# Patient Record
Sex: MALE | Race: WHITE | NOT HISPANIC OR LATINO | Employment: OTHER | URBAN - METROPOLITAN AREA
[De-identification: names, ages, dates, MRNs, and addresses within clinical notes are randomized per-mention and may not be internally consistent; named-entity substitution may affect disease eponyms.]

---

## 2018-06-07 LAB
ALBUMIN SERPL BCP-MCNC: 4.2 G/DL (ref 3.5–5.7)
ALP SERPL-CCNC: 82 IU/L (ref 55–165)
ALT SERPL W P-5'-P-CCNC: 16 IU/L (ref 10–35)
ANION GAP SERPL CALCULATED.3IONS-SCNC: 13.2 MM/L
AST SERPL W P-5'-P-CCNC: 17 U/L (ref 8–27)
BASOPHILS # BLD AUTO: 0 X3/UL (ref 0–0.3)
BASOPHILS # BLD AUTO: 0.5 % (ref 0–2)
BILIRUB SERPL-MCNC: 0.6 MG/DL (ref 0.3–1)
BUN SERPL-MCNC: 17 MG/DL (ref 7–25)
CALCIUM SERPL-MCNC: 9.5 MG/DL (ref 8.6–10.5)
CHLORIDE SERPL-SCNC: 102 MM/L (ref 98–107)
CHOLEST SERPL-MCNC: 138 MG/DL (ref 0–200)
CO2 SERPL-SCNC: 26 MM/L (ref 21–31)
CREAT SERPL-MCNC: 1.12 MG/DL (ref 0.7–1.3)
DEPRECATED RDW RBC AUTO: 14.2 % (ref 11.5–14.5)
EGFR (HISTORICAL): > 60 GFR
EGFR AFRICAN AMERICAN (HISTORICAL): > 60 GFR
EOSINOPHIL # BLD AUTO: 0 X3/UL (ref 0–0.5)
EOSINOPHIL NFR BLD AUTO: 0.9 % (ref 0–5)
GLUCOSE (HISTORICAL): 125 MG/DL (ref 65–99)
HCT VFR BLD AUTO: 45.3 % (ref 42–52)
HDLC SERPL-MCNC: 44 MG/DL (ref 40–60)
HGB BLD-MCNC: 15.6 G/DL (ref 14–18)
LDLC SERPL CALC-MCNC: 66.3 MG/DL (ref 75–193)
LYMPHOCYTES # BLD AUTO: 1.8 X3/UL (ref 1.2–4.2)
LYMPHOCYTES NFR BLD AUTO: 33.3 % (ref 20.5–51.1)
MCH RBC QN AUTO: 32.8 PG (ref 26–34)
MCHC RBC AUTO-ENTMCNC: 34.5 G/DL (ref 31–36)
MCV RBC AUTO: 95.1 FL (ref 81–99)
MONOCYTES # BLD AUTO: 0.4 X3/UL (ref 0–1)
MONOCYTES NFR BLD AUTO: 8.2 % (ref 1.7–12)
NEUTROPHILS # BLD AUTO: 3.1 X3/UL (ref 1.4–6.5)
NEUTS SEG NFR BLD AUTO: 57.1 % (ref 42.2–75.2)
OSMOLALITY, SERUM (HISTORICAL): 277 MOSM (ref 262–291)
PLATELET # BLD AUTO: 258 X3/UL (ref 130–400)
PMV BLD AUTO: 9.6 FL (ref 8.6–11.7)
POTASSIUM SERPL-SCNC: 4.2 MM/L (ref 3.5–5.5)
RBC # BLD AUTO: 4.76 X6/UL (ref 4.3–5.9)
SODIUM SERPL-SCNC: 137 MM/L (ref 134–143)
TOTAL PROTEIN (HISTORICAL): 6.8 G/DL (ref 6.4–8.9)
TRIGL SERPL-MCNC: 140 MG/DL (ref 44–166)
VLDL CHOLESTEROL (HISTORICAL): 28 MG/DL (ref 5–51)
WBC # BLD AUTO: 5.4 X3/UL (ref 4.8–10.8)

## 2018-08-17 DIAGNOSIS — I10 HYPERTENSION, UNSPECIFIED TYPE: Primary | ICD-10-CM

## 2018-08-17 RX ORDER — METOPROLOL SUCCINATE 50 MG/1
50 TABLET, EXTENDED RELEASE ORAL DAILY
Qty: 90 TABLET | Refills: 3 | Status: SHIPPED | OUTPATIENT
Start: 2018-08-17 | End: 2019-05-29 | Stop reason: SDUPTHER

## 2019-05-29 ENCOUNTER — OFFICE VISIT (OUTPATIENT)
Dept: CARDIOLOGY CLINIC | Facility: CLINIC | Age: 69
End: 2019-05-29
Payer: MEDICARE

## 2019-05-29 VITALS
DIASTOLIC BLOOD PRESSURE: 94 MMHG | HEART RATE: 58 BPM | HEIGHT: 70 IN | SYSTOLIC BLOOD PRESSURE: 136 MMHG | BODY MASS INDEX: 28.2 KG/M2 | WEIGHT: 197 LBS

## 2019-05-29 DIAGNOSIS — I25.10 CORONARY ARTERY DISEASE INVOLVING NATIVE CORONARY ARTERY OF NATIVE HEART WITHOUT ANGINA PECTORIS: Primary | ICD-10-CM

## 2019-05-29 DIAGNOSIS — I10 HYPERTENSION, UNSPECIFIED TYPE: ICD-10-CM

## 2019-05-29 DIAGNOSIS — E78.2 MIXED HYPERLIPIDEMIA: ICD-10-CM

## 2019-05-29 PROCEDURE — 93000 ELECTROCARDIOGRAM COMPLETE: CPT | Performed by: INTERNAL MEDICINE

## 2019-05-29 PROCEDURE — 99214 OFFICE O/P EST MOD 30 MIN: CPT | Performed by: PHYSICIAN ASSISTANT

## 2019-05-29 RX ORDER — ATORVASTATIN CALCIUM 80 MG/1
80 TABLET, FILM COATED ORAL DAILY
Qty: 90 TABLET | Refills: 3 | Status: SHIPPED | OUTPATIENT
Start: 2019-05-29

## 2019-05-29 RX ORDER — ESCITALOPRAM OXALATE 20 MG/1
20 TABLET ORAL DAILY
Refills: 1 | Status: ON HOLD | COMMUNITY
Start: 2019-05-03 | End: 2021-02-24

## 2019-05-29 RX ORDER — ATORVASTATIN CALCIUM 80 MG/1
80 TABLET, FILM COATED ORAL DAILY
Refills: 3 | COMMUNITY
Start: 2019-04-16 | End: 2019-05-29 | Stop reason: SDUPTHER

## 2019-05-29 RX ORDER — METOPROLOL SUCCINATE 50 MG/1
50 TABLET, EXTENDED RELEASE ORAL DAILY
Qty: 90 TABLET | Refills: 3 | Status: ON HOLD | OUTPATIENT
Start: 2019-05-29 | End: 2021-02-24

## 2019-05-29 RX ORDER — ASPIRIN 81 MG/1
81 TABLET ORAL DAILY
COMMUNITY

## 2020-02-25 ENCOUNTER — APPOINTMENT (OUTPATIENT)
Dept: LAB | Facility: HOSPITAL | Age: 70
End: 2020-02-25
Attending: OTOLARYNGOLOGY
Payer: MEDICARE

## 2020-02-25 ENCOUNTER — HOSPITAL ENCOUNTER (OUTPATIENT)
Dept: CT IMAGING | Facility: HOSPITAL | Age: 70
Discharge: HOME | End: 2020-02-25
Attending: OTOLARYNGOLOGY
Payer: MEDICARE

## 2020-02-25 DIAGNOSIS — H90.A32 MIXED CONDUCTIVE AND SENSORINEURAL HEARING LOSS OF LEFT EAR WITH RESTRICTED HEARING OF RIGHT EAR: ICD-10-CM

## 2020-02-25 LAB
BUN SERPL-MCNC: 18 MG/DL (ref 7–25)
CREAT SERPL-MCNC: 1.37 MG/DL (ref 0.7–1.3)
GFR SERPL CREATININE-BSD FRML MDRD: 53 ML/MIN/1.73SQ M

## 2020-02-25 PROCEDURE — 84520 ASSAY OF UREA NITROGEN: CPT

## 2020-02-25 PROCEDURE — 82565 ASSAY OF CREATININE: CPT

## 2020-02-25 PROCEDURE — 36415 COLL VENOUS BLD VENIPUNCTURE: CPT

## 2020-02-25 PROCEDURE — 70480 CT ORBIT/EAR/FOSSA W/O DYE: CPT

## 2020-05-08 ENCOUNTER — OFFICE VISIT (OUTPATIENT)
Dept: CARDIOLOGY CLINIC | Facility: CLINIC | Age: 70
End: 2020-05-08
Payer: MEDICARE

## 2020-05-08 VITALS
WEIGHT: 200 LBS | BODY MASS INDEX: 28.63 KG/M2 | SYSTOLIC BLOOD PRESSURE: 124 MMHG | HEART RATE: 55 BPM | HEIGHT: 70 IN | DIASTOLIC BLOOD PRESSURE: 82 MMHG

## 2020-05-08 DIAGNOSIS — I25.10 CORONARY ARTERY DISEASE INVOLVING NATIVE CORONARY ARTERY OF NATIVE HEART WITHOUT ANGINA PECTORIS: ICD-10-CM

## 2020-05-08 DIAGNOSIS — I10 HYPERTENSION, UNSPECIFIED TYPE: Primary | ICD-10-CM

## 2020-05-08 DIAGNOSIS — E78.2 MIXED HYPERLIPIDEMIA: ICD-10-CM

## 2020-05-08 PROCEDURE — 99214 OFFICE O/P EST MOD 30 MIN: CPT | Performed by: INTERNAL MEDICINE

## 2020-05-08 PROCEDURE — 93000 ELECTROCARDIOGRAM COMPLETE: CPT | Performed by: INTERNAL MEDICINE

## 2020-07-15 ENCOUNTER — TRANSCRIBE ORDERS (OUTPATIENT)
Dept: LAB | Facility: MEDICAL CENTER | Age: 70
End: 2020-07-15

## 2020-07-15 ENCOUNTER — APPOINTMENT (OUTPATIENT)
Dept: LAB | Facility: MEDICAL CENTER | Age: 70
End: 2020-07-15
Payer: MEDICARE

## 2020-07-15 DIAGNOSIS — I25.10 ASCVD (ARTERIOSCLEROTIC CARDIOVASCULAR DISEASE): ICD-10-CM

## 2020-07-15 DIAGNOSIS — I10 HYPERTENSION, UNSPECIFIED TYPE: ICD-10-CM

## 2020-07-15 DIAGNOSIS — I25.10 ASCVD (ARTERIOSCLEROTIC CARDIOVASCULAR DISEASE): Primary | ICD-10-CM

## 2020-07-15 DIAGNOSIS — E78.5 HYPERLIPIDEMIA, UNSPECIFIED HYPERLIPIDEMIA TYPE: ICD-10-CM

## 2020-07-15 LAB
ALBUMIN SERPL BCP-MCNC: 4.1 G/DL (ref 3.5–5)
ALP SERPL-CCNC: 99 U/L (ref 46–116)
ALT SERPL W P-5'-P-CCNC: 23 U/L (ref 12–78)
ANION GAP SERPL CALCULATED.3IONS-SCNC: 5 MMOL/L (ref 4–13)
AST SERPL W P-5'-P-CCNC: 17 U/L (ref 5–45)
BASOPHILS # BLD AUTO: 0.03 THOUSANDS/ΜL (ref 0–0.1)
BASOPHILS NFR BLD AUTO: 1 % (ref 0–1)
BILIRUB SERPL-MCNC: 1.1 MG/DL (ref 0.2–1)
BUN SERPL-MCNC: 18 MG/DL (ref 5–25)
CALCIUM SERPL-MCNC: 9.4 MG/DL (ref 8.3–10.1)
CHLORIDE SERPL-SCNC: 104 MMOL/L (ref 100–108)
CHOLEST SERPL-MCNC: 135 MG/DL (ref 50–200)
CO2 SERPL-SCNC: 26 MMOL/L (ref 21–32)
CREAT SERPL-MCNC: 1.12 MG/DL (ref 0.6–1.3)
EOSINOPHIL # BLD AUTO: 0.02 THOUSAND/ΜL (ref 0–0.61)
EOSINOPHIL NFR BLD AUTO: 0 % (ref 0–6)
ERYTHROCYTE [DISTWIDTH] IN BLOOD BY AUTOMATED COUNT: 13.6 % (ref 11.6–15.1)
GFR SERPL CREATININE-BSD FRML MDRD: 67 ML/MIN/1.73SQ M
GLUCOSE P FAST SERPL-MCNC: 89 MG/DL (ref 65–99)
HCT VFR BLD AUTO: 49.4 % (ref 36.5–49.3)
HDLC SERPL-MCNC: 45 MG/DL
HGB BLD-MCNC: 16.5 G/DL (ref 12–17)
IMM GRANULOCYTES # BLD AUTO: 0.02 THOUSAND/UL (ref 0–0.2)
IMM GRANULOCYTES NFR BLD AUTO: 0 % (ref 0–2)
LDLC SERPL CALC-MCNC: 75 MG/DL (ref 0–100)
LYMPHOCYTES # BLD AUTO: 1.76 THOUSANDS/ΜL (ref 0.6–4.47)
LYMPHOCYTES NFR BLD AUTO: 32 % (ref 14–44)
MCH RBC QN AUTO: 31.1 PG (ref 26.8–34.3)
MCHC RBC AUTO-ENTMCNC: 33.4 G/DL (ref 31.4–37.4)
MCV RBC AUTO: 93 FL (ref 82–98)
MONOCYTES # BLD AUTO: 0.49 THOUSAND/ΜL (ref 0.17–1.22)
MONOCYTES NFR BLD AUTO: 9 % (ref 4–12)
NEUTROPHILS # BLD AUTO: 3.2 THOUSANDS/ΜL (ref 1.85–7.62)
NEUTS SEG NFR BLD AUTO: 58 % (ref 43–75)
NONHDLC SERPL-MCNC: 90 MG/DL
NRBC BLD AUTO-RTO: 0 /100 WBCS
PLATELET # BLD AUTO: 312 THOUSANDS/UL (ref 149–390)
PMV BLD AUTO: 11.6 FL (ref 8.9–12.7)
POTASSIUM SERPL-SCNC: 3.8 MMOL/L (ref 3.5–5.3)
PROT SERPL-MCNC: 7.8 G/DL (ref 6.4–8.2)
PSA SERPL-MCNC: <0.1 NG/ML (ref 0–4)
RBC # BLD AUTO: 5.3 MILLION/UL (ref 3.88–5.62)
SODIUM SERPL-SCNC: 135 MMOL/L (ref 136–145)
TRIGL SERPL-MCNC: 74 MG/DL
WBC # BLD AUTO: 5.52 THOUSAND/UL (ref 4.31–10.16)

## 2020-07-15 PROCEDURE — G0103 PSA SCREENING: HCPCS

## 2020-07-15 PROCEDURE — 80061 LIPID PANEL: CPT

## 2020-07-15 PROCEDURE — 80053 COMPREHEN METABOLIC PANEL: CPT

## 2020-07-15 PROCEDURE — 85025 COMPLETE CBC W/AUTO DIFF WBC: CPT

## 2020-07-15 PROCEDURE — 36415 COLL VENOUS BLD VENIPUNCTURE: CPT

## 2020-11-13 ENCOUNTER — HOSPITAL ENCOUNTER (EMERGENCY)
Facility: HOSPITAL | Age: 70
Discharge: HOME | End: 2020-11-13
Attending: INTERNAL MEDICINE | Admitting: INTERNAL MEDICINE
Payer: MEDICARE

## 2020-11-13 ENCOUNTER — APPOINTMENT (EMERGENCY)
Dept: RADIOLOGY | Facility: HOSPITAL | Age: 70
End: 2020-11-13
Payer: MEDICARE

## 2020-11-13 VITALS
BODY MASS INDEX: 26.48 KG/M2 | WEIGHT: 185 LBS | TEMPERATURE: 97.4 F | DIASTOLIC BLOOD PRESSURE: 97 MMHG | SYSTOLIC BLOOD PRESSURE: 170 MMHG | OXYGEN SATURATION: 97 % | HEIGHT: 70 IN | HEART RATE: 72 BPM | RESPIRATION RATE: 16 BRPM

## 2020-11-13 DIAGNOSIS — S43.014A ANTERIOR DISLOCATION OF RIGHT SHOULDER, INITIAL ENCOUNTER: Primary | ICD-10-CM

## 2020-11-13 PROCEDURE — 73030 X-RAY EXAM OF SHOULDER: CPT

## 2020-11-13 PROCEDURE — 99283 EMERGENCY DEPT VISIT LOW MDM: CPT

## 2020-11-13 PROCEDURE — 23650 CLTX SHO DSLC W/MNPJ WO ANES: CPT | Performed by: INTERNAL MEDICINE

## 2020-11-13 PROCEDURE — 99284 EMERGENCY DEPT VISIT MOD MDM: CPT | Performed by: INTERNAL MEDICINE

## 2020-11-13 PROCEDURE — 90471 IMMUNIZATION ADMIN: CPT

## 2020-11-13 PROCEDURE — 96375 TX/PRO/DX INJ NEW DRUG ADDON: CPT

## 2020-11-13 PROCEDURE — 90715 TDAP VACCINE 7 YRS/> IM: CPT | Performed by: INTERNAL MEDICINE

## 2020-11-13 PROCEDURE — 96374 THER/PROPH/DIAG INJ IV PUSH: CPT

## 2020-11-13 RX ORDER — MIDAZOLAM HYDROCHLORIDE 2 MG/2ML
5 INJECTION, SOLUTION INTRAMUSCULAR; INTRAVENOUS ONCE
Status: DISCONTINUED | OUTPATIENT
Start: 2020-11-13 | End: 2020-11-13 | Stop reason: HOSPADM

## 2020-11-13 RX ORDER — HYDROMORPHONE HCL/PF 1 MG/ML
0.5 SYRINGE (ML) INJECTION ONCE
Status: COMPLETED | OUTPATIENT
Start: 2020-11-13 | End: 2020-11-13

## 2020-11-13 RX ORDER — KETOROLAC TROMETHAMINE 30 MG/ML
15 INJECTION, SOLUTION INTRAMUSCULAR; INTRAVENOUS ONCE
Status: COMPLETED | OUTPATIENT
Start: 2020-11-13 | End: 2020-11-13

## 2020-11-13 RX ADMIN — KETOROLAC TROMETHAMINE 15 MG: 30 INJECTION, SOLUTION INTRAMUSCULAR; INTRAVENOUS at 17:50

## 2020-11-13 RX ADMIN — HYDROMORPHONE HYDROCHLORIDE 0.5 MG: 1 INJECTION, SOLUTION INTRAMUSCULAR; INTRAVENOUS; SUBCUTANEOUS at 18:44

## 2020-11-13 RX ADMIN — TETANUS TOXOID, REDUCED DIPHTHERIA TOXOID AND ACELLULAR PERTUSSIS VACCINE, ADSORBED 0.5 ML: 5; 2.5; 8; 8; 2.5 SUSPENSION INTRAMUSCULAR at 17:02

## 2020-11-17 ENCOUNTER — OFFICE VISIT (OUTPATIENT)
Dept: OBGYN CLINIC | Facility: CLINIC | Age: 70
End: 2020-11-17
Payer: MEDICARE

## 2020-11-17 VITALS
WEIGHT: 192 LBS | TEMPERATURE: 97.5 F | BODY MASS INDEX: 27.49 KG/M2 | DIASTOLIC BLOOD PRESSURE: 88 MMHG | SYSTOLIC BLOOD PRESSURE: 149 MMHG | HEIGHT: 70 IN | HEART RATE: 91 BPM

## 2020-11-17 DIAGNOSIS — S43.004A DISLOCATION OF RIGHT SHOULDER JOINT, INITIAL ENCOUNTER: Primary | ICD-10-CM

## 2020-11-17 PROCEDURE — 99203 OFFICE O/P NEW LOW 30 MIN: CPT | Performed by: ORTHOPAEDIC SURGERY

## 2020-12-15 ENCOUNTER — APPOINTMENT (OUTPATIENT)
Dept: RADIOLOGY | Facility: CLINIC | Age: 70
End: 2020-12-15
Payer: MEDICARE

## 2020-12-15 ENCOUNTER — OFFICE VISIT (OUTPATIENT)
Dept: OBGYN CLINIC | Facility: CLINIC | Age: 70
End: 2020-12-15
Payer: MEDICARE

## 2020-12-15 VITALS
WEIGHT: 192 LBS | SYSTOLIC BLOOD PRESSURE: 138 MMHG | HEART RATE: 78 BPM | HEIGHT: 70 IN | DIASTOLIC BLOOD PRESSURE: 95 MMHG | BODY MASS INDEX: 27.49 KG/M2

## 2020-12-15 DIAGNOSIS — S43.004D DISLOCATION OF RIGHT SHOULDER JOINT, SUBSEQUENT ENCOUNTER: Primary | ICD-10-CM

## 2020-12-15 DIAGNOSIS — S43.004D DISLOCATION OF RIGHT SHOULDER JOINT, SUBSEQUENT ENCOUNTER: ICD-10-CM

## 2020-12-15 PROCEDURE — 99213 OFFICE O/P EST LOW 20 MIN: CPT | Performed by: ORTHOPAEDIC SURGERY

## 2020-12-15 PROCEDURE — 73030 X-RAY EXAM OF SHOULDER: CPT

## 2021-01-26 ENCOUNTER — OFFICE VISIT (OUTPATIENT)
Dept: OBGYN CLINIC | Facility: CLINIC | Age: 71
End: 2021-01-26
Payer: MEDICARE

## 2021-01-26 VITALS
BODY MASS INDEX: 28.2 KG/M2 | SYSTOLIC BLOOD PRESSURE: 165 MMHG | WEIGHT: 197 LBS | HEART RATE: 91 BPM | HEIGHT: 70 IN | DIASTOLIC BLOOD PRESSURE: 103 MMHG

## 2021-01-26 DIAGNOSIS — S43.004D DISLOCATION OF RIGHT SHOULDER JOINT, SUBSEQUENT ENCOUNTER: Primary | ICD-10-CM

## 2021-01-26 DIAGNOSIS — M75.101 TEAR OF RIGHT ROTATOR CUFF, UNSPECIFIED TEAR EXTENT, UNSPECIFIED WHETHER TRAUMATIC: ICD-10-CM

## 2021-01-26 PROCEDURE — 99213 OFFICE O/P EST LOW 20 MIN: CPT | Performed by: ORTHOPAEDIC SURGERY

## 2021-01-26 NOTE — PROGRESS NOTES
Assessment/Plan:    No problem-specific Assessment & Plan notes found for this encounter  Diagnoses and all orders for this visit:    Dislocation of right shoulder joint, subsequent encounter    Tear of right rotator cuff, unspecified tear extent, unspecified whether traumatic           the patient has persistent pain and weakness  An MRI is ordered for his right shoulder to look for rotator cuff tear  He returned back once the MRI is   Complete    Subjective:      Patient ID: Yan Arango  is a 72 y o  male  HPI      the patient is 2 months status post right shoulder dislocation  He states he still has some pain and weakness present  He states his range of motion is improved however  He denies any numbness or tingling  He denies any fever chills  The following portions of the patient's history were reviewed and updated as appropriate: allergies, current medications, past family history, past medical history, past social history, past surgical history and problem list     Review of Systems   Constitutional: Negative for chills, fever and unexpected weight change  HENT: Negative for hearing loss, nosebleeds and sore throat  Eyes: Negative for pain, redness and visual disturbance  Respiratory: Negative for cough, shortness of breath and wheezing  Cardiovascular: Negative for chest pain, palpitations and leg swelling  Gastrointestinal: Negative for abdominal pain, nausea and vomiting  Endocrine: Negative for polydipsia and polyuria  Genitourinary: Negative for dysuria and hematuria  Musculoskeletal: Positive for arthralgias, joint swelling and myalgias  Negative for back pain, gait problem, neck pain and neck stiffness  As noted in HPI   Skin: Negative for rash and wound  Neurological: Negative for dizziness, numbness and headaches  Psychiatric/Behavioral: Negative for decreased concentration and suicidal ideas  The patient is not nervous/anxious  Objective:      BP (!) 165/103 (BP Location: Left arm, Patient Position: Sitting, Cuff Size: Large)   Pulse 91   Ht 5' 10" (1 778 m)   Wt 89 4 kg (197 lb)   BMI 28 27 kg/m²          Physical Exam        Right upper extremity is neurovascular intact  Fingers are pink and mobile  Compartments are soft  Range of motion of her shoulder is improved with 110° of forward flexion and abduction  Internal rotation to L3-L4  Abductor strength is 3-1/2 to 4/5  There is a positive drop-arm test   There is mild signs of impingement  No gross instability  No crepitation is present

## 2021-02-04 ENCOUNTER — HOSPITAL ENCOUNTER (OUTPATIENT)
Dept: MRI IMAGING | Facility: HOSPITAL | Age: 71
Discharge: HOME | End: 2021-02-04
Attending: ORTHOPAEDIC SURGERY
Payer: MEDICARE

## 2021-02-04 DIAGNOSIS — M75.101 TEAR OF RIGHT ROTATOR CUFF, UNSPECIFIED TEAR EXTENT, UNSPECIFIED WHETHER TRAUMATIC: ICD-10-CM

## 2021-02-04 DIAGNOSIS — S43.004D DISLOCATION OF RIGHT SHOULDER JOINT, SUBSEQUENT ENCOUNTER: ICD-10-CM

## 2021-02-04 PROCEDURE — 73221 MRI JOINT UPR EXTREM W/O DYE: CPT

## 2021-02-04 PROCEDURE — G1004 CDSM NDSC: HCPCS

## 2021-02-09 ENCOUNTER — OFFICE VISIT (OUTPATIENT)
Dept: OBGYN CLINIC | Facility: CLINIC | Age: 71
End: 2021-02-09
Payer: MEDICARE

## 2021-02-09 ENCOUNTER — APPOINTMENT (OUTPATIENT)
Dept: LAB | Facility: CLINIC | Age: 71
End: 2021-02-09
Payer: MEDICARE

## 2021-02-09 ENCOUNTER — PREP FOR PROCEDURE (OUTPATIENT)
Dept: OBGYN CLINIC | Facility: CLINIC | Age: 71
End: 2021-02-09

## 2021-02-09 VITALS
HEART RATE: 86 BPM | WEIGHT: 197 LBS | SYSTOLIC BLOOD PRESSURE: 142 MMHG | BODY MASS INDEX: 28.2 KG/M2 | DIASTOLIC BLOOD PRESSURE: 88 MMHG | HEIGHT: 70 IN

## 2021-02-09 DIAGNOSIS — M75.121 COMPLETE TEAR OF RIGHT ROTATOR CUFF, UNSPECIFIED WHETHER TRAUMATIC: Primary | ICD-10-CM

## 2021-02-09 DIAGNOSIS — Z01.812 PRE-OPERATIVE LABORATORY EXAMINATION: ICD-10-CM

## 2021-02-09 PROBLEM — S46.011A TRAUMATIC COMPLETE TEAR OF RIGHT ROTATOR CUFF: Status: ACTIVE | Noted: 2021-02-09

## 2021-02-09 LAB
ANION GAP SERPL CALCULATED.3IONS-SCNC: 4 MMOL/L (ref 4–13)
BASOPHILS # BLD AUTO: 0.05 THOUSANDS/ΜL (ref 0–0.1)
BASOPHILS NFR BLD AUTO: 1 % (ref 0–1)
BUN SERPL-MCNC: 16 MG/DL (ref 5–25)
CALCIUM SERPL-MCNC: 10.1 MG/DL (ref 8.3–10.1)
CHLORIDE SERPL-SCNC: 109 MMOL/L (ref 100–108)
CO2 SERPL-SCNC: 28 MMOL/L (ref 21–32)
CREAT SERPL-MCNC: 1.2 MG/DL (ref 0.6–1.3)
EOSINOPHIL # BLD AUTO: 0.03 THOUSAND/ΜL (ref 0–0.61)
EOSINOPHIL NFR BLD AUTO: 1 % (ref 0–6)
ERYTHROCYTE [DISTWIDTH] IN BLOOD BY AUTOMATED COUNT: 14.4 % (ref 11.6–15.1)
GFR SERPL CREATININE-BSD FRML MDRD: 61 ML/MIN/1.73SQ M
GLUCOSE P FAST SERPL-MCNC: 79 MG/DL (ref 65–99)
HCT VFR BLD AUTO: 52.8 % (ref 36.5–49.3)
HGB BLD-MCNC: 17.1 G/DL (ref 12–17)
IMM GRANULOCYTES # BLD AUTO: 0.01 THOUSAND/UL (ref 0–0.2)
IMM GRANULOCYTES NFR BLD AUTO: 0 % (ref 0–2)
LYMPHOCYTES # BLD AUTO: 1.71 THOUSANDS/ΜL (ref 0.6–4.47)
LYMPHOCYTES NFR BLD AUTO: 30 % (ref 14–44)
MCH RBC QN AUTO: 30.1 PG (ref 26.8–34.3)
MCHC RBC AUTO-ENTMCNC: 32.4 G/DL (ref 31.4–37.4)
MCV RBC AUTO: 93 FL (ref 82–98)
MONOCYTES # BLD AUTO: 0.55 THOUSAND/ΜL (ref 0.17–1.22)
MONOCYTES NFR BLD AUTO: 10 % (ref 4–12)
NEUTROPHILS # BLD AUTO: 3.31 THOUSANDS/ΜL (ref 1.85–7.62)
NEUTS SEG NFR BLD AUTO: 58 % (ref 43–75)
NRBC BLD AUTO-RTO: 0 /100 WBCS
PLATELET # BLD AUTO: 389 THOUSANDS/UL (ref 149–390)
PMV BLD AUTO: 11 FL (ref 8.9–12.7)
POTASSIUM SERPL-SCNC: 4.2 MMOL/L (ref 3.5–5.3)
RBC # BLD AUTO: 5.69 MILLION/UL (ref 3.88–5.62)
SODIUM SERPL-SCNC: 141 MMOL/L (ref 136–145)
WBC # BLD AUTO: 5.66 THOUSAND/UL (ref 4.31–10.16)

## 2021-02-09 PROCEDURE — 99213 OFFICE O/P EST LOW 20 MIN: CPT | Performed by: ORTHOPAEDIC SURGERY

## 2021-02-09 PROCEDURE — 36415 COLL VENOUS BLD VENIPUNCTURE: CPT

## 2021-02-09 PROCEDURE — 80048 BASIC METABOLIC PNL TOTAL CA: CPT

## 2021-02-09 PROCEDURE — 85025 COMPLETE CBC W/AUTO DIFF WBC: CPT

## 2021-02-09 RX ORDER — CHLORHEXIDINE GLUCONATE 4 G/100ML
SOLUTION TOPICAL DAILY PRN
Status: CANCELLED | OUTPATIENT
Start: 2021-02-24

## 2021-02-09 RX ORDER — CEFAZOLIN SODIUM 2 G/50ML
2000 SOLUTION INTRAVENOUS ONCE
Status: CANCELLED | OUTPATIENT
Start: 2021-02-24 | End: 2021-02-09

## 2021-02-09 RX ORDER — CHLORHEXIDINE GLUCONATE 0.12 MG/ML
15 RINSE ORAL ONCE
Status: CANCELLED | OUTPATIENT
Start: 2021-02-24 | End: 2021-02-09

## 2021-02-09 NOTE — PROGRESS NOTES
Assessment/Plan:    No problem-specific Assessment & Plan notes found for this encounter  Diagnoses and all orders for this visit:    Complete tear of right rotator cuff, unspecified whether traumatic           the patient has opted for elective arthroscopy of his right shoulder with a probable rotator cuff repair  All risks, complications, and benefits were discussed with the patient in great detail including bleeding, infection, blood clots, pain, stiffness, neurovascular damage, fractures, dislocations, the possibility of loss of life or limb for surgery, heart attack, stroke, etcetera, rerupture of the rotator cuff, inability to repair the rotator cuff based on tissue and bone quality      his surgery is scheduled for Wednesday February 24, 2021    Subjective:      Patient ID: Darron Gee  is a 72 y o  male  HPI      the patient presents for his MRI report of his right shoulder  It was consistent with a full-thickness tear of the rotator cuff  He still has trouble lifting his shoulder  He denies any numbness or tingling  The pain does affect his lifestyle  He wants to proceed with elective arthroscopy and rotator cuff repair  The following portions of the patient's history were reviewed and updated as appropriate: allergies, current medications, past family history, past medical history, past social history, past surgical history and problem list     Review of Systems   Constitutional: Negative for chills, fever and unexpected weight change  HENT: Negative for hearing loss, nosebleeds and sore throat  Eyes: Negative for pain, redness and visual disturbance  Respiratory: Negative for cough, shortness of breath and wheezing  Cardiovascular: Negative for chest pain, palpitations and leg swelling  Gastrointestinal: Negative for abdominal pain, nausea and vomiting  Endocrine: Negative for polydipsia and polyuria  Genitourinary: Negative for dysuria and hematuria  Musculoskeletal: Positive for arthralgias and myalgias  Negative for back pain, gait problem, joint swelling, neck pain and neck stiffness  As noted in HPI   Skin: Negative for rash and wound  Neurological: Negative for dizziness, numbness and headaches  Psychiatric/Behavioral: Negative for decreased concentration and suicidal ideas  The patient is not nervous/anxious  Objective:      /88 (BP Location: Left arm, Patient Position: Sitting, Cuff Size: Large)   Pulse 86   Ht 5' 10" (1 778 m)   Wt 89 4 kg (197 lb)   BMI 28 27 kg/m²          Physical Exam        Neck was soft and supple  There is a negative axial compression test is neck  Right upper extremity was neurovascular intact  Fingers are pink and mobile  Compartments are soft  There is limited range of motion along his right shoulder and which could flex and abduct to 90°  There is mild signs of impingement  No gross instability  Rotator cuff strength testing was 3 5/5    There is a positive drop-arm test       MRI was consistent with a full-thickness tear of the rotator cuff

## 2021-02-09 NOTE — H&P (VIEW-ONLY)
Assessment/Plan:    No problem-specific Assessment & Plan notes found for this encounter  Diagnoses and all orders for this visit:    Complete tear of right rotator cuff, unspecified whether traumatic           the patient has opted for elective arthroscopy of his right shoulder with a probable rotator cuff repair  All risks, complications, and benefits were discussed with the patient in great detail including bleeding, infection, blood clots, pain, stiffness, neurovascular damage, fractures, dislocations, the possibility of loss of life or limb for surgery, heart attack, stroke, etcetera, rerupture of the rotator cuff, inability to repair the rotator cuff based on tissue and bone quality      his surgery is scheduled for Wednesday February 24, 2021    Subjective:      Patient ID: Gio Fernandes  is a 72 y o  male  HPI      the patient presents for his MRI report of his right shoulder  It was consistent with a full-thickness tear of the rotator cuff  He still has trouble lifting his shoulder  He denies any numbness or tingling  The pain does affect his lifestyle  He wants to proceed with elective arthroscopy and rotator cuff repair  The following portions of the patient's history were reviewed and updated as appropriate: allergies, current medications, past family history, past medical history, past social history, past surgical history and problem list     Review of Systems   Constitutional: Negative for chills, fever and unexpected weight change  HENT: Negative for hearing loss, nosebleeds and sore throat  Eyes: Negative for pain, redness and visual disturbance  Respiratory: Negative for cough, shortness of breath and wheezing  Cardiovascular: Negative for chest pain, palpitations and leg swelling  Gastrointestinal: Negative for abdominal pain, nausea and vomiting  Endocrine: Negative for polydipsia and polyuria  Genitourinary: Negative for dysuria and hematuria  Musculoskeletal: Positive for arthralgias and myalgias  Negative for back pain, gait problem, joint swelling, neck pain and neck stiffness  As noted in HPI   Skin: Negative for rash and wound  Neurological: Negative for dizziness, numbness and headaches  Psychiatric/Behavioral: Negative for decreased concentration and suicidal ideas  The patient is not nervous/anxious  Objective:      /88 (BP Location: Left arm, Patient Position: Sitting, Cuff Size: Large)   Pulse 86   Ht 5' 10" (1 778 m)   Wt 89 4 kg (197 lb)   BMI 28 27 kg/m²          Physical Exam        Neck was soft and supple  There is a negative axial compression test is neck  Right upper extremity was neurovascular intact  Fingers are pink and mobile  Compartments are soft  There is limited range of motion along his right shoulder and which could flex and abduct to 90°  There is mild signs of impingement  No gross instability  Rotator cuff strength testing was 3 5/5    There is a positive drop-arm test       MRI was consistent with a full-thickness tear of the rotator cuff

## 2021-02-17 ENCOUNTER — TELEPHONE (OUTPATIENT)
Dept: CARDIOLOGY CLINIC | Facility: CLINIC | Age: 71
End: 2021-02-17

## 2021-02-17 ENCOUNTER — TELEPHONE (OUTPATIENT)
Dept: OBGYN CLINIC | Facility: HOSPITAL | Age: 71
End: 2021-02-17

## 2021-02-17 NOTE — TELEPHONE ENCOUNTER
Spoke to Jackelin Cantu above message from Dr Gabbie Wilkes  She stated she will relay the message to the patient   Thank you

## 2021-02-17 NOTE — TELEPHONE ENCOUNTER
The aspirin should be stopped 8 days before the surgery    With that said, he should stop the aspirin now

## 2021-02-17 NOTE — TELEPHONE ENCOUNTER
Patient has surgery schedule on 2/24  Tanika from Pre-admission called for instructions about the Aspirin for patient    CB # [id number]

## 2021-02-17 NOTE — PRE-PROCEDURE INSTRUCTIONS
Pre-Surgery Instructions:   Medication Instructions    aspirin (ECOTRIN LOW STRENGTH) 81 mg EC tablet Hold for 8 days prior to surgery per  Dr Luke Parents atorvastatin (LIPITOR) 80 mg tablet Take as directed    carbidopa-levodopa (SINEMET)  mg per tablet Take as directed    escitalopram (LEXAPRO) 20 mg tablet Take as directed      My Surgical Experience    The following information was developed to assist you to prepare for your operation  What do I need to do before coming to the hospital?   Arrange for a responsible person to drive you to and from the hospital    Arrange care for your children at home  Children are not allowed in the recovery areas of the hospital   Plan to wear clothing that is easy to put on and take off  If you are having shoulder surgery, wear a shirt that buttons or zippers in the front  Bathing  o Shower the evening before and the morning of your surgery with an antibacterial soap  Please refer to the Pre Op Showering Instructions for Surgery Patients Sheet   o Remove nail polish and all body piercing jewelry  o Do not shave any body part for at least 24 hours before surgery-this includes face, arms, legs and upper body  Food  o Nothing to eat or drink after midnight the night before your surgery  This includes candy and chewing gum  o Exception: If your surgery is after 12:00pm (noon), you may have clear liquids such as 7-Up®, ginger ale, apple or cranberry juice, Jell-O®, water, or clear broth until 8:00 am  o Do not drink milk or juice with pulp on the morning before surgery  o Do not drink alcohol 24 hours before surgery  Medicine  o Follow instructions you received from your surgeon about which medicines you may take on the day of surgery  o If instructed to take medicine on the morning of surgery, take pills with just a small sip of water   Call your prescribing doctor for specific infroamtion on what to do if you take insulin    What should I bring to the hospital?    Bring:  Alexx Wallace or a walker, if you have them, for foot or knee surgery   A list of the daily medicines, vitamins, minerals, herbals and nutritional supplements you take  Include the dosages of medicines and the time you take them each day   Glasses, dentures or hearing aids   Minimal clothing; you will be wearing hospital sleepwear   Photo ID; required to verify your identity   If you have a Living Will or Power of , bring a copy of the documents   If you have an ostomy, bring an extra pouch and any supplies you use    Do not bring   Medicines or inhalers   Money, valuables or jewelry    What other information should I know about the day of surgery?  Notify your surgeons if you develop a cold, sore throat, cough, fever, rash or any other illness   Report to the Ambulatory Surgical/Same Day Surgery Unit   You will be instructed to stop at Registration only if you have not been pre-registered   Inform your  fi they do not stay that they will be asked by the staff to leave a phone number where they can be reached   Be available to be reached before surgery  In the event the operating room schedule changes, you may be asked to come in earlier or later than expected    *It is important to tell your doctor and others involved in your health care if you are taking or have been taking any non-prescription drugs, vitamins, minerals, herbals or other nutritional supplements   Any of these may interact with some food or medicines and cause a reaction

## 2021-02-17 NOTE — TELEPHONE ENCOUNTER
Pt has shoulder surgery/ possible rotator cuff repair  on 2/24/21  Dr Geoff Cruz wanted an 8 day hold of ASA prior to surgery         Please advise

## 2021-02-23 ENCOUNTER — OFFICE VISIT (OUTPATIENT)
Dept: LAB | Facility: HOSPITAL | Age: 71
End: 2021-02-23
Attending: PHYSICIAN ASSISTANT
Payer: MEDICARE

## 2021-02-23 ENCOUNTER — ANESTHESIA EVENT (OUTPATIENT)
Dept: PERIOP | Facility: HOSPITAL | Age: 71
End: 2021-02-23
Payer: MEDICARE

## 2021-02-23 DIAGNOSIS — Z01.812 PRE-OPERATIVE LABORATORY EXAMINATION: ICD-10-CM

## 2021-02-23 LAB
ATRIAL RATE: 87 BPM
P AXIS: 51 DEGREES
PR INTERVAL: 134 MS
QRS AXIS: -20 DEGREES
QRSD INTERVAL: 136 MS
QT INTERVAL: 378 MS
QTC INTERVAL: 454 MS
T WAVE AXIS: 3 DEGREES
VENTRICULAR RATE: 87 BPM

## 2021-02-23 PROCEDURE — 93005 ELECTROCARDIOGRAM TRACING: CPT

## 2021-02-24 ENCOUNTER — ANESTHESIA (OUTPATIENT)
Dept: PERIOP | Facility: HOSPITAL | Age: 71
End: 2021-02-24
Payer: MEDICARE

## 2021-02-24 ENCOUNTER — HOSPITAL ENCOUNTER (OUTPATIENT)
Facility: HOSPITAL | Age: 71
Setting detail: OUTPATIENT SURGERY
Discharge: HOME | End: 2021-02-24
Attending: ORTHOPAEDIC SURGERY | Admitting: ORTHOPAEDIC SURGERY
Payer: MEDICARE

## 2021-02-24 VITALS — HEART RATE: 84 BPM

## 2021-02-24 VITALS
HEART RATE: 73 BPM | RESPIRATION RATE: 18 BRPM | SYSTOLIC BLOOD PRESSURE: 128 MMHG | OXYGEN SATURATION: 93 % | TEMPERATURE: 97.2 F | DIASTOLIC BLOOD PRESSURE: 77 MMHG

## 2021-02-24 DIAGNOSIS — S46.011D TRAUMATIC COMPLETE TEAR OF RIGHT ROTATOR CUFF, SUBSEQUENT ENCOUNTER: Primary | ICD-10-CM

## 2021-02-24 DIAGNOSIS — M75.121 COMPLETE TEAR OF RIGHT ROTATOR CUFF, UNSPECIFIED WHETHER TRAUMATIC: ICD-10-CM

## 2021-02-24 PROCEDURE — 29826 SHO ARTHRS SRG DECOMPRESSION: CPT | Performed by: PHYSICIAN ASSISTANT

## 2021-02-24 PROCEDURE — 29826 SHO ARTHRS SRG DECOMPRESSION: CPT | Performed by: ORTHOPAEDIC SURGERY

## 2021-02-24 PROCEDURE — 29827 SHO ARTHRS SRG RT8TR CUF RPR: CPT | Performed by: ORTHOPAEDIC SURGERY

## 2021-02-24 PROCEDURE — C9290 INJ, BUPIVACAINE LIPOSOME: HCPCS | Performed by: ANESTHESIOLOGY

## 2021-02-24 PROCEDURE — 88304 TISSUE EXAM BY PATHOLOGIST: CPT | Performed by: CLINICAL MEDICAL LABORATORY

## 2021-02-24 PROCEDURE — C1713 ANCHOR/SCREW BN/BN,TIS/BN: HCPCS | Performed by: ORTHOPAEDIC SURGERY

## 2021-02-24 PROCEDURE — 29827 SHO ARTHRS SRG RT8TR CUF RPR: CPT | Performed by: PHYSICIAN ASSISTANT

## 2021-02-24 DEVICE — BIO-COMP SWIVELOCK C, CLD 5.5X19.1MM
Type: IMPLANTABLE DEVICE | Status: FUNCTIONAL
Brand: ARTHREX®

## 2021-02-24 RX ORDER — MIDAZOLAM HYDROCHLORIDE 2 MG/2ML
INJECTION, SOLUTION INTRAMUSCULAR; INTRAVENOUS
Status: COMPLETED | OUTPATIENT
Start: 2021-02-24 | End: 2021-02-24

## 2021-02-24 RX ORDER — CEFAZOLIN SODIUM 2 G/50ML
2000 SOLUTION INTRAVENOUS ONCE
Status: DISCONTINUED | OUTPATIENT
Start: 2021-02-24 | End: 2021-02-24 | Stop reason: HOSPADM

## 2021-02-24 RX ORDER — PROPOFOL 10 MG/ML
INJECTION, EMULSION INTRAVENOUS AS NEEDED
Status: DISCONTINUED | OUTPATIENT
Start: 2021-02-24 | End: 2021-02-24

## 2021-02-24 RX ORDER — POVIDONE-IODINE 10 MG/G
OINTMENT TOPICAL AS NEEDED
Status: DISCONTINUED | OUTPATIENT
Start: 2021-02-24 | End: 2021-02-24 | Stop reason: HOSPADM

## 2021-02-24 RX ORDER — PAROXETINE HYDROCHLORIDE 20 MG/1
20 TABLET, FILM COATED ORAL DAILY
COMMUNITY

## 2021-02-24 RX ORDER — CEFAZOLIN SODIUM 2 G/50ML
SOLUTION INTRAVENOUS AS NEEDED
Status: DISCONTINUED | OUTPATIENT
Start: 2021-02-24 | End: 2021-02-24

## 2021-02-24 RX ORDER — CEPHALEXIN 500 MG/1
500 CAPSULE ORAL EVERY 8 HOURS SCHEDULED
Qty: 9 CAPSULE | Refills: 0 | Status: SHIPPED | OUTPATIENT
Start: 2021-02-24 | End: 2021-02-27

## 2021-02-24 RX ORDER — ONDANSETRON 2 MG/ML
4 INJECTION INTRAMUSCULAR; INTRAVENOUS EVERY 6 HOURS PRN
Status: DISCONTINUED | OUTPATIENT
Start: 2021-02-24 | End: 2021-02-24 | Stop reason: HOSPADM

## 2021-02-24 RX ORDER — CHLORHEXIDINE GLUCONATE 0.12 MG/ML
15 RINSE ORAL ONCE
Status: COMPLETED | OUTPATIENT
Start: 2021-02-24 | End: 2021-02-24

## 2021-02-24 RX ORDER — SODIUM CHLORIDE, SODIUM LACTATE, POTASSIUM CHLORIDE, CALCIUM CHLORIDE 600; 310; 30; 20 MG/100ML; MG/100ML; MG/100ML; MG/100ML
125 INJECTION, SOLUTION INTRAVENOUS CONTINUOUS
Status: DISCONTINUED | OUTPATIENT
Start: 2021-02-24 | End: 2021-02-24 | Stop reason: HOSPADM

## 2021-02-24 RX ORDER — BUPIVACAINE HYDROCHLORIDE AND EPINEPHRINE 5; 5 MG/ML; UG/ML
INJECTION, SOLUTION PERINEURAL AS NEEDED
Status: DISCONTINUED | OUTPATIENT
Start: 2021-02-24 | End: 2021-02-24 | Stop reason: HOSPADM

## 2021-02-24 RX ORDER — LIDOCAINE HYDROCHLORIDE 10 MG/ML
INJECTION, SOLUTION EPIDURAL; INFILTRATION; INTRACAUDAL; PERINEURAL AS NEEDED
Status: DISCONTINUED | OUTPATIENT
Start: 2021-02-24 | End: 2021-02-24

## 2021-02-24 RX ORDER — FENTANYL CITRATE 50 UG/ML
INJECTION, SOLUTION INTRAMUSCULAR; INTRAVENOUS AS NEEDED
Status: DISCONTINUED | OUTPATIENT
Start: 2021-02-24 | End: 2021-02-24

## 2021-02-24 RX ORDER — MELOXICAM 15 MG/1
15 TABLET ORAL DAILY
Qty: 30 TABLET | Refills: 0 | Status: SHIPPED | OUTPATIENT
Start: 2021-02-24

## 2021-02-24 RX ORDER — BUPIVACAINE HYDROCHLORIDE 2.5 MG/ML
INJECTION, SOLUTION EPIDURAL; INFILTRATION; INTRACAUDAL
Status: COMPLETED | OUTPATIENT
Start: 2021-02-24 | End: 2021-02-24

## 2021-02-24 RX ORDER — OXYCODONE HYDROCHLORIDE AND ACETAMINOPHEN 5; 325 MG/1; MG/1
1 TABLET ORAL EVERY 4 HOURS PRN
Status: DISCONTINUED | OUTPATIENT
Start: 2021-02-24 | End: 2021-02-24 | Stop reason: HOSPADM

## 2021-02-24 RX ORDER — DEXAMETHASONE SODIUM PHOSPHATE 10 MG/ML
INJECTION, SOLUTION INTRAMUSCULAR; INTRAVENOUS AS NEEDED
Status: DISCONTINUED | OUTPATIENT
Start: 2021-02-24 | End: 2021-02-24

## 2021-02-24 RX ORDER — FENTANYL CITRATE/PF 50 MCG/ML
25 SYRINGE (ML) INJECTION
Status: DISCONTINUED | OUTPATIENT
Start: 2021-02-24 | End: 2021-02-24 | Stop reason: HOSPADM

## 2021-02-24 RX ORDER — LABETALOL 20 MG/4 ML (5 MG/ML) INTRAVENOUS SYRINGE
AS NEEDED
Status: DISCONTINUED | OUTPATIENT
Start: 2021-02-24 | End: 2021-02-24

## 2021-02-24 RX ORDER — CHLORHEXIDINE GLUCONATE 4 G/100ML
SOLUTION TOPICAL DAILY PRN
Status: DISCONTINUED | OUTPATIENT
Start: 2021-02-24 | End: 2021-02-24 | Stop reason: HOSPADM

## 2021-02-24 RX ORDER — ROCURONIUM BROMIDE 10 MG/ML
INJECTION, SOLUTION INTRAVENOUS AS NEEDED
Status: DISCONTINUED | OUTPATIENT
Start: 2021-02-24 | End: 2021-02-24

## 2021-02-24 RX ORDER — EPHEDRINE SULFATE 50 MG/ML
INJECTION INTRAVENOUS AS NEEDED
Status: DISCONTINUED | OUTPATIENT
Start: 2021-02-24 | End: 2021-02-24

## 2021-02-24 RX ORDER — ONDANSETRON 2 MG/ML
INJECTION INTRAMUSCULAR; INTRAVENOUS AS NEEDED
Status: DISCONTINUED | OUTPATIENT
Start: 2021-02-24 | End: 2021-02-24

## 2021-02-24 RX ORDER — ONDANSETRON 2 MG/ML
4 INJECTION INTRAMUSCULAR; INTRAVENOUS ONCE AS NEEDED
Status: DISCONTINUED | OUTPATIENT
Start: 2021-02-24 | End: 2021-02-24 | Stop reason: HOSPADM

## 2021-02-24 RX ORDER — NEOSTIGMINE METHYLSULFATE 1 MG/ML
INJECTION INTRAVENOUS AS NEEDED
Status: DISCONTINUED | OUTPATIENT
Start: 2021-02-24 | End: 2021-02-24

## 2021-02-24 RX ORDER — OXYCODONE HYDROCHLORIDE AND ACETAMINOPHEN 5; 325 MG/1; MG/1
1 TABLET ORAL EVERY 4 HOURS PRN
Qty: 35 TABLET | Refills: 0 | Status: SHIPPED | OUTPATIENT
Start: 2021-02-24 | End: 2021-03-06

## 2021-02-24 RX ORDER — GLYCOPYRROLATE 0.2 MG/ML
INJECTION INTRAMUSCULAR; INTRAVENOUS AS NEEDED
Status: DISCONTINUED | OUTPATIENT
Start: 2021-02-24 | End: 2021-02-24

## 2021-02-24 RX ADMIN — LABETALOL 20 MG/4 ML (5 MG/ML) INTRAVENOUS SYRINGE 10 MG: at 11:57

## 2021-02-24 RX ADMIN — GLYCOPYRROLATE 0.4 MG: 0.2 INJECTION, SOLUTION INTRAMUSCULAR; INTRAVENOUS at 11:45

## 2021-02-24 RX ADMIN — FENTANYL CITRATE 100 MCG: 50 INJECTION INTRAMUSCULAR; INTRAVENOUS at 10:44

## 2021-02-24 RX ADMIN — NEOSTIGMINE METHYLSULFATE 4 MG: 1 INJECTION, SOLUTION INTRAVENOUS at 11:44

## 2021-02-24 RX ADMIN — MIDAZOLAM HYDROCHLORIDE 2 MG: 1 INJECTION, SOLUTION INTRAMUSCULAR; INTRAVENOUS at 10:24

## 2021-02-24 RX ADMIN — LIDOCAINE HYDROCHLORIDE 50 MG: 10 INJECTION, SOLUTION EPIDURAL; INFILTRATION; INTRACAUDAL; PERINEURAL at 10:45

## 2021-02-24 RX ADMIN — ROCURONIUM BROMIDE 50 MG: 10 INJECTION INTRAVENOUS at 10:46

## 2021-02-24 RX ADMIN — ONDANSETRON 4 MG: 2 INJECTION INTRAMUSCULAR; INTRAVENOUS at 10:57

## 2021-02-24 RX ADMIN — DEXAMETHASONE SODIUM PHOSPHATE 4 MG: 10 INJECTION, SOLUTION INTRAMUSCULAR; INTRAVENOUS at 10:56

## 2021-02-24 RX ADMIN — LABETALOL 20 MG/4 ML (5 MG/ML) INTRAVENOUS SYRINGE 10 MG: at 12:06

## 2021-02-24 RX ADMIN — EPHEDRINE SULFATE 10 MG: 50 INJECTION, SOLUTION INTRAVENOUS at 11:08

## 2021-02-24 RX ADMIN — CEFAZOLIN SODIUM 2000 MG: 2 SOLUTION INTRAVENOUS at 10:40

## 2021-02-24 RX ADMIN — PROPOFOL 200 MG: 10 INJECTION, EMULSION INTRAVENOUS at 10:45

## 2021-02-24 RX ADMIN — SODIUM CHLORIDE, SODIUM LACTATE, POTASSIUM CHLORIDE, AND CALCIUM CHLORIDE 125 ML/HR: .6; .31; .03; .02 INJECTION, SOLUTION INTRAVENOUS at 09:22

## 2021-02-24 RX ADMIN — SODIUM CHLORIDE, SODIUM LACTATE, POTASSIUM CHLORIDE, AND CALCIUM CHLORIDE: .6; .31; .03; .02 INJECTION, SOLUTION INTRAVENOUS at 10:03

## 2021-02-24 RX ADMIN — BUPIVACAINE HYDROCHLORIDE 10 ML: 2.5 INJECTION, SOLUTION EPIDURAL; INFILTRATION; INTRACAUDAL; PERINEURAL at 10:24

## 2021-02-24 RX ADMIN — CHLORHEXIDINE GLUCONATE 0.12% ORAL RINSE 15 ML: 1.2 LIQUID ORAL at 09:15

## 2021-02-24 NOTE — ANESTHESIA PROCEDURE NOTES
Peripheral Block    Patient location during procedure: pre-op  Start time: 2/24/2021 10:24 AM  Reason for block: procedure for pain, at surgeon's request and post-op pain management  Staffing  Anesthesiologist: Breanne Smith MD  Performed: anesthesiologist   Preanesthetic Checklist  Completed: patient identified, site marked, surgical consent, pre-op evaluation, timeout performed, IV checked, risks and benefits discussed and monitors and equipment checked  Peripheral Block  Patient position: supine  Prep: ChloraPrep  Patient monitoring: heart rate, cardiac monitor, continuous pulse ox and frequent blood pressure checks  Block type: interscalene  Laterality: right  Injection technique: single-shot  Procedures: ultrasound guided, Ultrasound guidance required for the procedure to increase accuracy and safety of medication placement and decrease risk of complications   and nerve stimulator  Ultrasound permanent image savedbupivacaine (MARCAINE) 0 25 % perineural infiltration, 10 mL  midazolam (VERSED) 2 mg/2 mL IV, 2 mg (Exparei 20ml)  Needle  Needle type: Stimuplex   Needle gauge: 22 G  Needle length: 5 cm  Needle localization: anatomical landmarks and ultrasound guidance  Needle insertion depth: 3 cm  Test dose: negative  Assessment  Injection assessment: incremental injection, local visualized surrounding nerve on ultrasound, negative aspiration for heme and no paresthesia on injection  Paresthesia pain: none  Heart rate change: no  Slow fractionated injection: yes  Post-procedure:  site cleaned  patient tolerated the procedure well with no immediate complications  Additional Notes  Ultrasound guided in plane

## 2021-02-24 NOTE — OP NOTE
OPERATIVE REPORT  PATIENT NAME: Stephan Mejia  :  1950  MRN: [id number]  Pt Location: [facility] OR ROOM 02    SURGERY DATE: 2021    Surgeon(s) and Role:     * Catherine Davis DO - Primary    Assistant:  Andrés Alexis PA-C    Preop Diagnosis:  Complete tear of right rotator cuff, unspecified whether traumatic [M75 121]    Post-Op Diagnosis Codes:     * Complete tear of right rotator cuff, unspecified whether traumatic [M75 121]  Tear of labrum  Synovitis  Impingement    Procedure(s) (LRB):  SHOULDER ARTHROSCOPY WITH POSSIBLE ROTATOR CUFF REPAIR (Right)   Debridement  Synovectomy  Acromioplasty  Arthroscopic rotator cuff repair  Post arthroscopic injection of intra-articular joint space and peripheral portals    Specimen(s):  Shavings    Estimated Blood Loss:   Minimal    Drains:  None    Anesthesia Type:   General w/ Interscalene Block    Operative Indications:  Complete tear of right rotator cuff, unspecified whether traumatic [M75 121]      Operative Findings:  TT: 0    Complications:   None    Procedure and Technique:  The patient was properly identified and brought into the operating room suite  After successful induction of the general anesthetic, the patient was placed in the [facility] chair position  All areas of possible skin pressurization were well padded to avoid the above  The right upper extremity neck and torso region were then prepped and draped in the usual sterile fashion  It was medically necessary that the physician assistant be in the room to aid in positioning and placing the appropriate amount of retraction on the patient  A qualified resident was not available     The surgical landmarks were outlined with a skin marker  All the portals were infiltrated with 0 5% Marcaine with epinephrine  Using a #11  Scalpel blade a posterior portal was made approximately 1-1/2 cm medial and inferior to the posterior lateral corner of the acromion   The  arthroscope was then introduced into the glenohumeral joint space  There was a tremendous amount of synovial tissue present  Using a outside in technique an accessory anterior inferior portal was made and a synovectomy was performed  There was a degenerative tear along the posterior labrum which was debrided with a shaver  the anterior labrum was non-existent indicative of a previous tear or dislocation  There is some grade 2 arthritic changes along the humeral head as well as a Hill-Sachs lesion posteriorly  The biceps tendon was probed and found to be intact  The subscapularis tendon was found to be intact  There is a full-thickness tear of the supra spinatus tendon  This be further evaluated from the superior viewing portal  At this point, the arthroscope was then withdrawn from the glenohumeral joint space and introduced into the subacromial joint space  An accessory lateral portal was made  A bursectomy was performed  Once this occurred, there was a full-thickness rotator cuff tear present  It was confirmed with a grasper that there was adequate tissue quality  Using the Arthrex suture anchor system,  1 suture anchors were placed in a horizontal mattress fashion anatomic reducing the rotator cuff  At this point there was good tension on the cuff which was visualized using multiple arthroscopic portals  The soft tissue on the undersurface of the acromion was then removed  The coracoacromial ligament was then divided  At this point there was a large subacromial spur present  Using a motorized bur, an acromioplasty 1st occurred with the bur in the lateral portal and then switched to posterior portal to move any spur that was remaining  At this point, there is adequate decompression of the subacromial joint space  The shoulder was then copiously irrigated with sterile arthroscopic solution  All the excess fluid was removed  The portals were then closed with 3-0 nylon    The subacromial joint space was injected with 0 5% Marcaine with epinephrine and dressed with ointment, Xeroform, 4x4s, ABDs, and tape  A shoulder immobilizer with an abductor pillow was placed  There was no complications during procedure   He was successfully woken, transferred to [facility], and went to recovery room in stable condition       I was present for the entire procedure, A qualified resident physician was not available and A physician assistant was required during the procedure for retraction tissue handling,dissection and suturing    Patient Disposition:  PACU     SIGNATURE: Jermain Felix DO  DATE: February 24, 2021  TIME: 10:44 AM

## 2021-02-24 NOTE — INTERVAL H&P NOTE
H&P reviewed  After examining the patient I find no changes in the patients condition since the H&P had been written    Lungs CTA  Heart RRR  Vitals:    02/24/21 0900   BP: 152/100   Pulse: 86   Resp: 18   Temp: 98 4 °F (36 9 °C)   SpO2: 93%

## 2021-02-24 NOTE — ANESTHESIA PREPROCEDURE EVALUATION
Procedure:  SHOULDER ARTHROSCOPY WITH POSSIBLE ROTATOR CUFF REPAIR (Right Shoulder)    Relevant Problems   CARDIO   (+) Coronary artery disease involving native coronary artery of native heart without angina pectoris   (+) Hypertension   (+) Mixed hyperlipidemia        Physical Exam    Airway    Mallampati score: II  TM Distance: >3 FB  Neck ROM: full     Dental   No notable dental hx upper dentures,     Cardiovascular  Rhythm: regular, Rate: normal, Cardiovascular exam normal    Pulmonary  Pulmonary exam normal Breath sounds clear to auscultation,     Other Findings        Anesthesia Plan  ASA Score- 3     Anesthesia Type- regional and general with ASA Monitors  Additional Monitors:   Airway Plan: ETT  Plan Factors-Exercise tolerance (METS): >4 METS  Chart reviewed  EKG reviewed  Existing labs reviewed  Patient summary reviewed  Patient is not a current smoker  Induction- intravenous  Postoperative Plan- Plan for postoperative opioid use  Informed Consent- Anesthetic plan and risks discussed with patient  I personally reviewed this patient with the CRNA  Discussed and agreed on the Anesthesia Plan with the CRNA  Madeleine Madera

## 2021-02-24 NOTE — DISCHARGE INSTRUCTIONS
POST-OPERATIVE INFORMATION  ELBOW/SHOULDER SURGERY    1  Keep your arm in the sling as much as possible for the 1st 48 hours to minimize swelling  2  Keep your arm in the sling were brace at all times until seen in the office for follow-up  3  You may apply ice to your elbow or shoulder as instructed to control pain and swelling  4  You can and should exercise your fingers and wrist multiple times throughout the day  You can do this while remaining in the sling or brace  If you are in a shoulder immobilizer, you could remove the wrist strap to exercise her elbow  Do not lift your arm unless instructed by her physician  5  Your dressings may be changed after 2 days or if they become wet or dirty  You may remove the bandage after 3 days and cover the incisions with Band-Aids  6  You may shower after 2 days, but keep your incisions dry  If you are wearing a shoulder mobilizer remember to keep the operated arm against your body  You may gently slide your hand into wash and dry thoroughly  If you develop a rash in this underarm area, gently apply baby powder with your opposite hand  7  You should call the office if you have increasing pain not controlled by your medication, develop a fever, or experience increased drainage or redness around the incision site  8  If a post op appointment is not arranged for you prior to leaving the hospital, you should call the office the day after your surgery to make this appointment  9  You should start therapy in about 2 days after surgery or as previously scheduled

## 2021-02-26 ENCOUNTER — EVALUATION (OUTPATIENT)
Dept: PHYSICAL THERAPY | Facility: CLINIC | Age: 71
End: 2021-02-26
Payer: MEDICARE

## 2021-02-26 VITALS — HEART RATE: 93 BPM | SYSTOLIC BLOOD PRESSURE: 144 MMHG | DIASTOLIC BLOOD PRESSURE: 87 MMHG

## 2021-02-26 DIAGNOSIS — S46.011D TRAUMATIC COMPLETE TEAR OF RIGHT ROTATOR CUFF, SUBSEQUENT ENCOUNTER: ICD-10-CM

## 2021-02-26 DIAGNOSIS — Z98.890 S/P RIGHT ROTATOR CUFF REPAIR: Primary | ICD-10-CM

## 2021-02-26 PROCEDURE — 97161 PT EVAL LOW COMPLEX 20 MIN: CPT | Performed by: PHYSICAL THERAPIST

## 2021-02-26 PROCEDURE — 97110 THERAPEUTIC EXERCISES: CPT | Performed by: PHYSICAL THERAPIST

## 2021-02-26 PROCEDURE — 97140 MANUAL THERAPY 1/> REGIONS: CPT | Performed by: PHYSICAL THERAPIST

## 2021-02-26 NOTE — PROGRESS NOTES
PT Evaluation     Today's date: 2021  Patient name: Wili Montoya  : 1950  MRN: [id number]  Referring provider: Clau Curry DO  Dx:   Encounter Diagnosis     ICD-10-CM    1  S/P right rotator cuff repair  Z98 890    2  Traumatic complete tear of right rotator cuff, subsequent encounter  S46 011D Ambulatory referral to Physical Therapy                  Assessment  Assessment details: Wili Montoya  is a 72 y o  male who presents to PT POD 2 following R RC repair  No further referral appears necessary at this time based upon examination results  Presented in sling with abductor pillow  Patient's sling was adjusted to ensure proper fit  Patient required assistance with dressing  Instructed patient on removal of sling for PT exercise and bathing only  Patient was educated on hygiene and to keep incisions clean and dry  Instructed patient on initial HEP  Patient demonstrated understanding of initial HEP  Patient has severe functional deficits secondary to not being able to use his dominant arm x6 weeks  Prognosis is good given HEP compliance and PT 3x/wk  Please contact me if you have any questions or recommendations  Thank you for the opportunity to share in  [facility] care  Impairments: abnormal or restricted ROM, activity intolerance, impaired physical strength, lacks appropriate home exercise program and pain with function  Functional limitations: unable to use R UE; much difficulty with ADLs/IADLs and drivingUnderstanding of Dx/Px/POC: good   Prognosis: good    Goals  STGs  1) In 2 visits patient will demonstrate independence with initial basic HEP  2) In 2 weeks patient will demonstrate independence with donning/doffing sling  3) In 4 weeks patient will demonstrate 90 deg PROM R shoulder flexion and abduction      LTGs  1) In 6 weeks patient will DC sling  2) In 8 weeks patient will demonstrate AROM R UE 90 deg flexion and abduction  3) In 8-12 weeks patient will report little to no difficulty with all ADLs and IADLs  4) In 12 weeks patient will tolerate 45 mins of strengthening exercise  5) In 16 weeks patient will tolerate lifting and carrying 25 lbs or more with no shoulder pain    Plan  Patient would benefit from: skilled physical therapy  Referral necessary: No  Planned modality interventions: cryotherapy, TENS, unattended electrical stimulation and thermotherapy: hydrocollator packs  Planned therapy interventions: joint mobilization, manual therapy, massage, Guerra taping, patient education, postural training, neuromuscular re-education, self care, strengthening, stretching, therapeutic activities, therapeutic exercise, home exercise program, flexibility and functional ROM exercises  Frequency: 3x week  Duration in weeks: 12  Plan of Care beginning date: 2021  Plan of Care expiration date: 2021  Treatment plan discussed with: patient        Subjective Evaluation    History of Present Illness  Date of surgery: 2021  Mechanism of injury: surgery  Mechanism of injury: Patient presents to PT s/p R RC repair 21  Original injury occurred approx 4 months ago  Patient reports he fell while walking  He reports that he has Parkinson's and was out for a walk, picked up pace and could not slow down  Went into a jog and fell  Pain  At best pain ratin  At worst pain ratin    Social Support  Lives in: [facility]  Lives with: alone    Employment status: not working (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)  Hand dominance: right  Exercise history: likes to be active with walking    Treatments  No previous or current treatments  Patient Goals  Patient goals for therapy: independence with ADLs/IADLs, increased motion and increased strength          Objective     Observations     Right Shoulder  Positive for incision  Additional Observation Details  Removed dressings   Cleaned skin surrounding incisions with alcohol swabs  Surgical incisions inspected  Sutures present  Incisions clean, dry and intact with no signs/symptoms of infection  Patient was educated on signs/symptoms of infection and was advised to contact MD immediately if suspect infection  Active Range of Motion     Additional Active Range of Motion Details  Not appropriate for AROM testing at this time    Passive Range of Motion     Right Shoulder   Flexion: 30 degrees with pain  Abduction: 30 degrees with pain  External rotation 0°: -40 degrees   Internal rotation 0°: Right shoulder passive internal rotation at 0 degrees: to belly  Strength/Myotome Testing     Additional Strength Details  Not appropriate for strength testing at this time      Flowsheet Rows      Most Recent Value   PT/OT G-Codes   Current Score  4   Projected Score  54             Precautions: s/p R RC repair DOS 2/24/21 (no AROM x6 weeks), Parkinson's  POC exp 5/21/21  Manuals 2/26            R shoulder stretching to ilia Flex/abd only  KG                                                   Neuro Re-Ed             Scap retractions                                                                                           Ther Ex             Cervical ROM B/l rot x10 ea            Elbow flex/ext AROM x10 ea            Wrist flex/ext AROM x10 ea            UT stretch 15" x4            Levator scap stretch 15" x4                                                   Ther Activity             Digiflex gripping                                                                 Modalities             CP/stim post tx prn                            Patient was provided a home exercise program and demonstrated an understanding of exercises  Patient was advised to stop performing home exercise program if symptoms increase or new complaints developed  Verbal understanding demonstrated regarding home exercise program instructions

## 2021-03-02 ENCOUNTER — OFFICE VISIT (OUTPATIENT)
Dept: PHYSICAL THERAPY | Facility: CLINIC | Age: 71
End: 2021-03-02
Payer: MEDICARE

## 2021-03-02 DIAGNOSIS — S46.011D TRAUMATIC COMPLETE TEAR OF RIGHT ROTATOR CUFF, SUBSEQUENT ENCOUNTER: Primary | ICD-10-CM

## 2021-03-02 DIAGNOSIS — Z98.890 S/P RIGHT ROTATOR CUFF REPAIR: ICD-10-CM

## 2021-03-02 PROCEDURE — 97140 MANUAL THERAPY 1/> REGIONS: CPT

## 2021-03-02 PROCEDURE — 97110 THERAPEUTIC EXERCISES: CPT

## 2021-03-02 NOTE — PROGRESS NOTES
Daily Note     Today's date: 3/2/2021  Patient name: Cher Wang  : 1950  MRN: [id number]  Referring provider: Rober Hogan DO  Dx:   Encounter Diagnosis     ICD-10-CM    1  Traumatic complete tear of right rotator cuff, subsequent encounter  S46 011D    2  S/P right rotator cuff repair  Z98 890                   Subjective: Pt denies R shoulder pain  States he return to Dr Kelle Elizondo 3/8/21  Objective: See treatment diary below      Assessment: Tolerated treatment well  Reviewed RTC repair precautions with patient  Patient exhibited good technique with therapeutic exercises and would benefit from continued PT as per RTC repair protocol  Plan: Continue per plan of care        Precautions: s/p R RC repair DOS 21 (no AROM x6 weeks), Parkinson's  POC exp 21  Manuals 2/26 3/2           R shoulder stretching to ilia Flex/abd only  KG Flex/abd only  MJC                                                  Neuro Re-Ed             Scap retractions  5" x10                                                                                         Ther Ex             Cervical ROM B/l rot x10 ea B/L  x10 ea           Elbow flex/ext AROM x10 ea 2x10 ea           Wrist flex/ext AROM x10 ea 2x10 ea           UT stretch 15" x4 15" x4           Levator scap stretch 15" x4 15" x4                                                  Ther Activity             Digiflex gripping  yellow 5" 2x10                                                               Modalities             CP/stim post tx prn  Pt defers

## 2021-03-04 ENCOUNTER — OFFICE VISIT (OUTPATIENT)
Dept: PHYSICAL THERAPY | Facility: CLINIC | Age: 71
End: 2021-03-04
Payer: MEDICARE

## 2021-03-04 DIAGNOSIS — Z98.890 S/P RIGHT ROTATOR CUFF REPAIR: ICD-10-CM

## 2021-03-04 DIAGNOSIS — S46.011D TRAUMATIC COMPLETE TEAR OF RIGHT ROTATOR CUFF, SUBSEQUENT ENCOUNTER: Primary | ICD-10-CM

## 2021-03-04 PROCEDURE — 97110 THERAPEUTIC EXERCISES: CPT

## 2021-03-04 PROCEDURE — 97140 MANUAL THERAPY 1/> REGIONS: CPT

## 2021-03-04 NOTE — PROGRESS NOTES
Daily Note     Today's date: 3/4/2021  Patient name: Leisa Meigs  : 1950  MRN: [id number]  Referring provider: Sanjuana Delacruz DO  Dx:   Encounter Diagnosis     ICD-10-CM    1  Traumatic complete tear of right rotator cuff, subsequent encounter  S46 011D    2  S/P right rotator cuff repair  Z98 890                   Subjective: Pt denies R shoulder pain at rest while in his sling  He reports that he was able to use his R hand and wrist to stir his coffee this morning  Objective: See treatment diary below      Assessment: Tolerated treatment well  Pt limited in elbow ext during manuals  Advised pt to work on R elbow ext while at home  Pt Patient demonstrated fatigue post treatment, exhibited good technique with therapeutic exercises and would benefit from continued PT      Plan: Continue per plan of care  Progress treament per protocol        Precautions: s/p R RC repair DOS 21 (no AROM x6 weeks), Parkinson's  POC exp 21  Manuals 2/26 3/2 3     R shoulder stretching to ilia Flex/abd only  KG Flex/abd only  MJC Flex/abd only  TM     R elbow stretching    TM                     Neuro Re-Ed        Scap retractions  5" x10 5" 2x10                                                     Ther Ex        Cervical ROM B/l rot x10 ea B/L  x10 ea B/L  x10 ea     Elbow flex/ext AROM x10 ea 2x10 ea 2x10 ea     Wrist flex/ext AROM x10 ea 2x10 ea 2x10 ea     UT stretch 15" x4 15" x4 15" x4     Levator scap stretch 15" x4 15" x4 15" x4                             Ther Activity        Digiflex gripping  yellow 5" 2x10 yellow 5" 2x10                                     Modalities        CP/stim post tx prn  Pt defers Defers

## 2021-03-05 ENCOUNTER — OFFICE VISIT (OUTPATIENT)
Dept: PHYSICAL THERAPY | Facility: CLINIC | Age: 71
End: 2021-03-05
Payer: MEDICARE

## 2021-03-05 DIAGNOSIS — S46.011D TRAUMATIC COMPLETE TEAR OF RIGHT ROTATOR CUFF, SUBSEQUENT ENCOUNTER: Primary | ICD-10-CM

## 2021-03-05 DIAGNOSIS — Z98.890 S/P RIGHT ROTATOR CUFF REPAIR: ICD-10-CM

## 2021-03-05 PROCEDURE — 97140 MANUAL THERAPY 1/> REGIONS: CPT

## 2021-03-05 PROCEDURE — 97110 THERAPEUTIC EXERCISES: CPT

## 2021-03-05 NOTE — PROGRESS NOTES
Daily Note     Today's date: 3/5/2021  Patient name: Joanie Magana  : 1950  MRN: [id number]  Referring provider: Marisol Euceda DO  Dx:   Encounter Diagnosis     ICD-10-CM    1  Traumatic complete tear of right rotator cuff, subsequent encounter  S46 011D    2  S/P right rotator cuff repair  Z98 890                   Subjective: Pt reported that he was sore for the first time since surgery last night  He did perform some elbow ext lat night while out of his sling  Objective: See treatment diary below      Assessment: Tolerated treatment well  Manuals performed to ilia  Patient demonstrated fatigue post treatment, exhibited good technique with therapeutic exercises and would benefit from continued PT      Plan: Continue per plan of care  Progress treament per protocol        Precautions: s/p R RC repair DOS 21 (no AROM x6 weeks), Parkinson's  POC exp 21  Manuals 2/26 3/2 3 3    R shoulder stretching to ilia Flex/abd only  KG Flex/abd only  MJC Flex/abd only  TM Flex/abd only  TM    R elbow stretching    TM TM                    Neuro Re-Ed        Scap retractions  5" x10 5" 2x10 5" x20                                                    Ther Ex        Cervical ROM B/l rot x10 ea B/L  x10 ea B/L  x10 ea B/L x20 ea    Elbow flex/ext AROM x10 ea 2x10 ea 2x10 ea 2x10 ea    Wrist flex/ext AROM x10 ea 2x10 ea 2x10 ea 2x10 ea    UT stretch 15" x4 15" x4 15" x4 15" x4    Levator scap stretch 15" x4 15" x4 15" x4 15" x4                            Ther Activity        Digiflex gripping  yellow 5" 2x10 yellow 5" 2x10 Red 5" 2x10                                    Modalities        CP/stim post tx prn  Pt defers Defers Defers to HEP

## 2021-03-08 ENCOUNTER — OFFICE VISIT (OUTPATIENT)
Dept: PHYSICAL THERAPY | Facility: CLINIC | Age: 71
End: 2021-03-08
Payer: MEDICARE

## 2021-03-08 DIAGNOSIS — S46.011D TRAUMATIC COMPLETE TEAR OF RIGHT ROTATOR CUFF, SUBSEQUENT ENCOUNTER: Primary | ICD-10-CM

## 2021-03-08 DIAGNOSIS — Z98.890 S/P RIGHT ROTATOR CUFF REPAIR: ICD-10-CM

## 2021-03-08 PROCEDURE — 97110 THERAPEUTIC EXERCISES: CPT | Performed by: PHYSICAL THERAPIST

## 2021-03-08 PROCEDURE — 97140 MANUAL THERAPY 1/> REGIONS: CPT | Performed by: PHYSICAL THERAPIST

## 2021-03-08 NOTE — PROGRESS NOTES
Daily Note     Today's date: 3/8/2021  Patient name: Janet Dawn  : 1950  MRN: [id number]  Referring provider: Paco Hastings DO  Dx:   Encounter Diagnosis     ICD-10-CM    1  Traumatic complete tear of right rotator cuff, subsequent encounter  S46 011D    2  S/P right rotator cuff repair  Z98 890                   Subjective: Patient offers no new complaints  He remains compliant with HEP  Objective: See treatment diary below      Assessment: Tolerated treatment well  Making steady progress regarding shoulder ROM within limits of MD protocol  Patient demonstrated fatigue post treatment, exhibited good technique with therapeutic exercises and would benefit from continued PT      Plan: Continue per plan of care  Progress treatment as tolerated         Precautions: s/p R RC repair DOS 21 (no AROM x6 weeks), Parkinson's  POC exp 21  Manuals 2/26 3/2 3/4 3/5 3/8   R shoulder stretching to ilia Flex/abd only  KG Flex/abd only  MJC Flex/abd only  TM Flex/abd only  TM Flex/abd only  KG   R elbow stretching    TM TM KG                   Neuro Re-Ed        Scap retractions  5" x10 5" 2x10 5" x20 5" x20                                                   Ther Ex        Cervical ROM B/l rot x10 ea B/L  x10 ea B/L  x10 ea B/L x20 ea Rot/SB  B/l x20 ea   Elbow flex/ext AROM x10 ea 2x10 ea 2x10 ea 2x10 ea x20 ea   Wrist flex/ext AROM x10 ea 2x10 ea 2x10 ea 2x10 ea x20 ea   UT stretch 15" x4 15" x4 15" x4 15" x4 15" x4   Levator scap stretch 15" x4 15" x4 15" x4 15" x4 15" x4                           Ther Activity        Digiflex gripping  yellow 5" 2x10 yellow 5" 2x10 Red 5" 2x10 Red 5" x20                                   Modalities        CP/stim post tx prn  Pt defers Defers Defers to HEP

## 2021-03-09 ENCOUNTER — OFFICE VISIT (OUTPATIENT)
Dept: OBGYN CLINIC | Facility: CLINIC | Age: 71
End: 2021-03-09

## 2021-03-09 ENCOUNTER — APPOINTMENT (OUTPATIENT)
Dept: RADIOLOGY | Facility: CLINIC | Age: 71
End: 2021-03-09
Payer: MEDICARE

## 2021-03-09 VITALS
HEIGHT: 70 IN | BODY MASS INDEX: 28.2 KG/M2 | DIASTOLIC BLOOD PRESSURE: 87 MMHG | HEART RATE: 91 BPM | WEIGHT: 197 LBS | SYSTOLIC BLOOD PRESSURE: 138 MMHG

## 2021-03-09 DIAGNOSIS — Z98.890 S/P ARTHROSCOPY OF RIGHT SHOULDER: Primary | ICD-10-CM

## 2021-03-09 DIAGNOSIS — M75.121 COMPLETE TEAR OF RIGHT ROTATOR CUFF, UNSPECIFIED WHETHER TRAUMATIC: ICD-10-CM

## 2021-03-09 PROCEDURE — 99024 POSTOP FOLLOW-UP VISIT: CPT | Performed by: ORTHOPAEDIC SURGERY

## 2021-03-09 PROCEDURE — 1124F ACP DISCUSS-NO DSCNMKR DOCD: CPT | Performed by: ORTHOPAEDIC SURGERY

## 2021-03-09 PROCEDURE — 73030 X-RAY EXAM OF SHOULDER: CPT

## 2021-03-09 NOTE — PROGRESS NOTES
ASSESSMENT/PLAN:    Diagnoses and all orders for this visit:    S/P arthroscopy of right shoulder  -     XR shoulder 2+ vw right; Future         x-rays the patient's right shoulder shows adequate decompression of the subacromial joint space  There are no fractures or dislocations  Overall, the patient is doing well since surgery  He should continue physical therapy at this time  He will also continue the shoulder immobilizer  No active motion x4 weeks  He will follow up with our office in 1 month  The patient is acceptable to this plan  Return in about 1 month (around 4/9/2021)  _____________________________________________________  CHIEF COMPLAINT:  Chief Complaint   Patient presents with    Right Shoulder - Post-op         SUBJECTIVE:  Gio Fernandes  is a 72 y o  male  status post right shoulder arthroscopy with rotator cuff repair from 02/24/2021  The patient states he has been participating in physical therapy  He states he has not engaged in any active motion  He has been wearing the shoulder immobilizer  He denies any significant pain  He denies any numbness or tingling  He denies any fever or chills  The following portions of the patient's history were reviewed and updated as appropriate: allergies, current medications, past family history, past medical history, past social history, past surgical history and problem list     PAST MEDICAL HISTORY:  Past Medical History:   Diagnosis Date    Anxiety     Benign hypertension     [facility] [facility]     prostate    Coronary artery disease     ABRAM to LAD in 2014    History of cardiovascular stress test 01/21/2016    Normal EF, normal study      History of echocardiogram 05/05/2015    EF normal       Ischemic cardiomyopathy     resolved    Mixed hyperlipidemia     Myocardial infarction [facility] [address] [facility]     Parkinson disease ([facility] [address] [facility]     Prostate cancer ([facility] [address] [facility]     surgically removed    [facility] 2014 PAST SURGICAL HISTORY:  Past Surgical History:   Procedure Laterality Date    CORONARY ANGIOPLASTY WITH STENT PLACEMENT  12/26/2014    EF 40%, ABRAM to LAD   INCISIONAL HERNIA REPAIR      GA SHLDR ARTHROSCOP,SURG,W/ROTAT CUFF REPR Right 2/24/2021    Procedure: SHOULDER ARTHROSCOPY WITH ROTATOR CUFF REPAIR and acromioplasty;  Surgeon: Reshma Kramer DO;  Location: [facility] MAIN OR;  Service: Orthopedics    PROSTATECTOMY  2014    for cancer    TONSILLECTOMY         FAMILY HISTORY:  Family History   Problem Relation Age of Onset    Heart attack Father     Stroke Father        SOCIAL HISTORY:  Social History     Tobacco Use    Smoking status: Never Smoker    Smokeless tobacco: Never Used   Substance Use Topics    Alcohol use: Yes     Alcohol/week: 2 0 standard drinks     Types: 2 Cans of beer per week     Frequency: 4 or more times a week     Comment: daily    Drug use: Never       MEDICATIONS:    Current Outpatient Medications:     aspirin (ECOTRIN LOW STRENGTH) 81 mg EC tablet, Take 81 mg by mouth daily, Disp: , Rfl:     atorvastatin (LIPITOR) 80 mg tablet, Take 1 tablet (80 mg total) by mouth daily, Disp: 90 tablet, Rfl: 3    carbidopa-levodopa (SINEMET)  mg per tablet, Take 1 tablet by mouth 3 (three) times a day, Disp: , Rfl:     meloxicam (MOBIC) 15 mg tablet, Take 1 tablet (15 mg total) by mouth daily, Disp: 30 tablet, Rfl: 0    PARoxetine (PAXIL) 20 mg tablet, Take 20 mg by mouth daily, Disp: , Rfl:     ALLERGIES:  No Known Allergies    ROS:  Review of Systems     Constitutional: Negative for fatigue, fever or loss of appetite  HENT: Negative  Respiratory: Negative for shortness of breath, dyspnea  Cardiovascular: Negative for chest pain/tightness  Gastrointestinal: Negative for abdominal pain, N/V  Endocrine: Negative for cold/heat intolerance, unexplained weight loss/gain  Genitourinary: Negative for flank pain, dysuria, hematuria     Musculoskeletal:   Negative for arthralgia Skin: Negative for rash  Neurological: Negative for numbness or tingling  Psychiatric/Behavioral: Negative for agitation  _____________________________________________________  PHYSICAL EXAMINATION:    Blood pressure 138/87, pulse 91, height 5' 10" (1 778 m), weight 89 4 kg (197 lb)  Constitutional: Oriented to person, place, and time  Appears well-developed and well-nourished  No distress  HENT:   Head: Normocephalic  Eyes: Conjunctivae are normal  Right eye exhibits no discharge  Left eye exhibits no discharge  No scleral icterus  Cardiovascular: Normal rate  Pulmonary/Chest: Effort normal    Neurological: Alert and oriented to person, place, and time  Skin: Skin is warm and dry  No rash noted  Not diaphoretic  No erythema  No pallor  Psychiatric: Normal mood and affect  Behavior is normal  Judgment and thought content normal       MUSCULOSKELETAL EXAMINATION:   Physical Exam  Ortho Exam     right upper extremity is neurovascularly intact  Fingers are pink and mobile   Compartments are soft   Incisions are clean, dry and intact   Brisk cap refill   Sensation intact   No pain with gentle passive range of motion of the shoulder  Objective:  BP Readings from Last 1 Encounters:   03/09/21 138/87      Wt Readings from Last 1 Encounters:   03/09/21 89 4 kg (197 lb)        BMI:   Estimated body mass index is 28 27 kg/m² as calculated from the following:    Height as of this encounter: 5' 10" (1 778 m)  Weight as of this encounter: 89 4 kg (197 lb)  Radiographs:  _____________________________________________________  STUDIES REVIEWED:  I have personally reviewed pertinent films and reports in PACS      x-rays of the patient's right shoulder shows adequate decompression of the subacromial joint space        Silvestre Thornton PA-C

## 2021-03-11 ENCOUNTER — OFFICE VISIT (OUTPATIENT)
Dept: PHYSICAL THERAPY | Facility: CLINIC | Age: 71
End: 2021-03-11
Payer: MEDICARE

## 2021-03-11 DIAGNOSIS — Z98.890 S/P RIGHT ROTATOR CUFF REPAIR: ICD-10-CM

## 2021-03-11 DIAGNOSIS — S46.011D TRAUMATIC COMPLETE TEAR OF RIGHT ROTATOR CUFF, SUBSEQUENT ENCOUNTER: Primary | ICD-10-CM

## 2021-03-11 PROCEDURE — 97140 MANUAL THERAPY 1/> REGIONS: CPT

## 2021-03-11 PROCEDURE — 97110 THERAPEUTIC EXERCISES: CPT

## 2021-03-11 NOTE — PROGRESS NOTES
Daily Note     Today's date: 3/11/2021  Patient name: Jen Burnett  : 1950  MRN: [id number]  Referring provider: Ivone Espino DO  Dx:   Encounter Diagnosis     ICD-10-CM    1  Traumatic complete tear of right rotator cuff, subsequent encounter  S46 011D    2  S/P right rotator cuff repair  Z98 890                   Subjective: Pt reports that he does not have any pain at rest when in his sling  He is sleeping well  He is anxious to DC his sling  Objective: See treatment diary below      Assessment: Pt is now 2 weeks post op  Initiated pendulums to decrease joint tightness  Tolerated treatment well  Patient exhibited good technique with therapeutic exercises and would benefit from continued PT  Plan: Continue per plan of care  Progress treament per protocol        Precautions: s/p R RC repair DOS 21 (no AROM x6 weeks), Parkinson's  POC exp 21  Manuals 3/11 3 3 3 3   R shoulder stretching to ilia Flex/abd only  TM Flex/abd only  MJC Flex/abd only  TM Flex/abd only  TM Flex/abd only  KG   R elbow stretching  TM  TM TM KG                   Neuro Re-Ed        Scap retractions 5" x20 5" x10 5" 2x10 5" x20 5" x20                                                   Ther Ex        Cervical ROM B/l rot/SB x20 ea B/L  x10 ea B/L  x10 ea B/L x20 ea Rot/SB  B/l x20 ea   Elbow flex/ext AROM x20 ea 2x10 ea 2x10 ea 2x10 ea x20 ea   Wrist flex/ext AROM x20 ea 2x10 ea 2x10 ea 2x10 ea x20 ea   UT stretch 15" x4 15" x4 15" x4 15" x4 15" x4   Levator scap stretch 15" x4 15" x4 15" x4 15" x4 15" x4   Pendulums  4 ways x1 min ea                        Ther Activity        Digiflex gripping Red 5" x20 yellow 5" 2x10 yellow 5" 2x10 Red 5" 2x10 Red 5" x20                                   Modalities        CP/stim post tx prn  Pt defers Defers Defers to HEP

## 2021-03-12 ENCOUNTER — OFFICE VISIT (OUTPATIENT)
Dept: PHYSICAL THERAPY | Facility: CLINIC | Age: 71
End: 2021-03-12
Payer: MEDICARE

## 2021-03-12 DIAGNOSIS — Z98.890 S/P RIGHT ROTATOR CUFF REPAIR: ICD-10-CM

## 2021-03-12 DIAGNOSIS — S46.011D TRAUMATIC COMPLETE TEAR OF RIGHT ROTATOR CUFF, SUBSEQUENT ENCOUNTER: Primary | ICD-10-CM

## 2021-03-12 PROCEDURE — 97110 THERAPEUTIC EXERCISES: CPT | Performed by: PHYSICAL THERAPIST

## 2021-03-12 PROCEDURE — 97140 MANUAL THERAPY 1/> REGIONS: CPT | Performed by: PHYSICAL THERAPIST

## 2021-03-12 NOTE — PROGRESS NOTES
Daily Note     Today's date: 3/12/2021  Patient name: Rosa Rizvi  : 1950  MRN: [id number]  Referring provider: Lito Mahmood DO  Dx:   Encounter Diagnosis     ICD-10-CM    1  Traumatic complete tear of right rotator cuff, subsequent encounter  S46 011D    2  S/P right rotator cuff repair  Z98 890                   Subjective: Patient reports he is sore from yesterday  Objective: See treatment diary below      Assessment: Tolerated treatment well  Cueing required for pendulums to avoid active shoulder movement  Patient exhibited good technique with therapeutic exercises and would benefit from continued PT      Plan: Continue per plan of care  Progress treatment as tolerated         Precautions: s/p R RC repair DOS 21 (no AROM x6 weeks), Parkinson's  POC exp 21  Manuals 3/11 3/12 3/4 3/5 3/8   R shoulder stretching to ilia Flex/abd only  TM Flex/abd only  KG Flex/abd only  TM Flex/abd only  TM Flex/abd only  KG   R elbow stretching  TM KG TM TM KG                   Neuro Re-Ed        Scap retractions 5" x20 5" x20 5" 2x10 5" x20 5" x20                                                   Ther Ex        Cervical ROM B/l rot/SB x20 ea B/L rot and SB  x20 ea B/L  x10 ea B/L x20 ea Rot/SB  B/l x20 ea   Elbow flex/ext AROM x20 ea x20 ea 2x10 ea 2x10 ea x20 ea   Wrist flex/ext AROM x20 ea x20 ea 2x10 ea 2x10 ea x20 ea   UT stretch 15" x4 15" x4 15" x4 15" x4 15" x4   Levator scap stretch 15" x4 15" x4 15" x4 15" x4 15" x4   Pendulums  4 ways x1 min ea  4 ways x1 min ea                      Ther Activity        Digiflex gripping Red 5" x20 green 5" x20 yellow 5" 2x10 Red 5" 2x10 Red 5" x20                                   Modalities        CP/stim post tx prn  Pt defers Defers Defers to HEP

## 2021-03-15 ENCOUNTER — TRANSCRIBE ORDERS (OUTPATIENT)
Dept: LAB | Facility: MEDICAL CENTER | Age: 71
End: 2021-03-15

## 2021-03-15 ENCOUNTER — LAB (OUTPATIENT)
Dept: LAB | Facility: MEDICAL CENTER | Age: 71
End: 2021-03-15
Payer: MEDICARE

## 2021-03-15 ENCOUNTER — OFFICE VISIT (OUTPATIENT)
Dept: PHYSICAL THERAPY | Facility: CLINIC | Age: 71
End: 2021-03-15
Payer: MEDICARE

## 2021-03-15 DIAGNOSIS — E78.5 HYPERLIPIDEMIA, UNSPECIFIED HYPERLIPIDEMIA TYPE: ICD-10-CM

## 2021-03-15 DIAGNOSIS — I25.10 ASCVD (ARTERIOSCLEROTIC CARDIOVASCULAR DISEASE): ICD-10-CM

## 2021-03-15 DIAGNOSIS — I10 HYPERTENSION, UNSPECIFIED TYPE: ICD-10-CM

## 2021-03-15 DIAGNOSIS — Z98.890 S/P RIGHT ROTATOR CUFF REPAIR: ICD-10-CM

## 2021-03-15 DIAGNOSIS — I25.10 ASCVD (ARTERIOSCLEROTIC CARDIOVASCULAR DISEASE): Primary | ICD-10-CM

## 2021-03-15 DIAGNOSIS — S46.011D TRAUMATIC COMPLETE TEAR OF RIGHT ROTATOR CUFF, SUBSEQUENT ENCOUNTER: Primary | ICD-10-CM

## 2021-03-15 LAB
ALBUMIN SERPL BCP-MCNC: 4.3 G/DL (ref 3.5–5)
ALP SERPL-CCNC: 135 U/L (ref 46–116)
ALT SERPL W P-5'-P-CCNC: 71 U/L (ref 12–78)
ANION GAP SERPL CALCULATED.3IONS-SCNC: 7 MMOL/L (ref 4–13)
AST SERPL W P-5'-P-CCNC: 51 U/L (ref 5–45)
BASOPHILS # BLD AUTO: 0.05 THOUSANDS/ΜL (ref 0–0.1)
BASOPHILS NFR BLD AUTO: 1 % (ref 0–1)
BILIRUB SERPL-MCNC: 0.94 MG/DL (ref 0.2–1)
BUN SERPL-MCNC: 16 MG/DL (ref 5–25)
CALCIUM SERPL-MCNC: 9.9 MG/DL (ref 8.3–10.1)
CHLORIDE SERPL-SCNC: 105 MMOL/L (ref 100–108)
CHOLEST SERPL-MCNC: 168 MG/DL (ref 50–200)
CO2 SERPL-SCNC: 27 MMOL/L (ref 21–32)
CREAT SERPL-MCNC: 1.11 MG/DL (ref 0.6–1.3)
EOSINOPHIL # BLD AUTO: 0.02 THOUSAND/ΜL (ref 0–0.61)
EOSINOPHIL NFR BLD AUTO: 0 % (ref 0–6)
ERYTHROCYTE [DISTWIDTH] IN BLOOD BY AUTOMATED COUNT: 14.4 % (ref 11.6–15.1)
GFR SERPL CREATININE-BSD FRML MDRD: 67 ML/MIN/1.73SQ M
GLUCOSE SERPL-MCNC: 82 MG/DL (ref 65–140)
HCT VFR BLD AUTO: 52.1 % (ref 36.5–49.3)
HDLC SERPL-MCNC: 44 MG/DL
HGB BLD-MCNC: 17.4 G/DL (ref 12–17)
IMM GRANULOCYTES # BLD AUTO: 0.02 THOUSAND/UL (ref 0–0.2)
IMM GRANULOCYTES NFR BLD AUTO: 0 % (ref 0–2)
LDLC SERPL CALC-MCNC: 93 MG/DL (ref 0–100)
LYMPHOCYTES # BLD AUTO: 2.09 THOUSANDS/ΜL (ref 0.6–4.47)
LYMPHOCYTES NFR BLD AUTO: 37 % (ref 14–44)
MCH RBC QN AUTO: 29.8 PG (ref 26.8–34.3)
MCHC RBC AUTO-ENTMCNC: 33.4 G/DL (ref 31.4–37.4)
MCV RBC AUTO: 89 FL (ref 82–98)
MONOCYTES # BLD AUTO: 0.41 THOUSAND/ΜL (ref 0.17–1.22)
MONOCYTES NFR BLD AUTO: 7 % (ref 4–12)
NEUTROPHILS # BLD AUTO: 3.07 THOUSANDS/ΜL (ref 1.85–7.62)
NEUTS SEG NFR BLD AUTO: 55 % (ref 43–75)
NONHDLC SERPL-MCNC: 124 MG/DL
NRBC BLD AUTO-RTO: 0 /100 WBCS
PLATELET # BLD AUTO: 443 THOUSANDS/UL (ref 149–390)
PMV BLD AUTO: 11.4 FL (ref 8.9–12.7)
POTASSIUM SERPL-SCNC: 3.7 MMOL/L (ref 3.5–5.3)
PROT SERPL-MCNC: 8.2 G/DL (ref 6.4–8.2)
RBC # BLD AUTO: 5.83 MILLION/UL (ref 3.88–5.62)
SODIUM SERPL-SCNC: 139 MMOL/L (ref 136–145)
TRIGL SERPL-MCNC: 154 MG/DL
WBC # BLD AUTO: 5.66 THOUSAND/UL (ref 4.31–10.16)

## 2021-03-15 PROCEDURE — 85025 COMPLETE CBC W/AUTO DIFF WBC: CPT

## 2021-03-15 PROCEDURE — 80053 COMPREHEN METABOLIC PANEL: CPT

## 2021-03-15 PROCEDURE — 97140 MANUAL THERAPY 1/> REGIONS: CPT

## 2021-03-15 PROCEDURE — 80061 LIPID PANEL: CPT

## 2021-03-15 PROCEDURE — 36415 COLL VENOUS BLD VENIPUNCTURE: CPT

## 2021-03-15 PROCEDURE — 97110 THERAPEUTIC EXERCISES: CPT

## 2021-03-15 NOTE — PROGRESS NOTES
Daily Note     Today's date: 3/15/2021  Patient name: Ubaldo Mcclure  : 1950  MRN: [id number]  Referring provider: Riley Evans DO  Dx:   Encounter Diagnosis     ICD-10-CM    1  Traumatic complete tear of right rotator cuff, subsequent encounter  S46 011D    2  S/P right rotator cuff repair  Z98 890                   Subjective: Pt reports that he feel he has a knot in the R UT  He is anxious to DC is sling in a few weeks  He does deny R shoulder pain at rest        Objective: See treatment diary below      Assessment: Tolerated treatment well  Manuals performed to MD protocol  Pt continues to increase slightly in flex and abd of the R shoulder during manuals  Patient exhibited good technique with therapeutic exercises and would benefit from continued PT      Plan: Continue per plan of care  Progress treament per protocol        Precautions: s/p R RC repair DOS 21 (no AROM x6 weeks), Parkinson's  POC exp 21  Manuals 3/11 3/12 3/15 3/5 3   R shoulder stretching to ilia Flex/abd only  TM Flex/abd only  KG Flex/abd only  TM Flex/abd only  TM Flex/abd only  KG   R elbow stretching  TM KG TM TM KG                   Neuro Re-Ed        Scap retractions 5" x20 5" x20 5" 2x10 5" x20 5" x20                                                   Ther Ex        Cervical ROM B/l rot/SB x20 ea B/L rot and SB  x20 ea B/L rot and SB  x20 ea B/L x20 ea Rot/SB  B/l x20 ea   Elbow flex/ext AROM x20 ea x20 ea 2x10 ea 2x10 ea x20 ea   Wrist flex/ext AROM x20 ea x20 ea 2x10 ea 2x10 ea x20 ea   UT stretch 15" x4 15" x4 15" x4 15" x4 15" x4   Levator scap stretch 15" x4 15" x4 15" x4 15" x4 15" x4   Pendulums  4 ways x1 min ea  4 ways x1 min ea 4 ways x1 min ea                     Ther Activity        Digiflex gripping Red 5" x20 green 5" x20 Green 5" 2x10 Red 5" 2x10 Red 5" x20                                   Modalities        CP/stim post tx prn  Pt defers Defers Defers to HEP

## 2021-03-17 ENCOUNTER — OFFICE VISIT (OUTPATIENT)
Dept: PHYSICAL THERAPY | Facility: CLINIC | Age: 71
End: 2021-03-17
Payer: MEDICARE

## 2021-03-17 DIAGNOSIS — S46.011D TRAUMATIC COMPLETE TEAR OF RIGHT ROTATOR CUFF, SUBSEQUENT ENCOUNTER: Primary | ICD-10-CM

## 2021-03-17 DIAGNOSIS — Z98.890 S/P RIGHT ROTATOR CUFF REPAIR: ICD-10-CM

## 2021-03-17 PROCEDURE — 97110 THERAPEUTIC EXERCISES: CPT | Performed by: PHYSICAL THERAPIST

## 2021-03-17 PROCEDURE — 97140 MANUAL THERAPY 1/> REGIONS: CPT | Performed by: PHYSICAL THERAPIST

## 2021-03-17 NOTE — PROGRESS NOTES
Daily Note     Today's date: 3/17/2021  Patient name: Jen Burnett  : 1950  MRN: [id number]  Referring provider: Ivone Espino DO  Dx:   Encounter Diagnosis     ICD-10-CM    1  Traumatic complete tear of right rotator cuff, subsequent encounter  S46 011D    2  S/P right rotator cuff repair  Z98 890        Start Time: 945  Stop Time: 2663  Total time in clinic (min): 45 minutes    Subjective: Patient offers no new complaints at start of session  Objective: See treatment diary below      Assessment: Patient tolerated treatment well with no aggravation of sx reported following today's session  He also reported good relief of sx following scapular mobs with STM to scapulothoracic region  Progress per protocol  Plan: Continue per plan of care        Precautions: s/p R RC repair DOS 21 (no AROM x6 weeks), Parkinson's  POC exp 21  Manuals 3/11 3/12 3/15 3/17 3   R shoulder stretching to ilia Flex/abd only  TM Flex/abd only  KG Flex/abd only  TM JAB Flex/abd only  KG   R elbow stretching  TM KG TM JAB KG   Scap mobs/STM    JAB            Neuro Re-Ed        Scap retractions 5" x20 5" x20 5" 2x10 5" 2x10 5" x20                                                   Ther Ex        Cervical ROM B/l rot/SB x20 ea B/L rot and SB  x20 ea B/L rot and SB  x20 ea B/L rot and SB x20 ea Rot/SB  B/l x20 ea   Elbow flex/ext AROM x20 ea x20 ea 2x10 ea 2x10 ea x20 ea   Wrist flex/ext AROM x20 ea x20 ea 2x10 ea 2x10 ea x20 ea   UT stretch 15" x4 15" x4 15" x4 15"x4 15" x4   Levator scap stretch 15" x4 15" x4 15" x4 15"x4 15" x4   Pendulums  4 ways x1 min ea  4 ways x1 min ea 4 ways x1 min ea 4 ways x1 min ea                    Ther Activity        Digiflex gripping Red 5" x20 green 5" x20 Green 5" 2x10 Green 5" 2x10 Red 5" x20                                   Modalities        CP/stim post tx prn  Pt defers Defers

## 2021-03-19 ENCOUNTER — OFFICE VISIT (OUTPATIENT)
Dept: PHYSICAL THERAPY | Facility: CLINIC | Age: 71
End: 2021-03-19
Payer: MEDICARE

## 2021-03-19 DIAGNOSIS — S46.011D TRAUMATIC COMPLETE TEAR OF RIGHT ROTATOR CUFF, SUBSEQUENT ENCOUNTER: Primary | ICD-10-CM

## 2021-03-19 DIAGNOSIS — Z98.890 S/P RIGHT ROTATOR CUFF REPAIR: ICD-10-CM

## 2021-03-19 PROCEDURE — 97140 MANUAL THERAPY 1/> REGIONS: CPT | Performed by: PHYSICAL THERAPIST

## 2021-03-19 PROCEDURE — 97110 THERAPEUTIC EXERCISES: CPT | Performed by: PHYSICAL THERAPIST

## 2021-03-19 NOTE — PROGRESS NOTES
Daily Note     Today's date: 3/19/2021  Patient name: Keri Love  : 1950  MRN: [id number]  Referring provider: Stefan Bundy DO  Dx:   Encounter Diagnosis     ICD-10-CM    1  Traumatic complete tear of right rotator cuff, subsequent encounter  S46 011D    2  S/P right rotator cuff repair  Z98 890                   Subjective: Patient reports he is doing great  He offers no complaints  Remains compliant with his sling  Objective: See treatment diary below      Assessment: Added light resistance to biceps and wrist exercises today  Also added 1# DB for pendulums today to improve traction force  Tolerated treatment well  Patient exhibited good technique with therapeutic exercises and would benefit from continued PT      Plan: Continue per plan of care  Progress treatment as tolerated         Precautions: s/p R RC repair DOS 21 (no AROM x6 weeks - ), Parkinson's  POC exp 21  Manuals 3/11 3/12 3/15 3/17 3/19   R shoulder stretching to ilia Flex/abd only  TM Flex/abd only  KG Flex/abd only  TM JAB KG flex and abd   R elbow stretching  TM KG TM JAB KG   Scap mobs/STM    JAB            Neuro Re-Ed        Scap retractions 5" x20 5" x20 5" 2x10 5" 2x10 5" x30                                                   Ther Ex        Cervical ROM B/l rot/SB x20 ea B/L rot and SB  x20 ea B/L rot and SB  x20 ea B/L rot and SB x20 ea Rot/SB  B/l x20 ea   Elbow flex/ext AROM x20 ea x20 ea 2x10 ea 2x10 ea 1# x20 ea   Wrist flex/ext AROM x20 ea x20 ea 2x10 ea 2x10 ea 1# x20 ea   UT stretch 15" x4 15" x4 15" x4 15"x4 15" x4   Levator scap stretch 15" x4 15" x4 15" x4 15"x4 15" x4   Pendulums  4 ways x1 min ea  4 ways x1 min ea 4 ways x1 min ea 4 ways x1 min ea With 1# DB  4 ways x1 min ea                   Ther Activity        Digiflex gripping Red 5" x20 green 5" x20 Green 5" 2x10 Green 5" 2x10 Green 5" 2 mins                                   Modalities        CP/stim post tx prn  Pt defers Defers

## 2021-03-22 ENCOUNTER — OFFICE VISIT (OUTPATIENT)
Dept: PHYSICAL THERAPY | Facility: CLINIC | Age: 71
End: 2021-03-22
Payer: MEDICARE

## 2021-03-22 DIAGNOSIS — S46.011D TRAUMATIC COMPLETE TEAR OF RIGHT ROTATOR CUFF, SUBSEQUENT ENCOUNTER: Primary | ICD-10-CM

## 2021-03-22 DIAGNOSIS — Z98.890 S/P RIGHT ROTATOR CUFF REPAIR: ICD-10-CM

## 2021-03-22 PROCEDURE — 97110 THERAPEUTIC EXERCISES: CPT

## 2021-03-22 NOTE — PROGRESS NOTES
Daily Note     Today's date: 3/22/2021  Patient name: Crystal Tan  : 1950  MRN: [id number]  Referring provider: Barrett Duong DO  Dx:   Encounter Diagnosis     ICD-10-CM    1  Traumatic complete tear of right rotator cuff, subsequent encounter  S46 011D    2  S/P right rotator cuff repair  Z98 890                   Subjective: Patient noted no pain in shoulder currently  Objective: See treatment diary below      Assessment: Tolerated treatment fair  VC throughout treatment for patient to not put weight through his R shoulder as per following protocol  Patient used R arm to assist from pushing up from chair VC from therapist to not use R arm to push up from chair, Also VC for patient to not put full weight through R shoulder when going from supine to seated position  VC for patient to relax and not muscle guard when therapist was performing PROM  Patient was able to perform exercises with correct technique  Patient would benefit from continued PT      Plan: Continue per plan of care        Precautions: s/p R RC repair DOS 21 (no AROM x6 weeks - ), Parkinson's  POC exp 21  Manuals 3/22 3/12 3/15 3/17 3/19   R shoulder stretching to ilia AF flex and abd only Flex/abd only  KG Flex/abd only  TM JAB KG flex and abd   R elbow stretching  AF KG TM JAB KG   Scap mobs/STM    JAB            Neuro Re-Ed        Scap retractions 5" x30 5" x20 5" 2x10 5" 2x10 5" x30                                                   Ther Ex        Cervical ROM Rot/SB  B/l x20 ea B/L rot and SB  x20 ea B/L rot and SB  x20 ea B/L rot and SB x20 ea Rot/SB  B/l x20 ea   Elbow flex/ext AROM 1# x20 ea x20 ea 2x10 ea 2x10 ea 1# x20 ea   Wrist flex/ext AROM 1# x20 ea x20 ea 2x10 ea 2x10 ea 1# x20 ea   UT stretch 15" x4 15" x4 15" x4 15"x4 15" x4   Levator scap stretch 15" x4 15" x4 15" x4 15"x4 15" x4   Pendulums  With 1# DB  4 ways x1 min ea 4 ways x1 min ea 4 ways x1 min ea 4 ways x1 min ea With 1# DB  4 ways x1 min ea                   Ther Activity        Digiflex gripping Green 5" 2 mins green 5" x20 Green 5" 2x10 Green 5" 2x10 Green 5" 2 mins                                   Modalities        CP/stim post tx prn  Pt defers Defers

## 2021-03-24 ENCOUNTER — OFFICE VISIT (OUTPATIENT)
Dept: PHYSICAL THERAPY | Facility: CLINIC | Age: 71
End: 2021-03-24
Payer: MEDICARE

## 2021-03-24 DIAGNOSIS — S46.011D TRAUMATIC COMPLETE TEAR OF RIGHT ROTATOR CUFF, SUBSEQUENT ENCOUNTER: Primary | ICD-10-CM

## 2021-03-24 DIAGNOSIS — Z98.890 S/P RIGHT ROTATOR CUFF REPAIR: ICD-10-CM

## 2021-03-24 PROCEDURE — 97110 THERAPEUTIC EXERCISES: CPT | Performed by: PHYSICAL THERAPIST

## 2021-03-24 PROCEDURE — 97140 MANUAL THERAPY 1/> REGIONS: CPT | Performed by: PHYSICAL THERAPIST

## 2021-03-24 NOTE — PROGRESS NOTES
PT Progress Note     Today's date: 3/24/2021  Patient name: Keri Love  : 1950  MRN: [id number]  Referring provider: Stefan Bundy DO  Dx:   Encounter Diagnosis     ICD-10-CM    1  Traumatic complete tear of right rotator cuff, subsequent encounter  S46 011D    2  S/P right rotator cuff repair  Z98 890               Assessment  Assessment details: Keri Love  is a 72 y o  male who presents to PT POD 2 following R RC repair  No further referral appears necessary at this time based upon examination results  Presented in sling with abductor pillow  Patient's sling was adjusted to ensure proper fit  Patient required assistance with dressing  Instructed patient on removal of sling for PT exercise and bathing only  Patient was educated on hygiene and to keep incisions clean and dry  Instructed patient on initial HEP  Patient demonstrated understanding of initial HEP  Patient has severe functional deficits secondary to not being able to use his dominant arm x6 weeks  Prognosis is good given HEP compliance and PT 3x/wk  Please contact me if you have any questions or recommendations  Thank you for the opportunity to share in  [facility]'s care  RA 3/24       Keri Love  has demonstrated decreased pain, increased strength, increased range of motion, and increased activity tolerance since starting physical therapy services  He reports  an overall improvement of 95% thus far  He continues to present with pain, decreased strength, decreased range of motion, and decreased activity tolerance and would benefit from additional skilled physical therapy interventions to address impairments and maximize function          Impairments: abnormal or restricted ROM, activity intolerance, impaired physical strength, lacks appropriate home exercise program and pain with function  Functional limitations: unable to use R UE; much difficulty with ADLs/IADLs and drivingUnderstanding of Dx/Px/POC: good Prognosis: good    Goals  STGs  1) In 2 visits patient will demonstrate independence with initial basic HEP  Met   2) In 2 weeks patient will demonstrate independence with donning/doffing sling  Met   3) In 4 weeks patient will demonstrate 90 deg PROM R shoulder flexion and abduction  Me t    LTGs  1) In 6 weeks patient will DC sling  Progressing   2) In 8 weeks patient will demonstrate AROM R UE 90 deg flexion and abduction  Progressing   3) In 8-12 weeks patient will report little to no difficulty with all ADLs and IADLs  Progressing   4) In 12 weeks patient will tolerate 45 mins of strengthening exercise  Progressing   5) In 16 weeks patient will tolerate lifting and carrying 25 lbs or more with no shoulder pain  Progressing     Plan  Patient would benefit from: skilled physical therapy  Referral necessary: No  Planned modality interventions: cryotherapy, TENS, unattended electrical stimulation and thermotherapy: hydrocollator packs  Planned therapy interventions: joint mobilization, manual therapy, massage, Guerra taping, patient education, postural training, neuromuscular re-education, self care, strengthening, stretching, therapeutic activities, therapeutic exercise, home exercise program, flexibility and functional ROM exercises  Frequency: 3x week  Duration in weeks: 12  Plan of Care beginning date: 2/26/2021  Plan of Care expiration date: 5/21/2021  Treatment plan discussed with: patient        Subjective Evaluation    History of Present Illness  Date of surgery: 2/24/2021  Mechanism of injury: surgery  Mechanism of injury: Patient presents to PT s/p R RC repair 2/24/21  Original injury occurred approx 4 months ago  Patient reports he fell while walking  He reports that he has Parkinson's and was out for a walk, picked up pace and could not slow down  Went into a jog and fell  RA 3/24     The patient reports an improvement of 95 percent thus far  Still in phase 1 of RTC protocol   Allowed AROM at 6 weeks  Progressing well  Denies pain at this time ans reprots that he will be returning to work on   Pain    RA 3/24   At best pain ratin   0  At worst pain ratin   0    Social Support  Lives in: [place]  Lives with: alone    Employment status: not working (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)  Hand dominance: right  Exercise history: likes to be active with walking    Treatments  No previous or current treatments  Patient Goals  Patient goals for therapy: independence with ADLs/IADLs, increased motion and increased strength          Objective     Observations     Right Shoulder  Positive for incision  Additional Observation Details  Removed dressings  Cleaned skin surrounding incisions with alcohol swabs  Surgical incisions inspected  Sutures present  Incisions clean, dry and intact with no signs/symptoms of infection  Patient was educated on signs/symptoms of infection and was advised to contact MD immediately if suspect infection  Active Range of Motion     Additional Active Range of Motion Details  Not appropriate for AROM testing at this time    Passive Range of Motion     Right Shoulder   Flexion: 30 degrees with pain  RA 3/24  110 deg   Abduction: 30 degrees with pain  RA 3/24     90 deg   External rotation 0°: -40 degrees   RA 3/24 NT  Internal rotation 0°: Right shoulder passive internal rotation at 0 degrees: to belly  RA 3/24  NT    Strength/Myotome Testing     Additional Strength Details  Not appropriate for strength testing at this time    RA 3/24     Strength NT this date                    Precautions: s/p R RC repair DOS 21 (no AROM x6 weeks - ), Parkinson's  POC exp 21  Manuals 3/22 3/24 3/15 3/17 3/19   R shoulder stretching to ilia AF flex and abd only Flex/abd only  RRBE  Flex/abd only  TM JAB KG flex and abd   R elbow stretching  AF RRBE TM JAB KG   Scap mobs/STM    JAB            Neuro Re-Ed Scap retractions 5" x30 5" x20 5" 2x10 5" 2x10 5" x30                                                   Ther Ex        Cervical ROM Rot/SB  B/l x20 ea B/L rot and SB  x20 ea B/L rot and SB  x20 ea B/L rot and SB x20 ea Rot/SB  B/l x20 ea   Elbow flex/ext AROM 1# x20 ea x20 ea 2x10 ea 2x10 ea 1# x20 ea   Wrist flex/ext AROM 1# x20 ea x20 ea 2x10 ea 2x10 ea 1# x20 ea   UT stretch 15" x4 15" x4 15" x4 15"x4 15" x4   Levator scap stretch 15" x4 15" x4 15" x4 15"x4 15" x4   Pendulums  With 1# DB  4 ways x1 min ea 4 ways x1 min ea  1# DB 4 ways x1 min ea 4 ways x1 min ea With 1# DB  4 ways x1 min ea                   Ther Activity        Digiflex gripping Green 5" 2 mins green 5" x20 Green 5" 2x10 Green 5" 2x10 Green 5" 2 mins                                   Modalities        CP/stim post tx prn  Pt defers Defers

## 2021-03-26 ENCOUNTER — OFFICE VISIT (OUTPATIENT)
Dept: PHYSICAL THERAPY | Facility: CLINIC | Age: 71
End: 2021-03-26
Payer: MEDICARE

## 2021-03-26 DIAGNOSIS — Z98.890 S/P RIGHT ROTATOR CUFF REPAIR: ICD-10-CM

## 2021-03-26 DIAGNOSIS — S46.011D TRAUMATIC COMPLETE TEAR OF RIGHT ROTATOR CUFF, SUBSEQUENT ENCOUNTER: Primary | ICD-10-CM

## 2021-03-26 PROCEDURE — 97140 MANUAL THERAPY 1/> REGIONS: CPT

## 2021-03-26 PROCEDURE — 97110 THERAPEUTIC EXERCISES: CPT

## 2021-03-26 NOTE — PROGRESS NOTES
Daily Note     Today's date: 3/26/2021  Patient name: Dannette Leventhal  : 1950  MRN: [id number]  Referring provider: Tre Blake DO  Dx: No diagnosis found  Subjective: Pt denies R shoulder pain  Anxious to get out of sling  Pt returns to Dr Marcel Lopez 21  Objective: See treatment diary below      Assessment: Tolerated treatment well  Verbal cues to maintain RTC repair precaution  Pt verbalizes understanding  Patient exhibited good technique with therapeutic exercises and would benefit from continued PT      Plan: Continue per plan of care        Precautions: s/p R RC repair DOS 21 (no AROM x6 weeks - ), Parkinson's  POC exp 21  Manuals 3/22 3/26 3/15 3/17 3/19   R shoulder stretching to ilia AF flex and abd only Flex/abd only  MJC Flex/abd only  TM JAB KG flex and abd   R elbow stretching  AF MJC TM JAB KG   Scap mobs/STM    JAB            Neuro Re-Ed        Scap retractions 5" x30 5" x30 5" 2x10 5" 2x10 5" x30                                                   Ther Ex        Cervical ROM Rot/SB  B/l x20 ea B/L rot and SB  x20 ea B/L rot and SB  x20 ea B/L rot and SB x20 ea Rot/SB  B/l x20 ea   Elbow flex/ext AROM 1# x20 ea 1# x20 ea 2x10 ea 2x10 ea 1# x20 ea   Wrist flex/ext AROM 1# x20 ea 1# x20 ea 2x10 ea 2x10 ea 1# x20 ea   UT stretch 15" x4 15" x4 15" x4 15"x4 15" x4   Levator scap stretch 15" x4 15" x4 15" x4 15"x4 15" x4   Pendulums  With 1# DB  4 ways x1 min ea 1# DB 4 ways x1 min ea 4 ways x1 min ea 4 ways x1 min ea With 1# DB  4 ways x1 min ea                   Ther Activity        Digiflex gripping Green 5" 2 mins Blue  5" x20 Green 5" 2x10 Green 5" 2x10 Green 5" 2 mins                                   Modalities        CP/stim post tx prn  Pt defers Defers

## 2021-03-29 ENCOUNTER — OFFICE VISIT (OUTPATIENT)
Dept: PHYSICAL THERAPY | Facility: CLINIC | Age: 71
End: 2021-03-29
Payer: MEDICARE

## 2021-03-29 DIAGNOSIS — Z98.890 S/P RIGHT ROTATOR CUFF REPAIR: ICD-10-CM

## 2021-03-29 DIAGNOSIS — S46.011D TRAUMATIC COMPLETE TEAR OF RIGHT ROTATOR CUFF, SUBSEQUENT ENCOUNTER: Primary | ICD-10-CM

## 2021-03-29 PROCEDURE — 97530 THERAPEUTIC ACTIVITIES: CPT

## 2021-03-29 PROCEDURE — 97140 MANUAL THERAPY 1/> REGIONS: CPT

## 2021-03-29 PROCEDURE — 97110 THERAPEUTIC EXERCISES: CPT

## 2021-03-29 NOTE — PROGRESS NOTES
Daily Note     Today's date: 3/29/2021  Patient name: Vasile Kohli  : 1950  MRN: [id number]  Referring provider: Robby Patel DO  Dx:   Encounter Diagnosis     ICD-10-CM    1  Traumatic complete tear of right rotator cuff, subsequent encounter  S46 011D    2  S/P right rotator cuff repair  Z98 890                   Subjective: Pt denies pain at rest when in his sling  He is anxious to DC his sling  Objective: See treatment diary below      Assessment: Pt is now 4 weeks post op  Progressed program by iniating multiple AAROM exercises as per MD protocol  Tolerated treatment well  Patient demonstrated fatigue post treatment and would benefit from continued PT  Plan: Continue per plan of care  Progress treament per protocol        Precautions: s/p R RC repair DOS 21 (no AROM x6 weeks - ), Parkinson's  POC exp 21  Manuals 3/22 3/26 3/29 3/17 3/19   R shoulder stretching to ilia AF flex and abd only Flex/abd only  MJC Flex/abd to ilia    ER @45* to ilia    TM JAB KG flex and abd   R elbow stretching  AF MJC TM JAB KG   Scap mobs/STM    JAB            Neuro Re-Ed        Scap retractions 5" x30 5" x30 5" x30 5" 2x10 5" x30                                                   Ther Ex        Cervical ROM Rot/SB  B/l x20 ea B/L rot and SB  x20 ea DC B/L rot and SB x20 ea Rot/SB  B/l x20 ea   Elbow flex/ext AROM 1# x20 ea 1# x20 ea 2# 2x10 ea 2x10 ea 1# x20 ea   Wrist flex/ext AROM 1# x20 ea 1# x20 ea 2# 2x10 ea 2x10 ea 1# x20 ea   UT stretch 15" x4 15" x4 15" x4 15"x4 15" x4   Levator scap stretch 15" x4 15" x4 15" x4 15"x4 15" x4   Pendulums  With 1# DB  4 ways x1 min ea 1# DB 4 ways x1 min ea 2# DB 4 ways x1 min ea 4 ways x1 min ea With 1# DB  4 ways x1 min ea                   Ther Activity        Digiflex gripping Green 5" 2 mins Blue  5" x20 DC Green 5" 2x10 Green 5" 2 mins   AAROM Pulleys for functional ROM   Flex and Scap to ilia   10" x10     AAROM Finger ladder   Flex 5" x10 AAROM in ER w/ cane   5' x10             Modalities        CP/stim post tx prn  Pt defers

## 2021-03-31 ENCOUNTER — OFFICE VISIT (OUTPATIENT)
Dept: PHYSICAL THERAPY | Facility: CLINIC | Age: 71
End: 2021-03-31
Payer: MEDICARE

## 2021-03-31 DIAGNOSIS — Z98.890 S/P RIGHT ROTATOR CUFF REPAIR: ICD-10-CM

## 2021-03-31 DIAGNOSIS — S46.011D TRAUMATIC COMPLETE TEAR OF RIGHT ROTATOR CUFF, SUBSEQUENT ENCOUNTER: Primary | ICD-10-CM

## 2021-03-31 PROCEDURE — 97140 MANUAL THERAPY 1/> REGIONS: CPT

## 2021-03-31 PROCEDURE — 97110 THERAPEUTIC EXERCISES: CPT

## 2021-03-31 NOTE — PROGRESS NOTES
Daily Note     Today's date: 3/31/2021  Patient name: Jim Carlin  : 1950  MRN: [id number]  Referring provider: Susie Radford DO  Dx:   Encounter Diagnosis     ICD-10-CM    1  Traumatic complete tear of right rotator cuff, subsequent encounter  S46 011D    2  S/P right rotator cuff repair  Z98 890                   Subjective: Pt reports his R shoulder feels excellent  Denies increased symptoms following last PT treatment  Objective: See treatment diary below      Assessment: Tolerated treatment well  Verbal reminders to avoid AROM R shoulder at this time  Patient exhibited good technique with therapeutic exercises and would benefit from continued PT as per RTC repair protocol  Plan: Continue per plan of care        Precautions: s/p R RC repair DOS 21 (no AROM x6 weeks - ), Parkinson's  POC exp 21  Manuals 3/22 3/26 3/29 3/31 3/19   R shoulder stretching to ilia AF flex and abd only Flex/abd only  MJC Flex/abd to ilia    ER @45* to ilia    TM Flex/abd  To ilia    ER   @ 45* to ilia    [address] flex and abd   R elbow stretching  AF MJC TM MJC KG   Scap mobs/STM                Neuro Re-Ed        Scap retractions 5" x30 5" x30 5" x30 5" 2x10 5" x30                                                   Ther Ex        Cervical ROM Rot/SB  B/l x20 ea B/L rot and SB  x20 ea DC  Rot/SB  B/l x20 ea   Elbow flex/ext AROM 1# x20 ea 1# x20 ea 2# 2x10 ea 2# 2x10 ea 1# x20 ea   Wrist flex/ext AROM 1# x20 ea 1# x20 ea 2# 2x10 ea 2# 2x10 ea 1# x20 ea   UT stretch 15" x4 15" x4 15" x4 15"x4 15" x4   Levator scap stretch 15" x4 15" x4 15" x4 15"x4 15" x4   Pendulums  With 1# DB  4 ways x1 min ea 1# DB 4 ways x1 min ea 2# DB 4 ways x1 min ea 2# DB 4 ways x1 min ea With 1# DB  4 ways x1 min ea                   Ther Activity        Digiflex gripping Green 5" 2 mins Blue  5" x20 DC Green 5" 2x10 Green 5" 2 mins   AAROM Pulleys for functional ROM   Flex and Scap to ilia   10" x10 Flex and scap  To ilia  10" x10    AAROM Finger ladder   Flex 5" x10 Flex 5" x10  to # 24    AAROM in ER w/ cane   5' x10 5" x10            Modalities        CP/stim post tx prn  Pt defers

## 2021-04-02 ENCOUNTER — OFFICE VISIT (OUTPATIENT)
Dept: PHYSICAL THERAPY | Facility: CLINIC | Age: 71
End: 2021-04-02
Payer: MEDICARE

## 2021-04-02 DIAGNOSIS — S46.011D TRAUMATIC COMPLETE TEAR OF RIGHT ROTATOR CUFF, SUBSEQUENT ENCOUNTER: Primary | ICD-10-CM

## 2021-04-02 DIAGNOSIS — Z98.890 S/P RIGHT ROTATOR CUFF REPAIR: ICD-10-CM

## 2021-04-02 PROCEDURE — 97140 MANUAL THERAPY 1/> REGIONS: CPT | Performed by: PHYSICAL THERAPIST

## 2021-04-02 PROCEDURE — 97112 NEUROMUSCULAR REEDUCATION: CPT | Performed by: PHYSICAL THERAPIST

## 2021-04-02 PROCEDURE — 97110 THERAPEUTIC EXERCISES: CPT | Performed by: PHYSICAL THERAPIST

## 2021-04-02 NOTE — PROGRESS NOTES
Daily Note     Today's date: 2021  Patient name: Almita Solorio  : 1950  MRN: [id number]  Referring provider: Rickie Siemens, DO  Dx:   Encounter Diagnosis     ICD-10-CM    1  Traumatic complete tear of right rotator cuff, subsequent encounter  S46 011D    2  S/P right rotator cuff repair  Z98 890                   Subjective: Patient reports he is feeling really good  Next appt with his surgeon is not until   Objective: See treatment diary below      Assessment: Tolerated treatment well  Removed abductor pillow from sling today but instructed patient to continue using sling for UE immobilization  Patient demonstrated fatigue post treatment, exhibited good technique with therapeutic exercises and would benefit from continued PT      Plan: Continue per plan of care  Progress treatment as tolerated         Precautions: s/p R RC repair DOS 21 (no AROM x6 weeks - ), Parkinson's  POC exp 21  Manuals 3/22 3/26 3/29 3/31 4/2   R shoulder stretching to ilia AF flex and abd only Flex/abd only  MJC Flex/abd to ilia    ER @45* to ilia    TM Flex/abd  To ilia    ER   @ 45* to [employer] All directions to ilia    KG   R elbow stretching  AF MJC TM MJC KG   Scap mobs/STM                Neuro Re-Ed        Scap retractions 5" x30 5" x30 5" x30 5" 2x10 5" x30 w/2# DBs                                                   Ther Ex        Cervical ROM Rot/SB  B/l x20 ea B/L rot and SB  x20 ea DC     Elbow flex/ext AROM 1# x20 ea 1# x20 ea 2# 2x10 ea 2# 2x10 ea 2# x20 ea   Wrist flex/ext AROM 1# x20 ea 1# x20 ea 2# 2x10 ea 2# 2x10 ea 2# x20 ea   UT stretch 15" x4 15" x4 15" x4 15"x4 15" x4   Levator scap stretch 15" x4 15" x4 15" x4 15"x4 15" x4   Pendulums  With 1# DB  4 ways x1 min ea 1# DB 4 ways x1 min ea 2# DB 4 ways x1 min ea 2# DB 4 ways x1 min ea With 2# DB  4 ways x2 mins ea                   Ther Activity        Digiflex gripping Green 5" 2 mins Blue  5" x20 DC Green 5" 2x10    AAROM Pulleys for functional ROM   Flex and Scap to ilia   10" x10 Flex and scap  To ilia  10" x10 Flex and scap  To ilia  10" x10 ea   AAROM Finger ladder   Flex 5" x10 Flex 5" x10  to # 24 Flex 5" x10   AAROM in ER w/ cane   5' x10 5" x10 10"x10   AAROM flexion w/cane     10"x10   Modalities        CP/stim post tx prn  Pt defers

## 2021-04-05 ENCOUNTER — OFFICE VISIT (OUTPATIENT)
Dept: PHYSICAL THERAPY | Facility: CLINIC | Age: 71
End: 2021-04-05
Payer: MEDICARE

## 2021-04-05 DIAGNOSIS — Z98.890 S/P RIGHT ROTATOR CUFF REPAIR: ICD-10-CM

## 2021-04-05 DIAGNOSIS — S46.011D TRAUMATIC COMPLETE TEAR OF RIGHT ROTATOR CUFF, SUBSEQUENT ENCOUNTER: Primary | ICD-10-CM

## 2021-04-05 PROCEDURE — 97140 MANUAL THERAPY 1/> REGIONS: CPT

## 2021-04-05 PROCEDURE — 97530 THERAPEUTIC ACTIVITIES: CPT

## 2021-04-05 PROCEDURE — 97110 THERAPEUTIC EXERCISES: CPT

## 2021-04-05 NOTE — PROGRESS NOTES
Daily Note     Today's date: 2021  Patient name: Hong Gibbons  : 1950  MRN: [id number]  Referring provider: Becca Zabala DO  Dx:   Encounter Diagnosis     ICD-10-CM    1  Traumatic complete tear of right rotator cuff, subsequent encounter  S46 011D    2  S/P right rotator cuff repair  Z98 890                   Subjective: Pt reported that his shoulder is much more comfortable with the removel of the abd pillow on his sling  He is anxious to d/c his sling after his MD appt on   Objective: See treatment diary below       Assessment: Tolerated treatment well  Timed multiple exercises secondary to difficulty keeping count of reps  Patient demonstrated fatigue post treatment, exhibited good technique with therapeutic exercises and would benefit from continued PT  Plan: Continue per plan of care        Precautions: s/p R RC repair DOS 21 (no AROM x6 weeks - ), Parkinson's  POC exp 21  Manuals 4/5 3/26 3/29 3/31 4/2   R shoulder stretching to ilia All directions to ilia    TM Flex/abd only  MJC Flex/abd to ilia    ER @45* to ilia    TM Flex/abd  To ilia    ER   @ 45* to [employer] All directions to ilia    KG   R elbow stretching  TM MJC TM MJC KG   Scap mobs/STM                Neuro Re-Ed        Scap retractions 5" x30 w/2# DBs 5" x30 5" x30 5" 2x10 5" x30 w/2# DBs                                                   Ther Ex        Cervical ROM  B/L rot and SB  x20 ea DC     Elbow flex/ext AROM 2# x20 ea 1# x20 ea 2# 2x10 ea 2# 2x10 ea 2# x20 ea   Wrist flex/ext AROM 2# x20 ea 1# x20 ea 2# 2x10 ea 2# 2x10 ea 2# x20 ea   UT stretch 15" x4 15" x4 15" x4 15"x4 15" x4   Levator scap stretch 15" x4 15" x4 15" x4 15"x4 15" x4   Pendulums  With 2# DB  4 ways x2 mins ea 1# DB 4 ways x1 min ea 2# DB 4 ways x1 min ea 2# DB 4 ways x1 min ea With 2# DB  4 ways x2 mins ea                   Ther Activity        Digiflex gripping  Blue  5" x20 DC Green 5" 2x10    AAROM Pulleys for functional ROM Flex and scap  To ilia  10" x3 min ea  Flex and Scap to ilia   10" x10 Flex and scap  To ilia  10" x10 Flex and scap  To ilia  10" x10 ea   AAROM Finger ladder Flex 5" x2 min  Flex 5" x10 Flex 5" x10  to # 24 Flex 5" x10   AAROM in ER w/ cane 10" x2 min  5' x10 5" x10 10"x10   AAROM flexion w/cane 10" x2 min    10"x10   Modalities        CP/stim post tx prn  Pt defers

## 2021-04-07 ENCOUNTER — OFFICE VISIT (OUTPATIENT)
Dept: PHYSICAL THERAPY | Facility: CLINIC | Age: 71
End: 2021-04-07
Payer: MEDICARE

## 2021-04-07 DIAGNOSIS — S46.011D TRAUMATIC COMPLETE TEAR OF RIGHT ROTATOR CUFF, SUBSEQUENT ENCOUNTER: Primary | ICD-10-CM

## 2021-04-07 DIAGNOSIS — Z98.890 S/P RIGHT ROTATOR CUFF REPAIR: ICD-10-CM

## 2021-04-07 PROCEDURE — 97110 THERAPEUTIC EXERCISES: CPT

## 2021-04-07 PROCEDURE — 97140 MANUAL THERAPY 1/> REGIONS: CPT

## 2021-04-07 PROCEDURE — 97530 THERAPEUTIC ACTIVITIES: CPT

## 2021-04-07 NOTE — PROGRESS NOTES
Daily Note     Today's date: 2021  Patient name: Kristal Marrero  : 1950  MRN: [id number]  Referring provider: Hillary Christianson DO  Dx:   Encounter Diagnosis     ICD-10-CM    1  Traumatic complete tear of right rotator cuff, subsequent encounter  S46 011D    2  S/P right rotator cuff repair  Z98 890                   Subjective: Pt reports his R shoulder is feeling good  Objective: See treatment diary below      Assessment: Tolerated treatment well  Patient continues to wear sling on R without abduction pillow  Verbal cues with AAROM exercises today  Patient exhibited good technique with therapeutic exercises and would benefit from continued PT      Plan: Continue per plan of care        Precautions: s/p R RC repair DOS 21 (no AROM x6 weeks - ), Parkinson's  POC exp 21  Manuals 4/5 4/7 3/29 3/31 4   R shoulder stretching to ilia All directions to ilia    TM All directions to ilia    MJC Flex/abd to ilia    ER @45* to ilia    TM Flex/abd  To ilia    ER   @ 45* to [employer] All directions to ilia    KG   R elbow stretching  TM MJC TM MJC KG   Scap mobs/STM                Neuro Re-Ed        Scap retractions 5" x30 w/2# DBs 5"  W/2# DBs 5" x30 5" 2x10 5" x30 w/2# DBs                                                   Ther Ex        Cervical ROM   DC     Elbow flex/ext AROM 2# x20 ea 2# x20 ea 2# 2x10 ea 2# 2x10 ea 2# x20 ea   Wrist flex/ext AROM 2# x20 ea 2# x20 ea 2# 2x10 ea 2# 2x10 ea 2# x20 ea   UT stretch 15" x4  15" x4 15"x4 15" x4   Levator scap stretch 15" x4  15" x4 15"x4 15" x4   Pendulums  With 2# DB  4 ways x2 mins ea 2# DB 4 ways  x2 mins ea 2# DB 4 ways x1 min ea 2# DB 4 ways x1 min ea With 2# DB  4 ways x2 mins ea                   Ther Activity        Digiflex gripping   DC Green 5" 2x10    AAROM Pulleys for functional ROM Flex and scap  To ilia  10" x3 min ea Flex and scap  To ilia  10" x3 min ea Flex and Scap to ilia   10" x10 Flex and scap  To ilia  10" x10 Flex and scap  To ilia  10" x10 ea   AAROM Finger ladder Flex 5" x2 min Flex 5" x2 min Flex 5" x10 Flex 5" x10  to # 24 Flex 5" x10   AAROM in ER w/ cane 10" x2 min 10" x2 min 5' x10 5" x10 10"x10   AAROM flexion w/cane 10" x2 min 10" x2 min   10"x10   Modalities        CP/stim post tx prn  Pt defers

## 2021-04-09 ENCOUNTER — OFFICE VISIT (OUTPATIENT)
Dept: PHYSICAL THERAPY | Facility: CLINIC | Age: 71
End: 2021-04-09
Payer: MEDICARE

## 2021-04-09 DIAGNOSIS — S46.011D TRAUMATIC COMPLETE TEAR OF RIGHT ROTATOR CUFF, SUBSEQUENT ENCOUNTER: Primary | ICD-10-CM

## 2021-04-09 DIAGNOSIS — Z98.890 S/P RIGHT ROTATOR CUFF REPAIR: ICD-10-CM

## 2021-04-09 PROCEDURE — 97112 NEUROMUSCULAR REEDUCATION: CPT | Performed by: PHYSICAL MEDICINE & REHABILITATION

## 2021-04-09 PROCEDURE — 97140 MANUAL THERAPY 1/> REGIONS: CPT | Performed by: PHYSICAL MEDICINE & REHABILITATION

## 2021-04-09 PROCEDURE — 97110 THERAPEUTIC EXERCISES: CPT | Performed by: PHYSICAL MEDICINE & REHABILITATION

## 2021-04-09 NOTE — PROGRESS NOTES
Daily Note     Today's date: 2021  Patient name: Thai Castillo  : 1950  MRN: [id number]  Referring provider: Aminah Freedman DO  Dx:   Encounter Diagnosis     ICD-10-CM    1  Traumatic complete tear of right rotator cuff, subsequent encounter  S46 011D    2  S/P right rotator cuff repair  Z98 890                   Subjective: Patient offers no new complaints, reports his shoulder is doing well  Objective: See treatment diary below      Assessment: Tolerated treatment well overall  Patient exhibited good technique with therapeutic exercises and would benefit from continued PT  Continue to progress as able per protocol  Plan: Continue per plan of care        Precautions: s/p R RC repair DOS 21 (no AROM x6 weeks - ), Parkinson's  POC exp 21  Manuals    R shoulder stretching to ilia All directions to ilia    TM All directions to ilia    [facility]  All directions to ilia    KG   R elbow stretching  TM MJC LH  KG   Scap mobs/STM                Neuro Re-Ed        Scap retractions 5" x30 w/2# DBs 5"  W/2# DBs 30x5" 2# DBs  5" x30 w/2# DBs                                                   Ther Ex        Cervical ROM        Elbow flex/ext AROM 2# x20 ea 2# x20 ea 2# 20x  2# x20 ea   Wrist flex/ext AROM 2# x20 ea 2# x20 ea 2# 20x  2# x20 ea   UT stretch 15" x4    15" x4   Levator scap stretch 15" x4    15" x4   Pendulums  With 2# DB  4 ways x2 mins ea 2# DB 4 ways  x2 mins ea 2# DB 2'  With 2# DB  4 ways x2 mins ea                   Ther Activity        Digiflex gripping        AAROM Pulleys for functional ROM Flex and scap  To ilia  10" x3 min ea Flex and scap  To ilia  10" x3 min ea Flex/scap 10" H 3' ea  Flex and scap  To ilia  10" x10 ea   AAROM Finger ladder Flex 5" x2 min Flex 5" x2 min Flex 5" H 2'  Flex 5" x10   AAROM in ER w/ cane 10" x2 min 10" x2 min   10"x10   AAROM flexion w/cane 10" x2 min 10" x2 min 10" H 2'  10"x10   Modalities        CP/stim post tx prn Pt coreys np

## 2021-04-12 ENCOUNTER — OFFICE VISIT (OUTPATIENT)
Dept: PHYSICAL THERAPY | Facility: CLINIC | Age: 71
End: 2021-04-12
Payer: MEDICARE

## 2021-04-12 DIAGNOSIS — Z98.890 S/P RIGHT ROTATOR CUFF REPAIR: ICD-10-CM

## 2021-04-12 DIAGNOSIS — S46.011D TRAUMATIC COMPLETE TEAR OF RIGHT ROTATOR CUFF, SUBSEQUENT ENCOUNTER: Primary | ICD-10-CM

## 2021-04-12 PROCEDURE — 97140 MANUAL THERAPY 1/> REGIONS: CPT

## 2021-04-12 PROCEDURE — 97110 THERAPEUTIC EXERCISES: CPT

## 2021-04-12 PROCEDURE — 97530 THERAPEUTIC ACTIVITIES: CPT

## 2021-04-12 NOTE — PROGRESS NOTES
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 and would like to be able to use his R arm for light activities  Objective: See treatment diary below      Assessment: Tolerated treatment well  Pt remains limited in ABD during manuals  Added AAROM in scap with a cane while supine to increase ROM  Patient demonstrated fatigue post treatment, exhibited good technique with therapeutic exercises and would benefit from continued PT  Plan: Continue per plan of care  Progress treament per protocol        Precautions: s/p R RC repair DOS 21 (no AROM x6 weeks - ), Parkinson's  POC exp 21  Manuals    R shoulder stretching to ilia All directions to ilia    TM All directions to ilia    [facility] TM All directions to ilia    KG   R elbow stretching  TM MJC LH TM KG   Scap mobs/STM                Neuro Re-Ed        Scap retractions 5" x30 w/2# DBs 5"  W/2# DBs 30x5" 2# DBs 5" x30 w/ 2# DBs 5" x30 w/2# DBs                                                   Ther Ex        Cervical ROM        Elbow flex/ext AROM 2# x20 ea 2# x20 ea 2# 20x 2# x30 ea 2# x20 ea   Wrist flex/ext AROM 2# x20 ea 2# x20 ea 2# 20x 2# x30 ea 2# x20 ea   UT stretch 15" x4    15" x4   Levator scap stretch 15" x4    15" x4   Pendulums  With 2# DB  4 ways x2 mins ea 2# DB 4 ways  x2 mins ea 2# DB 2' 2# DB 4 ways  x2 mins ea With 2# DB  4 ways x2 mins ea                   Ther Activity        Digiflex gripping        AAROM Pulleys for functional ROM Flex and scap  To ilia  10" x3 min ea Flex and scap  To ilia  10" x3 min ea Flex/scap 10" H 3' ea Flex and scap  To ilia  10" x3 min ea Flex and scap  To ilia  10" x10 ea   AAROM Finger ladder Flex 5" x2 min Flex 5" x2 min Flex 5" H 2' Flex 5" x3 min Flex 5" x10   AAROM in ER w/ cane 10" x2 min 10" x2 min  10" x2 min 10"x10   AAROM flexion w/cane 10" x2 min 10" x2 min 10" H 2' 10" x2 min 10"x10   AAROM supine scap w/ cane      10" x2 min    Modalities        CP/stim post tx prn  Pt defers np

## 2021-04-14 ENCOUNTER — OFFICE VISIT (OUTPATIENT)
Dept: PHYSICAL THERAPY | Facility: CLINIC | Age: 71
End: 2021-04-14
Payer: MEDICARE

## 2021-04-14 DIAGNOSIS — Z98.890 S/P RIGHT ROTATOR CUFF REPAIR: ICD-10-CM

## 2021-04-14 DIAGNOSIS — S46.011D TRAUMATIC COMPLETE TEAR OF RIGHT ROTATOR CUFF, SUBSEQUENT ENCOUNTER: Primary | ICD-10-CM

## 2021-04-14 PROCEDURE — 97110 THERAPEUTIC EXERCISES: CPT

## 2021-04-14 PROCEDURE — 97140 MANUAL THERAPY 1/> REGIONS: CPT

## 2021-04-14 NOTE — PROGRESS NOTES
Daily Note     Today's date: 2021  Patient name: Chelsie See  : 1950  MRN: [id number]  Referring provider: Calvin Muhammad DO  Dx:   Encounter Diagnosis     ICD-10-CM    1  Traumatic complete tear of right rotator cuff, subsequent encounter  S46 011D    2  S/P right rotator cuff repair  Z98 890                   Subjective: Patient reports his R shoulder feels great  He is anxious to D/c sling  Returns to Dr Mildred Dale tomorrow  Objective: See treatment diary below      Assessment: Tolerated treatment well  Patient c/o occasional nonpainful click with AAROM supine flexion today  Patient exhibited good technique with therapeutic exercises and would benefit from continued PT as per RTC repair protocol  Plan: Continue per plan of care        Precautions: s/p R RC repair DOS 21 (no AROM x6 weeks - ), Parkinson's  POC exp 21  Manuals    R shoulder stretching to ilia All directions to ilia    TM All directions to ilia    [address] [place] [address] [address] [place] [address]   R elbow stretching  TM [address] [place] [address] TM [address] [address] mobs/STM                Neuro Re-Ed        Scap retractions 5" x30 w/2# DBs 5"  W/2# DBs 30x5" 2# DBs 5" x30 w/ 2# DBs 5" x30 w/2# DBs                                                   Ther Ex      Cervical ROM        Elbow flex/ext AROM 2# x20 ea 2# x20 ea 2# 20x 2# x30 ea 2# x30 ea   Wrist flex/ext AROM 2# x20 ea 2# x20 ea 2# 20x 2# x30 ea 2#    UT stretch 15" x4       Levator scap stretch 15" x4       Pendulums  With 2# DB  4 ways x2 mins ea 2# DB 4 ways  x2 mins ea 2# DB 2' 2# DB 4 ways  x2 mins ea 2# DB 4 ways  x2 mins ea                   Ther Activity      Digiflex gripping        AAROM Pulleys for functional ROM Flex and scap  To ilia  10" x3 min ea Flex and scap  To ilia  10" x3 min ea Flex/scap 10" H 3' ea Flex and scap  To ilia  10" x3 min ea Flex and scap  To ilia  10" x3 min ea   AAROM Finger ladder Flex 5" x2 min Flex 5" x2 min Flex 5" H 2' Flex 5" x3 min AAROM in ER w/ cane 10" x2 min 10" x2 min  10" x2 min 10" x2 min   AAROM flexion w/cane 10" x2 min 10" x2 min 10" H 2' 10" x2 min 10" x2 min   AAROM supine scap w/ cane      10" x2 min 10" x2 min   Modalities        CP/stim post tx prn  Pt defers np

## 2021-04-15 ENCOUNTER — OFFICE VISIT (OUTPATIENT)
Dept: OBGYN CLINIC | Facility: CLINIC | Age: 71
End: 2021-04-15

## 2021-04-15 VITALS
HEIGHT: 70 IN | WEIGHT: 197 LBS | HEART RATE: 76 BPM | DIASTOLIC BLOOD PRESSURE: 94 MMHG | BODY MASS INDEX: 28.2 KG/M2 | SYSTOLIC BLOOD PRESSURE: 157 MMHG

## 2021-04-15 DIAGNOSIS — Z98.890 S/P ARTHROSCOPY OF RIGHT SHOULDER: Primary | ICD-10-CM

## 2021-04-15 PROCEDURE — 99024 POSTOP FOLLOW-UP VISIT: CPT | Performed by: PHYSICIAN ASSISTANT

## 2021-04-15 NOTE — PROGRESS NOTES
ASSESSMENT/PLAN:    Diagnoses and all orders for this visit:    S/P arthroscopy of right shoulder        The patient is continuing to do well since surgery  He can now begin active range of motion  He should continue physical therapy for strengthening, stretching and range of motion  He no longer needs to use the shoulder immobilizer  He will follow up with our office in 6 weeks for a strength and motion check  The patient is acceptable to this plan  Return in about 6 weeks (around 5/27/2021)  _____________________________________________________  CHIEF COMPLAINT:  Chief Complaint   Patient presents with    Right Shoulder - Post-op, Follow-up         SUBJECTIVE:  Letitia Velázquez  is a 72 y o  male  Status post right shoulder arthroscopy with rotator cuff repair on 02/24/2021  The patient presents to our office for a postop visit  He has been participating in physical therapy  He states he has not performed any active range of motion  He has been wearing the shoulder immobilizer  He denies any significant pain  He is very happy with his results from surgery  He denies any numbness or tingling  He denies any fever or chills  The following portions of the patient's history were reviewed and updated as appropriate: allergies, current medications, past family history, past medical history, past social history, past surgical history and problem list     PAST MEDICAL HISTORY:  Past Medical History:   Diagnosis Date    Anxiety     Benign hypertension     Cancer ([facility] )     prostate    Coronary artery disease     ABRAM to LAD in 2014    History of cardiovascular stress test 01/21/2016    Normal EF, normal study      History of echocardiogram 05/05/2015    EF normal       Ischemic cardiomyopathy     resolved    Mixed hyperlipidemia     Myocardial infarction ([facility] )     Parkinson disease ([facility]     [facility]     surgically removed    STEMI (ST elevation myocardial infarction) [facility]) 2014       PAST SURGICAL HISTORY:  Past Surgical History:   Procedure Laterality Date    CORONARY ANGIOPLASTY WITH STENT PLACEMENT  12/26/2014    EF 40%, ABRAM to LAD   INCISIONAL HERNIA REPAIR      MI SHLDR ARTHROSCOP,SURG,W/ROTAT CUFF REPR Right 2/24/2021    Procedure: SHOULDER ARTHROSCOPY WITH ROTATOR CUFF REPAIR and acromioplasty;  Surgeon: Allan Bautista DO;  Location[facility] MAIN OR;  Service: Orthopedics    PROSTATECTOMY  2014    for cancer    TONSILLECTOMY         FAMILY HISTORY:  Family History   Problem Relation Age of Onset    Heart attack Father     Stroke Father        SOCIAL HISTORY:  Social History     Tobacco Use    Smoking status: Never Smoker    Smokeless tobacco: Never Used   Substance Use Topics    Alcohol use: Yes     Alcohol/week: 2 0 standard drinks     Types: 2 Cans of beer per week     Frequency: 4 or more times a week     Comment: daily    Drug use: Never       MEDICATIONS:    Current Outpatient Medications:     aspirin (ECOTRIN LOW STRENGTH) 81 mg EC tablet, Take 81 mg by mouth daily, Disp: , Rfl:     atorvastatin (LIPITOR) 80 mg tablet, Take 1 tablet (80 mg total) by mouth daily, Disp: 90 tablet, Rfl: 3    carbidopa-levodopa (SINEMET)  mg per tablet, Take 1 tablet by mouth 3 (three) times a day, Disp: , Rfl:     meloxicam (MOBIC) 15 mg tablet, Take 1 tablet (15 mg total) by mouth daily, Disp: 30 tablet, Rfl: 0    PARoxetine (PAXIL) 20 mg tablet, Take 20 mg by mouth daily, Disp: , Rfl:     ALLERGIES:  No Known Allergies    ROS:  Review of Systems     Constitutional: Negative for fatigue, fever or loss of appetite  HENT: Negative  Respiratory: Negative for shortness of breath, dyspnea  Cardiovascular: Negative for chest pain/tightness  Gastrointestinal: Negative for abdominal pain, N/V  Endocrine: Negative for cold/heat intolerance, unexplained weight loss/gain  Genitourinary: Negative for flank pain, dysuria, hematuria     Musculoskeletal:  Negative for arthralgia   Skin: Negative for rash  Neurological: Negative for numbness or tingling  Psychiatric/Behavioral: Negative for agitation  _____________________________________________________  PHYSICAL EXAMINATION:    Blood pressure 157/94, pulse 76, height 5' 10" (1 778 m), weight 89 4 kg (197 lb)  Constitutional: Oriented to person, place, and time  Appears well-developed and well-nourished  No distress  HENT:   Head: Normocephalic  Eyes: Conjunctivae are normal  Right eye exhibits no discharge  Left eye exhibits no discharge  No scleral icterus  Cardiovascular: Normal rate  Pulmonary/Chest: Effort normal    Neurological: Alert and oriented to person, place, and time  Skin: Skin is warm and dry  No rash noted  Not diaphoretic  No erythema  No pallor  Psychiatric: Normal mood and affect  Behavior is normal  Judgment and thought content normal       MUSCULOSKELETAL EXAMINATION:   Physical Exam  Ortho Exam      Right upper extremity is neurovascularly intact  Fingers are pink and mobile   Compartments are soft   Range of motion of the shoulders from 0-170 degrees   Rotator cuff strength is improved   Brisk cap refill   Sensation intact   Incisions are well healed  No warmth, erythema or ecchymosis present  Objective:  BP Readings from Last 1 Encounters:   04/15/21 157/94      Wt Readings from Last 1 Encounters:   04/15/21 89 4 kg (197 lb)        BMI:   Estimated body mass index is 28 27 kg/m² as calculated from the following:    Height as of this encounter: 5' 10" (1 778 m)  Weight as of this encounter: 89 4 kg (197 lb)          Anastacia Keane PA-C

## 2021-04-16 ENCOUNTER — APPOINTMENT (OUTPATIENT)
Dept: PHYSICAL THERAPY | Facility: CLINIC | Age: 71
End: 2021-04-16
Payer: MEDICARE

## 2021-04-19 ENCOUNTER — OFFICE VISIT (OUTPATIENT)
Dept: PHYSICAL THERAPY | Facility: CLINIC | Age: 71
End: 2021-04-19
Payer: MEDICARE

## 2021-04-19 DIAGNOSIS — S46.011D TRAUMATIC COMPLETE TEAR OF RIGHT ROTATOR CUFF, SUBSEQUENT ENCOUNTER: Primary | ICD-10-CM

## 2021-04-19 DIAGNOSIS — Z98.890 S/P RIGHT ROTATOR CUFF REPAIR: ICD-10-CM

## 2021-04-19 PROCEDURE — 97112 NEUROMUSCULAR REEDUCATION: CPT | Performed by: PHYSICAL THERAPIST

## 2021-04-19 PROCEDURE — 97110 THERAPEUTIC EXERCISES: CPT | Performed by: PHYSICAL THERAPIST

## 2021-04-19 PROCEDURE — 97140 MANUAL THERAPY 1/> REGIONS: CPT | Performed by: PHYSICAL THERAPIST

## 2021-04-19 NOTE — PROGRESS NOTES
Daily Note     Today's date: 2021  Patient name: Reid Coronado  : 1950  MRN: [id number]  Referring provider: Jorge Raymundo DO  Dx:   Encounter Diagnosis     ICD-10-CM    1  Traumatic complete tear of right rotator cuff, subsequent encounter  S46 011D    2  S/P right rotator cuff repair  Z98 890                   Subjective: Patient is pleased that he no longer has to use his sling  He reports that he thinks he over-did it this weekend  It was opening day at the [address] [place] [address] where he works and was helping to [employer] [facility]  Objective: See treatment diary below      Assessment: Tolerated treatment well  Progressed with addition of several exercises for AROM and strengthening as charted  Patient demonstrated fatigue post treatment, exhibited good technique with therapeutic exercises and would benefit from continued PT      Plan: Continue per plan of care  Progress treatment as tolerated         Precautions: s/p R RC repair DOS 21, Parkinson's  POC exp 21  Manuals    R shoulder stretching to ilia KG All directions to ilia    [address] [place] [address]   R elbow stretching  KG [address]           Neuro Re-Ed        Scap retractions DC 5"  W/2# DBs 30x5" 2# DBs 5" x30 w/ 2# DBs 5" x30 w/2# DBs   TB pull downs w/cues for scap retraction Red 3" 2x10       TB rows w/cues for scap retraction Red 3" 2x10       Isometrics - IR/ER/Flex/Ext 5" x10 ea                               Ther Ex      Elbow flex/ext AROM DC 2# x20 ea 2# 20x 2# x30 ea 2# x30 ea   Wrist flex/ext AROM DC 2# x20 ea 2# 20x 2# x30 ea 2#    Pendulums  DC 2# DB 4 ways  x2 mins ea 2# DB 2' 2# DB 4 ways  x2 mins ea 2# DB 4 ways  x2 mins ea   Biceps curls 4# 3x10       Sidelying ER x20       Ther Activity      UBE NV       AAROM Pulleys for functional ROM Flex and scap  To ilia  10" x3 min ea Flex and scap  To ilia  10" x3 min ea Flex/scap 10" H 3' ea Flex and scap  To ilia  10" x3 min ea Flex and scap  To ilia  10" x3 min ea   AAROM Finger ladder Resume NV Flex 5" x2 min Flex 5" H 2' Flex 5" x3 min    AAROM in ER w/ cane Resume NV 10" x2 min  10" x2 min 10" x2 min   AAROM flexion w/cane Standing 2x10 10" x2 min 10" H 2' 10" x2 min 10" x2 min   AAROM supine scap w/ cane Resume NV     10" x2 min 10" x2 min   Modalities        CP/stim post tx prn  Pt defers np

## 2021-04-21 ENCOUNTER — OFFICE VISIT (OUTPATIENT)
Dept: PHYSICAL THERAPY | Facility: CLINIC | Age: 71
End: 2021-04-21
Payer: MEDICARE

## 2021-04-21 DIAGNOSIS — Z98.890 S/P RIGHT ROTATOR CUFF REPAIR: ICD-10-CM

## 2021-04-21 DIAGNOSIS — S46.011D TRAUMATIC COMPLETE TEAR OF RIGHT ROTATOR CUFF, SUBSEQUENT ENCOUNTER: Primary | ICD-10-CM

## 2021-04-21 PROCEDURE — 97530 THERAPEUTIC ACTIVITIES: CPT

## 2021-04-21 PROCEDURE — 97110 THERAPEUTIC EXERCISES: CPT

## 2021-04-21 PROCEDURE — 97140 MANUAL THERAPY 1/> REGIONS: CPT

## 2021-04-21 NOTE — PROGRESS NOTES
Daily Note     Today's date: 2021  Patient name: Keyla Vivar  : 1950  MRN: [id number]  Referring provider: Karthikeyan Mccormick DO  Dx:   Encounter Diagnosis     ICD-10-CM    1  Traumatic complete tear of right rotator cuff, subsequent encounter  S46 011D    2  S/P right rotator cuff repair  Z98 890                   Subjective: Pt reports his R shoulder is feeling great  Objective: See treatment diary below      Assessment: Tolerated treatment well  Added UBC to program as outlined in plan today without c/o  Patient exhibited good technique with therapeutic exercises and would benefit from continued PT as per RTC repair protocol  Plan: Continue per plan of care        Precautions: s/p R RC repair DOS 21, Parkinson's  POC exp 21  Manuals    R shoulder stretching to ilia KG [address]   R elbow stretching  KG [facility] [address] [place] [address]           Neuro Re-Ed        Scap retractions DC --- 30x5" 2# DBs 5" x30 w/ 2# DBs 5" x30 w/2# DBs   TB pull downs w/cues for scap retraction Red 3" 2x10 Red 3"  2x10      TB rows w/cues for scap retraction Red 3" 2x10 Red 3"  2x10      Isometrics - IR/ER/Flex/Ext 5" x10 ea 5" x10 ea                              Ther Ex      Elbow flex/ext AROM DC ---- 2# 20x 2# x30 ea 2# x30 ea   Wrist flex/ext AROM DC ---- 2# 20x 2# x30 ea 2#    Pendulums  DC ---- 2# DB 2' 2# DB 4 ways  x2 mins ea 2# DB 4 ways  x2 mins ea   Biceps curls 4# 3x10 4# 3x10      Sidelying ER x20 x20      Ther Activity      UBE  rpm  3'fwd  /3'retro      AAROM Pulleys for functional ROM Flex and scap  To ilia  10" x3 min ea Flex and scap  To ilia  10" x3 min ea Flex/scap 10" H 3' ea Flex and scap  To ilia  10" x3 min ea Flex and scap  To ilia  10" x3 min ea   AAROM Finger ladder Resume NV Flex 5" x2 min Flex 5" H 2' Flex 5" x3 min    AAROM in ER w/ cane Resume NV 10" x2 min  10" x2 min 10" x2 min   AAROM flexion w/cane Standing 2x10 10" x2 min 10" H 2' 10" x2 min 10" x2 min   AAROM supine scap w/ cane Resume NV 10" x2 min    10" x2 min 10" x2 min   Modalities        CP/stim post tx prn  Pt defers np

## 2021-04-23 ENCOUNTER — OFFICE VISIT (OUTPATIENT)
Dept: PHYSICAL THERAPY | Facility: CLINIC | Age: 71
End: 2021-04-23
Payer: MEDICARE

## 2021-04-23 DIAGNOSIS — S46.011D TRAUMATIC COMPLETE TEAR OF RIGHT ROTATOR CUFF, SUBSEQUENT ENCOUNTER: Primary | ICD-10-CM

## 2021-04-23 DIAGNOSIS — Z98.890 S/P RIGHT ROTATOR CUFF REPAIR: ICD-10-CM

## 2021-04-23 PROCEDURE — 97140 MANUAL THERAPY 1/> REGIONS: CPT

## 2021-04-23 PROCEDURE — 97530 THERAPEUTIC ACTIVITIES: CPT

## 2021-04-23 PROCEDURE — 97112 NEUROMUSCULAR REEDUCATION: CPT

## 2021-04-23 PROCEDURE — 97110 THERAPEUTIC EXERCISES: CPT

## 2021-04-23 NOTE — PROGRESS NOTES
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  He denies pain at rest today  "I get some discomfort at night after a day of work "      Objective: See treatment diary below      Assessment: Tolerated treatment well  Patient demonstrated fatigue post treatment, exhibited good technique with therapeutic exercises and would benefit from continued PT      Plan: Continue per plan of care  Progress treament per protocol        Precautions: s/p R RC repair DOS 21, Parkinson's  POC exp 21  Manuals    R shoulder stretching to ilia KG [facility] TM TM [address]   R elbow stretching  KG MJC TM TM C           Neuro Re-Ed        Scap retractions DC ---  5" x30 w/ 2# DBs 5" x30 w/2# DBs   TB pull downs w/cues for scap retraction Red 3" 2x10 Red 3"  2x10 Red 3"  2x10     TB rows w/cues for scap retraction Red 3" 2x10 Red 3"  2x10 Red 3"  2x10     Isometrics - IR/ER/Flex/Ext 5" x10 ea 5" x10 ea 5" x10 ea                             Ther Ex        Elbow flex/ext AROM DC ----  2# x30 ea 2# x30 ea   Wrist flex/ext AROM DC ----  2# x30 ea 2#    Pendulums  DC ----  2# DB 4 ways  x2 mins ea 2# DB 4 ways  x2 mins ea   Biceps curls 4# 3x10 4# 3x10 4# 3x10     Sidelying ER x20 x20 x20     Ther Activity        UBE  rpm  3'fwd  /3'retro 120 rpm  4'fwd  /4'retro     AAROM Pulleys for functional ROM Flex and scap  To ilia  10" x3 min ea Flex and scap  To ilia  10" x3 min ea Flex and scap  To ilia  10" x3 min ea Flex and scap  To ilia  10" x3 min ea Flex and scap  To ilia  10" x3 min ea   AAROM Finger ladder Resume NV Flex 5" x2 min  Flex 5" x3 min    AAROM in ER w/ cane Resume NV 10" x2 min 10" x2 min 10" x2 min 10" x2 min   AAROM flexion w/cane Standing 2x10 10" x2 min Standing 10" x2 min 10" x2 min 10" x2 min   AAROM supine scap w/ cane Resume NV 10" x2 min Supine   10" x2 min   10" x2 min 10" x2 min   Modalities        CP/stim post tx prn  Pt defers

## 2021-04-26 ENCOUNTER — EVALUATION (OUTPATIENT)
Dept: PHYSICAL THERAPY | Facility: CLINIC | Age: 71
End: 2021-04-26
Payer: MEDICARE

## 2021-04-26 DIAGNOSIS — S46.011D TRAUMATIC COMPLETE TEAR OF RIGHT ROTATOR CUFF, SUBSEQUENT ENCOUNTER: Primary | ICD-10-CM

## 2021-04-26 DIAGNOSIS — Z98.890 S/P RIGHT ROTATOR CUFF REPAIR: ICD-10-CM

## 2021-04-26 PROCEDURE — 97112 NEUROMUSCULAR REEDUCATION: CPT | Performed by: PHYSICAL THERAPIST

## 2021-04-26 PROCEDURE — 97140 MANUAL THERAPY 1/> REGIONS: CPT | Performed by: PHYSICAL THERAPIST

## 2021-04-26 PROCEDURE — 97110 THERAPEUTIC EXERCISES: CPT | Performed by: PHYSICAL THERAPIST

## 2021-04-26 NOTE — PROGRESS NOTES
PT Progress Note     Today's date: 2021  Patient name: Erlinda Acevedo  : 1950  MRN: [id number]  Referring provider: Song Pineda DO  Dx:   Encounter Diagnosis     ICD-10-CM    1  Traumatic complete tear of right rotator cuff, subsequent encounter  S46 011D    2  S/P right rotator cuff repair  Z98 890               Assessment  Erlinda Acevedo  has attended 24 sessions of PT  He is making steady progress toward goals  He has demonstrated decreased pain, increased strength, increased range of motion, and increased activity tolerance since starting physical therapy services  Recommending patient keep lifting to a minimum, no more zhen n3 lbs with above shoulder height with R UE  He presents with continued decreased strength, decreased range of motion, and decreased activity tolerance and would benefit from additional skilled physical therapy interventions to address impairments and maximize function  Impairments: abnormal or restricted ROM, activity intolerance, impaired physical strength, lacks appropriate home exercise program and pain with function  Functional limitations: unable to use R UE; much difficulty with ADLs/IADLs and drivingUnderstanding of Dx/Px/POC: good   Prognosis: good    Goals  STGs  1) In 2 visits patient will demonstrate independence with initial basic HEP  Met   2) In 2 weeks patient will demonstrate independence with donning/doffing sling  Met   3) In 4 weeks patient will demonstrate 90 deg PROM R shoulder flexion and abduction    Me t    LTGs  1) In 6 weeks patient will DC sling -MET  2) In 8 weeks patient will demonstrate AROM R UE 90 deg flexion and abduction  -MET  3) In 8-12 weeks patient will report little to no difficulty with all ADLs and IADLs  Progressing   4) In 12 weeks patient will tolerate 45 mins of strengthening exercise  Progressing   5) In 16 weeks patient will tolerate lifting and carrying 25 lbs or more with no shoulder pain  Progressing Plan  Patient would benefit from: skilled physical therapy  Referral necessary: No  Planned modality interventions: cryotherapy, TENS, unattended electrical stimulation and thermotherapy: hydrocollator packs  Planned therapy interventions: joint mobilization, manual therapy, massage, Guerra taping, patient education, postural training, neuromuscular re-education, self care, strengthening, stretching, therapeutic activities, therapeutic exercise, home exercise program, flexibility and functional ROM exercises  Frequency: 2x week  Duration in weeks: 4  Plan of Care beginning date: 2021  Plan of Care expiration date: 2021  Treatment plan discussed with: patient        Subjective Evaluation    History of Present Illness  Date of surgery: 2021  Mechanism of injury: surgery  Mechanism of injury: Patient presents to PT s/p R RC repair 21  Original injury occurred approx 4 months ago  Patient reports he fell while walking  He reports that he has Parkinson's and was out for a walk, picked up pace and could not slow down  Went into a jog and fell  RA 3/24     The patient reports an improvement of 95 percent thus far  Still in phase 1 of RTC protocol  Allowed AROM at 6 weeks  Progressing well  Denies pain at this time ans reprots that he will be returning to work on   Progress Note :  Patient reports he is doing well  He notices pain with some movements or when laying for too long   He has been working but does not do much physical activity at work as his daughter in law is there to assist        Pain      At best pain ratin     At worst pain ratin       Social Support  Lives in: [facility]  Lives with: alone    Employment status: not working (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)  Hand dominance: right  Exercise history: likes to be active with walking    Treatments  No previous or current treatments  Patient Goals  Patient goals for therapy: independence with ADLs/IADLs, increased motion and increased strength          Objective     Observations     Right Shoulder  Positive for incision  Additional Observation Details  Removed dressings  Cleaned skin surrounding incisions with alcohol swabs  Surgical incisions inspected  Sutures present  Incisions clean, dry and intact with no signs/symptoms of infection  Patient was educated on signs/symptoms of infection and was advised to contact MD immediately if suspect infection       Active Range of Motion     Right Shoulder   Flexion: 135 deg  Abduction: 110 deg   External rotation BTH: C6  Internal rotation BTB:  L5    Passive Range of Motion     Right Shoulder   Flexion: 135 deg  Abduction: 120 deg   External rotation 90°: 70 deg  Internal rotation 90°:  30 deg    Strength/Myotome Testing     Not appropriate for strength testing at this time         Precautions: s/p R RC repair DOS 2/24/21 (no AROM x6 weeks - 4/7), Parkinson's  POC exp 5/21/21  Manuals 4/19 4/21 4/23 4/26 4/14   R shoulder stretching to ilia KG [address] TM KG [address]   R elbow stretching  KG MJC TM DC MJC           Neuro Re-Ed        Scap retractions DC ---  -- 5" x30 w/2# DBs   TB pull downs w/cues for scap retraction Red 3" 2x10 Red 3"  2x10 Red 3"  2x10 Red 3"  2x10    TB rows w/cues for scap retraction Red 3" 2x10 Red 3"  2x10 Red 3"  2x10 Red 3"  2x10    Isometrics - IR/ER/Flex/Ext 5" x10 ea 5" x10 ea 5" x10 ea                             Ther Ex     4/14   Elbow flex/ext AROM DC ----  -- 2# x30 ea   Wrist flex/ext AROM DC ----  -- 2#    Pendulums  DC ----  -- 2# DB 4 ways  x2 mins ea   Biceps curls 4# 3x10 4# 3x10 4# 3x10     Sidelying ER x20 x20 x20 1# x30    Ther Activity     4/14   UBE  rpm  3'fwd  /3'retro 120 rpm  4'fwd  /4'retro 120 rpm 5'fwd/5'retro    AAROM Pulleys for functional ROM Flex and scap  To ilia  10" x3 min ea Flex and scap  To ilia  10" x3 min ea Flex and scap  To ilia  10" x3 min ea Flex and scap  To ilia  10" x3 min ea Flex and scap  To ilia  10" x3 min ea   AAROM Finger ladder Resume NV Flex 5" x2 min      AAROM in ER w/ cane Resume NV 10" x2 min 10" x2 min DC 10" x2 min   AAROM flexion w/cane Standing 2x10 10" x2 min Standing 10" x2 min standing   10" x2 min 10" x2 min   AAROM supine scap w/ cane Resume NV 10" x2 min Supine   10" x2 min    10" x2 min   AAROM IR with rope for functional reach BTB     10"x2 mins    Modalities        CP/stim post tx prn  Pt defers

## 2021-04-28 ENCOUNTER — OFFICE VISIT (OUTPATIENT)
Dept: PHYSICAL THERAPY | Facility: CLINIC | Age: 71
End: 2021-04-28
Payer: MEDICARE

## 2021-04-28 DIAGNOSIS — S46.011D TRAUMATIC COMPLETE TEAR OF RIGHT ROTATOR CUFF, SUBSEQUENT ENCOUNTER: Primary | ICD-10-CM

## 2021-04-28 DIAGNOSIS — Z98.890 S/P RIGHT ROTATOR CUFF REPAIR: ICD-10-CM

## 2021-04-28 PROCEDURE — 97530 THERAPEUTIC ACTIVITIES: CPT

## 2021-04-28 PROCEDURE — 97112 NEUROMUSCULAR REEDUCATION: CPT

## 2021-04-28 PROCEDURE — 97140 MANUAL THERAPY 1/> REGIONS: CPT

## 2021-04-28 NOTE — PROGRESS NOTES
Daily Note     Today's date: 2021  Patient name: Lianne Anglin  : 1950  MRN: [id number]  Referring provider: Zenaida Waterman DO  Dx:   Encounter Diagnosis     ICD-10-CM    1  Traumatic complete tear of right rotator cuff, subsequent encounter  S46 011D    2  S/P right rotator cuff repair  Z98 890                   Subjective: Pt denies R shoulder pain at present  Primarily c/o "gnawing" pain R shoulder in evening at rest     Objective: See treatment diary below      Assessment: Tolerated treatment well  Verbal cues for proper exercise performance  Patient exhibited good technique with therapeutic exercises and would benefit from continued PT as per RTC repair protocol  Plan: Continue per plan of care        Precautions: s/p R RC repair DOS 21, Parkinson's  POC exp 21  Manuals    R shoulder stretching to ilia KG [address] TM [address] [place] [address] Road [address] [place] [address]   R elbow stretching  KG [address] [facility] TM [address] [facility]           Neuro Re-Ed        Scap retractions DC ---   5" x30 w/2# DBs   TB pull downs w/cues for scap retraction Red 3" 2x10 Red 3"  2x10 Red 3"  2x10 Red  3" 2x10    TB rows w/cues for scap retraction Red 3" 2x10 Red 3"  2x10 Red 3"  2x10 Red 3"    2 x10    Isometrics - IR/ER/Flex/Ext 5" x10 ea 5" x10 ea 5" x10 ea 5" x10 ea                            Ther Ex        Elbow flex/ext AROM DC ----   2# x30 ea   Wrist flex/ext AROM DC ----   2#    Pendulums  DC ----   2# DB 4 ways  x2 mins ea   Biceps curls 4# 3x10 4# 3x10 4# 3x10 4# 3x10     Sidelying ER x20 x20 x20 x20    Ther Activity        UBE  rpm  3'fwd  /3'retro 120 rpm  4'fwd  /4'retro 120 rpm  5'fwd/  5'retro    AAROM Pulleys for functional ROM Flex and scap  To ilia  10" x3 min ea Flex and scap  To ilia  10" x3 min ea Flex and scap  To ilia  10" x3 min ea Flex and scap  To ilia  10" x3 min ea Flex and scap  To ilia  10" x3 min ea   AAROM Finger ladder Resume NV Flex 5" x2 min  Flex 5" x2 min    AAROM in ER w/ cane Resume NV 10" x2 min 10" x2 min 10" x2 min 10" x2 min   AAROM flexion w/cane Standing 2x10 10" x2 min Standing 10" x2 min Standing 10" x2 min   AAROM supine scap w/ cane Resume NV 10" x2 min Supine   10" x2 min   Supine  10" x2 min 10" x2 min   Modalities        CP/stim post tx prn  Pt defers

## 2021-04-30 ENCOUNTER — OFFICE VISIT (OUTPATIENT)
Dept: PHYSICAL THERAPY | Facility: CLINIC | Age: 71
End: 2021-04-30
Payer: MEDICARE

## 2021-04-30 DIAGNOSIS — Z98.890 S/P RIGHT ROTATOR CUFF REPAIR: ICD-10-CM

## 2021-04-30 DIAGNOSIS — S46.011D TRAUMATIC COMPLETE TEAR OF RIGHT ROTATOR CUFF, SUBSEQUENT ENCOUNTER: Primary | ICD-10-CM

## 2021-04-30 PROCEDURE — 97530 THERAPEUTIC ACTIVITIES: CPT

## 2021-04-30 PROCEDURE — 97112 NEUROMUSCULAR REEDUCATION: CPT

## 2021-04-30 PROCEDURE — 97140 MANUAL THERAPY 1/> REGIONS: CPT

## 2021-04-30 NOTE — PROGRESS NOTES
Daily Note     Today's date: 2021  Patient name: Eugenia Ospina  : 1950  MRN: [id number]  Referring provider: Leah Gonzalez DO  Dx:   Encounter Diagnosis     ICD-10-CM    1  Traumatic complete tear of right rotator cuff, subsequent encounter  S46 011D    2  S/P right rotator cuff repair  Z98 890                   Subjective: Pt denies pain at rest today  "It is hard to tell which shoulder I had surgery on anymore "      Objective: See treatment diary below      Assessment: Progressed weight and TB resistance during multiple exercises to facilitate strength  Tolerated treatment well  Pt remains slightly limited in ABD and Flex of the R shoulder during manuals  Patient demonstrated fatigue post treatment, exhibited good technique with therapeutic exercises and would benefit from continued PT      Plan: Continue per plan of care  Progress treament per protocol        Precautions: s/p R RC repair DOS 21, Parkinson's  POC exp 21  Manuals    R shoulder stretching to ilia KG [address] TM [address] TM   R elbow stretching  KG MJC TM MJC TM           Neuro Re-Ed        TB pull downs w/cues for scap retraction Red 3" 2x10 Red 3"  2x10 Red 3"  2x10 Red  3" 2x10 Green  3" 2x10   TB rows w/cues for scap retraction Red 3" 2x10 Red 3"  2x10 Red 3"  2x10 Red 3"    2 x10 Green  3" 2x10   Isometrics - IR/ER/Flex/Ext 5" x10 ea 5" x10 ea 5" x10 ea 5" x10 ea IR/ER Red TB x20 ea                           Ther Ex        Biceps curls 4# 3x10 4# 3x10 4# 3x10 4# 3x10  5# 3x10    Sidelying ER x20 x20 x20 x20            Ther Activity        UBE  rpm  3'fwd  /3'retro 120 rpm  4'fwd  /4'retro 120 rpm  5'fwd/  5'retro 100 rpm  5'fwd/  5'retro   AAROM Pulleys for functional ROM Flex and scap  To ilia  10" x3 min ea Flex and scap  To ilia  10" x3 min ea Flex and scap  To ilia  10" x3 min ea Flex and scap  To ilia  10" x3 min ea Flex and scap  To ilia  10" x3 min ea   AAROM Finger ladder Resume NV Flex 5" x2 min      AAROM in ER w/ cane Resume NV 10" x2 min 10" x2 min 10" x2 min 10" x2 min   AAROM flexion w/cane Standing 2x10 10" x2 min Standing 10" x2 min Standing Standing 10" x2 min   AAROM supine scap w/ cane Resume NV 10" x2 min Supine   10" x2 min   Supine  10" x2 min Standing 10" x2 min   Modalities        CP/stim post tx prn  Pt defers

## 2021-05-03 ENCOUNTER — OFFICE VISIT (OUTPATIENT)
Dept: PHYSICAL THERAPY | Facility: CLINIC | Age: 71
End: 2021-05-03
Payer: MEDICARE

## 2021-05-03 DIAGNOSIS — S46.011D TRAUMATIC COMPLETE TEAR OF RIGHT ROTATOR CUFF, SUBSEQUENT ENCOUNTER: Primary | ICD-10-CM

## 2021-05-03 DIAGNOSIS — Z98.890 S/P RIGHT ROTATOR CUFF REPAIR: ICD-10-CM

## 2021-05-03 PROCEDURE — 97110 THERAPEUTIC EXERCISES: CPT | Performed by: PHYSICAL THERAPIST

## 2021-05-03 PROCEDURE — 97112 NEUROMUSCULAR REEDUCATION: CPT | Performed by: PHYSICAL THERAPIST

## 2021-05-03 PROCEDURE — 97140 MANUAL THERAPY 1/> REGIONS: CPT | Performed by: PHYSICAL THERAPIST

## 2021-05-03 NOTE — PROGRESS NOTES
Daily Note     Today's date: 5/3/2021  Patient name: Angel Reese  : 1950  MRN: [id number]  Referring provider: Wilson Begum DO  Dx:   Encounter Diagnosis     ICD-10-CM    1  Traumatic complete tear of right rotator cuff, subsequent encounter  S46 011D    2  S/P right rotator cuff repair  Z98 890                   Subjective: Patient reports his shoulder is feeling good but he is just very tired today  Objective: See treatment diary below      Assessment: Tolerated treatment well  Added ITY stretches at wall and added IR behind the back stretch to improve functional reaching  Patient demonstrated fatigue post treatment, exhibited good technique with therapeutic exercises and would benefit from continued PT      Plan: Continue per plan of care  Progress treatment as tolerated         Precautions: s/p R RC repair DOS 21, Parkinson's  POC exp 21  Manuals 5/3 4/21 4/23 4/28 4/30   R shoulder stretching to ilia KG [address] TM [address] [facility] TM   R elbow stretching   [address] [facility] TM [facility] TM           Neuro Re-Ed        TB pull downs w/cues for scap retraction Green 3" 2x10 Red 3"  2x10 Red 3"  2x10 Red  3" 2x10 Green  3" 2x10   TB rows w/cues for scap retraction Green 3" 2x10 Red 3"  2x10 Red 3"  2x10 Red 3"    2 x10 Green  3" 2x10   Isometrics - IR/ER/Flex/Ext IR/ER Red TB x20 ea 5" x10 ea 5" x10 ea 5" x10 ea IR/ER Red TB x20 ea                           Ther Ex        Biceps curls 5# 3x10 4# 3x10 4# 3x10 4# 3x10  5# 3x10    Sidelying ER 1# x20 x20 x20 x20    Supine scap punches 2# 2x10       ITY wall slides 10" x5 ea       Ther Activity         rpm 5'fwd/5'retro 120 rpm  3'fwd  /3'retro 120 rpm  4'fwd  /4'retro 120 rpm  5'fwd/  5'retro 100 rpm  5'fwd/  5'retro   AAROM Pulleys for functional ROM Flex and scap  To ilia  10" x3 min ea Flex and scap  To ilia  10" x3 min ea Flex and scap  To ilia  10" x3 min ea Flex and scap  To ilia  10" x3 min ea Flex and scap  To ilia  10" x3 min ea   AAROM Finger ladder DC to HEP Flex 5" x2 min      AAROM in ER w/ cane DC to HEP 10" x2 min 10" x2 min 10" x2 min 10" x2 min   AAROM flexion w/cane DC to HEP 10" x2 min Standing 10" x2 min Standing Standing 10" x2 min   AAROM supine scap w/ cane DC to HEP 10" x2 min Supine   10" x2 min   Supine  10" x2 min Standing 10" x2 min   Functional IR reach BTB stretch, strap for assistance 10" x2 mins       Modalities        CP/stim post tx prn  Pt defers

## 2021-05-05 ENCOUNTER — APPOINTMENT (OUTPATIENT)
Dept: PHYSICAL THERAPY | Facility: CLINIC | Age: 71
End: 2021-05-05
Payer: MEDICARE

## 2021-05-06 ENCOUNTER — OFFICE VISIT (OUTPATIENT)
Dept: PHYSICAL THERAPY | Facility: CLINIC | Age: 71
End: 2021-05-06
Payer: MEDICARE

## 2021-05-06 DIAGNOSIS — S46.011D TRAUMATIC COMPLETE TEAR OF RIGHT ROTATOR CUFF, SUBSEQUENT ENCOUNTER: Primary | ICD-10-CM

## 2021-05-06 DIAGNOSIS — Z98.890 S/P RIGHT ROTATOR CUFF REPAIR: ICD-10-CM

## 2021-05-06 PROCEDURE — 97110 THERAPEUTIC EXERCISES: CPT

## 2021-05-06 PROCEDURE — 97530 THERAPEUTIC ACTIVITIES: CPT

## 2021-05-06 PROCEDURE — 97140 MANUAL THERAPY 1/> REGIONS: CPT

## 2021-05-06 NOTE — PROGRESS NOTES
Daily Note     Today's date: 2021  Patient name: Letitia Velázquez  : 1950  MRN: [id number]  Referring provider: Melita Helms DO  Dx:   Encounter Diagnosis     ICD-10-CM    1  Traumatic complete tear of right rotator cuff, subsequent encounter  S46 011D    2  S/P right rotator cuff repair  Z98 890                   Subjective: Pt denies all pain at rest  He is able to use his RUE for more ADLs everyday  Objective: See treatment diary below      Assessment: Increased resistance during IR/ER to facilitate strength  Tolerated treatment well  Patient demonstrated fatigue post treatment, exhibited good technique with therapeutic exercises and would benefit from continued PT      Plan: Continue per plan of care  Progress treament per protocol        Precautions: s/p R RC repair DOS 21, Parkinson's  POC exp 21  Manuals 5/3 5/6 4/23 4/28 4/30   R shoulder stretching to ilia KG TM TM MJC TM   R elbow stretching    TM MJC TM           Neuro Re-Ed        TB pull downs w/cues for scap retraction Green 3" 2x10 Green 3"  2x10 Red 3"  2x10 Red  3" 2x10 Green  3" 2x10   TB rows w/cues for scap retraction Green 3" 2x10 Green 3"  2x10 Red 3"  2x10 Red 3"    2 x10 Green  3" 2x10   Isometrics - IR/ER/Flex/Ext IR/ER Red TB x20 ea IR/ER Green TB x20 ea 5" x10 ea 5" x10 ea IR/ER Red TB x20 ea                           Ther Ex        Biceps curls 5# 3x10 5# 3x10 4# 3x10 4# 3x10  5# 3x10    Sidelying ER 1# x20 2# x20 x20 x20    Supine scap punches 2# 2x10   2# 2x10      ITY wall slides 10" x5 ea 10" x5 ea      Ther Activity         rpm 5'fwd/5'retro 100 rpm  5'fwd/5' retro 120 rpm  4'fwd  /4'retro 120 rpm  5'fwd/  5'retro 100 rpm  5'fwd/  5'retro   AAROM Pulleys for functional ROM Flex and scap  To ilia  10" x3 min ea Flex and scap  To ilia  10" x3 min ea Flex and scap  To ilia  10" x3 min ea Flex and scap  To ilia  10" x3 min ea Flex and scap  To ilia  10" x3 min ea   AAROM in ER w/ cane DC to HEP -- 10" x2 min 10" x2 min 10" x2 min   AAROM flexion w/cane DC to HEP -- Standing 10" x2 min Standing Standing 10" x2 min   AAROM supine scap w/ cane DC to HEP -- Supine   10" x2 min   Supine  10" x2 min Standing 10" x2 min   Functional IR reach BTB stretch, strap for assistance 10" x2 mins 10" x2 mins      Modalities        CP/stim post tx prn

## 2021-05-07 ENCOUNTER — APPOINTMENT (OUTPATIENT)
Dept: PHYSICAL THERAPY | Facility: CLINIC | Age: 71
End: 2021-05-07
Payer: MEDICARE

## 2021-05-10 ENCOUNTER — OFFICE VISIT (OUTPATIENT)
Dept: PHYSICAL THERAPY | Facility: CLINIC | Age: 71
End: 2021-05-10
Payer: MEDICARE

## 2021-05-10 DIAGNOSIS — Z98.890 S/P RIGHT ROTATOR CUFF REPAIR: ICD-10-CM

## 2021-05-10 DIAGNOSIS — S46.011D TRAUMATIC COMPLETE TEAR OF RIGHT ROTATOR CUFF, SUBSEQUENT ENCOUNTER: Primary | ICD-10-CM

## 2021-05-10 PROCEDURE — 97140 MANUAL THERAPY 1/> REGIONS: CPT | Performed by: PHYSICAL THERAPIST

## 2021-05-10 PROCEDURE — 97110 THERAPEUTIC EXERCISES: CPT | Performed by: PHYSICAL THERAPIST

## 2021-05-10 PROCEDURE — 97112 NEUROMUSCULAR REEDUCATION: CPT | Performed by: PHYSICAL THERAPIST

## 2021-05-10 NOTE — PROGRESS NOTES
Daily Note     Today's date: 5/10/2021  Patient name: Cecil Sood  : 1950  MRN: [id number]  Referring provider: Lashonda Womack DO  Dx:   Encounter Diagnosis     ICD-10-CM    1  Traumatic complete tear of right rotator cuff, subsequent encounter  S46 011D    2  S/P right rotator cuff repair  Z98 890                   Subjective: Patient reports he is doing well  Objective: See treatment diary below      Assessment: Tolerated treatment well  Added supine chest press and seated shoulder press with light resistance  Patient tolerated new exercises well  Patient demonstrated fatigue post treatment, exhibited good technique with therapeutic exercises and would benefit from continued PT      Plan: Continue per plan of care  Progress treatment as tolerated         Precautions: s/p R RC repair DOS 21, Parkinson's  POC exp 21  Manuals 5/3 5/6 5/10 4/28 4/30   R shoulder stretching to ilia KG TM KG MJC TM   R elbow stretching     MJC TM           Neuro Re-Ed        TB pull downs w/cues for scap retraction Green 3" 2x10 Green 3"  2x10 Blue 3"  x20 Red  3" 2x10 Green  3" 2x10   TB rows w/cues for scap retraction Green 3" 2x10 Green 3"  2x10 Blue 3"  x20 Red 3"    2 x10 Green  3" 2x10   Isometrics - IR/ER/Flex/Ext IR/ER Red TB x20 ea IR/ER Green TB x20 ea IR/ER Blue TB x20 ea 5" x10 ea IR/ER Red TB x20 ea                           Ther Ex        Biceps curls 5# 3x10 5# 3x10 5# 3x10 4# 3x10  5# 3x10    Sidelying ER 1# x20 2# x20 2# x20 x20    Supine scap punches 2# 2x10   2# 2x10 4# 20x 3" ea     Supine chest press   2# 2x10     Seated overhead shoulder press   2# 2x10     ITY wall slides 10" x5 ea 10" x5 ea 10" x2 mins     Standing arm raises   NV     Ther Activity         rpm 5'fwd/5'retro 100 rpm  5'fwd/5' retro 80 rpm  5'fwd  /5'retro 120 rpm  5'fwd/  5'retro 100 rpm  5'fwd/  5'retro   AAROM Pulleys for functional ROM Flex and scap  To ilia  10" x3 min ea Flex and scap  To ilia  10" x3 min ea Flex and scap  To ilia  10" x3 min ea Flex and scap  To ilia  10" x3 min ea Flex and scap  To ilia  10" x3 min ea   AAROM in ER w/ cane DC to HEP -- -- 10" x2 min 10" x2 min   AAROM flexion w/cane DC to HEP -- -- Standing Standing 10" x2 min   AAROM supine scap w/ cane DC to HEP -- --   Supine  10" x2 min Standing 10" x2 min   Functional IR reach BTB stretch, strap for assistance 10" x2 mins 10" x2 mins 10" x2 mins     Modalities        CP/stim post tx prn

## 2021-05-12 ENCOUNTER — APPOINTMENT (OUTPATIENT)
Dept: PHYSICAL THERAPY | Facility: CLINIC | Age: 71
End: 2021-05-12
Payer: MEDICARE

## 2021-05-13 ENCOUNTER — APPOINTMENT (OUTPATIENT)
Dept: PHYSICAL THERAPY | Facility: CLINIC | Age: 71
End: 2021-05-13
Payer: MEDICARE

## 2021-05-14 ENCOUNTER — APPOINTMENT (OUTPATIENT)
Dept: PHYSICAL THERAPY | Facility: CLINIC | Age: 71
End: 2021-05-14
Payer: MEDICARE

## 2021-05-14 ENCOUNTER — OFFICE VISIT (OUTPATIENT)
Dept: PHYSICAL THERAPY | Facility: CLINIC | Age: 71
End: 2021-05-14
Payer: MEDICARE

## 2021-05-14 DIAGNOSIS — S46.011D TRAUMATIC COMPLETE TEAR OF RIGHT ROTATOR CUFF, SUBSEQUENT ENCOUNTER: Primary | ICD-10-CM

## 2021-05-14 DIAGNOSIS — Z98.890 S/P RIGHT ROTATOR CUFF REPAIR: ICD-10-CM

## 2021-05-14 PROCEDURE — 97110 THERAPEUTIC EXERCISES: CPT | Performed by: PHYSICAL THERAPIST

## 2021-05-14 PROCEDURE — 97140 MANUAL THERAPY 1/> REGIONS: CPT | Performed by: PHYSICAL THERAPIST

## 2021-05-14 PROCEDURE — 97112 NEUROMUSCULAR REEDUCATION: CPT | Performed by: PHYSICAL THERAPIST

## 2021-05-14 NOTE — PROGRESS NOTES
Daily Note     Today's date: 2021  Patient name: Merle Shafer  : 1950  MRN: [id number]  Referring provider: Saige Sheridan DO  Dx:   Encounter Diagnosis     ICD-10-CM    1  Traumatic complete tear of right rotator cuff, subsequent encounter  S46 011D    2  S/P right rotator cuff repair  Z98 890                   Subjective: Patient offers no new complaints  Objective: See treatment diary below      Assessment: Patient remains limited in ROM juan manuel into flexion and abduction  Tolerated treatment well  Able to progress with addition of standing arm raises into shoulder flexion with light resistance  Patient demonstrated fatigue post treatment, exhibited good technique with therapeutic exercises and would benefit from continued PT      Plan: Continue per plan of care  Progress treatment as tolerated         Precautions: s/p R RC repair DOS 21, Parkinson's  POC exp 21  Manuals 5/3 5/6 5/10 5/14 4/30   R shoulder stretching to ilia KG TM KG KG TM   R elbow stretching      TM           Neuro Re-Ed        TB pull downs w/cues for scap retraction Green 3" 2x10 Green 3"  2x10 Blue 3"  x20 Blue  3" x20 Green  3" 2x10   TB rows w/cues for scap retraction Green 3" 2x10 Green 3"  2x10 Blue 3"  x20 Blue 3"    x20 Green  3" 2x10   Isometrics - IR/ER/Flex/Ext IR/ER Red TB x20 ea IR/ER Green TB x20 ea IR/ER Blue TB x20 ea IR/ER Blue TB x20 ea IR/ER Red TB x20 ea                           Ther Ex        Biceps curls 5# 3x10 5# 3x10 5# 3x10 5# 3x10  5# 3x10    Sidelying ER 1# x20 2# x20 2# x20 4# x20    Supine scap punches 2# 2x10   2# 2x10 4# 20x 3" ea  4# 20x 3" ea    Supine chest press   2# 2x10 4# 2x10    Seated overhead shoulder press   2# 2x10 resume NV    ITY wall slides 10" x5 ea 10" x5 ea 10" x2 mins 10"x2 mins    Standing arm raises   NV  1# 2x10 flexion    Ther Activity         rpm 5'fwd/5'retro 100 rpm  5'fwd/5' retro 80 rpm  5'fwd  /5'retro 80 rpm  5'fwd/  5'retro 100 rpm  5'fwd/  5'retro   AAROM Pulleys for functional ROM Flex and scap  To ilia  10" x3 min ea Flex and scap  To ilia  10" x3 min ea Flex and scap  To ilia  10" x3 min ea Flex and scap  To ilia  10" x3 min ea Flex and scap  To ilia  10" x3 min ea   AAROM in ER w/ cane DC to HEP -- -- -- 10" x2 min   AAROM flexion w/cane DC to HEP -- -- -- Standing 10" x2 min   AAROM supine scap w/ cane DC to HEP -- -- -- Standing 10" x2 min   Functional IR reach BTB stretch, strap for assistance 10" x2 mins 10" x2 mins 10" x2 mins Resume NV    Modalities        CP/stim post tx prn

## 2021-05-17 ENCOUNTER — OFFICE VISIT (OUTPATIENT)
Dept: PHYSICAL THERAPY | Facility: CLINIC | Age: 71
End: 2021-05-17
Payer: MEDICARE

## 2021-05-17 DIAGNOSIS — Z98.890 S/P RIGHT ROTATOR CUFF REPAIR: ICD-10-CM

## 2021-05-17 DIAGNOSIS — S46.011D TRAUMATIC COMPLETE TEAR OF RIGHT ROTATOR CUFF, SUBSEQUENT ENCOUNTER: Primary | ICD-10-CM

## 2021-05-17 PROCEDURE — 97140 MANUAL THERAPY 1/> REGIONS: CPT

## 2021-05-17 PROCEDURE — 97110 THERAPEUTIC EXERCISES: CPT

## 2021-05-17 PROCEDURE — 97530 THERAPEUTIC ACTIVITIES: CPT

## 2021-05-17 NOTE — PROGRESS NOTES
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  Objective: See treatment diary below      Assessment: Increased weight during AROM exercises to facilitate strength  Tolerated treatment well  Patient demonstrated fatigue post treatment, exhibited good technique with therapeutic exercises and would benefit from continued PT      Plan: Continue per plan of care  Progress treament per protocol        Precautions: s/p R RC repair DOS 21, Parkinson's  POC exp 21  Manuals 5/3 56 5/10 5/14 5/17   R shoulder stretching to ilia KG TM KG KG TM   R elbow stretching                 Neuro Re-Ed        TB pull downs w/cues for scap retraction Green 3" 2x10 Green 3"  2x10 Blue 3"  x20 Blue  3" x20 Blue  3" 2x10   TB rows w/cues for scap retraction Green 3" 2x10 Green 3"  2x10 Blue 3"  x20 Blue 3"    x20 Blue  3" 2x10   Isometrics - IR/ER/Flex/Ext IR/ER Red TB x20 ea IR/ER Green TB x20 ea IR/ER Blue TB x20 ea IR/ER Blue TB x20 ea IR/ER Blue TB x20 ea                           Ther Ex        Biceps curls 5# 3x10 5# 3x10 5# 3x10 5# 3x10  5# 3x10    Sidelying ER 1# x20 2# x20 2# x20 4# x20 4# x20   Supine scap punches 2# 2x10   2# 2x10 4# 20x 3" ea  4# 20x 3" ea  4# 20x 3" ea   Supine chest press   2# 2x10 4# 2x10 4# 2x10   Seated overhead shoulder press   2# 2x10 resume NV    ITY wall slides 10" x5 ea 10" x5 ea 10" x2 mins 10"x2 mins 10" x2 min   Standing arm raises   NV  1# 2x10 flexion 2# 2x10 ea flexion/scap   Ther Activity         rpm 5'fwd/5'retro 100 rpm  5'fwd/5' retro 80 rpm  5'fwd  /5'retro 80 rpm  5'fwd/  5'retro 80 rpm  5'fwd/  5'retro   AAROM Pulleys for functional ROM Flex and scap  To ilia  10" x3 min ea Flex and scap  To ilia  10" x3 min ea Flex and scap  To ilia  10" x3 min ea Flex and scap  To ilia  10" x3 min ea Flex and scap  To ilia  10" x2 min ea   Functional IR reach BTB stretch, strap for assistance 10" x2 mins 10" x2 mins 10" x2 mins Resume NV 10" x2 mins   Modalities        CP/stim post tx prn

## 2021-05-17 NOTE — PROGRESS NOTES
PT Progress Note     Today's date: 2021  Patient name: Letitia Velázquez  : 1950  MRN: [id number]  Referring provider: Melita Helms DO  Dx:   Encounter Diagnosis     ICD-10-CM    1  Traumatic complete tear of right rotator cuff, subsequent encounter  S46 011D    2  S/P right rotator cuff repair  Z98 [id number]               Assessment  Letitia Velázquez  has attended 34 sessions of PT since [facility]  He continues to make improvements towards his goals and has made good gains since his last re-eval   He has no complaints of pain in his shoulder on a day to day basis, not some pain when moving his arm too far certain directions  His shoulder ROM and strength is WFL at this time and he is happy with his progress in PT  He reports no difficulty with completing tasks at home and notes that for the most part he has returned to [facility], still some difficulty with tasks related to his PD, not from the shoulder surgery  He has been off work but hopes to return this Friday  He will be seeing the doctor next week for his follow up appointment  The patient would benefit from continued PT to address deficits and improve function  Tx to include ROM, stretching, strengthening, modalities, HEP, pt education, postural ed, lifting/body mechanics, neuro re-ed, balance/proprioception Te, MT and equipment  Plan to see patient for one more visit this week to finalize HEP and plan to D/C PT at end of week            Impairments: abnormal or restricted ROM, activity intolerance, impaired physical strength, lacks appropriate home exercise program and pain with function  Functional limitations: unable to use R UE; much difficulty with ADLs/IADLs and drivingUnderstanding of Dx/Px/POC: good   Prognosis: good    Goals  STGs  1) In 2 visits patient will demonstrate independence with initial basic HEP  Met   2) In 2 weeks patient will demonstrate independence with donning/doffing sling  Met   3) In 4 weeks patient will demonstrate 90 deg PROM R shoulder flexion and abduction  Met    LTGs  1) In 6 weeks patient will DC sling -MET  2) In 8 weeks patient will demonstrate AROM R UE 90 deg flexion and abduction  -MET  3) In 8-12 weeks patient will report little to no difficulty with all ADLs and IADLs  Met  4) In 12 weeks patient will tolerate 45 mins of strengthening exercise  Progressing   5) In 16 weeks patient will tolerate lifting and carrying 25 lbs or more with no shoulder pain  Progressing     Plan  Patient would benefit from: skilled physical therapy  Referral necessary: No  Planned modality interventions: cryotherapy, TENS, unattended electrical stimulation and thermotherapy: hydrocollator packs  Planned therapy interventions: joint mobilization, manual therapy, massage, Guerra taping, patient education, postural training, neuromuscular re-education, self care, strengthening, stretching, therapeutic activities, therapeutic exercise, home exercise program, flexibility and functional ROM exercises  Frequency: 2x week  Duration in weeks: 1-2 weeks  Plan of Care beginning date: 5/24/2021  Plan of Care expiration date: 6/7/21  Treatment plan discussed with: patient        Subjective Evaluation    History of Present Illness  Date of surgery: 2/24/2021  Mechanism of injury: surgery  Mechanism of injury: Patient presents to PT s/p R RC repair 2/24/21  Original injury occurred approx 4 months ago  Patient reports he fell while walking  He reports that he has Parkinson's and was out for a walk, picked up pace and could not slow down  Went into a jog and fell  RA 3/24: The patient reports an improvement of 95 percent thus far  Still in phase 1 of RTC protocol  Allowed AROM at 6 weeks  Progressing well  Denies pain at this time ans reprots that he will be returning to work on April 17th  Progress Note 4/26:  Patient reports he is doing well  He notices pain with some movements or when laying for too long   He has been working but does not do much physical activity at work as his daughter in law is there to assist     UPDATE 21:   The patient reports that he typically has no pain in his shoulder  He will get pain at times when moving his arm too far in certain directions  He will be going back to see the doctor next week for his follow up appointment  He hopes to return to work this Friday            Pain      At best pain ratin     At worst pain ratin       Social Support  L[facility]: [place] [facility]  Lives with: alone    Employment status: not working (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 to this in Spring/Summer)  Hand dominance: right  Exercise history: likes to be active with walking    Treatments  No previous or current treatments  Patient Goals  Patient goals for therapy: independence with ADLs/IADLs, increased motion and increased strength          Objective     Observations     Right Shoulder  Positive for incision - healed       Active Range of Motion     Right Shoulder   Flexion: 170 deg  Abduction: 165 deg   External rotation: 68 deg  Internal rotation: 55 deg    Passive Range of Motion     Right Shoulder   Flexion: 173 deg  Abduction: 168 deg   External rotation 90°: 76 deg  Internal rotation 90°:  59 deg    Strength/Myotome Testing     R Shoulder strength grossly 4-/5 t/o       Precautions: s/p R RC repair DOS 21, Parkinson's  POC exp 21  Manuals 5/20 5/24 5/10 5/14 5/17   R shoulder stretching to ilia LH ML KG KG TM   R elbow stretching                            Neuro Re-Ed            TB pull downs w/cues for scap retraction BTB 2x10 Blue 3" x 20 Blue 3"  x20 Blue  3" x20 Blue  3" 2x10   TB rows w/cues for scap retraction BTB 2x10 Blue 3" x 20 Blue 3"  x20 Blue 3"    x20 Blue  3" 2x10   Isometrics - IR/ER/Flex/Ext IR/ER BTB 20x ea IR/ER Blue TB x 20 ea IR/ER Blue TB x20 ea IR/ER Blue TB x20 ea IR/ER Blue TB x20 ea                                             Ther Ex            Biceps curls    5# 3x10 5# 3x10  5# 3x10    Sidelying ER 4# 2x10 4# 2 x 10  2# x20 4# x20 4# x20   Supine scap punches 20x3", 4# 4# 20 x 3" 4# 20x 3" ea  4# 20x 3" ea  4# 20x 3" ea   Supine chest press 2x10 4# 2 x 10 4# 2# 2x10 4# 2x10 4# 2x10   Seated overhead shoulder press     2# 2x10 resume NV     ITY wall slides 3' 3' 10" x2 mins 10"x2 mins 10" x2 min   Standing arm raises 2# 2x10 ea flex/scap 2# 2 x 10 ea  Flex/Scap NV  1# 2x10 flexion 2# 2x10 ea flexion/scap   Ther Activity 5/20           UBE 5'/5' 5'/5'  80 rpm 80 rpm  5'fwd  /5'retro 80 rpm  5'fwd/  5'retro 80 rpm  5'fwd/  5'retro   AAROM Pulleys for functional ROM 3' ea 3' each Flex and scap  To ilia  10" x3 min ea Flex and scap  To ilia  10" x3 min ea Flex and scap  To ilia  10" x2 min ea   Functional IR reach BTB stretch, strap for assistance     10" x2 mins Resume NV 10" x2 mins   Modalities             CP/stim post tx prn

## 2021-05-19 ENCOUNTER — APPOINTMENT (OUTPATIENT)
Dept: PHYSICAL THERAPY | Facility: CLINIC | Age: 71
End: 2021-05-19
Payer: MEDICARE

## 2021-05-20 ENCOUNTER — OFFICE VISIT (OUTPATIENT)
Dept: PHYSICAL THERAPY | Facility: CLINIC | Age: 71
End: 2021-05-20
Payer: MEDICARE

## 2021-05-20 DIAGNOSIS — Z98.890 S/P RIGHT ROTATOR CUFF REPAIR: ICD-10-CM

## 2021-05-20 DIAGNOSIS — S46.011D TRAUMATIC COMPLETE TEAR OF RIGHT ROTATOR CUFF, SUBSEQUENT ENCOUNTER: Primary | ICD-10-CM

## 2021-05-20 PROCEDURE — 97140 MANUAL THERAPY 1/> REGIONS: CPT | Performed by: PHYSICAL MEDICINE & REHABILITATION

## 2021-05-20 PROCEDURE — 97112 NEUROMUSCULAR REEDUCATION: CPT | Performed by: PHYSICAL MEDICINE & REHABILITATION

## 2021-05-20 PROCEDURE — 97110 THERAPEUTIC EXERCISES: CPT | Performed by: PHYSICAL MEDICINE & REHABILITATION

## 2021-05-20 NOTE — PROGRESS NOTES
Daily Note     Today's date: 2021  Patient name: Melinda Roper  : 1950  MRN: [id number]  Referring provider: Jose Cruz DO  Dx:   Encounter Diagnosis     ICD-10-CM    1  Traumatic complete tear of right rotator cuff, subsequent encounter  S46 011D    2  S/P right rotator cuff repair  [id number] [id number]                   Subjective: Patient offers no new complaints to begin session  Objective: See treatment diary below    Assessment: Tolerated treatment well  May tolerate further resistance progression nv as charted  Patient demonstrated fatigue post treatment, exhibited good technique with therapeutic exercises and would benefit from continued PT  Plan: Continue per plan of care  Progress treament per protocol        Precautions: s/p R RC repair DOS 21, Parkinson's  POC exp 21  Manuals 5/20  5/10 5/14 5/17   R shoulder stretching to ilia LH  KG KG TM   R elbow stretching                 Neuro Re-Ed        TB pull downs w/cues for scap retraction BTB 2x10  Blue 3"  x20 Blue  3" x20 Blue  3" 2x10   TB rows w/cues for scap retraction BTB 2x10  Blue 3"  x20 Blue 3"    x20 Blue  3" 2x10   Isometrics - IR/ER/Flex/Ext IR/ER BTB 20x ea  IR/ER Blue TB x20 ea IR/ER Blue TB x20 ea IR/ER Blue TB x20 ea                           Ther Ex        Biceps curls   5# 3x10 5# 3x10  5# 3x10    Sidelying ER 4# 2x10  2# x20 4# x20 4# x20   Supine scap punches 20x3", 4#  4# 20x 3" ea  4# 20x 3" ea  4# 20x 3" ea   Supine chest press 2x10 4#  2# 2x10 4# 2x10 4# 2x10   Seated overhead shoulder press   2# 2x10 resume NV    ITY wall slides 3'  10" x2 mins 10"x2 mins 10" x2 min   Standing arm raises 2# 2x10 ea flex/scap Inc # NV  1# 2x10 flexion 2# 2x10 ea flexion/scap   Ther Activity        UBE 5'/5'  80 rpm  5'fwd  /5'retro 80 rpm  5'fwd/  5'retro 80 rpm  5'fwd/  5'retro   AAROM Pulleys for functional ROM 3' ea  Flex and scap  To ilia  10" x3 min ea Flex and scap  To ilia  10" x3 min ea Flex and scap  To ilia  10" x2 min ea   Functional IR reach BTB stretch, strap for assistance   10" x2 mins Resume NV 10" x2 mins   Modalities        CP/stim post tx prn

## 2021-05-21 ENCOUNTER — APPOINTMENT (OUTPATIENT)
Dept: PHYSICAL THERAPY | Facility: CLINIC | Age: 71
End: 2021-05-21
Payer: MEDICARE

## 2021-05-24 ENCOUNTER — EVALUATION (OUTPATIENT)
Dept: PHYSICAL THERAPY | Facility: CLINIC | Age: 71
End: 2021-05-24
Payer: MEDICARE

## 2021-05-24 DIAGNOSIS — S46.011D TRAUMATIC COMPLETE TEAR OF RIGHT ROTATOR CUFF, SUBSEQUENT ENCOUNTER: Primary | ICD-10-CM

## 2021-05-24 DIAGNOSIS — Z98.890 S/P RIGHT ROTATOR CUFF REPAIR: ICD-10-CM

## 2021-05-24 PROCEDURE — 97112 NEUROMUSCULAR REEDUCATION: CPT | Performed by: PHYSICAL THERAPIST

## 2021-05-24 PROCEDURE — 97110 THERAPEUTIC EXERCISES: CPT | Performed by: PHYSICAL THERAPIST

## 2021-05-24 PROCEDURE — 97140 MANUAL THERAPY 1/> REGIONS: CPT | Performed by: PHYSICAL THERAPIST

## 2021-05-26 ENCOUNTER — APPOINTMENT (OUTPATIENT)
Dept: PHYSICAL THERAPY | Facility: CLINIC | Age: 71
End: 2021-05-26
Payer: MEDICARE

## 2021-05-27 ENCOUNTER — APPOINTMENT (OUTPATIENT)
Dept: PHYSICAL THERAPY | Facility: CLINIC | Age: 71
End: 2021-05-27
Payer: MEDICARE

## 2021-05-28 ENCOUNTER — APPOINTMENT (OUTPATIENT)
Dept: PHYSICAL THERAPY | Facility: CLINIC | Age: 71
End: 2021-05-28
Payer: MEDICARE

## 2021-05-28 ENCOUNTER — OFFICE VISIT (OUTPATIENT)
Dept: PHYSICAL THERAPY | Facility: CLINIC | Age: 71
End: 2021-05-28
Payer: MEDICARE

## 2021-05-28 DIAGNOSIS — Z98.890 S/P RIGHT ROTATOR CUFF REPAIR: ICD-10-CM

## 2021-05-28 DIAGNOSIS — S46.011D TRAUMATIC COMPLETE TEAR OF RIGHT ROTATOR CUFF, SUBSEQUENT ENCOUNTER: Primary | ICD-10-CM

## 2021-05-28 PROCEDURE — 97112 NEUROMUSCULAR REEDUCATION: CPT | Performed by: PHYSICAL THERAPIST

## 2021-05-28 PROCEDURE — 97140 MANUAL THERAPY 1/> REGIONS: CPT | Performed by: PHYSICAL THERAPIST

## 2021-05-28 PROCEDURE — 97530 THERAPEUTIC ACTIVITIES: CPT | Performed by: PHYSICAL THERAPIST

## 2021-05-28 NOTE — PROGRESS NOTES
Daily Note     Today's date: 2021  Patient name: Cecil Sood  : 1950  MRN: [id number]  Referring provider: Lashonda Womack DO  Dx:   Encounter Diagnosis     ICD-10-CM    1  Traumatic complete tear of right rotator cuff, subsequent encounter  S46 011D    2  S/P right rotator cuff repair  [id number] [id number]                   Subjective: Patient reports he is feeling great  Objective: See treatment diary below      Assessment: At this time, patient has achieved their maximum functional benefit from skilled physical therapy services and will be discharged to their [facility]  Patient is in agreement with the plan of care    As a result, patient is discharged from physical therapy      Plan: DC from PT     Precautions: s/p R RC repair DOS 21, Parkinson's  Manuals 5/20 5/28 5/10 5/14 5/17   R shoulder stretching to ilia LH KG KG KG TM   R elbow stretching                 Neuro Re-Ed        TB pull downs w/cues for scap retraction BTB 2x10 Black  x20 Blue 3"  x20 Blue  3" x20 Blue  3" 2x10   TB rows w/cues for scap retraction BTB 2x10 Black  x20 Blue 3"  x20 Blue 3"    x20 Blue  3" 2x10   Isometrics - IR/ER/Flex/Ext IR/ER BTB 20x ea Black TB  x20 ea IR/ER Blue TB x20 ea IR/ER Blue TB x20 ea IR/ER Blue TB x20 ea                           Ther Ex        Biceps curls   5# 3x10 5# 3x10  5# 3x10    Sidelying ER 4# 2x10  2# x20 4# x20 4# x20   Supine scap punches 20x3", 4#  4# 20x 3" ea  4# 20x 3" ea  4# 20x 3" ea   Supine chest press 2x10 4#  2# 2x10 4# 2x10 4# 2x10   Seated overhead shoulder press   2# 2x10 resume NV    ITY wall slides 3'  10" x2 mins 10"x2 mins 10" x2 min   Standing arm raises 2# 2x10 ea flex/scap  NV  1# 2x10 flexion 2# 2x10 ea flexion/scap   Ther Activity        UBE 5'/5' 5'/5' 80 rpm 80 rpm  5'fwd  /5'retro 80 rpm  5'fwd/  5'retro 80 rpm  5'fwd/  5'retro   AAROM Pulleys for functional ROM 3' ea 3 min ea flex/scap Flex and scap  To ilia  10" x3 min ea Flex and scap  To ilia  10" x3 min ea Flex and scap  To ilia  10" x2 min ea   Functional IR reach BTB stretch, strap for assistance   10" x2 mins Resume NV 10" x2 mins   Modalities        CP/stim post tx prn

## 2021-06-03 ENCOUNTER — OFFICE VISIT (OUTPATIENT)
Dept: OBGYN CLINIC | Facility: CLINIC | Age: 71
End: 2021-06-03
Payer: MEDICARE

## 2021-06-03 VITALS
SYSTOLIC BLOOD PRESSURE: 155 MMHG | BODY MASS INDEX: 28.2 KG/M2 | DIASTOLIC BLOOD PRESSURE: 98 MMHG | HEART RATE: 96 BPM | HEIGHT: 70 IN | WEIGHT: 197 LBS

## 2021-06-03 DIAGNOSIS — Z98.890 S/P ARTHROSCOPY OF RIGHT SHOULDER: Primary | ICD-10-CM

## 2021-06-03 PROCEDURE — 99213 OFFICE O/P EST LOW 20 MIN: CPT | Performed by: PHYSICIAN ASSISTANT

## 2021-06-03 NOTE — PROGRESS NOTES
ASSESSMENT/PLAN:    Diagnoses and all orders for this visit:    S/P arthroscopy of right shoulder        The patient is continuing to do very well since surgery  He has excellent rotator cuff strength and range of motion  He should continue a home exercise program for strengthening, stretching and range of motion  He is very happy with the results of surgery  He can follow up with our office as needed  The patient is acceptable to this plan  Return if symptoms worsen or fail to improve       _____________________________________________________  CHIEF COMPLAINT:  Chief Complaint   Patient presents with    Right Shoulder - Follow-up         SUBJECTIVE:  Sarai Dominguez  is a 72 y o  male   Status post right shoulder arthroscopy with rotator cuff repair from 02/24/2021  The patient presents to our office for a postop visit  He states he has been doing very well since surgery  He denies any significant pain  He is very pleased with the results of surgery  He denies any numbness or tingling  He denies any fever or chills  He states he has completed physical therapy  The following portions of the patient's history were reviewed and updated as appropriate: allergies, current medications, past family history, past medical history, past social history, past surgical history and problem list     PAST MEDICAL HISTORY:  Past Medical History:   Diagnosis Date    Anxiety     Benign hypertension     [facility] )     prostate    Coronary artery disease     ABRAM to LAD in 2014    History of cardiovascular stress test 01/21/2016    Normal EF, normal study      History of echocardiogram 05/05/2015    EF normal       Ischemic cardiomyopathy     resolved    Mixed hyperlipidemia     [facility] infarction [facility]     Parkinson disease ([facility] )     Prostate cancer ([facility] )     surgically removed    STEMI (ST elevation myocardial infarction) ([facility] ) 2014       PAST SURGICAL HISTORY:  Past Surgical History: Procedure Laterality Date    CORONARY ANGIOPLASTY WITH STENT PLACEMENT  12/26/2014    EF 40%, ABRAM to LAD   INCISIONAL HERNIA REPAIR      DE SHLDR ARTHROSCOP,SURG,W/ROTAT CUFF REPR Right 2/24/2021    Procedure: SHOULDER ARTHROSCOPY WITH ROTATOR CUFF REPAIR and acromioplasty;  Surgeon: Orlando Higgins DO;  Location: [facility] MAIN OR;  Service: Orthopedics    PROSTATECTOMY  2014    for cancer    TONSILLECTOMY         FAMILY HISTORY:  Family History   Problem Relation Age of Onset    Heart attack Father     Stroke Father        SOCIAL HISTORY:  Social History     Tobacco Use    Smoking status: Never Smoker    Smokeless tobacco: Never Used   Substance Use Topics    Alcohol use: Yes     Alcohol/week: 2 0 standard drinks     Types: 2 Cans of beer per week     Frequency: 4 or more times a week     Comment: daily    Drug use: Never       MEDICATIONS:    Current Outpatient Medications:     aspirin (ECOTRIN LOW STRENGTH) 81 mg EC tablet, Take 81 mg by mouth daily, Disp: , Rfl:     atorvastatin (LIPITOR) 80 mg tablet, Take 1 tablet (80 mg total) by mouth daily, Disp: 90 tablet, Rfl: 3    carbidopa-levodopa (SINEMET)  mg per tablet, Take 1 tablet by mouth 3 (three) times a day, Disp: , Rfl:     meloxicam (MOBIC) 15 mg tablet, Take 1 tablet (15 mg total) by mouth daily, Disp: 30 tablet, Rfl: 0    PARoxetine (PAXIL) 20 mg tablet, Take 20 mg by mouth daily, Disp: , Rfl:     ALLERGIES:  No Known Allergies    ROS:  Review of Systems     Constitutional: Negative for fatigue, fever or loss of appetite  HENT: Negative  Respiratory: Negative for shortness of breath, dyspnea  Cardiovascular: Negative for chest pain/tightness  Gastrointestinal: Negative for abdominal pain, N/V  Endocrine: Negative for cold/heat intolerance, unexplained weight loss/gain  Genitourinary: Negative for flank pain, dysuria, hematuria  Musculoskeletal:  Negative for arthralgia   Skin: Negative for rash      Neurological: Negative for numbness or tingling  Psychiatric/Behavioral: Negative for agitation  _____________________________________________________  PHYSICAL EXAMINATION:    Blood pressure 155/98, pulse 96, height 5' 10" (1 778 m), weight 89 4 kg (197 lb)  Constitutional: Oriented to person, place, and time  Appears well-developed and well-nourished  No distress  HENT:   Head: Normocephalic  Eyes: Conjunctivae are normal  Right eye exhibits no discharge  Left eye exhibits no discharge  No scleral icterus  Cardiovascular: Normal rate  Pulmonary/Chest: Effort normal    Neurological: Alert and oriented to person, place, and time  Skin: Skin is warm and dry  No rash noted  Not diaphoretic  No erythema  No pallor  Psychiatric: Normal mood and affect  Behavior is normal  Judgment and thought content normal       MUSCULOSKELETAL EXAMINATION:   Physical Exam  Ortho Exam      Right upper extremity is neurovascularly intact   Fingers are pink and mobile  Compartments are soft   Rotator cuff strength is 5/5   Range of motion of the shoulder is from 0-175 degrees   Brisk cap refill   Sensation intact   Incisions are well healed     Objective:  BP Readings from Last 1 Encounters:   06/03/21 155/98      Wt Readings from Last 1 Encounters:   06/03/21 89 4 kg (197 lb)        BMI:   Estimated body mass index is 28 27 kg/m² as calculated from the following:    Height as of this encounter: 5' 10" (1 778 m)  Weight as of this encounter: 89 4 kg (197 lb)            Jamee Rain PA-C

## 2021-06-08 ENCOUNTER — OFFICE VISIT (OUTPATIENT)
Dept: PHYSICAL THERAPY | Facility: CLINIC | Age: 71
End: 2021-06-08
Payer: MEDICARE

## 2021-06-08 DIAGNOSIS — R29.898 WEAKNESS OF RIGHT LOWER EXTREMITY: ICD-10-CM

## 2021-06-08 DIAGNOSIS — R26.89 IMBALANCE: ICD-10-CM

## 2021-06-08 DIAGNOSIS — R53.83 DECREASED STAMINA: ICD-10-CM

## 2021-06-08 DIAGNOSIS — R26.9 GAIT DISTURBANCE: ICD-10-CM

## 2021-06-08 DIAGNOSIS — G20 PARKINSON'S DISEASE (HCC): Primary | ICD-10-CM

## 2021-06-08 PROCEDURE — 97110 THERAPEUTIC EXERCISES: CPT | Performed by: PHYSICAL THERAPIST

## 2021-06-08 PROCEDURE — 97163 PT EVAL HIGH COMPLEX 45 MIN: CPT | Performed by: PHYSICAL THERAPIST

## 2021-06-08 NOTE — PROGRESS NOTES
PT Evaluation     Today's date: 2021  Patient name: Stormy Davison  : 1950  MRN: [id number]  Referring provider: Blanche Amado DO  Dx:   Encounter Diagnosis     ICD-10-CM    1  Parkinson's disease ([facility] [address] )  G20    2  Decreased stamina  R53 83    3  Weakness of right lower extremity  R29 898    4  Imbalance  R26 89    5  Gait disturbance  R26 9                   Assessment  Assessment details: Patient is a 79 y/o male who presents to OPPT with the diagnosis of Parkinson's Disease   Patient demonstrates deficits in ROM, strength, balance, motor planning, divided attention tasks, and functional activities   Patient demonstrates co-morbidities of decreased hearing, h/o falls, right sided weakness, dizziness that may impact progress with OPPT interventions   He will benefit from skilled PT interventions at 2 times per week for 6-8 weeks in order to maximize progress toward personal  goals as established during the first week of therapy interventions  Impairments: abnormal coordination, abnormal gait, abnormal movement, activity intolerance, impaired balance, impaired physical strength, lacks appropriate home exercise program, safety issue, scapular dyskinesis and poor posture   Understanding of Dx/Px/POC: good   Prognosis: good  Prognosis details: Positive prognostic indicators include positive attitude toward recovery and good understanding of diagnosis and treatment plan options  Negative prognostic indicators include comorbidities  Goals  In 4 weeks, patient will:  1        Demonstrate ability to don (UB item of clothing)  in < 15 seconds without assistance  2  Demonstrate ability to don (LB item of clothing) in < 30 seconds  3  Demonstrate ability to ambulate through doorway/threshold without verbal cues or change in gait quality >80% of occasions  4  Demonstrate ability to reach waist to shoulder/above head without posterior LOB    In 6-8 weeks, patient will:  5  Demonstrate ability to ambulate forward holding item requiring bilateral UEs without LOB or change in gait quality  6  Ascend stairs step over step with single UE support without change in movement quality  7  Demonstrate ability to roll bilaterally in <5 seconds without use of assistive equipment  8  Demonstrate reduced fall risk based on functional outcome measure      Plan  Patient would benefit from: skilled physical therapy  Other planned modality interventions: Modalities prn for symptom management  Planned therapy interventions: manual therapy, neuromuscular re-education, self care, therapeutic activities, therapeutic exercise, gait training and home exercise program  Frequency: 2x week  Duration in visits: 8  Duration in weeks: 4  Plan of Care beginning date: 6/8/2021  Plan of Care expiration date: 7/8/2021  Treatment plan discussed with: patient and PTA        Subjective Evaluation    History of Present Illness  Mechanism of injury: No official diagnosis of PD per patient  Reports he was given meds for PD, but only takes 1 pill, once a day  He was told to take one pill 3 times per day  Reports he doesn't notice a difference at one pill  Also reports he was supposed to make a neurologist appointment, but didn't want to follow up    (+) recent falls  (+) usually falls forward  (+) difficulty with ADLs  (+) increased unsteadiness with closing eyes, currently avoids going out at night  (+) difficulty on inclines/declines  (+) difficulty with uneven surfaces  (+)  Mild freezing episodes  (+) notices others walk faster than him  Quality of life: good    Pain  No pain reported  Location: Back pain all his life, nothing out of the ordinary    Social Support  Steps to enter house: yes  2  Lives in: [facility]  Lives with: spouse    Employment status: working  Hand dominance: right    Treatments  Previous treatment: physical therapy  Current treatment: physical therapy  Patient Goals  Patient goals for therapy: increased strength, independence with ADLs/IADLs, improved balance, increased motion and return to sport/leisure activities          Objective     Strength/Myotome Testing     Left Hip   Planes of Motion   Flexion: 4  Extension: 4-  Abduction: 4    Right Hip   Planes of Motion   Flexion: 4  Extension: 4-  Abduction: 4    Left Knee   Flexion: 4+  Extension: 4+    Right Knee   Flexion: 4+  Extension: 4+    Left Ankle/Foot   Dorsiflexion: 4+  Plantar flexion: 4+    Right Ankle/Foot   Dorsiflexion: 4+  Plantar flexion: 4+  Neuro Exam:     Dizziness  Negative for disequilibrium  Exacerbating factors  Positive for bending over and walking in busy environment  Negative for rolling in bed, looking up and walking  Cervical exam   Ligament Laxity Testing   Alar ligament: WNL  Sharp Rizwan: WNL  Modified VBI   Left: asymptomatic  Right: asymptomatic  Seated posture: forward head posture    Oculomotor exam   Oculomotor ROM: WNL  Resting nystagmus: not present   Gaze holding nystagmus: not present left  and not present right  Smooth pursuits: within normal limits  Vertical saccades: hypometric  Horizontal saccades: hypometric   Convergence: abnormal  Cover test: normal  Crossover test: normal    Sensation   Proprioception LE: left WNL and right WNL    Coordination   Rapid alternating movements: UE impaired and LE impaired    Functional outcomes   5x sit to stand: 14 1 (seconds)  Functional outcome comment: Mini Best     Sit to Stand  2= Normal:  Comes to stand without use of hands and stabilizes independently     Rise to Toes  1=Moderate:  Heels up, but not full range (smaller than when holding hands) OR noticeable instability for 3 seconds     Stand on one leg  1=Moderate: < 20 seconds     Compensatory Stepping Correction - Forward  2= Normal:  Recovers independently with a single, large step (2nd realignment step is allowed)      Compensatory Stepping Correction - Backward  1=Moderate: More than one step used to recover equilibrium     Compensatory Stepping Correction - Lateral  2= Normal:  Recovers independently with a single, large step (cross over or lateral OK)  Stance (Feet Together); Eyes open, Firm surface  2= Normal:  30 seconds     Stance (Feet Together);  Eyes closed, Foam surface  1=Moderate: < 30 seconds (22 seconds)    Incline - Eyes Closed  1=Moderate:  Stands independently <30 sec OR aligns with surface    Change in gait speed  1= Moderate:  Unable to change walking speed or signs of imbalance     Walk with Head Turns - Horizontal  2= Normal:  Performs head turns with no change in gait speed and good balance     Walk with Pivot Turns  2=  Normal:  Turns with feet close FAST (< or = to 3 steps) with good balance     Step over obstacles  1= Moderate: step over box but touches box OR displays cautious behavior by slowing gait     Timed up and go with dual task (3 meter walk)  TU Seconds  Dual Task TU 7 Seconds  2= Normal:  No noticeable change in sitting, standing, or walking while backward counting when compared to TUG without dual task    Total Score:                  Precautions: Falls, BP  Re-eval Date: 2021    Date        Visit Count 1       FOTO See IE       Pain In See IE       Pain Out See IE           Manuals        LEs prn                               Neuro Re-Ed        Dynavision        Natus         Obstacle Course        Star Drill        PWR focus                        Ther Ex 138-145/,   HR 76-84, 98% on RA with O2       Aerobic  Upright bike  10 mins       SLR x 4        HR/TR        Mini Squats        Single Leg progression                                        Ther Activity        Kitchen/ADL                Gait Training        Divided Attention        Head turns        Modalities         prn

## 2021-06-08 NOTE — LETTER
2021    Georgia Zhao  [address]  [facility] [address]    Patient: Jeremias Hall  YOB: 1950   Date of Visit: 2021     Encounter Diagnosis     ICD-10-CM    1  Parkinson's disease ([facility] [address] )  G20    2  Decreased stamina  R53 83    3  Weakness of right lower extremity  R29 898    4  Imbalance  R26 89    5  Gait disturbance  R26 9        Dear Dr Orosco :    Thank you for your recent referral of Jeremias Hall    Please review the attached evaluation summary from Kingsley's recent visit  Please verify that you agree with the plan of care by signing the attached order  If you have any questions or concerns, please do not hesitate to call  I sincerely appreciate the opportunity to share in the care of one of your patients and hope to have another opportunity to work with you in the near future  Sincerely,    Carol Madrigal      Referring Provider:      I certify that I have read the below Plan of Care and certify the need for these services furnished under this plan of treatment while under my care  Alicia Lino DO [address] [address]  Via Fax: [phone]          PT Evaluation     Today's date: 2021  Patient name: Jeremias Hall  : 1950  MRN: [id number]  Referring provider: Shelby Jenkins DO  Dx:   Encounter Diagnosis     ICD-10-CM    1  Parkinson's disease ([facility] [address] )  G20    2  Decreased stamina  R53 83    3  Weakness of right lower extremity  R29 898    4  Imbalance  R26 89    5   Gait disturbance  R26 9                   Assessment  Assessment details: Patient is a 81 y/o male who presents to OPPT with the diagnosis of Parkinson's Disease   Patient demonstrates deficits in ROM, strength, balance, motor planning, divided attention tasks, and functional activities   Patient demonstrates co-morbidities of decreased hearing, h/o falls, right sided weakness, dizziness that may impact progress with OPPT interventions   He will benefit from skilled PT interventions at 2 times per week for 6-8 weeks in order to maximize progress toward personal  goals as established during the first week of therapy interventions  Impairments: abnormal coordination, abnormal gait, abnormal movement, activity intolerance, impaired balance, impaired physical strength, lacks appropriate home exercise program, safety issue, scapular dyskinesis and poor posture   Understanding of Dx/Px/POC: good   Prognosis: good  Prognosis details: Positive prognostic indicators include positive attitude toward recovery and good understanding of diagnosis and treatment plan options  Negative prognostic indicators include comorbidities  Goals  In 4 weeks, patient will:  1        Demonstrate ability to don (UB item of clothing)  in < 15 seconds without assistance  2  Demonstrate ability to don (LB item of clothing) in < 30 seconds  3  Demonstrate ability to ambulate through doorway/threshold without verbal cues or change in gait quality >80% of occasions  4  Demonstrate ability to reach waist to shoulder/above head without posterior LOB    In 6-8 weeks, patient will:  5  Demonstrate ability to ambulate forward holding item requiring bilateral UEs without LOB or change in gait quality  6  Ascend stairs step over step with single UE support without change in movement quality  7  Demonstrate ability to roll bilaterally in <5 seconds without use of assistive equipment  8        Demonstrate reduced fall risk based on functional outcome measure      Plan  Patient would benefit from: skilled physical therapy  Other planned modality interventions: Modalities prn for symptom management  Planned therapy interventions: manual therapy, neuromuscular re-education, self care, therapeutic activities, therapeutic exercise, gait training and home exercise program  Frequency: 2x week  Duration in visits: 8  Duration in weeks: 4  Plan of Care beginning date: 6/8/2021  Plan of Care expiration date: 7/8/2021  Treatment plan discussed with: patient and PTA        Subjective Evaluation    History of Present Illness  Mechanism of injury: No official diagnosis of PD per patient  Reports he was given meds for PD, but only takes 1 pill, once a day  He was told to take one pill 3 times per day  Reports he doesn't notice a difference at one pill  Also reports he was supposed to make a neurologist appointment, but didn't want to follow up  (+) recent falls  (+) usually falls forward  (+) difficulty with ADLs  (+) increased unsteadiness with closing eyes, currently avoids going out at night  (+) difficulty on inclines/declines  (+) difficulty with uneven surfaces  (+)  Mild freezing episodes  (+) notices others walk faster than him  Quality of life: good    Pain  No pain reported  Location: Back pain all his life, nothing out of the ordinary    Social Support  Steps to enter house: yes  2  Lives in: [facility]  Lives with: spouse    Employment status: working  Hand dominance: right    Treatments  Previous treatment: physical therapy  Current treatment: physical therapy  Patient Goals  Patient goals for therapy: increased strength, independence with ADLs/IADLs, improved balance, increased motion and return to sport/leisure activities          Objective     Strength/Myotome Testing     Left Hip   Planes of Motion   Flexion: 4  Extension: 4-  Abduction: 4    Right Hip   Planes of Motion   Flexion: 4  Extension: 4-  Abduction: 4    Left Knee   Flexion: 4+  Extension: 4+    Right Knee   Flexion: 4+  Extension: 4+    Left Ankle/Foot   Dorsiflexion: 4+  Plantar flexion: 4+    Right Ankle/Foot   Dorsiflexion: 4+  Plantar flexion: 4+  Neuro Exam:     Dizziness  Negative for disequilibrium  Exacerbating factors  Positive for bending over and walking in busy environment  Negative for rolling in bed, looking up and walking       Cervical exam   Ligament Laxity Testing   Alar ligament: WNL  Sharp Rizwan: WNL  Modified VBI   Left: asymptomatic  Right: asymptomatic  Seated posture: forward head posture    Oculomotor exam   Oculomotor ROM: WNL  Resting nystagmus: not present   Gaze holding nystagmus: not present left  and not present right  Smooth pursuits: within normal limits  Vertical saccades: hypometric  Horizontal saccades: hypometric   Convergence: abnormal  Cover test: normal  Crossover test: normal    Sensation   Proprioception LE: left WNL and right WNL    Coordination   Rapid alternating movements: UE impaired and LE impaired    Functional outcomes   5x sit to stand: 14 1 (seconds)  Functional outcome comment: Mini Best     Sit to Stand  2= Normal:  Comes to stand without use of hands and stabilizes independently     Rise to Toes  1=Moderate:  Heels up, but not full range (smaller than when holding hands) OR noticeable instability for 3 seconds     Stand on one leg  1=Moderate: < 20 seconds     Compensatory Stepping Correction - Forward  2= Normal:  Recovers independently with a single, large step (2nd realignment step is allowed)  Compensatory Stepping Correction - Backward  1=Moderate: More than one step used to recover equilibrium     Compensatory Stepping Correction - Lateral  2= Normal:  Recovers independently with a single, large step (cross over or lateral OK)  Stance (Feet Together); Eyes open, Firm surface  2= Normal:  30 seconds     Stance (Feet Together);  Eyes closed, Foam surface  1=Moderate: < 30 seconds (22 seconds)    Incline - Eyes Closed  1=Moderate:  Stands independently <30 sec OR aligns with surface    Change in gait speed  1= Moderate:  Unable to change walking speed or signs of imbalance     Walk with Head Turns - Horizontal  2= Normal:  Performs head turns with no change in gait speed and good balance     Walk with Pivot Turns  2=  Normal:  Turns with feet close FAST (< or = to 3 steps) with good balance     Step over obstacles  1= Moderate: step over box but touches box OR displays cautious behavior by slowing gait     Timed up and go with dual task (3 meter walk)  TU Seconds  Dual Task TU 7 Seconds  2= Normal:  No noticeable change in sitting, standing, or walking while backward counting when compared to TUG without dual task    Total Score:                  Precautions: Falls, BP  Re-eval Date: 2021    Date        Visit Count 1       FOTO See IE       Pain In See IE       Pain Out See IE           Manuals        LEs prn                               Neuro Re-Ed        Dynavision        Natus         Obstacle Course        Star Drill        PWR focus                        Ther Ex 138-145/,   HR 76-84, 98% on RA with O2       Aerobic  Upright bike  10 mins       SLR x 4        HR/TR        Mini Squats        Single Leg progression                                        Ther Activity        Kitchen/ADL                Gait Training        Divided Attention        Head turns        Modalities         prn

## 2021-06-09 ENCOUNTER — TRANSCRIBE ORDERS (OUTPATIENT)
Dept: PHYSICAL THERAPY | Facility: CLINIC | Age: 71
End: 2021-06-09

## 2021-06-09 DIAGNOSIS — G20 PARKINSON DISEASE (HCC): Primary | ICD-10-CM

## 2021-06-10 ENCOUNTER — OFFICE VISIT (OUTPATIENT)
Dept: PHYSICAL THERAPY | Facility: CLINIC | Age: 71
End: 2021-06-10
Payer: MEDICARE

## 2021-06-10 DIAGNOSIS — R26.9 GAIT DISTURBANCE: Primary | ICD-10-CM

## 2021-06-10 DIAGNOSIS — Z98.890 S/P RIGHT ROTATOR CUFF REPAIR: ICD-10-CM

## 2021-06-10 DIAGNOSIS — S46.011D TRAUMATIC COMPLETE TEAR OF RIGHT ROTATOR CUFF, SUBSEQUENT ENCOUNTER: ICD-10-CM

## 2021-06-10 PROCEDURE — 97112 NEUROMUSCULAR REEDUCATION: CPT

## 2021-06-10 PROCEDURE — 97110 THERAPEUTIC EXERCISES: CPT

## 2021-06-10 NOTE — PROGRESS NOTES
Daily Note     Today's date: 6/10/2021  Patient name: Chelsie See  : 1950  MRN: [id number]  Referring provider: Pritesh Mccullough DO  Dx:   Encounter Diagnosis     ICD-10-CM    1  Gait disturbance  R26 9    2  Traumatic complete tear of right rotator cuff, subsequent encounter  S46 011D    3  S/P right rotator cuff repair  Z98 890                   Subjective: I wake up every morning stiff 1* back and knees  I loosen up after shower/ 5 min walking around the house      Objective: See treatment diary below      Assessment: Tolerated treatment well  Monitored R shoulder discomfort t/o rx session, modifications to pt maria d  Patient provided verbal/tactile cues prn for proper form and technique with exercises completed this date  HEP issued  Patient would benefit from continued PT      Plan: Continue per plan of care        Precautions: Falls, BP  Re-eval Date: 2021    Date 6/8 6/10      Visit Count 1 2      FOTO See IE       Pain In See IE       Pain Out See IE           Manuals        LEs prn                               Neuro Re-Ed        Dynavision        Natus         Obstacle Course        Star Drill        PWR focus  Seated  Trunk flex/reach, trunk rotation w/ hip ext  Stand  Step FWD, Side, retro  With Eye/hand boost  Divided attention/ltr/word association    STS with UE reach  2x 5    Total 45 min                      Ther Ex 138-145/,   HR 76-84, 98% on RA with O2 /82      Aerobic  Upright bike  10 mins Upright bike  10 mins  Cues pacing  Difficulty maintaining pace > 30-40 RPM      SLR x 4        HR/TR        Mini Squats        Single Leg progression                                        Ther Activity        Kitchen/ADL                Gait Training        Divided Attention        Head turns                HEP  Pt educ/ issue HEP  Step fwd/side/retro, seated flex/rotation              Modalities         prn

## 2021-06-11 ENCOUNTER — APPOINTMENT (OUTPATIENT)
Dept: PHYSICAL THERAPY | Facility: CLINIC | Age: 71
End: 2021-06-11
Payer: MEDICARE

## 2021-06-11 NOTE — PROGRESS NOTES
Daily Note     Today's date: 2021  Patient name: Sarai Dominguez  : 1950  MRN: [id number]  Referring provider: Felisa Karimi DO  Dx:   Encounter Diagnosis     ICD-10-CM    1  Gait disturbance  R26 9    2  Traumatic complete tear of right rotator cuff, subsequent encounter  S46 011D    3  S/P right rotator cuff repair  Z98 890    4  Decreased stamina  R53 83    5  Weakness of right lower extremity  R29 898                   Subjective: I have been trying my HEP at home  Not jun if I am doing them right        Objective: See treatment diary below      Assessment: Tolerated treatment well  Patient provided verbal/tactile cues prn for proper form and technique with exercises completed this date  Progressed HEP / encourage carryover  Pt with occ LOB with self correction with chair/countertop aides  Patient would benefit from continued PT      Plan: Continue per plan of care        Precautions: Falls, BP  Re-eval Date: 2021    Date 6/8 6/10 6/14     Visit Count 1 2 3     FOTO See IE       Pain In See IE       Pain Out See IE           Manuals        LEs prn                               Neuro Re-Ed        Dynavision        Natus         Obstacle Course        Star Drill        PWR focus  Seated  Trunk flex/reach, trunk rotation w/ hip ext  Stand  Step FWD, Side, retro  With Eye/hand boost  Divided attention/ltr/word association    STS with UE reach  2x 5    Total 45 min Seated  Trunk flex/reach, trunk rotation w/ hip ext  Stand  Step FWD, Side, retro  With Eye/hand boost  Divided attention/ltr/word association    STS with UE reach  2x 5  5x 27"    Rock and Reach    Stand Trunk Rotation      Total 45 min                     Ther Ex 138-145/,   HR 76-84, 98% on RA with O2 /82      Aerobic  Upright bike  10 mins Upright bike  10 mins  Cues pacing  Difficulty maintaining pace > 30-40 RPM Upright bike  10 mins  Cues pacing  Difficulty maintaining pace > 30-40 RPM     SLR x 4 HR/TR        Mini Squats        Single Leg progression                                        Ther Activity        Kitchen/ADL                Gait Training        Divided Attention        Head turns                HEP  Pt educ/ issue HEP  Step fwd/side/retro, seated flex/rotation              Modalities         prn

## 2021-06-14 ENCOUNTER — OFFICE VISIT (OUTPATIENT)
Dept: PHYSICAL THERAPY | Facility: CLINIC | Age: 71
End: 2021-06-14
Payer: MEDICARE

## 2021-06-14 DIAGNOSIS — R29.898 WEAKNESS OF RIGHT LOWER EXTREMITY: ICD-10-CM

## 2021-06-14 DIAGNOSIS — R53.83 DECREASED STAMINA: ICD-10-CM

## 2021-06-14 DIAGNOSIS — Z98.890 S/P RIGHT ROTATOR CUFF REPAIR: ICD-10-CM

## 2021-06-14 DIAGNOSIS — S46.011D TRAUMATIC COMPLETE TEAR OF RIGHT ROTATOR CUFF, SUBSEQUENT ENCOUNTER: ICD-10-CM

## 2021-06-14 DIAGNOSIS — R26.9 GAIT DISTURBANCE: Primary | ICD-10-CM

## 2021-06-14 PROCEDURE — 97110 THERAPEUTIC EXERCISES: CPT

## 2021-06-14 PROCEDURE — 97112 NEUROMUSCULAR REEDUCATION: CPT

## 2021-06-15 NOTE — PROGRESS NOTES
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 not to lose my balance      Objective: See treatment diary below      Assessment: Tolerated treatment well  Patient provided mod/max verbal/tactile cues prn for proper form and technique with exercises completed this date  Pt encourage carryover with HEP/provided patient with accountability chart   Patient demonstrated fatigue post treatment and would benefit from continued PT      Plan: Continue per plan of care        Precautions: Falls, BP  Re-eval Date: 2021    Date 6/8 6/10 6/14 6/16    Visit Count 1 2 3 4    FOTO See IE       Pain In See IE       Pain Out See IE           Manuals        LEs prn                               Neuro Re-Ed        Dynavision        Natus         Obstacle Course        Star Drill        PWR focus  Seated  Trunk flex/reach, trunk rotation w/ hip ext  Stand  Step FWD, Side, retro  With Eye/hand boost  Divided attention/ltr/word association    STS with UE reach  2x 5    Total 45 min Seated  Trunk flex/reach, trunk rotation w/ hip ext  Stand  Step FWD, Side, retro  With Eye/hand boost  Divided attention/ltr/word association    STS with UE reach  2x 5  5x 27"    Rock and Reach    Stand Trunk Rotation      Total 45 min Seated  Trunk flex/reach, trunk rotation w/ hip ext  Stand  Step FWD, Side, retro  With Eye/hand boost  Divided attention/ltr/word association    STS with UE reach  2x 5  5x 27"    Rock and Reach    Stand Trunk Rotation      Total 35 mi    Multi directional step    Firm/foam  10 min  Cues  Trial 60 bpm pacing  Pt self cue    Dynamic reach/trunk rotation    Foam  With match cards  Feet staggered/alt    Ther Ex 138-145/,   HR 76-84, 98% on RA with O2 /82      Aerobic  Upright bike  10 mins Upright bike  10 mins  Cues pacing  Difficulty maintaining pace > 30-40 RPM Upright bike  10 mins  Cues pacing  Difficulty maintaining pace > 30-40 RPM Upright bike  10 mins  Cues pacing  Difficulty maintaining pace > 30-40 RPM    SLR x 4        HR/TR        Mini Squats        Single Leg progression                                        Ther Activity        Kitchen/ADL                Gait Training        Divided Attention        Head turns                HEP  Pt educ/ issue HEP  Step fwd/side/retro, seated flex/rotation              Modalities         prn

## 2021-06-16 ENCOUNTER — OFFICE VISIT (OUTPATIENT)
Dept: PHYSICAL THERAPY | Facility: CLINIC | Age: 71
End: 2021-06-16
Payer: MEDICARE

## 2021-06-16 DIAGNOSIS — R26.89 IMBALANCE: ICD-10-CM

## 2021-06-16 DIAGNOSIS — R29.898 WEAKNESS OF RIGHT LOWER EXTREMITY: ICD-10-CM

## 2021-06-16 DIAGNOSIS — Z98.890 S/P RIGHT ROTATOR CUFF REPAIR: ICD-10-CM

## 2021-06-16 DIAGNOSIS — R53.83 DECREASED STAMINA: ICD-10-CM

## 2021-06-16 DIAGNOSIS — G20 PARKINSON'S DISEASE (HCC): ICD-10-CM

## 2021-06-16 DIAGNOSIS — S46.011D TRAUMATIC COMPLETE TEAR OF RIGHT ROTATOR CUFF, SUBSEQUENT ENCOUNTER: ICD-10-CM

## 2021-06-16 DIAGNOSIS — R26.9 GAIT DISTURBANCE: Primary | ICD-10-CM

## 2021-06-16 PROCEDURE — 97112 NEUROMUSCULAR REEDUCATION: CPT

## 2021-06-16 PROCEDURE — 97110 THERAPEUTIC EXERCISES: CPT

## 2021-06-21 NOTE — PROGRESS NOTES
Daily Note     Today's date: 2021  Patient name: Logan Dyer  : 1950  MRN: [id number]  Referring provider: Shady Isaac DO  Dx:   Encounter Diagnosis     ICD-10-CM    1  Imbalance  R26 89    2  Parkinson's disease ([facility] [address] )  G20    3  Weakness of right lower extremity  R29 898                   Subjective: I do my exercise every day, maybe not all of them and not all at the same time  I spread them out through the day    Objective: See treatment diary below      Assessment: Tolerated treatment well  Noted improvement with recall of HEP  Provided min cues with TE this date with monitor of R shoulder discomfort  Pt struggles with adding divided attention with step strategies, decrease stride noted   Patient would benefit from continued PT      Plan: Continue per plan of care        Precautions: Falls, BP  Re-eval Date: 2021    Date 6/8 6/10 6/14 6/16 6/22   Visit Count 1 2 3 4 5   FOTO See IE    completed   Pain In See IE       Pain Out See IE         Manuals        LEs prn                               Neuro Re-Ed        Dynavision        Natus         Obstacle Course        Star Drill        PWR focus  Seated  Trunk flex/reach, trunk rotation w/ hip ext  Stand  Step FWD, Side, retro  With Eye/hand boost  Divided attention/ltr/word association    STS with UE reach  2x 5    Total 45 min Seated  Trunk flex/reach, trunk rotation w/ hip ext  Stand  Step FWD, Side, retro  With Eye/hand boost  Divided attention/ltr/word association    STS with UE reach  2x 5  5x 27"    Rock and Reach    Stand Trunk Rotation      Total 45 min Seated  Trunk flex/reach, trunk rotation w/ hip ext  Stand  Step FWD, Side, retro  With Eye/hand boost  Divided attention/ltr/word association    STS with UE reach  2x 5  5x 27"    Rock and Reach    Stand Trunk Rotation      Total 35 mi Seated  Trunk flex/reach, trunk rotation w/ hip ext  Stand  Step FWD, Side, retro  With Eye/hand boost  Divided attention/ltr/word association    STS with UE reach  2x 5    All 4's  - trunk rotation  - step    Total 35 min   Multi directional step    Firm/foam  10 min  Cues  Trial 60 bpm pacing  Pt self cue Firm/foam  10 min  Cues  Trial 60 bpm pacing, Divided attention, dynamic reach   Pt self cue   Dynamic reach/trunk rotation    Foam  With match cards  Feet staggered/alt    Ther Ex 138-145/,   HR 76-84, 98% on RA with O2 /82      Aerobic  Upright bike  10 mins Upright bike  10 mins  Cues pacing  Difficulty maintaining pace > 30-40 RPM Upright bike  10 mins  Cues pacing  Difficulty maintaining pace > 30-40 RPM Upright bike  10 mins  Cues pacing  Difficulty maintaining pace > 30-40 RPM Upright bike  10 mins  Cues pacing  Difficulty maintaining pace > 30-40 RPM   SLR x 4        HR/TR        Mini Squats        Single Leg progression                                        Ther Activity        Kitchen/ADL                Gait Training        Divided Attention        Head turns                HEP  Pt educ/ issue HEP  Step fwd/side/retro, seated flex/rotation              Modalities         prn

## 2021-06-22 ENCOUNTER — OFFICE VISIT (OUTPATIENT)
Dept: PHYSICAL THERAPY | Facility: CLINIC | Age: 71
End: 2021-06-22
Payer: MEDICARE

## 2021-06-22 DIAGNOSIS — R26.89 IMBALANCE: Primary | ICD-10-CM

## 2021-06-22 DIAGNOSIS — G20 PARKINSON'S DISEASE (HCC): ICD-10-CM

## 2021-06-22 DIAGNOSIS — R29.898 WEAKNESS OF RIGHT LOWER EXTREMITY: ICD-10-CM

## 2021-06-22 PROCEDURE — 97110 THERAPEUTIC EXERCISES: CPT

## 2021-06-22 PROCEDURE — 97112 NEUROMUSCULAR REEDUCATION: CPT

## 2021-06-24 NOTE — PROGRESS NOTES
Daily Note     Today's date: 2021  Patient name: Coleman Villaseñor  : 1950  MRN: [id number]  Referring provider: Anastacia Beck DO  Dx:   Encounter Diagnosis     ICD-10-CM    1  Traumatic complete tear of right rotator cuff, subsequent encounter  S46 011D    2  S/P right rotator cuff repair  Z98 890    3  Decreased stamina  R53 83                   Subjective: I had my first hallucination I was probably half asleep seeing ants all across the floor  Having trouble sleeping find myself awake a lot at night  Work activities make me feel exhausted by end of day  Working 7days/week  Have hard time keeping up to the things I usually do  Trying my exercise but sometimes too tired end of work day    Objective: See treatment diary below      Assessment: Tolerated treatment well  Improve memory recall of HEP  Requires min Cues , visual targets provided  Pt encourage arm swing with use of trek poles  Progressed with adding divided attention  Patient would benefit from continued PT      Plan: Continue per plan of care        Precautions: Falls, BP  Re-eval Date: 2021    Date        Visit Count 6       FOTO     Due   Pain In        Pain Out          Manuals        LEs                                Neuro Re-Ed        Dynavision        Natus         Obstacle Course        Star Drill        PWR focus Seated  Trunk flex/reach, trunk rotation w/ hip ext  Stand  Step FWD, Side, retro  With Eye/hand boost  Divided attention/ltr/word association    STS with UE reach  2x 5    All 4's  - trunk rotation  - step    Total 35 min       Multi directional step Firm/foam  10 min  Cues  Trial 60 bpm pacing, Divided attention, dynamic reach   Pt self cue       Dynamic reach/trunk rotation        Ther Ex        Aerobic  Upright bike  10 mins  Cues pacing  Difficulty maintaining pace > 30-40 RPM       SLR x 4        HR/TR        Mini Squats        Single Leg progression                                        Ther Activity        Kitchen/ADL                Gait Training Trek poles  2 laps clinic   Cues scan environment,  Min A for correct reciprocol UE/LE        Divided Attention        Head turns                HEP                Modalities

## 2021-06-25 ENCOUNTER — OFFICE VISIT (OUTPATIENT)
Dept: PHYSICAL THERAPY | Facility: CLINIC | Age: 71
End: 2021-06-25
Payer: MEDICARE

## 2021-06-25 DIAGNOSIS — Z98.890 S/P RIGHT ROTATOR CUFF REPAIR: ICD-10-CM

## 2021-06-25 DIAGNOSIS — S46.011D TRAUMATIC COMPLETE TEAR OF RIGHT ROTATOR CUFF, SUBSEQUENT ENCOUNTER: Primary | ICD-10-CM

## 2021-06-25 DIAGNOSIS — R53.83 DECREASED STAMINA: ICD-10-CM

## 2021-06-25 PROCEDURE — 97110 THERAPEUTIC EXERCISES: CPT

## 2021-06-25 PROCEDURE — 97112 NEUROMUSCULAR REEDUCATION: CPT

## 2021-06-25 NOTE — PROGRESS NOTES
Daily Note     Today's date: 2021  Patient name: Alejandra Linda  : 1950  MRN: [id number]  Referring provider: Alexandria Desai DO  Dx:   Encounter Diagnosis     ICD-10-CM    1  Decreased stamina  R53 83    2  Imbalance  R26 89    3  Parkinson's disease ([facility] [address] )  G20    4  Weakness of right lower extremity  R29 898    5  Gait disturbance  R26 9        Start Time: 0700  Stop Time: 0800  Total time in clinic (min): 60 minutes    Subjective: "I was able to get some of my exercises done at work, I do them in a chair  People probably see me and think I'm crazy"      Objective: See treatment diary below      Assessment: Tolerated treatment well  Pt cued to take bigger steps during multidirectional board  Occasionally pt demonstrates appropriate self-cues  Pt needed min cueing for reciprocal pattern during ambulation trials  Patient demonstrated fatigue post treatment and would benefit from continued PT      Plan: Continue per plan of care  Precautions: Falls, BP  Re-eval Date: 2021    Date       Visit Count 6 7      FOTO     Due   Pain In        Pain Out          Manuals        LEs                                Neuro Re-Ed        Dynavision        Natus         Obstacle Course        Star Drill        PWR focus Seated  Trunk flex/reach, trunk rotation w/ hip ext  Stand  Step FWD, Side, retro  With Eye/hand boost  Divided attention/ltr/word association    STS with UE reach  2x 5    All 4's  - trunk rotation  - step    Total 35 min Lateral step and reach spot it and letter paper  Word association  Quadruped  -trunk rot  -step    STS with UE reach  2x5    25min      Multi directional step Firm/foam  10 min  Cues  Trial 60 bpm pacing, Divided attention, dynamic reach   Pt self cue Firm/foam 15min cues for big stepping   60bpm  Pt self cue, short term memory      Dynamic reach/trunk rotation        Ther Ex        Aerobic  Upright bike  10 mins  Cues pacing  Difficulty maintaining pace > 30-40 RPM Upright bike 10min cues pacing 30-40rpm      SLR x 4        HR/TR        Mini Squats        Single Leg progression                                        Ther Activity        Kitchen/ADL                Gait Training Trek poles  2 laps clinic   Cues scan environment,  Min A for correct reciprocol UE/LE  Trek poles 4laps clinic   Min A for correct reciprocol UE/LE    Trunk disassociation alt UE/LE hitting bolster      Divided Attention        Head turns                HEP                Modalities

## 2021-06-28 ENCOUNTER — OFFICE VISIT (OUTPATIENT)
Dept: PHYSICAL THERAPY | Facility: CLINIC | Age: 71
End: 2021-06-28
Payer: MEDICARE

## 2021-06-28 DIAGNOSIS — R29.898 WEAKNESS OF RIGHT LOWER EXTREMITY: ICD-10-CM

## 2021-06-28 DIAGNOSIS — R26.89 IMBALANCE: ICD-10-CM

## 2021-06-28 DIAGNOSIS — R53.83 DECREASED STAMINA: Primary | ICD-10-CM

## 2021-06-28 DIAGNOSIS — G20 PARKINSON'S DISEASE (HCC): ICD-10-CM

## 2021-06-28 DIAGNOSIS — R26.9 GAIT DISTURBANCE: ICD-10-CM

## 2021-06-28 PROCEDURE — 97112 NEUROMUSCULAR REEDUCATION: CPT | Performed by: PHYSICAL THERAPIST

## 2021-06-28 PROCEDURE — 97116 GAIT TRAINING THERAPY: CPT | Performed by: PHYSICAL THERAPIST

## 2021-06-30 NOTE — PROGRESS NOTES
Daily Note     Today's date: 2021  Patient name: Bibi Chen  : 1950  MRN: [id number]  Referring provider: Nico Jain DO  Dx:   Encounter Diagnosis     ICD-10-CM    1  Decreased stamina  R53 83    2  Imbalance  R26 89    3  Parkinson's disease ([facility] [address] )  G20    4  Weakness of right lower extremity  R29 898    5  Gait disturbance  R26 9    6  Traumatic complete tear of right rotator cuff, subsequent encounter  S46 011D    7  S/P right rotator cuff repair  Z98 890                   Subjective: HEP going well  Do the intermittently throughout the day  4-5x/week        Objective: See treatment diary below      Assessment: Tolerated treatment well  Initial gait demonstrated decrease arm swing, rigid posture, decrease scan environment, step stride, pt provide mod cues with gait to address state  Patient would benefit from continued PT      Plan: Continue per plan of care  Precautions: Falls, BP  Re-eval Date: 2021    Date      Visit Count 6 7 8     FOTO     Due   Pain In        Pain Out          Manuals        LEs                                Neuro Re-Ed        Dynavision        Natus         Obstacle Course        Star Drill        PWR focus Seated  Trunk flex/reach, trunk rotation w/ hip ext  Stand  Step FWD, Side, retro  With Eye/hand boost  Divided attention/ltr/word association    STS with UE reach  2x 5    All 4's  - trunk rotation  - step    Total 35 min Lateral step and reach spot it and letter paper  Word association  Quadruped  -trunk rot  -step    STS with UE reach  2x5    25min Seated  Trunk flex/reach, trunk rotation w/ hip ext     Multi directional step Firm/foam  10 min  Cues  Trial 60 bpm pacing, Divided attention, dynamic reach   Pt self cue Firm/foam 15min cues for big stepping   60bpm  Pt self cue, short term memory      Dynamic reach/trunk rotation        Ther Ex        Aerobic  Upright bike  10 mins  Cues pacing  Difficulty maintaining pace > 30-40 RPM Upright bike 10min cues pacing 30-40rpm Upright bike 10min cues pacing 30-40rpm     SLR x 4        HR/TR        Mini Squats        Single Leg progression                                        Ther Activity        Kitchen/ADL                Gait Training Trek poles  2 laps clinic   Cues scan environment,  Min A for correct reciprocol UE/LE  Trek poles 4laps clinic   Min A for correct reciprocol UE/LE    Trunk disassociation alt UE/LE hitting bolster Trek poles  Clinic/outside  Curb negotiation, uneven surface, focus on turns, head turns, divided attention, memory recall, TUG w/ poles    Verbal cues reciprocol UE/LE, pole placement, ground clearance, scan environment, step stride, postural awareness    CS/ S  40 min     Divided Attention        Head turns                HEP                Modalities

## 2021-07-01 ENCOUNTER — OFFICE VISIT (OUTPATIENT)
Dept: PHYSICAL THERAPY | Facility: CLINIC | Age: 71
End: 2021-07-01
Payer: MEDICARE

## 2021-07-01 DIAGNOSIS — G20 PARKINSON'S DISEASE (HCC): ICD-10-CM

## 2021-07-01 DIAGNOSIS — R26.9 GAIT DISTURBANCE: ICD-10-CM

## 2021-07-01 DIAGNOSIS — R29.898 WEAKNESS OF RIGHT LOWER EXTREMITY: ICD-10-CM

## 2021-07-01 DIAGNOSIS — S46.011D TRAUMATIC COMPLETE TEAR OF RIGHT ROTATOR CUFF, SUBSEQUENT ENCOUNTER: ICD-10-CM

## 2021-07-01 DIAGNOSIS — R26.89 IMBALANCE: ICD-10-CM

## 2021-07-01 DIAGNOSIS — R53.83 DECREASED STAMINA: Primary | ICD-10-CM

## 2021-07-01 DIAGNOSIS — Z98.890 S/P RIGHT ROTATOR CUFF REPAIR: ICD-10-CM

## 2021-07-01 PROCEDURE — 97116 GAIT TRAINING THERAPY: CPT

## 2021-07-01 PROCEDURE — 97110 THERAPEUTIC EXERCISES: CPT

## 2021-07-01 PROCEDURE — 97112 NEUROMUSCULAR REEDUCATION: CPT

## 2021-07-06 ENCOUNTER — EVALUATION (OUTPATIENT)
Dept: PHYSICAL THERAPY | Facility: CLINIC | Age: 71
End: 2021-07-06
Payer: MEDICARE

## 2021-07-06 DIAGNOSIS — R26.89 IMBALANCE: ICD-10-CM

## 2021-07-06 DIAGNOSIS — G20 PARKINSON'S DISEASE (HCC): ICD-10-CM

## 2021-07-06 DIAGNOSIS — R29.898 WEAKNESS OF RIGHT LOWER EXTREMITY: ICD-10-CM

## 2021-07-06 DIAGNOSIS — R53.83 DECREASED STAMINA: Primary | ICD-10-CM

## 2021-07-06 DIAGNOSIS — R26.9 GAIT DISTURBANCE: ICD-10-CM

## 2021-07-06 PROCEDURE — 97116 GAIT TRAINING THERAPY: CPT

## 2021-07-06 PROCEDURE — 97110 THERAPEUTIC EXERCISES: CPT

## 2021-07-06 PROCEDURE — 97112 NEUROMUSCULAR REEDUCATION: CPT

## 2021-07-06 NOTE — PROGRESS NOTES
Daily Note     Today's date: 2021  Patient name: Adelita Ty  : 1950  MRN: [id number]  Referring provider: Ashish Smith DO  Dx:   Encounter Diagnosis     ICD-10-CM    1  Decreased stamina  R53 83    2  Imbalance  R26 89    3  Gait disturbance  R26 9    4  Parkinson's disease ([address] )  G20    5  Weakness of right lower extremity  R29 898                   Subjective: Feeling good  HEP is definitely help  I am feeling more balance and able to focus on my walking better  l      Objective: See treatment diary below      Assessment: Tolerated treatment well  Guard/decrease R arm swing with ambulation  Decrease step stride with divided attention/head turns  Patient exhibited good technique with therapeutic exercises      Plan: Continue per plan of care  Precautions: Falls, BP  Re-eval Date: 2021    Date     Visit Count 6 7 8 9    FOTO     Due   Pain In        Pain Out          Manuals        LEs                                Neuro Re-Ed        Dynavision        Natus         Obstacle Course    Focus on turn negotiation, memory recall, pull/push chair, in/out threshold, dynamic reach with cones, crate carry 4# , divided attention  20 min    CS/ S    Star Drill        PWR focus Seated  Trunk flex/reach, trunk rotation w/ hip ext  Stand  Step FWD, Side, retro  With Eye/hand boost  Divided attention/ltr/word association    STS with UE reach  2x 5    All 4's  - trunk rotation  - step    Total 35 min Lateral step and reach spot it and letter paper  Word association  Quadruped  -trunk rot  -step    STS with UE reach  2x5    25min Seated  Trunk flex/reach, trunk rotation w/ hip ext     Multi directional step Firm/foam  10 min  Cues  Trial 60 bpm pacing, Divided attention, dynamic reach   Pt self cue Firm/foam 15min cues for big stepping   60bpm  Pt self cue, short term memory      Dynamic reach/trunk rotation        Ther Ex        Aerobic  Upright bike  10 mins  Cues pacing  Difficulty maintaining pace > 30-40 RPM Upright bike 10min cues pacing 30-40rpm Upright bike 10min cues pacing 30-40rpm Upright bike 10min cues pacing 30-40rpm    SLR x 4        HR/TR        Mini Squats        Single Leg progression                                        Ther Activity        Kitchen/ADL                Gait Training Trek poles  2 laps clinic   Cues scan environment,  Min A for correct reciprocol UE/LE  Trek poles 4laps clinic   Min A for correct reciprocol UE/LE    Trunk disassociation alt UE/LE hitting bolster Trek poles  Clinic/outside  Curb negotiation, uneven surface, focus on turns, head turns, divided attention, memory recall, TUG w/ poles    Verbal cues reciprocol UE/LE, pole placement, ground clearance, scan environment, step stride, postural awareness    CS/ S  40 min Trek poles  Clinic/outside  Curb negotiation, uneven surface, focus on turns, head turns, divided attention, memory recal    Verbal cues reciprocol UE/LE, pole placement, ground clearance, scan environment, step stride, postural awareness    CS/ S  30 min    Divided Attention        Head turns                HEP                Modalities

## 2021-07-08 ENCOUNTER — EVALUATION (OUTPATIENT)
Dept: PHYSICAL THERAPY | Facility: CLINIC | Age: 71
End: 2021-07-08
Payer: MEDICARE

## 2021-07-08 DIAGNOSIS — R53.83 DECREASED STAMINA: Primary | ICD-10-CM

## 2021-07-08 DIAGNOSIS — R26.9 GAIT DISTURBANCE: ICD-10-CM

## 2021-07-08 DIAGNOSIS — Z98.890 S/P RIGHT ROTATOR CUFF REPAIR: ICD-10-CM

## 2021-07-08 DIAGNOSIS — R29.898 WEAKNESS OF RIGHT LOWER EXTREMITY: ICD-10-CM

## 2021-07-08 DIAGNOSIS — G20 PARKINSON'S DISEASE (HCC): ICD-10-CM

## 2021-07-08 DIAGNOSIS — S46.011D TRAUMATIC COMPLETE TEAR OF RIGHT ROTATOR CUFF, SUBSEQUENT ENCOUNTER: ICD-10-CM

## 2021-07-08 PROCEDURE — 97110 THERAPEUTIC EXERCISES: CPT | Performed by: PHYSICAL THERAPIST

## 2021-07-08 PROCEDURE — 97112 NEUROMUSCULAR REEDUCATION: CPT | Performed by: PHYSICAL THERAPIST

## 2021-07-08 NOTE — PROGRESS NOTES
PT Discharge    Today's date: 2021  Patient name: Derek Sahu  : 1950  MRN: [id number]  Referring provider: Alfonso Pedersen DO  Dx:   Encounter Diagnosis     ICD-10-CM    1  Decreased stamina  R53 83    2  Gait disturbance  R26 9    3  Parkinson's disease ([facility] [address] )  G20    4  Weakness of right lower extremity  R29 898    5  Traumatic complete tear of right rotator cuff, subsequent encounter  S46 011D    6  S/P right rotator cuff repair  Z98 890                   Assessment  Assessment details: Patient is a 81 y/o male who presents to [facility] with the diagnosis of Parkinson's Disease   Patient has demonstrated overall improvements with all activity  Scores low fall risk  Good understanding of carryover with HEP  Improving gait quality and symmetry  He is making steady progress overall  Placed on hold at this time to determine benefits of maintenance HEP  Patient to follow up in September, patient has not followed up with neurology at this point  Impairments: activity intolerance  Understanding of Dx/Px/POC: good   Prognosis: good  Prognosis details: Positive prognostic indicators include positive attitude toward recovery and good understanding of diagnosis and treatment plan options  Negative prognostic indicators include comorbidities  Goals  In 4 weeks, patient will:  1        Demonstrate ability to don (UB item of clothing)  in < 15 seconds without assistance - MET  2  Demonstrate ability to don (LB item of clothing) in < 30 seconds - MET  3  Demonstrate ability to ambulate through doorway/threshold without verbal cues or change in gait quality >80% of occasions - MET  4  Demonstrate ability to reach waist to shoulder/above head without posterior LOB - MET    In 6-8 weeks, patient will:  5  Demonstrate ability to ambulate forward holding item requiring bilateral UEs without LOB or change in gait quality - MET  6        Ascend stairs step over step with single UE support without change in movement quality - MET  7  Demonstrate ability to roll bilaterally in <5 seconds without use of assistive equipment - MET  8  Demonstrate reduced fall risk based on functional outcome measure - MET      Plan  Patient would benefit from: skilled physical therapy  Frequency: 1x week  Duration in visits: 1  Duration in weeks: 1  Plan of Care beginning date: 7/8/2021  Plan of Care expiration date: 7/8/2021  Treatment plan discussed with: patient and PTA        Subjective Evaluation    History of Present Illness  Mechanism of injury: UPDATE:  Patient presents with improvements noted in gait, movement, and balance  No official diagnosis of PD per patient  Reports he was given meds for PD, but only takes 1 pill, once a day  He was told to take one pill 3 times per day  Reports he doesn't notice a difference at one pill  Also reports he was supposed to make a neurologist appointment, but didn't want to follow up    (+) recent falls  (+) usually falls forward  (+) difficulty with ADLs  (+) increased unsteadiness with closing eyes, currently avoids going out at night  (+) difficulty on inclines/declines  (+) difficulty with uneven surfaces  (+)  Mild freezing episodes  (+) notices others walk faster than him  Quality of life: good    Pain  No pain reported  Location: Back pain all his life, nothing out of the ordinary    Social Support  Steps to enter house: yes  2  [facility] in: [facility]  Lives with: spouse    Employment status: working  Hand dominance: right    Treatments  Previous treatment: physical therapy  Current treatment: physical therapy  Patient Goals  Patient goals for therapy: independence with ADLs/IADLs, improved balance and increased motion          Objective     Strength/Myotome Testing     Left Hip   Planes of Motion   Flexion: 4+  Extension: 4+  Abduction: 4+    Right Hip   Planes of Motion   Flexion: 4+  Extension: 4+  Abduction: 4+    Left Knee   Flexion: 4+  Extension: 4+    Right Knee   Flexion: 4+  Extension: 4+    Left Ankle/Foot   Dorsiflexion: 4+  Plantar flexion: 4+    Right Ankle/Foot   Dorsiflexion: 4+  Plantar flexion: 4+  Neuro Exam:     Dizziness  Negative for disequilibrium  Exacerbating factors  Negative for bending over, rolling in bed, looking up, walking and walking in busy environment  Cervical exam   Ligament Laxity Testing   Alar ligament: WNL  Sharp Rizwan: WNL  Modified VBI   Left: asymptomatic  Right: asymptomatic    Oculomotor exam   Oculomotor ROM: WNL  Resting nystagmus: not present   Gaze holding nystagmus: not present left  and not present right  Smooth pursuits: within normal limits  Vertical saccades: normal  Horizontal saccades: normal  Convergence: abnormal  Cover test: normal  Crossover test: normal    Sensation   Proprioception LE: left WNL and right WNL    Coordination   Rapid alternating movements: UE WNL and LE impaired     Functional outcomes   5x sit to stand: at eval 14  9 at re-eval (seconds)  Functional outcome comment: Mini Best   Timed up and go with dual task (3 meter walk)  TU Seconds  Dual Task TU 7 Seconds  Total Score: 21/28    UB dressing - 10 seconds  LB dressing - 12  Rolling in bed - < 5 seconds    Mini Best     Sit to Stand  2= Normal:  Comes to stand without use of hands and stabilizes independently     Rise to Toes  2= Normal:  Stable for 3 seconds with maximum height     Stand on one leg  1=Moderate: < 20 seconds     Compensatory Stepping Correction - Forward  2= Normal:  Recovers independently with a single, large step (2nd realignment step is allowed)  Compensatory Stepping Correction - Backward  2= Normal:  Recovers independently with a single, large step (2nd realignment step is allowed)  Compensatory Stepping Correction - Lateral  2= Normal:  Recovers independently with a single, large step (cross over or lateral OK)  Stance (Feet Together);  Eyes open, Firm surface  2= Normal: 30 seconds     Stance (Feet Together);  Eyes closed, Foam surface  2= Normal:  30 seconds    Incline - Eyes Closed  2= Normal:  Stands independently 30 seconds and aligns with gravity    Change in gait speed  2= Normal:  Significantly changes walking speed without imbalance     Walk with Head Turns - Horizontal  2= Normal:  Performs head turns with no change in gait speed and good balance     Walk with Pivot Turns  2=  Normal:  Turns with feet close FAST (< or = to 3 steps) with good balance     Step over obstacles  2= Normal:  Able to step over box with minimal change of gait speed and with good balance     Timed up and go with dual task (3 meter walk)  TU Seconds  Dual Task TU 2 Seconds  1= Moderate: Dual Task affects counting OR walking (>10%) when compared to the TUG without Dual Task    Total Score: 26/28

## 2021-07-08 NOTE — PROGRESS NOTES
Daily Note     Today's date: 2021  Patient name: Kimmie Stevenson  : 1950  MRN: [id number]  Referring provider: Vickie Srivastava DO  Dx:   Encounter Diagnosis     ICD-10-CM    1  Decreased stamina  R53 83    2  Gait disturbance  R26 9    3  Parkinson's disease ([facility] [address] )  G20    4  Weakness of right lower extremity  R29 898    5  Traumatic complete tear of right rotator cuff, subsequent encounter  S46 011D    6  S/P right rotator cuff repair  Z98 890                   Subjective: See Discharge      Objective: See treatment diary below      Assessment: See Discharge      Plan: See DIscharge     Precautions: Falls, BP  Re-eval Date: 2021    Date    Visit Count 6 7 8 9 10   FOTO     Completed   Pain In        Pain Out          Manuals        LEs                                Neuro Re-Ed        Dynavision        Natus         Obstacle Course    Focus on turn negotiation, memory recall, pull/push chair, in/out threshold, dynamic reach with cones, crate carry 4# , divided attention  20 min    CS/ S    Star Drill        PWR focus Seated  Trunk flex/reach, trunk rotation w/ hip ext  Stand  Step FWD, Side, retro  With Eye/hand boost  Divided attention/ltr/word association    STS with UE reach  2x 5    All 4's  - trunk rotation  - step    Total 35 min Lateral step and reach spot it and letter paper  Word association  Quadruped  -trunk rot  -step    STS with UE reach  2x5    25min Seated  Trunk flex/reach, trunk rotation w/ hip ext     Multi directional step Firm/foam  10 min  Cues  Trial 60 bpm pacing, Divided attention, dynamic reach   Pt self cue Firm/foam 15min cues for big stepping   60bpm  Pt self cue, short term memory      Dynamic reach/trunk rotation        Ther Ex        Aerobic  Upright bike  10 mins  Cues pacing  Difficulty maintaining pace > 30-40 RPM Upright bike 10min cues pacing 30-40rpm Upright bike 10min cues pacing 30-40rpm Upright bike 10min cues pacing 30-40rpm SLR x 4        HR/TR        Mini Squats        Single Leg progression                                        Ther Activity        Kitchen/ADL                Gait Training Trek poles  2 laps clinic   Cues scan environment,  Min A for correct reciprocol UE/LE  Trek poles 4laps clinic   Min A for correct reciprocol UE/LE    Trunk disassociation alt UE/LE hitting bolster Trek poles  Clinic/outside  Curb negotiation, uneven surface, focus on turns, head turns, divided attention, memory recall, TUG w/ poles    Verbal cues reciprocol UE/LE, pole placement, ground clearance, scan environment, step stride, postural awareness    CS/ S  40 min Trek poles  Clinic/outside  Curb negotiation, uneven surface, focus on turns, head turns, divided attention, memory recal    Verbal cues reciprocol UE/LE, pole placement, ground clearance, scan environment, step stride, postural awareness    CS/ S  30 min    Divided Attention        Head turns                HEP                Modalities

## 2021-07-08 NOTE — PROGRESS NOTES
Daily Note     Today's date: 2021  Patient name: Alicia Vega  : 1950  MRN: [id number]  Referring provider: Vitaliy Otto DO  Dx:   Encounter Diagnosis     ICD-10-CM    1  Decreased stamina  R53 83    2  Gait disturbance  R26 9    3  Parkinson's disease ([facility] [address] )  G20    4  Weakness of right lower extremity  R29 898    5  Traumatic complete tear of right rotator cuff, subsequent encounter  S46 011D    6  S/P right rotator cuff repair  Z98 [id number]                   Subjective: See Reeval      Objective: See treatment diary below      Assessment: Tolerated treatment well  Patient would benefit from continued PT      Plan: DC HEP with 2 month f/u     Precautions: Falls, BP  Re-eval Date: 2021    Date    Visit Count 6 7 8 9 10   FOTO     completed   Pain In        Pain Out          Manuals        LEs                                Neuro Re-Ed        Dynavision        Natus         Obstacle Course    Focus on turn negotiation, memory recall, pull/push chair, in/out threshold, dynamic reach with cones, crate carry 4# , divided attention  20 min    CS/ S Mini Best 15 min   Star Drill        PWR focus Seated  Trunk flex/reach, trunk rotation w/ hip ext  Stand  Step FWD, Side, retro  With Eye/hand boost  Divided attention/ltr/word association    STS with UE reach  2x 5    All 4's  - trunk rotation  - step    Total 35 min Lateral step and reach spot it and letter paper  Word association  Quadruped  -trunk rot  -step    STS with UE reach  2x5    25min Seated  Trunk flex/reach, trunk rotation w/ hip ext  Stand/seated/supine  15 min  With hand/eye boost     front of mirror  Facial expression/vowels    Don/doff shirt/shorts  Timed effort   10/12 sec    STS w/o UE   <11 sec   Multi directional step Firm/foam  10 min  Cues  Trial 60 bpm pacing, Divided attention, dynamic reach   Pt self cue Firm/foam 15min cues for big stepping   60bpm  Pt self cue, short term memory Dynamic reach/trunk rotation        Ther Ex        Aerobic  Upright bike  10 mins  Cues pacing  Difficulty maintaining pace > 30-40 RPM Upright bike 10min cues pacing 30-40rpm Upright bike 10min cues pacing 30-40rpm Upright bike 10min cues pacing 30-40rpm Upright bike 10min cues pacing 30-40rpm   SLR x 4        HR/TR        Mini Squats        Single Leg progression                                        Ther Activity        Kitchen/ADL                Gait Training Trek poles  2 laps clinic   Cues scan environment,  Min A for correct reciprocol UE/LE  Trek poles 4laps clinic   Min A for correct reciprocol UE/LE    Trunk disassociation alt UE/LE hitting bolster Trek poles  Clinic/outside  Curb negotiation, uneven surface, focus on turns, head turns, divided attention, memory recall, TUG w/ poles    Verbal cues reciprocol UE/LE, pole placement, ground clearance, scan environment, step stride, postural awareness    CS/ S  40 min Trek poles  Clinic/outside  Curb negotiation, uneven surface, focus on turns, head turns, divided attention, memory recal    Verbal cues reciprocol UE/LE, pole placement, ground clearance, scan environment, step stride, postural awareness    CS/ S  30 min    Divided Attention        Head turns                HEP     reviewed           Modalities

## 2021-09-14 ENCOUNTER — OFFICE VISIT (OUTPATIENT)
Dept: PHYSICAL THERAPY | Facility: CLINIC | Age: 71
End: 2021-09-14
Payer: MEDICARE

## 2021-09-14 DIAGNOSIS — G20 PARKINSON DISEASE (HCC): Primary | ICD-10-CM

## 2021-09-14 PROCEDURE — 97112 NEUROMUSCULAR REEDUCATION: CPT

## 2021-09-14 PROCEDURE — 97110 THERAPEUTIC EXERCISES: CPT

## 2021-09-14 NOTE — PROGRESS NOTES
Daily Note     Today's date: 2021  Patient name: Riaz Montague  : 1950  MRN: [id number]  Referring provider: Scott Nguyen DO  Dx:   Encounter Diagnosis     ICD-10-CM    1  Parkinson disease ([facility] [address] )  G20                   Subjective: See IE for details      Objective: See treatment diary below      Assessment: Tolerated treatment well  Patient would benefit from continued PT      Plan: Continue per plan of care        Precautions: Falls  Re-eval Date:10/13/21    Date        Visit Count See IE       FOTO See IE       Pain In See IE       Pain Out                Manuals        LEs prn                               Neuro Re-Ed        Dynavision        Natus         Obstacle Course Mini Best       Star Drill        PWR focus 30 min  Stand  Firm  Min cues  Eye /hand/voice boosts       HEP PWR  Stand  Handout for eye/hand boosts               Ther Ex        Aerobic Nustep  L4 10 min  Cues pacing       SLR x 4        HR/TR        Mini Squats        Single Leg progression                                        Ther Activity        Kitchen/ADL                Gait Training        Divided Attention        Head turns        Modalities         prn

## 2021-09-14 NOTE — LETTER
2021    Georgia Funes  [address]  [facility] [address]    Patient: Bibi Chen  YOB: 1950   Date of Visit: 2021     Encounter Diagnosis     ICD-10-CM    1  Parkinson disease [facility])  G20        Dear Dr Willow Aguilar:    Thank you for your recent referral of Bibi Chen    Please review the attached evaluation summary from Kingsley's recent visit  Please verify that you agree with the plan of care by signing the attached order  If you have any questions or concerns, please do not hesitate to call  I sincerely appreciate the opportunity to share in the care of one of your patients and hope to have another opportunity to work with you in the near future  Sincerely,    Isreal Larose      Referring Provider:      I certify that I have read the below Plan of Care and certify the need for these services furnished under this plan of treatment while under my care  Vilma Sun DO  [address] [address]  Via Fax: [phone]          PT Re-Evaluation     Today's date: 2021  Patient name: Bibi Chen  : 1950  MRN: [id number]  Referring provider: Nico Jain DO  Dx:   Encounter Diagnosis     ICD-10-CM    1  [facility] ([facility] )  G20                   Assessment  Assessment details: Patient is a 77 y/o male who presents to OPPT with the diagnosis of Parkinson's Disease   Patient demonstrates deficits in ROM, strength, balance, motor planning, divided attention tasks, and functional activities   Patient demonstrates decreased carryover with HEP  He will benefit from skilled PT interventions at 2 times per week for 2-4 weeks in order to maximize progress toward personal  goals as established during the first week of therapy interventions  Will benefit from short course of PT to re-establish consistent HEP performance      Impairments: abnormal gait, abnormal movement, activity intolerance, impaired balance, impaired physical strength, lacks appropriate home exercise program and safety issue  Prognosis details: Positive prognostic indicators include positive attitude toward recovery and good understanding of diagnosis and treatment plan options  Negative prognostic indicators include comorbidities  Goals  In 4 weeks, patient will:       Demonstrate ability to ambulate forward holding item requiring bilateral UEs without LOB or change in gait quality       Ascend stairs step over step with single UE support without change in movement quality       Demonstrate ability to roll bilaterally in <5 seconds without use of assistive equipment       Demonstrate reduced fall risk based on functional outcome measure    STGs=LTGs    Plan  Patient would benefit from: skilled physical therapy  Other planned modality interventions: Modalities prn for symptom management  Planned therapy interventions: manual therapy, neuromuscular re-education, self care, therapeutic activities, therapeutic exercise, gait training and home exercise program  Frequency: 2x week  Duration in visits: 8  Duration in weeks: 4  Plan of Care beginning date: 9/14/2021  Plan of Care expiration date: 10/14/2021  Treatment plan discussed with: patient and PTA        Subjective Evaluation    History of Present Illness  Mechanism of injury: Patient returns for interim follow up  Given HEP at time of discharge  Reports he has been trying to do things bigger, but has not been performing HEP regularly  Notes he walks with larger steps when he's thinking about it, but if not back to shuffling steps  Notes no real falls, but a few near misses, 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    Quality of life: good    Pain  No pain reported  Location: Back pain all his life, nothing out of the ordinary    Social Support  Steps to enter house: yes  2  L[facility] in: [facility]  Lives with: spouse    Employment status: working  Hand dominance: right    Treatments  Previous treatment: physical therapy  Current treatment: physical therapy  Patient Goals  Patient goals for therapy: independence with ADLs/IADLs, improved balance and increased motion          Objective     Strength/Myotome Testing     Left Hip   Planes of Motion   Flexion: 4+  Extension: 4+  Abduction: 4+    Right Hip   Planes of Motion   Flexion: 4+  Extension: 4+  Abduction: 4+    Left Knee   Flexion: 4+  Extension: 4+    Right Knee   Flexion: 4+  Extension: 4+    Left Ankle/Foot   Dorsiflexion: 4+  Plantar flexion: 4+    Right Ankle/Foot   Dorsiflexion: 4+  Plantar flexion: 4+  Neuro Exam:     Dizziness  Negative for disequilibrium  Exacerbating factors  Negative for bending over, rolling in bed, looking up, walking and walking in busy environment  Cervical exam   Ligament Laxity Testing   Alar ligament: WNL  Sharp Rizwan:  WNL  Modified VBI   Left: asymptomatic  Right: asymptomatic    Oculomotor exam   Oculomotor ROM: WNL  Resting nystagmus: not present   Gaze holding nystagmus: not present left  and not present right  Smooth pursuits: within normal limits  Vertical saccades: normal  Horizontal saccades: normal  Convergence: abnormal  Cover test: normal  Crossover test: normal    Sensation   Proprioception LE: left WNL and right WNL    Coordination   Rapid alternating movements: UE WNL and LE impaired     Functional outcomes   Functional outcome comment: Mini Best at end of last episode of care:   Timed up and go with dual task (3 meter walk)  TU Seconds  Dual Task TU 2 Seconds  Total Score: 26/28    Mini Best     Sit to Stand  2= Normal:  Comes to stand without use of hands and stabilizes independently     Rise to Toes  2= Normal:  Stable for 3 seconds with maximum height     Stand on one leg  1=Moderate: < 20 seconds  Right:  7 seconds  Left:  5 seconds      Compensatory Stepping Correction - Forward  2= Normal: Recovers independently with a single, large step (2nd realignment step is allowed)  Compensatory Stepping Correction - Backward  1=Moderate: More than one step used to recover equilibrium     Compensatory Stepping Correction - Lateral  2= Normal:  Recovers independently with a single, large step (cross over or lateral OK)  Stance (Feet Together); Eyes open, Firm surface  2= Normal:  30 seconds     Stance (Feet Together); Eyes closed, Foam surface  2= Normal:  30 seconds    Incline - Eyes Closed  2= Normal:  Stands independently 30 seconds and aligns with gravity    Change in gait speed  2= Normal:  Significantly changes walking speed without imbalance     Walk with Head Turns - Horizontal  2= Normal:  Performs head turns with no change in gait speed and good balance     Walk with Pivot Turns  2=  Normal:  Turns with feet close FAST (< or = to 3 steps) with good balance     Step over obstacles  2= Normal:  Able to step over box with minimal change of gait speed and with good balance     Timed up and go with dual task (3 meter walk)  TU 42 Seconds  Dual Task TU 58 Seconds  2= Normal:  No noticeable change in sitting, standing, or walking while backward counting when compared to TUG without dual task    Total Score: 26/28                     Daily Note     Today's date: 2021  Patient name: Jo Wheatley  : 1950  MRN: [id number]  Referring provider: Shawn Age, DO  Dx:   Encounter Diagnosis     ICD-10-CM    1  Parkinson disease ([facility] [address] )  G20                   Subjective: See IE for details      Objective: See treatment diary below      Assessment: Tolerated treatment well  Patient would benefit from continued PT      Plan: Continue per plan of care        Precautions: Falls  Re-eval Date:10/13/21    Date        Visit Count See IE       FOTO See IE       Pain In See IE       Pain Out                Manuals        LEs prn                               Neuro Re-Ed        Dynavision Natus         Obstacle Course Mini Best       Star Drill        PWR focus 30 min  Stand  Firm  Min cues  Eye /hand/voice boosts       HEP PWR  Stand  Handout for eye/hand boosts               Ther Ex        Aerobic Nustep  L4 10 min  Cues pacing       SLR x 4        HR/TR        Mini Squats        Single Leg progression                                        Ther Activity        Kitchen/ADL                Gait Training        Divided Attention        Head turns        Modalities         prn

## 2021-09-14 NOTE — PROGRESS NOTES
PT Re-Evaluation     Today's date: 2021  Patient name: Mariaelena Samuel  : 1950  MRN: [id number]  Referring provider: Flaquita Hutton DO  Dx:   Encounter Diagnosis     ICD-10-CM    1  Parkinson disease ([facility] [address] )  G20                   Assessment  Assessment details: Patient is a 77 y/o male who presents to OPPT with the diagnosis of Parkinson's Disease   Patient demonstrates deficits in ROM, strength, balance, motor planning, divided attention tasks, and functional activities   Patient demonstrates decreased carryover with HEP  He will benefit from skilled PT interventions at 2 times per week for 2-4 weeks in order to maximize progress toward personal  goals as established during the first week of therapy interventions  Will benefit from short course of PT to re-establish consistent HEP performance  Impairments: abnormal gait, abnormal movement, activity intolerance, impaired balance, impaired physical strength, lacks appropriate home exercise program and safety issue  Prognosis details: Positive prognostic indicators include positive attitude toward recovery and good understanding of diagnosis and treatment plan options  Negative prognostic indicators include comorbidities          Goals  In 4 weeks, patient will:       Demonstrate ability to ambulate forward holding item requiring bilateral UEs without LOB or change in gait quality       Ascend stairs step over step with single UE support without change in movement quality       Demonstrate ability to roll bilaterally in <5 seconds without use of assistive equipment       Demonstrate reduced fall risk based on functional outcome measure    STGs=LTGs    Plan  Patient would benefit from: skilled physical therapy  Other planned modality interventions: Modalities prn for symptom management  Planned therapy interventions: manual therapy, neuromuscular re-education, self care, therapeutic activities, therapeutic exercise, gait training and home exercise program  Frequency: 2x week  Duration in visits: 8  Duration in weeks: 4  Plan of Care beginning date: 9/14/2021  Plan of Care expiration date: 10/14/2021  Treatment plan discussed with: patient and PTA        Subjective Evaluation    History of Present Illness  Mechanism of injury: Patient returns for interim follow up  Given HEP at time of discharge  Reports he has been trying to do things bigger, but has not been performing HEP regularly  Notes he walks with larger steps when he's thinking about it, but if not back to shuffling steps  Notes no real falls, but a few near misses, 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  Quality of life: good    Pain  No pain reported  Location: Back pain all his life, nothing out of the ordinary    Social Support  Steps to enter house: yes  2  Lives in: [facility]  Lives with: spouse    Employment status: working  Hand dominance: right    Treatments  Previous treatment: physical therapy  Current treatment: physical therapy  Patient Goals  Patient goals for therapy: independence with ADLs/IADLs, improved balance and increased motion          Objective     Strength/Myotome Testing     Left Hip   Planes of Motion   Flexion: 4+  Extension: 4+  Abduction: 4+    Right Hip   Planes of Motion   Flexion: 4+  Extension: 4+  Abduction: 4+    Left Knee   Flexion: 4+  Extension: 4+    Right Knee   Flexion: 4+  Extension: 4+    Left Ankle/Foot   Dorsiflexion: 4+  Plantar flexion: 4+    Right Ankle/Foot   Dorsiflexion: 4+  Plantar flexion: 4+  Neuro Exam:     Dizziness  Negative for disequilibrium  Exacerbating factors  Negative for bending over, rolling in bed, looking up, walking and walking in busy environment  Cervical exam   Ligament Laxity Testing   Alar ligament: WNL  Sharp Rizwan:  WNL  Modified VBI   Left: asymptomatic  Right: asymptomatic    Oculomotor exam   Oculomotor ROM: WNL  Resting nystagmus: not present   Gaze holding nystagmus: not present left  and not present right  Smooth pursuits: within normal limits  Vertical saccades: normal  Horizontal saccades: normal  Convergence: abnormal  Cover test: normal  Crossover test: normal    Sensation   Proprioception LE: left WNL and right WNL    Coordination   Rapid alternating movements: UE WNL and LE impaired     Functional outcomes   Functional outcome comment: Mini Best at end of last episode of care:   Timed up and go with dual task (3 meter walk)  TU Seconds  Dual Task TU 2 Seconds  Total Score: 26/28    Mini Best     Sit to Stand  2= Normal:  Comes to stand without use of hands and stabilizes independently     Rise to Toes  2= Normal:  Stable for 3 seconds with maximum height     Stand on one leg  1=Moderate: < 20 seconds  Right:  7 seconds  Left:  5 seconds      Compensatory Stepping Correction - Forward  2= Normal:  Recovers independently with a single, large step (2nd realignment step is allowed)  Compensatory Stepping Correction - Backward  1=Moderate: More than one step used to recover equilibrium     Compensatory Stepping Correction - Lateral  2= Normal:  Recovers independently with a single, large step (cross over or lateral OK)  Stance (Feet Together); Eyes open, Firm surface  2= Normal:  30 seconds     Stance (Feet Together);  Eyes closed, Foam surface  2= Normal:  30 seconds    Incline - Eyes Closed  2= Normal:  Stands independently 30 seconds and aligns with gravity    Change in gait speed  2= Normal:  Significantly changes walking speed without imbalance     Walk with Head Turns - Horizontal  2= Normal:  Performs head turns with no change in gait speed and good balance     Walk with Pivot Turns  2=  Normal:  Turns with feet close FAST (< or = to 3 steps) with good balance     Step over obstacles  2= Normal:  Able to step over box with minimal change of gait speed and with good balance     Timed up and go with dual task (3 meter walk)  TU 42 Seconds  Dual Task TUG: 8 58 Seconds  2= Normal:  No noticeable change in sitting, standing, or walking while backward counting when compared to TUG without dual task    Total Score: 26/28

## 2021-09-21 NOTE — PROGRESS NOTES
Daily Note     Today's date: 2021  Patient name: Angelica Martinez  : 1950  MRN: [id number]  Referring provider: Marie Blake DO  Dx:   Encounter Diagnosis     ICD-10-CM    1  Parkinson disease ([facility] [address] )  G20                   Subjective: Reports doing his HEP regularly  Objective: See treatment diary below      Assessment: Tolerated treatment well  Patient demonstrated fatigue post treatment and would benefit from continued PT  Difficulty with tandem stance/arm swing combinations, worse with divided attention  Plan: Continue per plan of care        Precautions: Falls  Re-eval Date:10/13/21    Date       Visit Count See IE 2      FOTO See IE       Pain In See IE 0      Pain Out  0              Manuals        LEs prn                               Neuro Re-Ed        Dynavision        Natus         Obstacle Course Mini Best TUG  7 7 seconds  TUG-cog  9 5 sec  TUG-man  10 2 sec      Star Drill        PWR focus 30 min  Stand  Firm  Min cues  Eye /hand/voice boosts 30 min  Stand  Firm  Min cues  [place] /hand/voice boosts      HEP PWR  Stand  Handout for eye/hand boosts [employer]  Handout for eye/hand boosts - reviewed eye boosts for home              Ther Ex        Aerobic Nustep  L4 10 min  Cues pacing Nustep  L4 10 min  Cues pacing      SLR x 4        HR/TR        Mini Squats        Single Leg progression                                        Ther Activity        Kitchen/ADL                Gait Training        Divided Attention        Head turns        Modalities         prn

## 2021-09-22 ENCOUNTER — OFFICE VISIT (OUTPATIENT)
Dept: PHYSICAL THERAPY | Facility: CLINIC | Age: 71
End: 2021-09-22
Payer: MEDICARE

## 2021-09-22 DIAGNOSIS — G20 PARKINSON DISEASE (HCC): Primary | ICD-10-CM

## 2021-09-22 PROCEDURE — 97112 NEUROMUSCULAR REEDUCATION: CPT | Performed by: PHYSICAL THERAPIST

## 2021-09-22 PROCEDURE — 97110 THERAPEUTIC EXERCISES: CPT | Performed by: PHYSICAL THERAPIST

## 2021-09-22 NOTE — PROGRESS NOTES
Daily Note     Today's date: 2021  Patient name: Bibi Chen  : 1950  MRN: [id number]  Referring provider: Nico Jain DO  Dx:   Encounter Diagnosis     ICD-10-CM    1  Parkinson disease ([address] )  G20                   Subjective: Reports he is moving, sore from lifting  Objective: See treatment diary below      Assessment: Tolerated treatment well  Patient demonstrated fatigue post treatment and would benefit from continued PT  Ongoing difficulty with divided attention tasks  Plan: Continue per plan of care        Precautions: Falls  Re-eval Date:10/13/21    Date      Visit Count See IE 2 3     FOTO See IE       Pain In See IE 0 0     Pain Out  0 0             Manuals        LEs prn                               Neuro Re-Ed        Dynavision   R/L  Red/green  Foam  15 mins      Natus         Obstacle Course Mini Best TUG  7 7 seconds  TUG-cog  9 5 sec  TUG-man  10 2 sec Multidirectional board  25 mins  R/L  Addition of category naming  Addition of UE activity     Star Drill        PWR focus 30 min  Stand  Firm  Min cues  Eye /hand/voice boosts 30 min  Stand  Firm  Min cues  [place] /hand/voice boosts      HEP [employer]  Stand  Handout for eye/hand boosts [employer]  Handout for eye/hand boosts - reviewed eye boosts for home              Ther Ex        Aerobic Nustep  L4 10 min  Cues pacing Nustep  L4 10 min  Cues pacing Nustep  L4 10 min  Cues pacing     SLR x 4   Leg Press  50#  2x15     HR/TR        Mini Squats        Single Leg progression                                        Ther Activity        Kitchen/ADL                Gait Training        Divided Attention        Head turns        Modalities         prn

## 2021-09-23 ENCOUNTER — OFFICE VISIT (OUTPATIENT)
Dept: PHYSICAL THERAPY | Facility: CLINIC | Age: 71
End: 2021-09-23
Payer: MEDICARE

## 2021-09-23 DIAGNOSIS — G20 PARKINSON DISEASE (HCC): Primary | ICD-10-CM

## 2021-09-23 PROCEDURE — 97110 THERAPEUTIC EXERCISES: CPT | Performed by: PHYSICAL THERAPIST

## 2021-09-23 PROCEDURE — 97112 NEUROMUSCULAR REEDUCATION: CPT | Performed by: PHYSICAL THERAPIST

## 2021-09-28 ENCOUNTER — OFFICE VISIT (OUTPATIENT)
Dept: CARDIOLOGY CLINIC | Facility: CLINIC | Age: 71
End: 2021-09-28
Payer: MEDICARE

## 2021-09-28 VITALS
HEART RATE: 68 BPM | SYSTOLIC BLOOD PRESSURE: 128 MMHG | BODY MASS INDEX: 27.49 KG/M2 | WEIGHT: 192 LBS | HEIGHT: 70 IN | DIASTOLIC BLOOD PRESSURE: 82 MMHG

## 2021-09-28 DIAGNOSIS — E78.2 MIXED HYPERLIPIDEMIA: ICD-10-CM

## 2021-09-28 DIAGNOSIS — I10 HYPERTENSION, UNSPECIFIED TYPE: ICD-10-CM

## 2021-09-28 DIAGNOSIS — I25.10 CORONARY ARTERY DISEASE INVOLVING NATIVE CORONARY ARTERY OF NATIVE HEART WITHOUT ANGINA PECTORIS: Primary | ICD-10-CM

## 2021-09-28 PROCEDURE — 99214 OFFICE O/P EST MOD 30 MIN: CPT | Performed by: INTERNAL MEDICINE

## 2021-09-28 NOTE — PROGRESS NOTES
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 [employer]  To reiterate, in December of 2014 he underwent heart catheterization which revealed a 50% left main stem stenosis as well as 95% lad stenosis  Drug-eluting stent of both lesions was performed  He has had no chest pain or chest pressure since then  Most recent or relevant cardiac/vascular testing:    Echo 4/2018 - EF >55%  Mild MR   Stress echo 1/2016 - normal EF, no evidence of ischemia   Cardiac cath 12/2014- 50% left main, 95% LAD, 40% CX  EF 40%        Past Surgical History:   Procedure Laterality Date    CORONARY ANGIOPLASTY WITH STENT PLACEMENT  12/26/2014    EF 40%, ABRAM to LAD      INCISIONAL HERNIA REPAIR      OR SHLDR ARTHROSCOP,SURG,W/ROTAT CUFF REPR Right 2/24/2021    Procedure: SHOULDER ARTHROSCOPY WITH ROTATOR CUFF REPAIR and acromioplasty;  Surgeon: Sheri Bardales DO;  Location: [facility] MAIN OR;  Service: Orthopedics    PROSTATECTOMY  2014    for cancer    TONSILLECTOMY       CMP:   Lab Results   Component Value Date     06/07/2018    K 3 7 03/15/2021    K 4 2 06/07/2018     03/15/2021     06/07/2018    CO2 27 03/15/2021    CO2 26 06/07/2018    BUN 16 03/15/2021    BUN 17 06/07/2018    CREATININE 1 11 03/15/2021    CREATININE 1 12 06/07/2018    EGFR 67 03/15/2021       Lipid Profile:   Lab Results   Component Value Date    CHOL 138 06/07/2018    TRIG 154 (H) 03/15/2021    TRIG 140 06/07/2018    HDL 44 03/15/2021    HDL 44 06/07/2018         Review of Systems   10  point ROS  was otherwise non pertinent or negative except as per HPI or as below  Gait: Normal         Objective:     /82   Pulse 68   Ht 5' 10" (1 778 m)   Wt 87 1 kg (192 lb)   BMI 27 55 kg/m²     PHYSICAL EXAM:    General:  Normal appearance in no distress  Eyes:  Anicteric  Oral mucosa:  Moist   Neck:  No JVD  Carotid upstrokes are brisk without bruits  No masses  Chest:  Clear to auscultation  Cardiac:  No palpable PMI  Normal S1 and S2  No murmur gallop or rub  Abdomen:  Soft and nontender  No palpable organomegaly or aortic enlargement  Extremities:  No peripheral edema  Musculoskeletal:  Symmetric  Vascular:  Femoral pulses are brisk without bruits  Popliteal pulses are intact bilaterally  Pedal pulses are intact  Neuro:  Grossly symmetric  Psych:  Alert and oriented x3  Current Outpatient Medications:     aspirin (ECOTRIN LOW STRENGTH) 81 mg EC tablet, Take 81 mg by mouth daily, Disp: , Rfl:     atorvastatin (LIPITOR) 80 mg tablet, Take 1 tablet (80 mg total) by mouth daily, Disp: 90 tablet, Rfl: 3    carbidopa-levodopa (SINEMET)  mg per tablet, Take 1 tablet by mouth 3 (three) times a day, Disp: , Rfl:     meloxicam (MOBIC) 15 mg tablet, Take 1 tablet (15 mg total) by mouth daily, Disp: 30 tablet, Rfl: 0    PARoxetine (PAXIL) 20 mg tablet, Take 20 mg by mouth daily, Disp: , Rfl:   No Known Allergies  Past Medical History:   Diagnosis Date    Anxiety     Benign hypertension     Cancer ([facility] [address] )     prostate    Coronary artery disease     ABRAM to LAD in 2014    History of cardiovascular stress test 01/21/2016    Normal EF, normal study      History of echocardiogram 05/05/2015    EF normal       Ischemic cardiomyopathy     resolved    Mixed hyperlipidemia     Myocardial infarction ([facility] [address] [facility]     Parkinson disease ([facility] [address] )     Prostate cancer [facility])     surgically removed    STEMI (ST elevation myocardial infarction) ([facility] [address] ) 2014           Social History     Tobacco Use   Smoking Status Never Smoker   Smokeless Tobacco Never Used

## 2021-10-05 ENCOUNTER — OFFICE VISIT (OUTPATIENT)
Dept: PHYSICAL THERAPY | Facility: CLINIC | Age: 71
End: 2021-10-05
Payer: MEDICARE

## 2021-10-05 DIAGNOSIS — G20 PARKINSON DISEASE (HCC): Primary | ICD-10-CM

## 2021-10-05 PROCEDURE — 97112 NEUROMUSCULAR REEDUCATION: CPT

## 2021-10-05 PROCEDURE — 97110 THERAPEUTIC EXERCISES: CPT

## 2022-01-11 ENCOUNTER — APPOINTMENT (OUTPATIENT)
Dept: PHYSICAL THERAPY | Facility: CLINIC | Age: 72
End: 2022-01-11
Payer: MEDICARE

## 2022-03-28 ENCOUNTER — OFFICE VISIT (OUTPATIENT)
Dept: PHYSICAL THERAPY | Facility: CLINIC | Age: 72
End: 2022-03-28
Payer: MEDICARE

## 2022-03-28 DIAGNOSIS — G20 PARKINSON DISEASE (HCC): Primary | ICD-10-CM

## 2022-03-28 PROCEDURE — 97162 PT EVAL MOD COMPLEX 30 MIN: CPT

## 2022-03-28 NOTE — PROGRESS NOTES
PT Evaluation     Today's date: 3/28/2022  Patient name: Merle Shafer  : 1950  MRN: [id number]  Referring provider: Carlos Everett DO  Dx:   Encounter Diagnosis     ICD-10-CM    1  Parkinson disease ([facility] [address] )  Jose Arciniega        Start Time: 920  Stop Time: 958  Total time in clinic (min): 38 minutes    Assessment  Assessment details: Patient is a 80 y o  male who reports to outpatient physical therapy with history of Parkinson Disease  Probable movement impairment diagnosis of PD resulting in pathoanatomical symptoms of gait/balance/ambulation dysfuction and limiting ability to walk long distances, walk at increased speed, perform normal daily activity  Skilled physical therapy is warranted for the application of the interventions found below in the planned intervention section  Patient has continued to perform previous PT HEP throughout the winter however has seen a decline in function and has not been as active  Would like to increase activity level and function w/activities to prepare for warmer weather  Patient will benefit from skilled outpatient PT services to improve and maximize his function, to reduce risk for falls and potential injuries associated with falls, reduce healthcare costs via hospitalization  In early stages of Parkinson's Disease, research indicates that intensive physical exercise can improve patient's motor control and assist in slowing the disease progression as a neuroprotective agent  Patient will benefit from skilled outpatient PT in order to maximize function in the short-term and long-term with overall improved postural strength with associated stability and mobility  Prognosis is good given HEP compliance and attendance to physical therapy 1-2x a week for 8 weeks  Please contact me if you have any questions or recommendations  Thank you for the referral and the opportunity to share in Kingsley's care      Patient verbalized understanding of POC, HEP, and return demonstrated HEP  Impairments: abnormal gait, activity intolerance, impaired balance, impaired physical strength, lacks appropriate home exercise program and poor posture     Goals  Short Term Goals:  - Patient will improve TUG from 14 6 sec to <12 sec in order to reduce risk of falls and to increase safety with functional mobility  - Patient will improve 5 STS by the [address] of 2 3 sec from 15 2 sec to 12 9 sec indicating an improvement in strength and decreased challenge with transfers  - Patient will complete 6 MWT and improve by the [address] of 269 ft to indicating an improvement in cardiovascular endurance in order to maximize function with functional mobility throughout the community  - Patient will be independent in basic HEP in order to manage condition at home    Long Term Goals:  - Patient will be able to ambulate on uneven surfaces with 50% reduction in near falls to increase safety with community mobility  - Patient will be able to perform a floor transfer without physical assistance to assist with fall recovery at home and in the community  - Patient will be independent in comprehensive HEP post discharge from program  - Patient will be able to walk up incline with decreased difficulty and no loss of balance in order to improve functional mobility throughout the community  - Patient will be able to perform sit to stands from softer surfaces such as a couch or bed in order to improvement function with transfers  - Patient will be consistent with program for at least 1 day per week to assist with management of condition and functional independence of their condition      Plan  Plan details: 6 MWT, Mini Best Tests within first few visits       Planned therapy interventions: manual therapy, neuromuscular re-education, therapeutic activities, therapeutic exercise, home exercise program, gait training and balance  Frequency: 2x week  Duration in weeks: 12  Plan of Care beginning date: 3/28/2022  Plan of Care expiration date: 6/28/2022  Treatment plan discussed with: patient        Subjective Evaluation    History of Present Illness  Mechanism of injury: Michael Munguia presents to outpatient physical therapy with a referral for Parkinson Disease  Michael Munguia reports things have been slowly progressing and getting worse  HPI/Primary Complaint: PD is gradually getting worse; is having a more difficult time with walking- shuffling more, difficulty lifting the feet  Has had decreased activity over the winter  R foot will get frozen after sitting for a while     (-) recent falls  (+) usually falls/loses balance forward  (+) difficulty with ADLs  (+) increased unsteadiness with closing eyes  (+) difficulty on inclines/declines  (+) difficulty with uneven surfaces  (+)  Mild freezing episodes (after sitting)  (+) notices others walk faster than him  Relevant PMH: previous R RTC tear/surgery   Work: retired  Hobbies/exercise: daily HEP  Activity Limitations: walking; limited to 1 mile at the most; usually does 2 blocks, balance  Patient Goals: improve walking, balance     Social Support  Steps to enter house: no  Stairs in house: no   Lives with: alone    Employment status: not working  Hand dominance: right    Treatments  Previous treatment: physical therapy        Objective     Strength/Myotome Testing     Left Hip   Planes of Motion   Flexion: 3+  Extension: 3+  Abduction: 3+    Right Hip   Planes of Motion   Flexion: 3+  Extension: 3+  Abduction: 3+    Left Knee   Flexion: 4  Extension: 4    Right Knee   Flexion: 4  Extension: 4    Left Ankle/Foot   Dorsiflexion: 4  Plantar flexion: 4    Right Ankle/Foot   Dorsiflexion: 4  Plantar flexion: 4  Neuro Exam:     Sensation   Light touch LE: left WNL and right WNL    Coordination   Heel to shin: left WNL and right WNL  Finger to nose: left WNL and right WNL  Rapid alternating movements: LE impaired     Transfers Sit to stand: independent     Functional outcomes   5x sit to stand: 14 6 (seconds)  TUG: 15 2 (seconds)  Functional outcome gait comment: Decreased gait speed, mild shuffling, decreased BL arm swing; increased steppage for foot clearance       Balance:   BL SLS= <5"  Tandem= <20"  FT EO= <25"  FT EC= <15"                   Re-eval Date: 4/28    Date 3/28            Visit Count 1            FOTO             Pain In             Pain Out               Manuals                                                                 Neuro Re-Ed                                                                                                        Ther Ex                                                                                                                     Ther Activity                                                                 Gait Training                                       Modalities

## 2022-03-29 ENCOUNTER — APPOINTMENT (OUTPATIENT)
Dept: PHYSICAL THERAPY | Facility: CLINIC | Age: 72
End: 2022-03-29
Payer: MEDICARE

## 2022-03-30 ENCOUNTER — OFFICE VISIT (OUTPATIENT)
Dept: PHYSICAL THERAPY | Facility: CLINIC | Age: 72
End: 2022-03-30
Payer: MEDICARE

## 2022-03-30 DIAGNOSIS — G20 PARKINSON DISEASE (HCC): Primary | ICD-10-CM

## 2022-03-30 PROCEDURE — 97110 THERAPEUTIC EXERCISES: CPT

## 2022-03-30 PROCEDURE — 97112 NEUROMUSCULAR REEDUCATION: CPT

## 2022-03-30 NOTE — PROGRESS NOTES
Daily Note     Today's date: 3/30/2022  Patient name: Eugenia Ospina  : 1950  MRN: [id number]  Referring provider: Anjelica Montgomery DO  Dx:   Encounter Diagnosis     ICD-10-CM    1  Parkinson disease ([facility] [address] )  G20                   Subjective: Stiff all the time  RTW April @ [employer]  Walk 1 mi 3x/week  Do my exer maybe 2 x/week  I have a hard time getting in/out of bed    Objective: See treatment diary below    Mini Best  Anticipatory  Sub Score: 4/6  1  Sit to Stand  Instruction:  [place] your arms across your chest   Try not to use your hands unless you must   Do no let your legs lean against the back of the chair when you stand  Please stand up now    2= Normal:  Comes to stand without use of hands and stabilizes independently    2  Rise to Toes  Instruction:  Place your feet shoulder width apart  Place your hands on your hips  Try to rise as high as you can onto your toes  I will count out loud to 3 seconds  Try to hold this pose for at lest 3 seconds  Look straight ahead  Rise now    1=Moderate:  Heels up, but not full range (smaller than when holding hands) OR noticeable instability for 3 seconds    3  Stand on one leg  Instruction:  Look straight ahead  Keep your hands on your hips  Lift your leg off of the round behind you without touching or resting your raised leg upon your other standing leg  Stay standing on one leg as long as you can  Look straight ahead  Lift now    1=Moderate: < 20 seconds    Right: 2 seconds  Left:  3seconds   Score lowest time for worst leg in 2 trials each  Reactive Postural Control  Sub Score:  4/6  4  Compensatory Stepping Correction - Forward  Instruction:  Stand with your feet shoulder width apart, arms at your sides  Lean forward against my hand beyond your forward limits  When I let go, do whatever is necessary, including taking a step, to avoid a fall    2= Normal:  Recovers independently with a single, large step (2nd realignment step is allowed)  5  Compensatory Stepping Correction - Backward  Instruction:  Stand with your feet shoulder width apart, arms at your sides  Lean forward against my hand beyond your forward limits  When I let go, do whatever is necessary, including taking a step, to avoid a fall    1=Moderate: More than one step used to recover equilibrium    6  Compensatory Stepping Correction - Lateral  Instruction:  Stand with your feet shoulder width apart, arms at your sides  Lean forward against my hand beyond your forward limits  When I let go, do whatever is necessary, including taking a step, to avoid a fall    1=Moderate: Several steps to recover equilibrium    Right: 1  Left: 1  Use the side with the lowest score to calculate score  Sensory Orientation  Sub Score: 6/6  7  Stance (Feet Together); Eyes open, Firm surface  Instruction:  Place your hands on your hips  Place your feet together until almost touching  Look straight ahead  Be as stable and still as possible, until I say stop    2= Normal:  30 seconds    8  Stance (Feet Together); Eyes closed, Foam surface  Instruction:  Step onto the foam   Place your hands on your hips  Place your feet together until almost touching  Close your eyes  Be as stable and still as possible, until I say stop  I will start timing when you close your eyes    2= Normal:  30 seconds    9  Incline - Eyes Closed  Instruction:  Step onto the incline ramp  Place your hands on your hips  Place your feet shoulder width apart and have your arms down at your sides  I will start timing when you close your eyes    2= Normal:  Stands independently 30 seconds and aligns with gravity      Dynamic Gait  Sub Score: 8/10  10  Change in gait speed  Instruction:  Begin walking at your normal speed, when I tell you fast, walk as fast as you can  When I say slow, walk very slowly    2= Normal:  Significantly changes walking speed without imbalance    11  Walk with Head Turns - Horizontal  Instruction:  Begin walking at your normal speed, when I say right, turn your head and look to the right  When I say left turn your head and look to the left  Try to keep yourself walking in a straight line    1= Moderate:  Performs head turns with reduction in gait speed    12   Walk with Pivot Turns  Instruction:  Begin walking at your normal speed  When I tell you to turn and stop, turn as quickly as you can, face the opposite direction, and stop  After the turn, your feet should be close together    2=  Normal:  Turns with feet close FAST (< or = to 3 steps) with good balance    13  Step over obstacles  Instruction:  Begin walking at your normal speed  When you get to the box, step over it, not around it and keep walking    1= Moderate: step over box but touches box OR displays cautious behavior by slowing gait    14  Timed up and go with dual task (3 meter walk)  Instruction TUG: when I say Go, stand up from the chair, walk at your normal speed across the tape on the floor, turn around, and come back to sit in the chair      Instruction TUG with Dual Task: count backwards y 3s starting at ___  When I say go stand up from the chair, walk at your normal speed across the tape on the floor, turn around, and come back to sit in the chair  Continue counting backwards the entire time    TU 59 Seconds  Dual Task TUG: 10 41 Seconds  2= Normal:  No noticeable change in sitting, standing, or walking while backward counting when compared to TUG without dual task      (When scoring, if subjects gait speed slows more than 10% between the TUG without and with the Dual Task the score should be decreased by a point)  Total Score:       Assessment: Tolerated treatment well  Patient provided max  verbal/tactile cues prn for proper form and technique with exercises completed this date     Pt ambulates with decrease ground clearance, step stride, arm swing  Patient demonstrated fatigue post treatment and would benefit from continued PT      Plan: Continue per plan of care        Re-eval Date: 4/28    Date 3/28 3/30      Visit Count 1 2      FOTO        Pain In        Pain Out          Manuals                                        Neuro Re-Ed          Mini Best  15 min      PWR  Supine  - up  - WS  -Rotation  -Step  Max cues    15 min                                      HEP  PWR  Supine   *pt to write 2-3 hierarchy goals NV      Ther Ex                                                                        Ther Activity                                        Gait Training                        Modalities

## 2022-04-05 NOTE — PROGRESS NOTES
Daily Note     Today's date: 2022  Patient name: Kim Becker  : 1950  MRN: [id number]  Referring provider: Sawyer Isaac DO  Dx:   Encounter Diagnosis     ICD-10-CM    1  Parkinson disease ([facility] [address] )  G20                   Subjective: I am trying to walk every ~ 20 min  Noticing more FOG around the house, 1* co's of FOG when I go to get out of a shower    Objective: See treatment diary below      Assessment: Tolerated treatment well  Patient provided mod verbal/tactile cues prn for proper form and technique with exercises completed this date  1* focus on FOG strategies, turn negotiation  Patient would benefit from continued PT      Plan: Continue per plan of care        Re-eval Date:     Date 3/28 3/30 4/6     Visit Count 1 2 3     FOTO        Pain In        Pain Out          Manuals                                        Neuro Re-Ed          Mini Best  15 min      PWR  Supine  - up  - WS  -Rotation  -Step  Max cues    15 min Seated  - up  -WS  -rotation  -step     Chair <>chair transfer   10x  "Feet/clock" cues CS     STS   5x  16 3sec  W/ UE    5x 15 7sec  W/o UE                     HEP  PWR  Supine   *pt to write 2-3 hierarchy goals NV      Ther Ex        Nustep   L3 10 min  Warm up L3 10 min  Cues UE/LE extension      Knee ext/flex    22#  3x10 ea                                                      Ther Activity                                        Gait Training           Clinic  3x   Small spaces/threshold,turn negotiations    CS    Cues ground clearance, arm swing, step stride, postural awareness    Educ/Practiced  4 S's with pt self cue             Modalities

## 2022-04-06 ENCOUNTER — OFFICE VISIT (OUTPATIENT)
Dept: PHYSICAL THERAPY | Facility: CLINIC | Age: 72
End: 2022-04-06
Payer: MEDICARE

## 2022-04-06 DIAGNOSIS — G20 PARKINSON DISEASE (HCC): Primary | ICD-10-CM

## 2022-04-06 PROCEDURE — 97116 GAIT TRAINING THERAPY: CPT

## 2022-04-06 PROCEDURE — 97110 THERAPEUTIC EXERCISES: CPT

## 2022-04-06 PROCEDURE — 97112 NEUROMUSCULAR REEDUCATION: CPT

## 2022-04-11 NOTE — PROGRESS NOTES
Daily Note     Today's date: 2022  Patient name: Darron Gee  : 1950  MRN: [id number]  Referring provider: Joce Mendoza DO  Dx:   Encounter Diagnosis     ICD-10-CM    1  Parkinson disease ([facility] [address] )  G20                   Subjective: Busy weekend getting trailer ready [place]  Denies any recent fall , near misses all the time       Objective: See treatment diary below      Assessment: Tolerated treatment well, denied any increase pain/sx's with TE/neuro exercises this date  Pt easily distraction, difficulty staying task oriented  Pt performed partial neuro exercises/quiet environment  Pt updated HEP with PWR seated  Pt encourage self cuing   Patient demonstrated fatigue post treatment      Plan: Continue per plan of care        Re-eval Date:     Date 3/28 3/30 4/6 4/12    Visit Count 1 2 3 4    FOTO        Pain In        Pain Out          Manuals                                        Neuro Re-Ed          Mini Best  15 min      PWR  Supine  - up  - WS  -Rotation  -Step  Max cues    15 min Seated  - up  -WS  -twist  -step    15 min Seated  - up  -WS  -Twist  -step  Eye/hand boosts, pt self cues  Partial perform quiet environment  Mod cues  15 min    Chair <>chair transfer   10x  "Feet/clock" cues CS     STS   5x  16 3sec  W/ UE    5x 15 7sec  W/o UE     Obstacle course    3 yellow circles focus >step stride, red foam, turn negotiation  With divided attention, memory recall  15 min            HEP  PWR  Supine   *pt to write 2-3 hierarchy goals NV  PWR  seated    Ther Ex        Nustep   L3 10 min  Warm up L3 10 min  Cues UE/LE extension  L3 10 min  Cues UE/LE extension     Knee ext/flex    22#  3x10 ea                                                      Ther Activity                                        Gait Training           Clinic  3x   Small spaces/threshold,turn negotiations    CS    Cues ground clearance, arm swing, step stride, postural awareness    Educ/Practiced  4 S's with pt self cue Clinic  4x   Small spaces/threshold,turn negotiations, memory recall 1 min trial/5 objects    CS    Cues ground clearance, arm swing, step stride, postural awareness    Practiced  4 S's with pt self cues            Modalities

## 2022-04-12 ENCOUNTER — OFFICE VISIT (OUTPATIENT)
Dept: PHYSICAL THERAPY | Facility: CLINIC | Age: 72
End: 2022-04-12
Payer: MEDICARE

## 2022-04-12 DIAGNOSIS — G20 PARKINSON DISEASE (HCC): Primary | ICD-10-CM

## 2022-04-12 PROCEDURE — 97110 THERAPEUTIC EXERCISES: CPT

## 2022-04-12 PROCEDURE — 97116 GAIT TRAINING THERAPY: CPT

## 2022-04-12 PROCEDURE — 97112 NEUROMUSCULAR REEDUCATION: CPT

## 2022-04-14 ENCOUNTER — OFFICE VISIT (OUTPATIENT)
Dept: PHYSICAL THERAPY | Facility: CLINIC | Age: 72
End: 2022-04-14
Payer: MEDICARE

## 2022-04-14 DIAGNOSIS — G20 PARKINSON DISEASE (HCC): Primary | ICD-10-CM

## 2022-04-14 PROCEDURE — 97110 THERAPEUTIC EXERCISES: CPT

## 2022-04-14 PROCEDURE — 97112 NEUROMUSCULAR REEDUCATION: CPT

## 2022-04-19 ENCOUNTER — OFFICE VISIT (OUTPATIENT)
Dept: PHYSICAL THERAPY | Facility: CLINIC | Age: 72
End: 2022-04-19
Payer: MEDICARE

## 2022-04-19 DIAGNOSIS — G20 PARKINSON DISEASE (HCC): Primary | ICD-10-CM

## 2022-04-19 PROCEDURE — 97112 NEUROMUSCULAR REEDUCATION: CPT

## 2022-04-19 PROCEDURE — 97110 THERAPEUTIC EXERCISES: CPT

## 2022-04-19 NOTE — PROGRESS NOTES
Daily Note     Today's date: 2022  Patient name: Crystal Tan  : 1950  MRN: [id number]  Referring provider: Jordana Singh DO  Dx:   Encounter Diagnosis     ICD-10-CM    1  Parkinson disease ([facility] [address] )  G20                   Subjective: I am doing my exercise daily as I want to be able to walk better  Working back up at the [address] again  Have occ FOG   Pt denies any recent falls          Objective: See treatment diary below      Assessment: Tolerated treatment well  Noted increase occ right hand tremor , adding flicks t/o rx prn  Pt demonstrates decrease step stride, < LILLIANA retro step  Patient demonstrated fatigue post treatment and would benefit from continued PT      Plan: Continue per plan of care        Re-eval Date:     Date        Visit Count 6       FOTO  DUE      Pain In        Pain Out          Manuals                                        Neuro Re-Ed                PWR Standing  - up  -WS  -Twist  -step  Eye/hand boosts, pt self cues  Busy clinic/quiet  environment  Mod cues  15 min       Chair <>chair transfer        STS resume       Obstacle course Resume  STS, turn negotiation, step over 6" obstacles,  Thresholds    Memory recall, match word/picture,word in conversation  15 min       Multi direction step 15 min  Firm  With divided attention, Ltr/work, hand flicks  Mod cues  1* focus step stride       Retro step 6 x 10 feet  Cues step stride, >LILLIANA  Cs/CGA                               Ther Ex        Nustep L3 10 min  Cues UE/LE extension        Knee ext/flex                        Ther Activity                                        Gait Training         Clinic/lobby  Head turns, cues reciprocol, > LILLIANA, ground clearance, WS  arm swing  8 min  CS/CGA               Modalities

## 2022-04-21 ENCOUNTER — OFFICE VISIT (OUTPATIENT)
Dept: PHYSICAL THERAPY | Facility: CLINIC | Age: 72
End: 2022-04-21
Payer: MEDICARE

## 2022-04-21 DIAGNOSIS — G20 PARKINSON DISEASE (HCC): Primary | ICD-10-CM

## 2022-04-21 PROCEDURE — 97110 THERAPEUTIC EXERCISES: CPT

## 2022-04-21 PROCEDURE — 97112 NEUROMUSCULAR REEDUCATION: CPT

## 2022-04-21 NOTE — PROGRESS NOTES
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        Objective: See treatment diary below      Assessment: Tolerated treatment well  Noted improvement with WS strategies  Difficulty with step retro, cues for > step stride  occ LOB with CGA for balance recovery 1* LOB with turn negotiation  Pt conts to be challenged with performing exercises in busy clinic setting  Patient demonstrated fatigue post treatment and would benefit from continued PT      Plan: Continue per plan of care        Re-eval Date:     Date       Visit Count 6 7      FOTO - NC - - - - -   Pain In        Pain Out          Manuals                                        Neuro Re-Ed                PWR Standing  - up  -WS  -Twist  -step  Eye/hand boosts, pt self cues  Busy clinic/quiet  environment  Mod cues  15 min Standing  - up  -WS  -Twist  -step  Eye/hand boosts, pt self cues, divided attention  Busy clinic/quiet  Environment  Mod cues  15 min      Seated out/down/up/out  5 x count 10  Min cue      Chair <>chair transfer        STS resume       Obstacle course Resume  STS, turn negotiation, step over 6" obstacles,  Thresholds    Memory recall, match word/picture,word in conversation  15 min       Multi direction step 15 min  Firm  With divided attention, Ltr/work, hand flicks  Mod cues  1* focus step stride 15 min  Firm/ foam  With divided attention, Ltr/work, hand flicks  Mod cues  1* focus step stride      Retro step 6 x 10 feet  Cues step stride, >LILLIANA  Cs/CGA       WS with hand boosts  R/L  Firm  Scarfs  10x ea dir                      Ther Ex        Nustep L3 10 min  Cues UE/LE extension  L3 10 min  Cues UE/LE extension       Knee ext/flex Ther Activity                                        Gait Training         Clinic/kristopher  Head turns, cues reciprocol, > LILLIANA, ground clearance, WS  arm swing  8 min  CS/CGA               Modalities

## 2022-04-26 ENCOUNTER — OFFICE VISIT (OUTPATIENT)
Dept: PHYSICAL THERAPY | Facility: CLINIC | Age: 72
End: 2022-04-26
Payer: MEDICARE

## 2022-04-26 DIAGNOSIS — G20 PARKINSON DISEASE (HCC): Primary | ICD-10-CM

## 2022-04-26 PROCEDURE — 97110 THERAPEUTIC EXERCISES: CPT

## 2022-04-26 PROCEDURE — 97112 NEUROMUSCULAR REEDUCATION: CPT

## 2022-04-26 NOTE — PROGRESS NOTES
Daily Note     Today's date: 2022  Patient name: Genie Perry  : 1950  MRN: [id number]  Referring provider: Fatou Don DO  Dx:   Encounter Diagnosis     ICD-10-CM    1  Parkinson disease ([facility] [address] )  G20                   Subjective: working more at the [employer] my exercises on my days off of work   I worked 2 days last week      Objective: See treatment diary below      Assessment: Tolerated treatment well, denied any co's of pain/discomfort  Pt continues to struggle performing TE / neuro exercises in busy clinic setting/easily distracted  Pt focus on > step stride with retro step  Focus on reciprocol arm swing, head turns, > step stride/ground clearance with ambulation  Patient would benefit from continued PT      Plan: Continue per plan of care        Re-eval Date:     Date      Visit Count 6 7 8     FOTO - NC - - - - -   Pain In        Pain Out          Manuals                                        Neuro Re-Ed                PWR Standing  - up  -WS  -Twist  -step  Eye/hand boosts, pt self cues  Busy clinic/quiet  environment  Mod cues  15 min Standing  - up  -WS  -Twist  -step  Eye/hand boosts, pt self cues, divided attention  Busy clinic/quiet  Environment  Mod cues  15 min Standing  Firm/foam  - up  -WS  -Twist  -step  Eye/hand boosts, pt self cues, divided attention  Busy clinic/quiet  Environment  Mod cues  15 min     Seated out/down/up/out  5 x count 10  Min cues      Chair <>chair transfer        STS resume  No arm chair  Feet on foam  With pushup with BL UE 5x  25 sec    W/ BL UE reach 5x 24 sec       Obstacle course Resume  STS, turn negotiation, step over 6" obstacles,  Thresholds    Memory recall, match word/picture,word in conversation  15 min       Multi direction step 15 min  Firm  With divided attention, Ltr/work, hand flicks  Mod cues  1* focus step stride 15 min  Firm/ foam  With divided attention, Ltr/work, hand flicks  Mod cues  1* focus step stride 5 min  Firm/ foam  With divided attention, Ltr/work, hand flicks  Mod cues  1* focus step stride retro  CS     Retro step 6 x 10 feet  Cues step stride, >LILLIANA  Cs/CGA       WS with hand boosts  R/L  Firm  Scarfs  10x ea dir                      Ther Ex        Nustep L3 10 min  Cues UE/LE extension  L3 10 min  Cues UE/LE extension  L3 10 min  Cues UE/LE extension      Knee ext/flex                        Ther Activity                                        Gait Training         Clinic/kristopher  Head turns, cues reciprocol, > LILLIANA, ground clearance, WS  arm swing  8 min  CS/CGA  Clinic  With head turns, divided attention    Cues reciprocal arm swing, step stride/ground clearance  CS/CGA  10 min  With brief seated rest x 2             Modalities

## 2022-04-28 ENCOUNTER — EVALUATION (OUTPATIENT)
Dept: PHYSICAL THERAPY | Facility: CLINIC | Age: 72
End: 2022-04-28
Payer: MEDICARE

## 2022-04-28 DIAGNOSIS — G20 PARKINSON DISEASE (HCC): Primary | ICD-10-CM

## 2022-04-28 PROCEDURE — 97110 THERAPEUTIC EXERCISES: CPT

## 2022-04-28 PROCEDURE — 97116 GAIT TRAINING THERAPY: CPT

## 2022-04-28 PROCEDURE — 97112 NEUROMUSCULAR REEDUCATION: CPT

## 2022-04-28 NOTE — PROGRESS NOTES
PT Re-Evaluation     Today's date: 2022  Patient name: Dannette Leventhal  : 1950  MRN: [id number]  Referring provider: Rommel Castellanos DO  Dx:   Encounter Diagnosis     ICD-10-CM    1  Parkinson disease ([facility] [address] )  G20                   Assessment  Assessment details: The patient has been seen in PT for a total of 9 visits with good PT attendance noted  He has made improvements since [facility] and is making progress towards his goals  His functional deficits include gait and balance dysfunction and ambulation dysfunction which limit his ability to walk long distances, walk at increased speed and perform normal ADLs  Improvements are noted with regards to 5x STS time, decreased TUG time and improvements with times with balance activities such as SLS and tandem stance  The patient would benefit from continued PT to address deficits and improve function  Tx to include ROM, stretching, strengthening, modalities, HEP, pt education, postural ed, lifting/body mechanics, neuro re-ed, balance/proprioception Te, MT and equipment  Will progress as able in upcoming visits with ROM, strengthening, gait, balance and HEP  PT notes LOB backwards and decreased safety with chair negotiation such as when going from sit to stand  He would benefit from further education on safety awareness and balance and proprioception                Impairments: abnormal gait, activity intolerance, impaired balance, impaired physical strength, lacks appropriate home exercise program and poor posture     Goals  Short Term Goals:  - Patient will improve TUG from 14 6 sec to <12 sec in order to reduce risk of falls and to increase safety with functional mobility - met  - Patient will improve 5 STS by the [address] of 2 3 sec from 15 2 sec to 12 9 sec indicating an improvement in strength and decreased challenge with transfers - met  - Patient will complete 6 MWT and improve by the [address] of 269 ft to indicating an improvement in cardiovascular endurance in order to maximize function with functional mobility throughout the community - NT today  - Patient will be independent in basic HEP in order to manage condition at home - progressing    Long Term Goals:  - Patient will be able to ambulate on uneven surfaces with 50% reduction in near falls to increase safety with community mobility  - Patient will be able to perform a floor transfer without physical assistance to assist with fall recovery at home and in the community  - Patient will be independent in comprehensive HEP post discharge from program  - Patient will be able to walk up incline with decreased difficulty and no loss of balance in order to improve functional mobility throughout the community  - Patient will be able to perform sit to stands from softer surfaces such as a couch or bed in order to improvement function with transfers  - Patient will be consistent with program for at least 1 day per week to assist with management of condition and functional independence of their condition      Plan  Plan details:     Patient would benefit from: skilled physical therapy  Planned therapy interventions: manual therapy, neuromuscular re-education, therapeutic activities, therapeutic exercise, home exercise program, gait training, balance, balance/weight bearing training, self care, patient education, strengthening, stretching, transfer training and flexibility  Frequency: 2x week  Duration in weeks: 12  Plan of Care beginning date: 4/28/2022  Plan of Care expiration date: 7/21/2022  Treatment plan discussed with: patient        Subjective Evaluation    History of Present Illness  Mechanism of injury: Jamal Arley presents to outpatient physical therapy with a referral for Parkinson Disease  Jamal Tubbs reports things have been slowly progressing and getting worse  HPI/Primary Complaint: PD is gradually getting worse; is having a more difficult time with walking- shuffling more, difficulty lifting the feet   Has had decreased activity over the winter  R foot will get frozen after sitting for a while  (-) recent falls  (+) usually falls/loses balance forward  (+) difficulty with ADLs  (+) increased unsteadiness with closing eyes  (+) difficulty on inclines/declines  (+) difficulty with uneven surfaces  (+)  Mild freezing episodes (after sitting)  (+) notices others walk faster than him  Relevant PMH: previous R RTC tear/surgery   Work: retired  Hobbies/exercise: daily HEP  Activity Limitations: walking; limited to 1 mile at the most; usually does 2 blocks, balance  Patient Goals: improve walking, balance     UPDATE 22: The patient states he is back to work  Notes that he feels unsteady with walking on uneven surfaces  Notes that he does have freezing of gait, mostly when he approaches a chair  Social Support  Steps to enter house: no  Stairs in house: no   Lives with: alone    Hand dominance: right    Treatments  Previous treatment: physical therapy        Objective     Strength/Myotome Testing     Left Hip   Planes of Motion   Flexion: 4-  Extension: 3+  Abduction: 3+    Right Hip   Planes of Motion   Flexion: 4-  Extension: 3+  Abduction: 3+    Left Knee   Flexion: 4  Extension: 4    Right Knee   Flexion: 4  Extension: 4    Left Ankle/Foot   Dorsiflexion: 4  Plantar flexion: 4    Right Ankle/Foot   Dorsiflexion: 4  Plantar flexion: 4  Neuro Exam:     Sensation   Light touch LE: left WNL and right WNL    Coordination   Heel to shin: left WNL and right WNL  Finger to nose: left WNL and right WNL  Rapid alternating movements: LE impaired     Transfers Sit to stand: independent     Functional outcomes   5x sit to stand: 12 38 (seconds)  TU 09 (seconds)  Functional outcome gait comment: Decreased gait speed, mild shuffling, decreased BL arm swing; increased steppage for foot clearance       Balance:   BL SLS - R: 10", L: 3" with 2 trials  Tandem= 30" R<L, 20" L<R  FT EO= 30"  FT EC= 30"

## 2022-04-28 NOTE — PROGRESS NOTES
Daily Note     Today's date: 2022  Patient name: Yan Arango  : 1950  MRN: [id number]  Referring provider: Lynn Mahan DO  Dx:   Encounter Diagnosis     ICD-10-CM    1  Parkinson disease ([facility] [address] )  G20                   Subjective: I still have trouble walking uneven areas at work  Pt denies any recent falls        Objective: See treatment diary below    5x sit to stand: 12 38 (seconds)  TU 09 (seconds)  Functional outcome gait comment: Decreased gait speed, mild shuffling, decreased BL arm swing; increased steppage for foot clearance  Balance:   BL SLS= R 10", L 3" with 2 trials    Tandem= 30" with R<L , 20" L<R  FT EO= 30"  FT EC= 30"      Assessment: Tolerated treatment well  Difficulty with turn negotiation, backward stepping   Poor safety awareness with stand>sit transfers, cues for step strategies  Patient demonstrated fatigue post treatment and would benefit from continued PT      Plan: Continue per plan of care        Re-eval Date:     Date     Visit Count 6 7 8 9    FOTO - NC - - - - -   Pain In        Pain Out          Manuals                                        Neuro Re-Ed            15 min  Function outcome testing    PWR Standing  - up  -WS  -Twist  -step  Eye/hand boosts, pt self cues  Busy clinic/quiet  environment  Mod cues  15 min Standing  - up  -WS  -Twist  -step  Eye/hand boosts, pt self cues, divided attention  Busy clinic/quiet  Environment  Mod cues  15 min Standing  Firm/foam  - up  -WS  -Twist  -step  Eye/hand boosts, pt self cues, divided attention  Busy clinic/quiet  Environment  Mod cues  15 min Standing  Firm/foam  - up  -WS  -Twist  -step  Eye/hand boosts, pt self cues, divided attention  Busy clinic/quiet  Environment  Mod cues  15 min    Seated out/down/up/out  5 x count 10  Min cues      Chair <>chair transfer        STS resume  No arm chair  Feet on foam  With pushup with BL UE 5x  25 sec    W/ BL UE reach 5x 24 sec   resume Obstacle course Resume  STS, turn negotiation, step over 6" obstacles,  Thresholds    Memory recall, match word/picture,word in conversation  15 min       Multi direction step 15 min  Firm  With divided attention, Ltr/work, hand flicks  Mod cues  1* focus step stride 15 min  Firm/ foam  With divided attention, Ltr/work, hand flicks  Mod cues  1* focus step stride 5 min  Firm/ foam  With divided attention, Ltr/work, hand flicks  Mod cues  1* focus step stride retro  CS 10 min  Firm/ foam  With divided attention, Ltr/work, hand flicks  Mod cues  1* focus step stride retro  CS    Retro step 6 x 10 feet  Cues step stride, >LILLIANA  Cs/CGA   6 x 10 feet  Cues step stride, >LILLIANA  Cs/CGA    WS with hand boosts  R/L  Firm  Scarfs  10x ea dir                      Ther Ex        Nustep L3 10 min  Cues UE/LE extension  L3 10 min  Cues UE/LE extension  L3 10 min  Cues UE/LE extension  L3 10 min  Cues UE/LE extension     Knee ext/flex                        Ther Activity                                        Gait Training         Clinic/kristopher  Head turns, cues reciprocol, > LILLIANA, ground clearance, WS  arm swing  8 min  CS/CGA  Clinic  With head turns, divided attention    Cues reciprocal arm swing, step stride/ground clearance  CS/CGA  10 min  With brief seated rest x 2 Clinic  With head turns, divided attention    Cues reciprocal arm swing, step stride/ground clearance  CS/CGA  10 min  With brief seated rest x 2            Modalities

## 2022-05-02 NOTE — PROGRESS NOTES
Daily Note     Today's date: 5/3/2022  Patient name: Eric Campbell  : 1950  MRN: [id number]  Referring provider: Kadeem Zamora DO  Dx:   Encounter Diagnosis     ICD-10-CM    1  Parkinson disease ([facility] [address] )  G20                   Subjective:  I doing ok today  I had a dizzy spell over the weekend, lasted a day but I am fine now      Objective: See treatment diary below      Assessment: Tolerated treatment well  Patient would benefit from continued PT      Plan: Continue per plan of care          Re-eval Date:     Date 4/19 4/21 4/26 4/28 5/3   Visit Count 6 7 8 9 10   FOTO - NC - - - - -   Pain In        Pain Out          Manuals                                        Neuro Re-Ed            15 min  Function outcome testing    PWR Standing  - up  -WS  -Twist  -step  Eye/hand boosts, pt self cues  Busy clinic/quiet  environment  Mod cues  15 min Standing  - up  -WS  -Twist  -step  Eye/hand boosts, pt self cues, divided attention  Busy clinic/quiet  Environment  Mod cues  15 min Standing  Firm/foam  - up  -WS  -Twist  -step  Eye/hand boosts, pt self cues, divided attention  Busy clinic/quiet  Environment  Mod cues  15 min Standing  Firm/foam  - up  -WS  -Twist  -step  Eye/hand boosts, pt self cues, divided attention  Busy clinic/quiet  Environment  Mod cues  15 min Standing/seated  Firm/foam  - up  -WS  -Twist  -step  Eye/hand boosts, pt self cues, divided attention  Busy clinic/quiet  Environment  Mod cues  15 min   Seated out/down/up/out  5 x count 10  Min cues      Chair <>chair transfer        STS resume  No arm chair  Feet on foam  With pushup with BL UE 5x  25 sec    W/ BL UE reach 5x 24 sec   resume    Obstacle course Resume  STS, turn negotiation, step over 6" obstacles,  Thresholds    Memory recall, match word/picture,word in conversation  15 min       Multi direction step 15 min  Firm  With divided attention, Ltr/work, hand flicks  Mod cues  1* focus step stride 15 min  Firm/ foam  With divided attention, Ltr/work, hand flicks  Mod cues  1* focus step stride 5 min  Firm/ foam  With divided attention, Ltr/work, hand flicks  Mod cues  1* focus step stride retro  CS 10 min  Firm/ foam  With divided attention, Ltr/work, hand flicks  Mod cues  1* focus step stride retro  CS    Retro step 6 x 10 feet  Cues step stride, >LILLIANA  Cs/CGA   6 x 10 feet  Cues step stride, >LILLIANA  Cs/CGA    WS with hand boosts  R/L  Firm  Scarfs  10x ea dir                      Ther Ex        Nustep L3 10 min  Cues UE/LE extension  L3 10 min  Cues UE/LE extension  L3 10 min  Cues UE/LE extension  L3 10 min  Cues UE/LE extension  L3 10 min  Cues UE/LE extension    Knee ext/flex                        Ther Activity                                        Gait Training         Clinic/kristopher  Head turns, cues reciprocol, > LILLIANA, ground clearance, WS  arm swing  8 min  CS/CGA  Clinic  With head turns, divided attention    Cues reciprocal arm swing, step stride/ground clearance  CS/CGA  10 min  With brief seated rest x 2 Clinic  With head turns, divided attention    Cues reciprocal arm swing, step stride/ground clearance  CS/CGA  10 min  With brief seated rest x 2 Clinic  Firm/double red foam, foam turn negotiation, STS transfers with divided attention    Outside  Level/ grassy area  Change mahesh, HT/HN, divided attention, memory recall, turn negotiation, ring toss/WS    Cues heel strike , reciprocol arm swing, step stride  30 min           Modalities

## 2022-05-03 ENCOUNTER — OFFICE VISIT (OUTPATIENT)
Dept: PHYSICAL THERAPY | Facility: CLINIC | Age: 72
End: 2022-05-03
Payer: MEDICARE

## 2022-05-03 DIAGNOSIS — G20 PARKINSON DISEASE (HCC): Primary | ICD-10-CM

## 2022-05-03 PROCEDURE — 97112 NEUROMUSCULAR REEDUCATION: CPT

## 2022-05-03 PROCEDURE — 97110 THERAPEUTIC EXERCISES: CPT

## 2022-05-04 NOTE — PROGRESS NOTES
Daily Note     Today's date: 2022  Patient name: Leisa Meigs  : 1950  MRN: [id number]  Referring provider: Donaldo Velázquez DO  Dx:   Encounter Diagnosis     ICD-10-CM    1  Parkinson disease ([facility] [address] )  G20                   Subjective: Today is a good day  Yesterday I felt so good that I wanted to run across the parking lot        Objective: See treatment diary below      Assessment: Tolerated treatment well denied any increase pain/discomfort  Pt demonstrates difficulty with walking/head turns, > difficulty with head nods  Demonstrates decrease step stride, arm swing, ground clearance, decrease mahesh  2 LOB with CGA for balance recovery   Patient would benefit from continued PT      Plan: Continue per plan of care        Re-eval Date:     Date        Visit Count 11       FOTO - NC -       Pain In        Pain Out          Manuals                                        Neuro Re-Ed                PWR Standing/seated  Firm/foam  - up  -WS  -Twist  -step  Eye/hand boosts, pt self cues, divided attention  Busy clinic/quiet  Environment  Mod cues  15 min       Seated out/down/up/out        Chair <>chair transfer        STS 12"  5x   21 75 sec  W/o UE       Obstacle course        Multi direction step 5 min  Firm  60 bpm       Retro step        WS with hand boosts                        Ther Ex        Nustep L3 10 min  Cues UE/LE extension        Knee ext/flex                        Ther Activity                                        Gait Training         Clinic  Firm/double red foam, foam turn negotiation, 3 yellow circles,   6" block   Eye spy, memory recall  CS/CGA    Outside  Level/ grassy area  Change mahesh, HT/HN, divided attention, memory recall, turn negotiation,     Cues heel strike , reciprocol arm swing, step stride  30 min               Modalities

## 2022-05-05 ENCOUNTER — OFFICE VISIT (OUTPATIENT)
Dept: PHYSICAL THERAPY | Facility: CLINIC | Age: 72
End: 2022-05-05
Payer: MEDICARE

## 2022-05-05 DIAGNOSIS — G20 PARKINSON DISEASE (HCC): Primary | ICD-10-CM

## 2022-05-05 PROCEDURE — 97112 NEUROMUSCULAR REEDUCATION: CPT

## 2022-05-05 PROCEDURE — 97110 THERAPEUTIC EXERCISES: CPT

## 2022-05-06 NOTE — PROGRESS NOTES
Daily Note     Today's date: 2022  Patient name: Yan Arango  : 1950  MRN: [id number]  Referring provider: Lynn Mahan DO  Dx:   Encounter Diagnosis     ICD-10-CM    1  Parkinson disease ([facility] [address] )  G20                   Subjective: I am doing ok today Weekend not as busy as I thought[place]      Objective: See treatment diary below      Assessment: Tolerated treatment well  Patient provided min-mod verbal/tactile cues prn for proper form and technique with exercises completed this date  Pt provided handouts for eye/hand boosts  Encourage carryover with HEP  Patient demonstrated fatigue post treatment and would benefit from continued PT      Plan: Continue per plan of care        Re-eval Date:     Date       Visit Count 11 12      FOTO - NC -       Pain In        Pain Out          Manuals                                        Neuro Re-Ed                PWR Standing/seated  Firm/foam  - up  - Rock  - Twist  - Step  Eye/hand boosts, pt self cues, divided attention  Busy clinic/quiet  Environment  Mod cues  15 min Supine/all 4's / seated/standing  10x ea  - up  - [address]  - Step  Eye/hand boosts, pt self cues, divided attention  Busy clinic Environment   Min- Mod cues  40 min      Seated out/down/up/out        Chair <>chair transfer        STS 12"  5x   21 75 sec  W/o UE       Obstacle course        Multi direction step 5 min  Firm  60 bpm 7 min   Firm  60/65 bpm  CS  Cues > step stride      Retro step        WS with hand boosts                        Ther Ex        Nustep L3 10 min  Cues UE/LE extension  L4 10 min  Cues UE/LE extension      Knee ext/flex                        Ther Activity                                        Gait Training         Clinic  Firm/double red foam, foam turn negotiation, 3 yellow circles,   6" block   Eye spy, memory recall  CS/CGA    Outside  Level/ grassy area  Change mahesh, HT/HN, divided attention, memory recall, turn negotiation,     Cues heel strike , reciprocol arm swing, step stride  30 min               Modalities

## 2022-05-09 ENCOUNTER — OFFICE VISIT (OUTPATIENT)
Dept: PHYSICAL THERAPY | Facility: CLINIC | Age: 72
End: 2022-05-09
Payer: MEDICARE

## 2022-05-09 DIAGNOSIS — G20 PARKINSON DISEASE (HCC): Primary | ICD-10-CM

## 2022-05-09 PROCEDURE — 97110 THERAPEUTIC EXERCISES: CPT

## 2022-05-09 PROCEDURE — 97112 NEUROMUSCULAR REEDUCATION: CPT

## 2022-05-10 NOTE — PROGRESS NOTES
Daily Note     Today's date: 2022  Patient name: Preet Collazo  : 1950  MRN: [id number]  Referring provider: Mana Costa DO  Dx:   Encounter Diagnosis     ICD-10-CM    1  Parkinson disease ([facility] [address] )  G20                   Subjective:  Dont have any new co's  I did my standing HEP yesterday feeling more confident with doing them properly      Objective: See treatment diary below      Assessment: Tolerated treatment fairly well  Noted > shuffle gait this date with pt indicating stop taking meds 3x/day per MD to 1x/day  Pt encourage to discuss with MD re: self medicating  Pt improved with step pacing with multi directional stepping with > challenge L step retro   Patient demonstrated fatigue post treatment and would benefit from continued PT      Plan: Continue per plan of care        Re-eval Date:     Date      Visit Count 11 12 13     FOTO - NC -       Pain In        Pain Out          Manuals                                        Neuro Re-Ed                PWR Standing/seated  Firm/foam  - up  - Rock  - Twist  - Step  Eye/hand boosts, pt self cues, divided attention  Busy clinic/quiet  Environment  Mod cues  15 min Supine/all 4's / seated/standing  10x ea  - up  - [address]  - Step  Eye/hand boosts, pt self cues, divided attention  Busy clinic Environment   Min- Mod cues  40 min Standing  10x ea  - up  - Rock  - Twist  - Step  Eye/hand boosts, pt self cues, divided attention     Min cues  15 min     Seated out/down/up/out        Chair <>chair transfer        STS 12"  5x   21 75 sec  W/o UE       Obstacle course        Multi direction step 5 min  Firm  60 bpm 7 min   Firm  60/65 bpm  CS  Cues > step stride 8 min   Firm  Self pace/ 65 & 70  Bpm, music  CS  Cues > step stride     Retro step   R/L/alt  10x ea  0 UE  CS     WS with hand boosts        Turn Negotiation   30x  Clock cues  CS                     Ther Ex        Nustep L3 10 min  Cues UE/LE extension  L4 10 min  Cues UE/LE extension L5 10 min  Cues UE/LE extension     Knee ext/flex                        Ther Activity                                        Gait Training         Clinic  Firm/double red foam, foam turn negotiation, 3 yellow circles,   6" block   Eye spy, memory recall  CS/CGA    Outside  Level/ grassy area  Change mhaesh, HT/HN, divided attention, memory recall, turn negotiation,     Cues heel strike , reciprocol arm swing, step stride  30 min  Clinic/lobby/outside    Head turns, divided attention, turn negotiation    > shuffle / FOG    Cues 4 S's    CS/CGA             Modalities

## 2022-05-11 ENCOUNTER — OFFICE VISIT (OUTPATIENT)
Dept: PHYSICAL THERAPY | Facility: CLINIC | Age: 72
End: 2022-05-11
Payer: MEDICARE

## 2022-05-11 DIAGNOSIS — G20 PARKINSON DISEASE (HCC): Primary | ICD-10-CM

## 2022-05-11 PROCEDURE — 97112 NEUROMUSCULAR REEDUCATION: CPT

## 2022-05-11 PROCEDURE — 97116 GAIT TRAINING THERAPY: CPT

## 2022-05-11 PROCEDURE — 97110 THERAPEUTIC EXERCISES: CPT

## 2022-05-16 ENCOUNTER — OFFICE VISIT (OUTPATIENT)
Dept: PHYSICAL THERAPY | Facility: CLINIC | Age: 72
End: 2022-05-16
Payer: MEDICARE

## 2022-05-16 DIAGNOSIS — G20 PARKINSON DISEASE (HCC): Primary | ICD-10-CM

## 2022-05-16 PROCEDURE — 97112 NEUROMUSCULAR REEDUCATION: CPT

## 2022-05-16 PROCEDURE — 97110 THERAPEUTIC EXERCISES: CPT

## 2022-05-16 NOTE — PROGRESS NOTES
Daily Note     Today's date: 2022  Patient name: Gio Fernandes  : 1950  MRN: [id number]  Referring provider: Ronaldo Song DO  Dx:   Encounter Diagnosis     ICD-10-CM    1  Parkinson disease ([facility] [address] )  G20        Start Time: 0900  Stop Time: 1000  Total time in clinic (min): 60 minutes    Subjective: Patient noted no new complaints  Objective: See treatment diary below      Assessment: Tolerated treatment fair  Added in ambulation with turns to sit in chair and standing rotation ball pass with therapist  Patient was able to perform both without complaints also added in stepping over cones and directional ambulation  VC for patient to lift feet up over cones instead of going around cones when performing  Patient would benefit from continued PT  Plan: Continue per plan of care        Re-eval Date:     Date     Visit Count 11 12 13 14    FOTO - NC -       Pain In        Pain Out          Manuals                                        Neuro Re-Ed                PWR Standing/seated  Firm/foam  - up  - Rock  - Twist  - Step  Eye/hand boosts, pt self cues, divided attention  Busy clinic/quiet  Environment  Mod cues  15 min Supine/all 4's / seated/standing  10x ea  - up  - [address]  - Step  Eye/hand boosts, pt self cues, divided attention  Busy clinic Environment   Min- Mod cues  40 min Standing  10x ea  - up  - Rock  - Twist  - Step  Eye/hand boosts, pt self cues, divided attention     Min cues  15 min NV    Seated out/down/up/out        Chair <>chair transfer        STS 12"  5x   21 75 sec  W/o UE       Obstacle course        Multi direction step 5 min  Firm  60 bpm 7 min   Firm  60/65 bpm  CS  Cues > step stride 8 min   Firm  Self pace/ 65 & 70  Bpm, music  CS  Cues > step stride 8 min   Firm  Self pace/ 65 & 70  Bpm, music  CS  Cues > step stride    Retro step   R/L/alt  10x ea  0 UE  CS R/L/alt  10x ea  0 UE  CS    WS with hand boosts        Turn Negotiation 30x  Clock cues  CS 30x  Clock cues  CS    Standing rotation ball pass    X 10ea    Stepping over cones     Fwd lat 4 laps ea    Walking and turning to sit in chair    X 6 laps    Ther Ex        Nustep L3 10 min  Cues UE/LE extension  L4 10 min  Cues UE/LE extension L5 10 min  Cues UE/LE extension L5 10 min  Cues UE/LE extension    Knee ext/flex                        Ther Activity                                        Gait Training         Clinic  Firm/double red foam, foam turn negotiation, 3 yellow circles,   6" block   Eye spy, memory recall  CS/CGA    Outside  Level/ grassy area  Change amhesh, HT/HN, divided attention, memory recall, turn negotiation,     Cues heel strike , reciprocol arm swing, step stride  30 min  Clinic/lobby/outside    Head turns, divided attention, turn negotiation    > shuffle / FOG    Cues 4 S's    CS/CGA Clinic/lobby/outside    Head turns, divided attention, turn negotiation    > shuffle / FOG    Cues 4 S's    CS/CGA            Modalities

## 2022-05-18 ENCOUNTER — OFFICE VISIT (OUTPATIENT)
Dept: PHYSICAL THERAPY | Facility: CLINIC | Age: 72
End: 2022-05-18
Payer: MEDICARE

## 2022-05-18 DIAGNOSIS — G20 PARKINSON DISEASE (HCC): Primary | ICD-10-CM

## 2022-05-18 PROCEDURE — 97112 NEUROMUSCULAR REEDUCATION: CPT

## 2022-05-18 PROCEDURE — 97110 THERAPEUTIC EXERCISES: CPT

## 2022-05-18 NOTE — PROGRESS NOTES
Daily Note     Today's date: 2022  Patient name: Eric Campbell  : 1950  MRN: [id number]  Referring provider: Kadeem Zamora DO  Dx:   Encounter Diagnosis     ICD-10-CM    1  Parkinson disease ([facility] [address] )  G20                   Subjective: I have been doing my exercises every day  No co's       Objective: See treatment diary below      Assessment: Tolerated treatment well    Pt demonstrates independency with HEP with focus on hand/eye/voice boost options    occ FOG with ambul outside, pt executed 4's without cuing  Patient demonstrated fatigue post treatment and would benefit from continued PT      Plan: DC to HEP  To benefit with f/u PT in 4 months per discussion with PT/SL     Re-eval Date:     Date    Visit Count 11 12 13 14 15   FOTO - NC -       Pain In        Pain Out          Manuals                                        Neuro Re-Ed                PWR Standing/seated  Firm/foam  - up  - Rock  - Twist  - Step  Eye/hand boosts, pt self cues, divided attention  Busy clinic/quiet  Environment  Mod cues  15 min Supine/all 4's / seated/standing  10x ea  - up  - [address]  - Step  Eye/hand boosts, pt self cues, divided attention  Busy clinic Environment   Min- Mod cues  40 min Standing  10x ea  - up  - Rock  - Twist  - Step  Eye/hand boosts, pt self cues, divided attention     Min cues  15 min NV Standing  10x ea  - up  - Rock  - Twist  - Step  Eye/hand boosts, pt self cues, divided attention     Min cues  15 min   Seated out/down/up/out        Chair <>chair transfer        STS 12"  5x   21 75 sec  W/o UE       Obstacle course        Multi direction step 5 min  Firm  60 bpm 7 min   Firm  60/65 bpm  CS  Cues > step stride 8 min   Firm  Self pace/ 65 & 70  Bpm, music  CS  Cues > step stride 8 min   Firm  Self pace/ 65 & 70  Bpm, music  CS  Cues > step stride 8 min   Firm  Self pace/ 65 & 70  Bpm, music  CS  Cues > step stride   Retro step   R/L/alt  10x ea  0 UE  CS R/L/alt  10x ea  0 UE  CS R/L/alt  10x ea  0 UE  CS   WS with hand boosts        Turn Negotiation   30x  Clock cues  CS 30x  Clock cues  CS 30x  Clock cues  CS   Standing rotation ball pass    X 10ea    Stepping over cones     Fwd lat 4 laps ea    Walking and turning to sit in chair    X 6 laps    Ther Ex        Nustep L3 10 min  Cues UE/LE extension  L4 10 min  Cues UE/LE extension L5 10 min  Cues UE/LE extension L5 10 min  Cues UE/LE extension L5 10 min  Cues UE/LE extension   Knee ext/flex                        Ther Activity                                        Gait Training         Clinic  Firm/double red foam, foam turn negotiation, 3 yellow circles,   6" block   Eye spy, memory recall  CS/CGA    Outside  Level/ grassy area  Change mahesh, HT/HN, divided attention, memory recall, turn negotiation,     Cues heel strike , reciprocol arm swing, step stride  30 min  Clinic/lobby/outside    Head turns, divided attention, turn negotiation    > shuffle / FOG    Cues 4 S's    CS/CGA Clinic/lobby/outside    Head turns, divided attention, turn negotiation    > shuffle / FOG    Cues 4 S's    CS/CGA Clinic/lobby/outside    Head turns, divided attention, turn negotiation    < shuffle / FOG    reviewed 4 S's    CS/CGA           Modalities

## 2022-05-20 ENCOUNTER — APPOINTMENT (OUTPATIENT)
Dept: LAB | Facility: MEDICAL CENTER | Age: 72
End: 2022-05-20
Payer: MEDICARE

## 2022-05-20 DIAGNOSIS — I25.10 ASCVD (ARTERIOSCLEROTIC CARDIOVASCULAR DISEASE): ICD-10-CM

## 2022-05-20 DIAGNOSIS — I10 HYPERTENSION, UNSPECIFIED TYPE: ICD-10-CM

## 2022-05-20 DIAGNOSIS — C61 PROSTATE CA (HCC): ICD-10-CM

## 2022-05-20 DIAGNOSIS — E78.5 HYPERLIPIDEMIA, UNSPECIFIED HYPERLIPIDEMIA TYPE: ICD-10-CM

## 2022-05-20 LAB
ALBUMIN SERPL BCP-MCNC: 3.9 G/DL (ref 3.5–5)
ALP SERPL-CCNC: 92 U/L (ref 46–116)
ALT SERPL W P-5'-P-CCNC: 18 U/L (ref 12–78)
ANION GAP SERPL CALCULATED.3IONS-SCNC: 3 MMOL/L (ref 4–13)
AST SERPL W P-5'-P-CCNC: 20 U/L (ref 5–45)
BASOPHILS # BLD AUTO: 0.05 THOUSANDS/ΜL (ref 0–0.1)
BASOPHILS NFR BLD AUTO: 1 % (ref 0–1)
BILIRUB SERPL-MCNC: 0.71 MG/DL (ref 0.2–1)
BUN SERPL-MCNC: 15 MG/DL (ref 5–25)
CALCIUM SERPL-MCNC: 10.5 MG/DL (ref 8.3–10.1)
CHLORIDE SERPL-SCNC: 106 MMOL/L (ref 100–108)
CHOLEST SERPL-MCNC: 157 MG/DL
CO2 SERPL-SCNC: 31 MMOL/L (ref 21–32)
CREAT SERPL-MCNC: 1.27 MG/DL (ref 0.6–1.3)
EOSINOPHIL # BLD AUTO: 0.04 THOUSAND/ΜL (ref 0–0.61)
EOSINOPHIL NFR BLD AUTO: 1 % (ref 0–6)
ERYTHROCYTE [DISTWIDTH] IN BLOOD BY AUTOMATED COUNT: 14.4 % (ref 11.6–15.1)
GFR SERPL CREATININE-BSD FRML MDRD: 56 ML/MIN/1.73SQ M
GLUCOSE P FAST SERPL-MCNC: 94 MG/DL (ref 65–99)
HCT VFR BLD AUTO: 50.2 % (ref 36.5–49.3)
HDLC SERPL-MCNC: 45 MG/DL
HGB BLD-MCNC: 17.2 G/DL (ref 12–17)
IMM GRANULOCYTES # BLD AUTO: 0.01 THOUSAND/UL (ref 0–0.2)
IMM GRANULOCYTES NFR BLD AUTO: 0 % (ref 0–2)
LDLC SERPL CALC-MCNC: 91 MG/DL (ref 0–100)
LYMPHOCYTES # BLD AUTO: 2.15 THOUSANDS/ΜL (ref 0.6–4.47)
LYMPHOCYTES NFR BLD AUTO: 34 % (ref 14–44)
MCH RBC QN AUTO: 29.8 PG (ref 26.8–34.3)
MCHC RBC AUTO-ENTMCNC: 34.3 G/DL (ref 31.4–37.4)
MCV RBC AUTO: 87 FL (ref 82–98)
MONOCYTES # BLD AUTO: 0.61 THOUSAND/ΜL (ref 0.17–1.22)
MONOCYTES NFR BLD AUTO: 10 % (ref 4–12)
NEUTROPHILS # BLD AUTO: 3.48 THOUSANDS/ΜL (ref 1.85–7.62)
NEUTS SEG NFR BLD AUTO: 54 % (ref 43–75)
NONHDLC SERPL-MCNC: 112 MG/DL
NRBC BLD AUTO-RTO: 0 /100 WBCS
PLATELET # BLD AUTO: 487 THOUSANDS/UL (ref 149–390)
PMV BLD AUTO: 11.4 FL (ref 8.9–12.7)
POTASSIUM SERPL-SCNC: 4.3 MMOL/L (ref 3.5–5.3)
PROT SERPL-MCNC: 7.9 G/DL (ref 6.4–8.2)
PSA SERPL-MCNC: <0.1 NG/ML (ref 0–4)
RBC # BLD AUTO: 5.78 MILLION/UL (ref 3.88–5.62)
SODIUM SERPL-SCNC: 140 MMOL/L (ref 136–145)
TRIGL SERPL-MCNC: 105 MG/DL
WBC # BLD AUTO: 6.34 THOUSAND/UL (ref 4.31–10.16)

## 2022-05-20 PROCEDURE — 85025 COMPLETE CBC W/AUTO DIFF WBC: CPT

## 2022-05-20 PROCEDURE — G0103 PSA SCREENING: HCPCS

## 2022-05-20 PROCEDURE — 80061 LIPID PANEL: CPT

## 2022-05-20 PROCEDURE — 36415 COLL VENOUS BLD VENIPUNCTURE: CPT

## 2022-05-20 PROCEDURE — 80053 COMPREHEN METABOLIC PANEL: CPT

## 2022-05-23 ENCOUNTER — APPOINTMENT (OUTPATIENT)
Dept: PHYSICAL THERAPY | Facility: CLINIC | Age: 72
End: 2022-05-23
Payer: MEDICARE

## 2022-05-25 ENCOUNTER — APPOINTMENT (OUTPATIENT)
Dept: PHYSICAL THERAPY | Facility: CLINIC | Age: 72
End: 2022-05-25
Payer: MEDICARE

## 2022-06-01 LAB
COLOGUARD RESULT REPORTABLE: NEGATIVE
EXTERNAL COLOGUARD RESULT: NEGATIVE

## 2022-08-24 ENCOUNTER — HOSPITAL ENCOUNTER (EMERGENCY)
Facility: HOSPITAL | Age: 72
Discharge: HOME | End: 2022-08-25
Attending: EMERGENCY MEDICINE
Payer: MEDICARE

## 2022-08-24 ENCOUNTER — APPOINTMENT (OUTPATIENT)
Dept: RADIOLOGY | Facility: HOSPITAL | Age: 72
End: 2022-08-24
Payer: MEDICARE

## 2022-08-24 ENCOUNTER — APPOINTMENT (EMERGENCY)
Dept: CT IMAGING | Facility: HOSPITAL | Age: 72
End: 2022-08-24
Payer: MEDICARE

## 2022-08-24 DIAGNOSIS — U07.1 COVID-19: Primary | ICD-10-CM

## 2022-08-24 DIAGNOSIS — R53.1 GENERALIZED WEAKNESS: ICD-10-CM

## 2022-08-24 LAB
ALBUMIN SERPL BCP-MCNC: 4.2 G/DL (ref 3.5–5)
ALP SERPL-CCNC: 97 U/L (ref 34–104)
ALT SERPL W P-5'-P-CCNC: 18 U/L (ref 7–52)
ANION GAP SERPL CALCULATED.3IONS-SCNC: 11 MMOL/L (ref 4–13)
APTT PPP: 35 SECONDS (ref 23–37)
AST SERPL W P-5'-P-CCNC: 17 U/L (ref 13–39)
BACTERIA UR QL AUTO: ABNORMAL /HPF
BASOPHILS # BLD AUTO: 0.04 THOUSANDS/ΜL (ref 0–0.1)
BASOPHILS NFR BLD AUTO: 1 % (ref 0–1)
BILIRUB SERPL-MCNC: 0.62 MG/DL (ref 0.2–1)
BILIRUB UR QL STRIP: NEGATIVE
BUN SERPL-MCNC: 18 MG/DL (ref 5–25)
CALCIUM SERPL-MCNC: 9.9 MG/DL (ref 8.4–10.2)
CHLORIDE SERPL-SCNC: 106 MMOL/L (ref 96–108)
CLARITY UR: CLEAR
CO2 SERPL-SCNC: 23 MMOL/L (ref 21–32)
COLOR UR: YELLOW
CREAT SERPL-MCNC: 1.38 MG/DL (ref 0.6–1.3)
EOSINOPHIL # BLD AUTO: 0.01 THOUSAND/ΜL (ref 0–0.61)
EOSINOPHIL NFR BLD AUTO: 0 % (ref 0–6)
ERYTHROCYTE [DISTWIDTH] IN BLOOD BY AUTOMATED COUNT: 14.3 % (ref 11.6–15.1)
FLUAV RNA RESP QL NAA+PROBE: NEGATIVE
FLUBV RNA RESP QL NAA+PROBE: NEGATIVE
GFR SERPL CREATININE-BSD FRML MDRD: 51 ML/MIN/1.73SQ M
GLUCOSE SERPL-MCNC: 132 MG/DL (ref 65–140)
GLUCOSE UR STRIP-MCNC: NEGATIVE MG/DL
HCT VFR BLD AUTO: 50.4 % (ref 36.5–49.3)
HGB BLD-MCNC: 16.8 G/DL (ref 12–17)
HGB UR QL STRIP.AUTO: ABNORMAL
IMM GRANULOCYTES # BLD AUTO: 0.04 THOUSAND/UL (ref 0–0.2)
IMM GRANULOCYTES NFR BLD AUTO: 1 % (ref 0–2)
INR PPP: 1.08 (ref 0.84–1.19)
KETONES UR STRIP-MCNC: NEGATIVE MG/DL
LACTATE SERPL-SCNC: 1.4 MMOL/L (ref 0.5–2)
LEUKOCYTE ESTERASE UR QL STRIP: NEGATIVE
LIPASE SERPL-CCNC: 16 U/L (ref 11–82)
LYMPHOCYTES # BLD AUTO: 0.58 THOUSANDS/ΜL (ref 0.6–4.47)
LYMPHOCYTES NFR BLD AUTO: 10 % (ref 14–44)
MCH RBC QN AUTO: 29.9 PG (ref 26.8–34.3)
MCHC RBC AUTO-ENTMCNC: 33.3 G/DL (ref 31.4–37.4)
MCV RBC AUTO: 90 FL (ref 82–98)
MONOCYTES # BLD AUTO: 0.85 THOUSAND/ΜL (ref 0.17–1.22)
MONOCYTES NFR BLD AUTO: 14 % (ref 4–12)
MUCOUS THREADS UR QL AUTO: ABNORMAL
NEUTROPHILS # BLD AUTO: 4.41 THOUSANDS/ΜL (ref 1.85–7.62)
NEUTS SEG NFR BLD AUTO: 74 % (ref 43–75)
NITRITE UR QL STRIP: NEGATIVE
NON-SQ EPI CELLS URNS QL MICRO: ABNORMAL /HPF
NRBC BLD AUTO-RTO: 0 /100 WBCS
PH UR STRIP.AUTO: 5.5 [PH]
PLATELET # BLD AUTO: 425 THOUSANDS/UL (ref 149–390)
PMV BLD AUTO: 11.4 FL (ref 8.9–12.7)
POTASSIUM SERPL-SCNC: 3.7 MMOL/L (ref 3.5–5.3)
PROCALCITONIN SERPL-MCNC: 0.1 NG/ML
PROT SERPL-MCNC: 7 G/DL (ref 6.4–8.4)
PROT UR STRIP-MCNC: NEGATIVE MG/DL
PROTHROMBIN TIME: 14 SECONDS (ref 11.6–14.5)
RBC # BLD AUTO: 5.61 MILLION/UL (ref 3.88–5.62)
RBC #/AREA URNS AUTO: ABNORMAL /HPF
RSV RNA RESP QL NAA+PROBE: NEGATIVE
SARS-COV-2 RNA RESP QL NAA+PROBE: POSITIVE
SODIUM SERPL-SCNC: 140 MMOL/L (ref 135–147)
SP GR UR STRIP.AUTO: 1.02 (ref 1–1.03)
UROBILINOGEN UR QL STRIP.AUTO: 0.2 E.U./DL
WBC # BLD AUTO: 5.93 THOUSAND/UL (ref 4.31–10.16)
WBC #/AREA URNS AUTO: ABNORMAL /HPF

## 2022-08-24 PROCEDURE — 85025 COMPLETE CBC W/AUTO DIFF WBC: CPT | Performed by: EMERGENCY MEDICINE

## 2022-08-24 PROCEDURE — 96360 HYDRATION IV INFUSION INIT: CPT

## 2022-08-24 PROCEDURE — 36415 COLL VENOUS BLD VENIPUNCTURE: CPT | Performed by: EMERGENCY MEDICINE

## 2022-08-24 PROCEDURE — 83605 ASSAY OF LACTIC ACID: CPT | Performed by: EMERGENCY MEDICINE

## 2022-08-24 PROCEDURE — 93005 ELECTROCARDIOGRAM TRACING: CPT

## 2022-08-24 PROCEDURE — 80053 COMPREHEN METABOLIC PANEL: CPT | Performed by: EMERGENCY MEDICINE

## 2022-08-24 PROCEDURE — 99285 EMERGENCY DEPT VISIT HI MDM: CPT | Performed by: EMERGENCY MEDICINE

## 2022-08-24 PROCEDURE — 71045 X-RAY EXAM CHEST 1 VIEW: CPT

## 2022-08-24 PROCEDURE — 0241U HB NFCT DS VIR RESP RNA 4 TRGT: CPT | Performed by: EMERGENCY MEDICINE

## 2022-08-24 PROCEDURE — 74177 CT ABD & PELVIS W/CONTRAST: CPT

## 2022-08-24 PROCEDURE — 99285 EMERGENCY DEPT VISIT HI MDM: CPT

## 2022-08-24 PROCEDURE — 83690 ASSAY OF LIPASE: CPT | Performed by: EMERGENCY MEDICINE

## 2022-08-24 PROCEDURE — G1004 CDSM NDSC: HCPCS

## 2022-08-24 PROCEDURE — 85730 THROMBOPLASTIN TIME PARTIAL: CPT | Performed by: EMERGENCY MEDICINE

## 2022-08-24 PROCEDURE — 85610 PROTHROMBIN TIME: CPT | Performed by: EMERGENCY MEDICINE

## 2022-08-24 PROCEDURE — 87040 BLOOD CULTURE FOR BACTERIA: CPT | Performed by: EMERGENCY MEDICINE

## 2022-08-24 PROCEDURE — 96361 HYDRATE IV INFUSION ADD-ON: CPT

## 2022-08-24 PROCEDURE — 81001 URINALYSIS AUTO W/SCOPE: CPT | Performed by: EMERGENCY MEDICINE

## 2022-08-24 PROCEDURE — 84145 PROCALCITONIN (PCT): CPT | Performed by: EMERGENCY MEDICINE

## 2022-08-24 RX ORDER — ACETAMINOPHEN 325 MG/1
650 TABLET ORAL ONCE
Status: COMPLETED | OUTPATIENT
Start: 2022-08-24 | End: 2022-08-24

## 2022-08-24 RX ADMIN — IOHEXOL 80 ML: 350 INJECTION, SOLUTION INTRAVENOUS at 22:35

## 2022-08-24 RX ADMIN — ACETAMINOPHEN 650 MG: 325 TABLET ORAL at 21:53

## 2022-08-24 RX ADMIN — SODIUM CHLORIDE 1000 ML: 0.9 INJECTION, SOLUTION INTRAVENOUS at 21:55

## 2022-08-25 VITALS
HEART RATE: 84 BPM | TEMPERATURE: 98.3 F | OXYGEN SATURATION: 96 % | RESPIRATION RATE: 18 BRPM | DIASTOLIC BLOOD PRESSURE: 100 MMHG | SYSTOLIC BLOOD PRESSURE: 174 MMHG

## 2022-08-25 LAB
ATRIAL RATE: 96 BPM
P AXIS: 80 DEGREES
PR INTERVAL: 140 MS
QRS AXIS: -8 DEGREES
QRSD INTERVAL: 128 MS
QT INTERVAL: 366 MS
QTC INTERVAL: 462 MS
T WAVE AXIS: 50 DEGREES
VENTRICULAR RATE: 96 BPM

## 2022-08-25 PROCEDURE — 93010 ELECTROCARDIOGRAM REPORT: CPT | Performed by: INTERNAL MEDICINE

## 2022-08-25 PROCEDURE — 96361 HYDRATE IV INFUSION ADD-ON: CPT

## 2022-08-25 NOTE — ED PROVIDER NOTES
History  Chief Complaint   Patient presents with    Weakness - Generalized     Pt presents with generalized weakness, and diarrhea      45-year-old male with history of coronary artery disease, Parkinson's disease who presents to the emergency department for evaluation of generalized weakness  Patient reports starting yesterday night, his Parkinson's symptoms have worsened  He endorses worsening generalized weakness as well as stumbling more often  He denies any falls or head strike  Denies any headache, chest pain, abdominal pain, nausea, or urinary symptoms  Does endorse some loose watery stool that is nonbloody in nature  He denies any fevers, but was noted to be febrile upon arrival   No known sick contacts  No cough or sore throat  Prior to Admission Medications   Prescriptions Last Dose Informant Patient Reported? Taking? PARoxetine (PAXIL) 20 mg tablet   Yes No   Sig: Take 20 mg by mouth daily   aspirin (ECOTRIN LOW STRENGTH) 81 mg EC tablet  Self Yes No   Sig: Take 81 mg by mouth daily   atorvastatin (LIPITOR) 80 mg tablet  Self No No   Sig: Take 1 tablet (80 mg total) by mouth daily   carbidopa-levodopa (SINEMET)  mg per tablet   Yes No   Sig: Take 1 tablet by mouth 3 (three) times a day   meloxicam (MOBIC) 15 mg tablet   No No   Sig: Take 1 tablet (15 mg total) by mouth daily      Facility-Administered Medications: None       Past Medical History:   Diagnosis Date    Anxiety     Benign hypertension     Cancer ([facility] )     prostate    Coronary artery disease     ABRAM to LAD in 2014    History of cardiovascular stress test 01/21/2016    Normal EF, normal study      History of echocardiogram 05/05/2015    EF normal       Ischemic cardiomyopathy     resolved    Mixed hyperlipidemia     Myocardial infarction ([facility] )     Parkinson disease ([facility] )     Prostate cancer ([facility] )     surgically removed    STEMI (ST elevation myocardial infarction) ([facility] ) 2014       Past Surgical History: Procedure Laterality Date    CORONARY ANGIOPLASTY WITH STENT PLACEMENT  12/26/2014    EF 40%, ABRAM to LAD   INCISIONAL HERNIA REPAIR      AL SHLDR ARTHROSCOP,SURG,W/ROTAT CUFF REPR Right 2/24/2021    Procedure: SHOULDER ARTHROSCOPY WITH ROTATOR CUFF REPAIR and acromioplasty;  Surgeon: Yuri Ghosh DO;  Location: [address] [place] [address] OR;  Service: Orthopedics    PROSTATECTOMY  2014    for cancer    TONSILLECTOMY         Family History   Problem Relation Age of Onset    Heart attack Father     Stroke Father      I have reviewed and agree with the history as documented  E-Cigarette/Vaping    E-Cigarette Use Never User      E-Cigarette/Vaping Substances    Nicotine No     THC No     CBD No     Flavoring No     Other No     Unknown No      Social History     Tobacco Use    Smoking status: Never Smoker    Smokeless tobacco: Never Used   Vaping Use    Vaping Use: Never used   Substance Use Topics    Alcohol use: Yes     Alcohol/week: 2 0 standard drinks     Types: 2 Cans of beer per week     Comment: daily    Drug use: Never       Review of Systems   Constitutional: Positive for fever  Negative for chills  HENT: Negative for ear pain and sore throat  Eyes: Negative for pain and visual disturbance  Respiratory: Negative for cough and shortness of breath  Cardiovascular: Negative for chest pain and palpitations  Gastrointestinal: Positive for diarrhea  Negative for abdominal pain and vomiting  Genitourinary: Negative for dysuria and hematuria  Musculoskeletal: Negative for arthralgias and back pain  Skin: Negative for color change and rash  Neurological: Positive for weakness  Negative for seizures and syncope  All other systems reviewed and are negative  Physical Exam  Physical Exam  Vitals and nursing note reviewed  Constitutional:       Appearance: He is well-developed  HENT:      Head: Normocephalic and atraumatic        Right Ear: External ear normal       Left Ear: External ear normal       Nose: Nose normal       Mouth/Throat:      Mouth: Mucous membranes are moist    Eyes:      Extraocular Movements: Extraocular movements intact  Conjunctiva/sclera: Conjunctivae normal       Pupils: Pupils are equal, round, and reactive to light  Cardiovascular:      Rate and Rhythm: Regular rhythm  Tachycardia present  Pulses: Normal pulses  Heart sounds: Normal heart sounds  No murmur heard  Pulmonary:      Effort: Pulmonary effort is normal  No respiratory distress  Breath sounds: Normal breath sounds  Abdominal:      General: Bowel sounds are normal       Palpations: Abdomen is soft  Tenderness: There is abdominal tenderness (Mild diffuse)  There is no right CVA tenderness, left CVA tenderness, guarding or rebound  Musculoskeletal:         General: No deformity  Normal range of motion  Cervical back: Normal range of motion and neck supple  Skin:     General: Skin is warm and dry  Capillary Refill: Capillary refill takes less than 2 seconds  Neurological:      General: No focal deficit present  Mental Status: He is alert and oriented to person, place, and time  Mental status is at baseline     Psychiatric:         Mood and Affect: Mood normal          Behavior: Behavior normal          Vital Signs  ED Triage Vitals   Temperature Pulse Respirations Blood Pressure SpO2   08/24/22 2124 08/24/22 2124 08/24/22 2124 08/24/22 2124 08/24/22 2124   (!) 101 °F (38 3 °C) 97 18 170/97 94 %      Temp Source Heart Rate Source Patient Position - Orthostatic VS BP Location FiO2 (%)   08/24/22 2124 08/25/22 0150 08/25/22 0150 08/24/22 2124 --   Tympanic Monitor Lying Right arm       Pain Score       08/24/22 2153       Med Not Given for Pain - for MAR use only           Vitals:    08/24/22 2124 08/25/22 0133 08/25/22 0145 08/25/22 0150   BP: 170/97  (!) 174/100 (!) 174/100   Pulse: 97 85 86 84   Patient Position - Orthostatic VS:    Lying         Visual Acuity      ED Medications  Medications   acetaminophen (TYLENOL) tablet 650 mg (650 mg Oral Given 8/24/22 2153)   sodium chloride 0 9 % bolus 1,000 mL (0 mL Intravenous Stopped 8/25/22 0135)   iohexol (OMNIPAQUE) 350 MG/ML injection (SINGLE-DOSE) 80 mL (80 mL Intravenous Given 8/24/22 2235)       Diagnostic Studies  Results Reviewed     Procedure Component Value Units Date/Time    Urine Microscopic [[id number]]  (Abnormal) Collected: 08/24/22 2318    Lab Status: Final result Specimen: Urine, Clean Catch Updated: 08/24/22 2338     RBC, UA 1-2 /hpf      WBC, UA 0-1 /hpf      Epithelial Cells Occasional /hpf      Bacteria, UA None Seen /hpf      MUCUS THREADS Occasional    UA w Reflex to Microscopic w Reflex to Culture [[id number]]  (Abnormal) Collected: 08/24/22 2318    Lab Status: Final result Specimen: Urine, Clean Catch Updated: 08/24/22 2324     Color, UA Yellow     Clarity, UA Clear     Specific Ronan, UA 1 020     pH, UA 5 5     Leukocytes, UA Negative     Nitrite, UA Negative     Protein, UA Negative mg/dl      Glucose, UA Negative mg/dl      Ketones, UA Negative mg/dl      Urobilinogen, UA 0 2 E U /dl      Bilirubin, UA Negative     Occult Blood, UA Trace-Intact    FLU/RSV/COVID - if FLU/RSV clinically relevant [[id number]]  (Abnormal) Collected: 08/24/22 2137    Lab Status: Final result Specimen: Nares from Nose Updated: 08/24/22 2232     SARS-CoV-2 Positive     INFLUENZA A PCR Negative     INFLUENZA B PCR Negative     RSV PCR Negative    Narrative:      FOR PEDIATRIC PATIENTS - copy/paste COVID Guidelines URL to browser: [email or web address]    SARS-CoV-2 assay is a Nucleic Acid Amplification assay intended for the  qualitative detection of nucleic acid from SARS-CoV-2 in nasopharyngeal  swabs  Results are for the presumptive identification of SARS-CoV-2 RNA      Positive results are indicative of infection with SARS-CoV-2, the virus  causing COVID-19, but do not rule out bacterial infection or co-infection  with other viruses  Laboratories within the [place] and its  territories are required to report all positive results to the appropriate  public health authorities  Negative results do not preclude SARS-CoV-2  infection and should not be used as the sole basis for treatment or other  patient management decisions  Negative results must be combined with  clinical observations, patient history, and epidemiological information  This test has not been FDA cleared or approved  This test has been authorized by FDA under an Emergency Use Authorization  (EUA)  This test is only authorized for the duration of time the  declaration that circumstances exist justifying the authorization of the  emergency use of an in vitro diagnostic tests for detection of SARS-CoV-2  virus and/or diagnosis of COVID-19 infection under section 564(b)(1) of  the Act, 21 U  S C  173WSC-7(N)(4), unless the authorization is terminated  or revoked sooner  The test has been validated but independent review by FDA  and CLIA is pending  Test performed using [employer] Gene[employer]pert: This RT-PCR assay targets N2,  a region unique to SARS-CoV-2  A conserved region in the E-gene was chosen  for pan-Sarbecovirus detection which includes SARS-CoV-2  Procalcitonin [[id number]]  (Normal) Collected: 08/24/22 2155    Lab Status: Final result Specimen: Blood from Arm, Left Updated: 08/24/22 2221     Procalcitonin 0 10 ng/ml     Lactic acid [[id number]]  (Normal) Collected: 08/24/22 2155    Lab Status: Final result Specimen: Blood from Arm, Left Updated: 08/24/22 2219     LACTIC ACID 1 4 mmol/L     Narrative:      Result may be elevated if tourniquet was used during collection      Comprehensive metabolic panel [[id number]]  (Abnormal) Collected: 08/24/22 2155    Lab Status: Final result Specimen: Blood from Arm, Left Updated: 08/24/22 2215     Sodium 140 mmol/L      Potassium 3 7 mmol/L      Chloride 106 mmol/L      CO2 23 mmol/L      ANION GAP 11 mmol/L      BUN 18 mg/dL      Creatinine 1 38 mg/dL      Glucose 132 mg/dL      Calcium 9 9 mg/dL      AST 17 U/L      ALT 18 U/L      Alkaline Phosphatase 97 U/L      Total Protein 7 0 g/dL      Albumin 4 2 g/dL      Total Bilirubin 0 62 mg/dL      eGFR 51 ml/min/1 73sq m     Narrative:      [place] guidelines for Chronic Kidney Disease (CKD):     Stage 1 with normal or high GFR (GFR > 90 mL/min/1 73 square meters)    Stage 2 Mild CKD (GFR = 60-89 mL/min/1 73 square meters)    Stage 3A Moderate CKD (GFR = 45-59 mL/min/1 73 square meters)    Stage 3B Moderate CKD (GFR = 30-44 mL/min/1 73 square meters)    Stage 4 Severe CKD (GFR = 15-29 mL/min/1 73 square meters)    Stage 5 End Stage CKD (GFR <15 mL/min/1 73 square meters)  Note: GFR calculation is accurate only with a steady state creatinine    Lipase [[id number]]  (Normal) Collected: 08/24/22 2155    Lab Status: Final result Specimen: Blood from Arm, Left Updated: 08/24/22 2215     Lipase 16 u/L     Protime-INR [[id number]]  (Normal) Collected: 08/24/22 2155    Lab Status: Final result Specimen: Blood from Arm, Left Updated: 08/24/22 2206     Protime 14 0 seconds      INR 1 08    APTT [[id number]]  (Normal) Collected: 08/24/22 2155    Lab Status: Final result Specimen: Blood from Arm, Left Updated: 08/24/22 2206     PTT 35 seconds     CBC and differential [[id number]]  (Abnormal) Collected: 08/24/22 2153    Lab Status: Final result Specimen: Blood Updated: 08/24/22 2155     WBC 5 93 Thousand/uL      RBC 5 61 Million/uL      Hemoglobin 16 8 g/dL      Hematocrit 50 4 %      MCV 90 fL      MCH 29 9 pg      MCHC 33 3 g/dL      RDW 14 3 %      MPV 11 4 fL      Platelets 954 Thousands/uL      nRBC 0 /100 WBCs      Neutrophils Relative 74 %      Immat GRANS % 1 %      Lymphocytes Relative 10 %      Monocytes Relative 14 %      Eosinophils Relative 0 %      Basophils Relative 1 %      Neutrophils Absolute 4 41 Thousands/µL      Immature Grans Absolute 0 04 Thousand/uL      Lymphocytes Absolute 0 58 Thousands/µL      Monocytes Absolute 0 85 Thousand/µL      Eosinophils Absolute 0 01 Thousand/µL      Basophils Absolute 0 04 Thousands/µL     Blood culture #2 [[id number]] Collected: 08/24/22 2152    Lab Status: In process Specimen: Blood Updated: 08/24/22 2153    Blood culture #1 [[id number]] Collected: 08/24/22 2152    Lab Status: In process Specimen: Blood Updated: 08/24/22 2152                 CT abdomen pelvis with contrast   Final Result by Nancy Cruz MD (08/25 0015)      No acute abnormality            Workstation performed: [id number]         XR chest portable   ED Interpretation by Jewel Park MD (08/24 2153)   No acute cardiopulmonary abnormality                 Procedures  Procedures         ED Course  ED Course as of 08/25/22 0414   Wed Aug 24, 2022   2125 Blood Pressure: 170/97   2125 Temperature(!): 101 °F (38 3 °C)   2125 Pulse: 97   2125 Respirations: 18   2125 SpO2: 94 %   2217 Creatinine(!): 1 38  Slightly elevated from 3 months ago   2234 SARS-COV-2(!): Positive   2319 EKG interpreted by myself demonstrates sinus rhythm with a right bundle-branch block occasional PVC, nonspecific ST segment and T-wave abnormalities, unchanged from previous   u Aug 25, 2022   0121 Awaiting amb pulse ox   0145 Patient ambulatory pulse ox between 95 and 97%   0145 He is able to ambulate with shuffling gait which he states is baseline                            Initial Sepsis Screening     Row Name 08/24/22 2220                Is the patient's history suggestive of a new or worsening infection? Yes (Proceed)  -RC        Suspected source of infection suspect infection, source unknown  -RC        Are two or more of the following signs & symptoms of infection both present and new to the patient? Yes (Proceed)  -RC        Indicate SIRS criteria Hyperthemia > 38 3C (100 9F); Tachycardia > 90 bpm  -RC        If the answer is yes to both questions, suspicion of sepsis is present --        If severe sepsis is present AND tissue hypoperfusion perists in the hour after fluid resuscitation or lactate > 4, the patient meets criteria for SEPTIC SHOCK --        Are any of the following organ dysfunction criteria present within 6 hours of suspected infection and SIRS criteria that are NOT considered to be chronic conditions? No  -RC        Organ dysfunction --        Date of presentation of severe sepsis --        Time of presentation of severe sepsis --        Tissue hypoperfusion persists in the hour after crystalloid fluid administration, evidenced, by either: --        Was hypotension present within one hour of the conclusion of crystalloid fluid administration? --        Date of presentation of septic shock --        Time of presentation of septic shock --              User Key  (r) = Recorded By, (t) = Taken By, (c) = Cosigned By    [address] Name Provider Type    Shiva Gallegos MD Physician                              MDM  Number of Diagnoses or Management Options  COVID-19  Generalized weakness  Diagnosis management comments: 60-year-old male with history of Parkinson's disease presents the ED for evaluation of worsening generalized weakness  On exam he is resting comfortably in bed in no acute distress  Patient does meet SIRS criteria as he is febrile heart rate is greater than 90  Septic workup was ordered  Labs were grossly unremarkable other than mildly elevated creatinine compared to 3 months prior although this did not meet criteria for MULU  Patient did test positive for COVID-19  Upon reassessment, patient was resting comfortably in bed  He was able to ambulate with shuffling gait which he states is his baseline  There were no periods of desaturation on ambulating  I offered the patient admission to the hospital although he elects to be discharged home  He reports having and grandson at home that can help him    I advised him to call his primary care physician in the morning to discuss possible monoclonal antibody therapy  Patient expressed understanding  He was given strict return precautions  Amount and/or Complexity of Data Reviewed  Clinical lab tests: ordered and reviewed  Tests in the radiology section of CPT®: ordered and reviewed  Review and summarize past medical records: yes  Independent visualization of images, tracings, or specimens: yes        Disposition  Final diagnoses:   Generalized weakness   COVID-19     Time reflects when diagnosis was documented in both MDM as applicable and the Disposition within this note     Time User Action Codes Description Comment    8/24/2022 10:34 PM Check, Kirt Monk Add [R53 1] Generalized weakness     8/24/2022 10:34 PM Check, Kirt Monk Add [U07 1] COVID-19     8/24/2022 10:34 PM Check, Kirt Monk Modify [R53 1] Generalized weakness     8/24/2022 10:34 PM Check, Kirt Monk Modify [U07 1] COVID-19       ED Disposition     ED Disposition   Discharge    Condition   Stable    Date/Time   Thu Aug 25, 2022  1:46 AM    Comment   Ravinder Duran  discharge to home/self care                 Follow-up Information     Follow up With Specialties Details Why Contact Info Additional [address], DO Emergency Medicine, Internal Medicine Call   [address] [id number] [facility] Emergency Department Emergency Medicine  If symptoms worsen [address] Yara Reagan [phone]  [facility] Department, [address] [facility] [place], [address] [facility]          Discharge Medication List as of 8/25/2022  1:46 AM      CONTINUE these medications which have NOT CHANGED    Details   aspirin (ECOTRIN LOW STRENGTH) 81 mg EC tablet Take 81 mg by mouth daily, Historical Med      atorvastatin (LIPITOR) 80 mg tablet Take 1 tablet (80 mg total) by mouth daily, Starting Wed 5/29/2019, Print      carbidopa-levodopa (SINEMET)  mg per tablet Take 1 tablet by mouth 3 (three) times a day, Historical Med      meloxicam (MOBIC) 15 mg tablet Take 1 tablet (15 mg total) by mouth daily, Starting Wed 2/24/2021, Normal      PARoxetine (PAXIL) 20 mg tablet Take 20 mg by mouth daily, Historical Med             No discharge procedures on file      PDMP Review       Value Time User    PDMP Reviewed  Yes 2/24/2021 10:41 AM Joana Vasquez PA-C          ED Provider  Electronically Signed by           Avni Burnette MD  08/25/22 8535

## 2022-08-25 NOTE — DISCHARGE INSTRUCTIONS
Call your primary care physician to discuss possible monoclonal antibody therapy      Recommend you obtain an at home pulse oximeter and return to the emergency department if the level drops below 90%

## 2022-08-25 NOTE — ED NOTES
Assisted with ambulation around the room, patient tolerated well        Dariana Tracy RN  08/25/22 [id number]

## 2022-08-28 LAB
BACTERIA BLD CULT: NORMAL
BACTERIA BLD CULT: NORMAL

## 2022-08-30 LAB
BACTERIA BLD CULT: NORMAL
BACTERIA BLD CULT: NORMAL

## 2022-10-04 ENCOUNTER — EVALUATION (OUTPATIENT)
Dept: PHYSICAL THERAPY | Facility: CLINIC | Age: 72
End: 2022-10-04
Payer: MEDICARE

## 2022-10-04 DIAGNOSIS — G20 PARKINSON DISEASE (HCC): Primary | ICD-10-CM

## 2022-10-04 PROCEDURE — 97116 GAIT TRAINING THERAPY: CPT | Performed by: PHYSICAL THERAPIST

## 2022-10-04 PROCEDURE — 97162 PT EVAL MOD COMPLEX 30 MIN: CPT | Performed by: PHYSICAL THERAPIST

## 2022-10-04 NOTE — PROGRESS NOTES
PT Evaluation     Today's date: 10/4/2022  Patient name: Sukhjinder Baxter  : 1950  MRN: [id number]  Referring provider: Geraldine Albert DO  Dx:   Encounter Diagnosis     ICD-10-CM    1  Parkinson disease ([facility] )  G20                   Assessment  Assessment details: Pt is a 71 YOM who is returning to PT with hx of PD  Presents with shuffling gait, decreased endurance per 6 MWT, and some impairments in balance per FGA and is considered a fall risk  Overall function appeared to be stable compared to last testing in PT however he would benefit from skilled PT 2x/week for 4 weeks to decrease fall risk and maximize functional mobility  Goals  ST weeks  1  Independent with updated HEP   2  5 x STS < 12 sec to demo improved LE strength and balance    LT weeks  1  6 MWT > 1080 ft to demo improved endurance  2  FGA > 25/30 to demo improved balance and decreased fall risk     Plan  Planned therapy interventions: stretching, strengthening, postural training, home exercise program, gait training, therapeutic exercise, therapeutic activities, neuromuscular re-education, balance, balance/weight bearing training and coordination  Frequency: 2x week  Duration in weeks: 4  Plan of Care beginning date: 10/4/2022  Plan of Care expiration date: 2023  Treatment plan discussed with: patient        Subjective Evaluation    History of Present Illness  Mechanism of injury: About two months ago in August, he could not walk and went to ER and was discovered that he had COVID  COVID exacerbated his PD sxs  Feels like he is back to normal now  No changes in medications  Was last in PT in 2022  Was keeping up with exercises until he went to ER and was doing exercises a couple times per week then  Was initially diagnosed with PD about 2-3 years ago       Work: retired    Hobbies: going to different [employer]       Pain  No pain reported    Social Support  Steps to enter house: no  Stairs in house: no   Lives in: one-story house  Lives with: alone    Employment status: not working  Hand dominance: right    Patient Goals  Patient goal: to walk like he used to like when he last left PT        Objective    BP: 142/80     Balance Test 10/4   6 Minute Walk Test (ft): 930 ft no AD    FGA: 21/30    Gait Speed (ft/s): SSV = 0 87 m/s   5x Sit To Stand (s): 13 75 sec    TUG: 10 31  Cog 11, slower counting   Manual 10 57       Coordination Left Right   Heel To [place] WNL WNL   Finger To Nose     Rapid Alternating Movement     UE     LE Abnormal  abnormal       Sensation Left Right   Kinesthesia     Light Touch intact Intact    Sharp/Dull     2 Point Discrimination           Manual Muscle Testing - Hip Left Right   Flexion 5 5   Extension     Abduction     External Rotation       Manual Muscle Testing - Knee Left Right   Flexion 5 5   Extension 5 5     Manual Muscle Testing - Ankle Left Right   Doriflexion 5 5   Plantarflexion         Gait Assessment: shuffling gait, decreased arm swings, decreased trunk rotation          Re-eval Date: 11/4/22    Date 10/4       Visit Count 1       FOTO        Pain In        Pain Out             Precautions HTN, hx of CAD, hx of MI        Manuals                                        Neuro Re-Ed                                                                 Ther Ex                                                                        Ther Activity                        Gait Training         Vc for big steps, arm swings               Modalities

## 2022-10-06 ENCOUNTER — OFFICE VISIT (OUTPATIENT)
Dept: PHYSICAL THERAPY | Facility: CLINIC | Age: 72
End: 2022-10-06
Payer: MEDICARE

## 2022-10-06 DIAGNOSIS — G20 PARKINSON DISEASE (HCC): Primary | ICD-10-CM

## 2022-10-06 PROCEDURE — 97110 THERAPEUTIC EXERCISES: CPT

## 2022-10-06 PROCEDURE — 97112 NEUROMUSCULAR REEDUCATION: CPT

## 2022-10-06 NOTE — PROGRESS NOTES
Daily Note     Today's date: 10/6/2022  Patient name: Drew Ybarra  : 1950  MRN: [id number]  Referring provider: José Luis Knowles DO  Dx:   Encounter Diagnosis     ICD-10-CM    1  Parkinson disease ([facility] )  G20                   Subjective: I am doing great  Pt denies any recent falls      Objective: See treatment diary below      Assessment: Tolerated treatment well  Patient provided mod verbal/tactile cues prn for proper form and technique with exercises completed this date  Pt encourage carryover with HEP/daily  Pt stated understanding pt ambul with fwd trunk posture with decrease arm swing     Patient demonstrated fatigue post treatment and would benefit from continued PT      Plan: Continue per plan of care        Re-eval Date: 22    Date 10/4 10/6      Visit Count 1 2      FOTO        Pain In        Pain Out             Precautions HTN, hx of CAD, hx of MI        Manuals                                        Neuro Re-Ed         Seated  - up  - rock  - twist  - step  10x ea      Stand  - up  - rock  - twist  - step  10x ea  With hand/eye boost      STS  5x firm  12 44 sec  5x firm w/ bolster   13 37 sec  5x2 foam  25 09 sec   18 62 sec                                         Ther Ex                                                                        Ther Activity                        Gait Training         Vc for big steps, arm swings Clinic  Cues >arm swing, tall posture              Modalities

## 2022-10-10 ENCOUNTER — OFFICE VISIT (OUTPATIENT)
Dept: PHYSICAL THERAPY | Facility: CLINIC | Age: 72
End: 2022-10-10
Payer: MEDICARE

## 2022-10-10 DIAGNOSIS — G20 PARKINSON DISEASE (HCC): Primary | ICD-10-CM

## 2022-10-10 PROCEDURE — 97112 NEUROMUSCULAR REEDUCATION: CPT

## 2022-10-10 PROCEDURE — 97110 THERAPEUTIC EXERCISES: CPT

## 2022-10-10 NOTE — PROGRESS NOTES
Daily Note     Today's date: 10/10/2022  Patient name: Gianna Keita  : 1950  MRN: [id number]  Referring provider: Gio Burden DO  Dx:   Encounter Diagnosis     ICD-10-CM    1  Parkinson disease ([address] )  G20                   Subjective: Had a good weekend  Still have trouble with HEP      Objective: See treatment diary below      Assessment: Tolerated treatment well  Patient would benefit from continued PT      Plan: Continue per plan of care        Re-eval Date: 22    Date 10/4 10/6 10/10     Visit Count 1 2 3     FOTO        Pain In        Pain Out             Precautions HTN, hx of CAD, hx of MI        Manuals                                        Neuro Re-Ed         Seated  - up  - rock  - twist  - step  10x ea Up only  10x     Stand  - up  - rock  - twist  - step  Fwd  -Step back and bow  10x ea  With hand/eye boost 10x ea  With hand/eye boost     STS  5x firm  12 44 sec  5x firm w/ bolster   13 37 sec  5x2 foam  25 09 sec   18 62 sec    10x foam  10x foam w/ 4# DB     Multi Directional Step   8x  W/ and w/o metronome 60 bpm                               Ther Ex        Aerobic   Nustep  L3 10 min                                                             Ther Activity                        Gait Training         Vc for big steps, arm swings Clinic  Cues >arm swing, tall posture Clinic  Cues >arm swing, tall posture             Modalities

## 2022-10-12 NOTE — PROGRESS NOTES
Daily Note     Today's date: 10/13/2022  Patient name: Jose Roberto Nava  : 1950  MRN: [id number]  Referring provider: Leticia Self DO  Dx:   Encounter Diagnosis     ICD-10-CM    1  Parkinson disease ([facility] [address] )  G20                   Subjective: I feel ok today  Doing my HEP, have trouble with some of them      Objective: See treatment diary below      Assessment: Tolerated treatment well  Patient provided min-mod verbal/tactile cues prn for proper form and technique with exercises completed this date  ambul with decrease arm swing, step stride/ground clearance  Trial gait with canes / 1* focus arm swing  Patient demonstrated fatigue post treatment and would benefit from continued PT      Plan: Continue per plan of care        Re-eval Date: 22    Date 10/4 10/6 10/10 10/13    Visit Count 1 2 3 4    FOTO        Pain In        Pain Out             Precautions HTN, hx of CAD, hx of MI        Manuals                                        Neuro Re-Ed         Seated  - up  - rock  - twist  - step  10x ea Up only  10x Up only  10x    Stand  - up  - rock  - twist  - step  Fwd  -Step back and bow  10x ea  With hand/eye boost 10x ea  With hand/eye boost 10x ea  With hand/eye boost    STS  5x firm  12 44 sec  5x firm w/ bolster   13 37 sec  5x2 foam  25 09 sec   18 62 sec    10x foam  10x foam w/ 4# DB 10x foam  10x foam w/ 4# DB    Multi Directional Step   8x  W/ and w/o metronome 60 bpm   8x  W/ and w/o metronome 60 bpm, firm/foam                            Ther Ex        Aerobic   Nustep  L3 10 min Nustep  L5 10 min                                                            Ther Activity                        Gait Training         Vc for big steps, arm swings Clinic  Cues >arm swing, tall posture Clinic  Cues >arm swing, tall posture Clinic/lobby with 2 canes 1* focus arm swing  10 min            Modalities

## 2022-10-13 ENCOUNTER — OFFICE VISIT (OUTPATIENT)
Dept: PHYSICAL THERAPY | Facility: CLINIC | Age: 72
End: 2022-10-13
Payer: MEDICARE

## 2022-10-13 DIAGNOSIS — G20 PARKINSON DISEASE (HCC): Primary | ICD-10-CM

## 2022-10-13 PROCEDURE — 97112 NEUROMUSCULAR REEDUCATION: CPT

## 2022-10-13 PROCEDURE — 97110 THERAPEUTIC EXERCISES: CPT

## 2022-10-18 ENCOUNTER — OFFICE VISIT (OUTPATIENT)
Dept: PHYSICAL THERAPY | Facility: CLINIC | Age: 72
End: 2022-10-18
Payer: MEDICARE

## 2022-10-18 DIAGNOSIS — G20 PARKINSON DISEASE (HCC): Primary | ICD-10-CM

## 2022-10-18 PROCEDURE — 97110 THERAPEUTIC EXERCISES: CPT

## 2022-10-18 PROCEDURE — 97112 NEUROMUSCULAR REEDUCATION: CPT

## 2022-10-18 NOTE — PROGRESS NOTES
Daily Note     Today's date: 10/18/2022  Patient name: Eugenia Ospina  : 1950  MRN: [id number]  Referring provider: Anjelica Montgomery DO  Dx:   Encounter Diagnosis     ICD-10-CM    1  Parkinson disease ([facility] [address] )  G20                   Subjective: Struggling with HEP, not sure if I am doing them 100% correctly      Objective: See treatment diary below      Assessment: Tolerated treatment well  Patient provided min verbal/tactile cues prn for proper form and technique with exercises completed this date  Pt encourage compliance/carryover with HEP to improve calibration   Patient would benefit from continued PT      Plan: Continue per plan of care        Re-eval Date: 22    Date 10/4 10/6 10/10 10/13 10/18   Visit Count 1 2 3 4 5   FOTO        Pain In        Pain Out             Precautions HTN, hx of CAD, hx of MI        Manuals                                        Neuro Re-Ed         Seated  - up  - rock  - twist  - step  10x ea Up only  10x Up only  10x Up only  10x   Stand  - up  - rock  - twist  - step  Fwd  -Step back and bow  10x ea  With hand/eye boost 10x ea  With hand/eye boost 10x ea  With hand/eye boost 10x ea  With hand/eye boost   STS  5x firm  12 44 sec  5x firm w/ bolster   13 37 sec  5x2 foam  25 09 sec   18 62 sec    10x foam  10x foam w/ 4# DB 10x foam  10x foam w/ 4# DB 10x foam  10x foam w/ 4# DB   Multi Directional Step   8x  W/ and w/o metronome 60 bpm   8x  W/ and w/o metronome 60 bpm, firm/foam 8x  W/ and w/o metronome 60/65/70 bpm, firm/foam                           Ther Ex        Aerobic   Nustep  L3 10 min Nustep  L5 10 min                                                            Ther Activity                        Gait Training         Vc for big steps, arm swings Clinic  Cues >arm swing, tall posture Clinic  Cues >arm swing, tall posture Clinic/lobby with 2 canes 1* focus arm swing  10 min Clinic/lobby / outside with 2 canes 1* focus arm swing, head turns  10 min Modalities

## 2022-10-20 ENCOUNTER — OFFICE VISIT (OUTPATIENT)
Dept: PHYSICAL THERAPY | Facility: CLINIC | Age: 72
End: 2022-10-20
Payer: MEDICARE

## 2022-10-20 DIAGNOSIS — G20 PARKINSON DISEASE (HCC): Primary | ICD-10-CM

## 2022-10-20 PROCEDURE — 97112 NEUROMUSCULAR REEDUCATION: CPT

## 2022-10-20 PROCEDURE — 97110 THERAPEUTIC EXERCISES: CPT

## 2022-10-20 NOTE — PROGRESS NOTES
Daily Note     Today's date: 10/20/2022  Patient name: Stephanie Luna  : 1950  MRN: [id number]  Referring provider: Beata Paz DO  Dx:   Encounter Diagnosis     ICD-10-CM    1  Parkinson disease ([facility] [address] )  G20                   Subjective: I do my exer when I think of it  Think I am doing HEP better but still not 100% sure if they are right  Pt denies any recent falls        Objective: See treatment diary below      Assessment: Tolerated treatment fairly well  Denied any increase pain/discomfort with pt reporting having difficulty with R hip / old hip fx get up out of be in am  Patient provided  Min verbal/tactile cues prn for proper form and technique with exercises completed this date with overall improvement with memory recall  Pt struggled with > step strides in all planes  Decrease ground clearance / shuffle gait today  Patient demonstrated fatigue post treatment and would benefit from continued PT      Plan: Continue per plan of care        Re-eval Date: 22    Date 10/20       Visit Count 6       FOTO        Pain In        Pain Out             Precautions HTN, hx of CAD, hx of MI        Manuals                                        Neuro Re-Ed         Seated  - up  - rock  - twist  - step Up only  10x  timed       Stand  - up  - rock  - twist  - step  Fwd  -Step back and bow 10x ea  10x f/b 10x double time  With hand/eye/voice boost       STS [id number] foam  10x foam w/ 4# DB       Multi Directional Step 5x  W/ and w/o metronome 60/65/70 bpm, firm       Floor transfer 10 min  With all 4's dynamic reach/ WS/ and step       Chair <>chair With turn nego  10x  Cues > step stride               Ther Ex        Aerobic SRC  10 min  Cues > RPM                                                               Ther Activity                        Gait Training         Clinic/lobby / outside with 2 canes 1* focus arm swing, head turns  10 min               Modalities

## 2022-10-24 NOTE — PROGRESS NOTES
Daily Note     Today's date: 10/25/2022  Patient name: Michael Lu  : 1950  MRN: [id number]  Referring provider: Andrew Martin DO  Dx:   Encounter Diagnosis     ICD-10-CM    1  Parkinson disease ([facility] [address] )  G20                   Subjective: No new co's  I am very stiff today  Denies any recent falls      Objective: See treatment diary below      Assessment: Tolerated treatment well  Patient provided min verbal  cues prn for proper form and technique with exercises completed this date  Pt ambul with decrease ground clearance, arm swing, ant trunk posture    Patient would benefit from continued PT      Plan: Continue per plan of care        Re-eval Date: 22    Date 10/20 10/25      Visit Count 6 7      FOTO        Pain In        Pain Out             Precautions HTN, hx of CAD, hx of MI        Manuals                                        Neuro Re-Ed         Seated  - up  - rock  - twist  - step Up only  10x  timed Seated   Out/down/up/back 10x     Supine  Rock/twist  Focus bed mobility with eye boost  8 min        Stand  - up  - rock  - twist  - step  Fwd  -Step back and bow 10x ea  10x f/b 10x double time  With hand/eye/voice boost 10x ea  10x f/b 10x double time  With hand/eye/voice boost      STS [id number] foam  10x foam w/ 4# DB 5x chair   5x chair with foam/feet  5x 12"+foam  0UE      Multi Directional Step 5x  W/ and w/o metronome 60/65/70 bpm, firm 6x  With eye/hand boosts      Floor transfer 10 min  With all 4's dynamic reach/ WS/ and step       Chair <>chair With turn nego  10x  Cues > step stride               Ther Ex        Aerobic SRC  10 min  Cues > RPM Nustep  L4 10 min                                                              Ther Activity                        Gait Training         Clinic/lobby / outside with 2 canes 1* focus arm swing, head turns  10 min Clinic focus on turn negotiation  Cues > arm swing              Modalities

## 2022-10-25 ENCOUNTER — OFFICE VISIT (OUTPATIENT)
Dept: PHYSICAL THERAPY | Facility: CLINIC | Age: 72
End: 2022-10-25
Payer: MEDICARE

## 2022-10-25 DIAGNOSIS — G20 PARKINSON DISEASE (HCC): Primary | ICD-10-CM

## 2022-10-25 PROCEDURE — 97116 GAIT TRAINING THERAPY: CPT

## 2022-10-25 PROCEDURE — 97110 THERAPEUTIC EXERCISES: CPT

## 2022-10-25 PROCEDURE — 97112 NEUROMUSCULAR REEDUCATION: CPT

## 2022-10-26 NOTE — PROGRESS NOTES
Daily Note     Today's date: 10/27/2022  Patient name: Brett Grady  : 1950  MRN: [id number]  Referring provider: Rama Elizalde DO  Dx:   Encounter Diagnosis     ICD-10-CM    1  Parkinson disease ([facility] [address] )  G20                   Subjective: No new co's  Try to do my HEP still not sure if doing the right or not      Objective: See treatment diary below      Assessment: Tolerated treatment well  Denied any increase pain/discomfort  Denies any need for seated rest  Progressed with double time with neuro exer and > challenges with divided attention  Cont's to have difficulty with retro step > LOB, <step stride  Patient would benefit from continued PT      Plan: Continue per plan of care        Re-eval Date: 22    Date 10/20 10/25 10/27     Visit Count 6 7 8     FOTO - DC   Completed     Pain In        Pain Out             Precautions HTN, hx of CAD, hx of MI        Manuals                                        Neuro Re-Ed         Seated  - up  - rock  - twist  - step Up only  10x  timed Seated   Out/down/up/back 10x     Supine  Rock/twist  Focus bed mobility with eye boost  8 min   Seated   Out/down/up/back 10x          Stand  - up  - rock  - twist  - step  Fwd  -Step back and bow 10x ea  10x f/b 10x double time  With hand/eye/voice boost 10x ea  10x f/b 10x double time  With hand/eye/voice boost 10x ea  10x f/b 10x double time  With hand/eye/voice boost  Trial divided attention     STS [id number] foam  10x foam w/ 4# DB 5x chair   5x chair with foam/feet  5x 12"+foam  0UE 5x chair   5x chair with foam/feet  5x 12"+foam  0UE     Multi Directional Step 5x  W/ and w/o metronome 60/65/70 bpm, firm 6x  With eye/hand boosts 6x  With eye/hand boosts     Floor transfer 10 min  With all 4's dynamic reach/ WS/ and step       Chair <>chair With turn nego  10x  Cues > step stride          Posterior ankle strategies w/ TB  5x  Cues > step stride     Ther Ex        Aerobic SRC  10 min  Cues > RPM Nustep  L4 10 min Nustep  L4 10 min                                                             Ther Activity                        Gait Training         Clinic/lobby / outside with 2 canes 1* focus arm swing, head turns  10 min Clinic focus on turn negotiation  Cues > arm swing              Modalities

## 2022-10-27 ENCOUNTER — EVALUATION (OUTPATIENT)
Dept: SPEECH THERAPY | Facility: CLINIC | Age: 72
End: 2022-10-27
Payer: MEDICARE

## 2022-10-27 ENCOUNTER — OFFICE VISIT (OUTPATIENT)
Dept: PHYSICAL THERAPY | Facility: CLINIC | Age: 72
End: 2022-10-27
Payer: MEDICARE

## 2022-10-27 DIAGNOSIS — G20 PARKINSON DISEASE (HCC): Primary | ICD-10-CM

## 2022-10-27 DIAGNOSIS — R41.841 COGNITIVE COMMUNICATION DEFICIT: ICD-10-CM

## 2022-10-27 DIAGNOSIS — G20 PARKINSON'S DISEASE (HCC): Primary | ICD-10-CM

## 2022-10-27 PROCEDURE — 97110 THERAPEUTIC EXERCISES: CPT

## 2022-10-27 PROCEDURE — 97112 NEUROMUSCULAR REEDUCATION: CPT

## 2022-10-27 PROCEDURE — 96125 COGNITIVE TEST BY HC PRO: CPT

## 2022-10-27 NOTE — PROGRESS NOTES
Speech-Language Pathology Initial Evaluation    Today's date: 10/27/2022  Patient’s name: Tony Lemons  : 1950  MRN: [id number]  Safety measures: Fall risk  Referring provider: Last Watkins DO    Encounter Diagnosis     ICD-10-CM    1  Parkinson's disease ([facility] [address] )  G20    2  Cognitive communication deficit  [id number]  841          Visit tracking:  -Referring provider: Non-Epic  -Billing guidelines: CMS  -Visit #1/10  -Insurance: Medicare  -RE due 22    Subjective comments: Patient is a 70 y/o male, dx with Parkinson's disease ~1 5 years ago  Patient presenting for a cognitive evaluation due to decline in memory  Patient currently is receiving PT services  How did the patient hear about us? Prior patient    Patient's goal(s): "Just to remember a little more"    Reason for referral: Change in cognitive status  Prior functional status: Communication effective and appropriate in all situations  Clinically complex situations: N/A    Hearing: Bilateral hearing aids  Vision: Prescription glasses    Home environment/lifestyle: Lives alone  Highest level of education: Graduated high school, college for [employer]  Vocational status: Retired; boat rental business owner    Mental status: Alert  Behavior status: Cooperative  Communication modalities: Verbal  Rehabilitation prognosis: Good rehab potential to reach the established goals    Assessments    COGNITIVE CHECKLIST:  *Patient indicated that he is experiencing difficulties in the following areas:    · Memory: Remembering what people have told you, Remembering peoples' names and Remembering the date/day of the week    · Attention: Easily distracted and Losing your train of thought    · Processing: Responding to questions in a timely manner     · Executive Functions: None as reported by patient      · Communication: Word finding in conversation and Expressing thoughts and ideas fluently    · Visual: Change in handwriting (i e , sloppier)    · Emotional: Increased anxiety    · Increased Sensitivities to: None as reported by patient  The Repeatable Battery for the Assessment of Neuropsychological Status (RBANS) is a brief, individually-administered assessment which measures attention, language, visuospatial/constructional abilities, and immediate & delayed memory  The RBANS is intended for use with adolescents to adults, ages 15 to 80 years  The following results were obtained during the administration of the assessment  Form: A     Cognitive Domain/Subtest: Index Score: Percentile Rank: Classification:   IMMEDIATE MEMORY 61 0 5%ile Extremely Low        1  List Learning (13/40)        2  Story Memory (10/24)       VISUOSPATIAL/  CONSTRUCTIONAL 96 39%ile Average        3  Figure Copy (20/20)        4  Line Orientation (11/20)       LANGUAGE 85 16%ile Low Average        5  Picture Naming (10/10)        6  Semantic Fluency (11/40)       ATTENTION 97 42%ile Average        7  Digit Span (16/16)        8  Coding (17/89)       DELAYED MEMORY 64 1%ile Extremely Low        9  List Recall (3/10)        10  List Recognition (15/20)        11  Story Recall (2/12)        12  Figure Recall (9/20)         Sum of Index Scores:  403   Total Score:  76   Percentile: 5%ile   Classification: Borderline       *Patient named 11 concrete category members (fruits/vegetables) in 60 sec (norm=15+)  -- BELOW AVERAGE      Goals    Short-term goals:  1  Patient will be educated on word finding strategies (i e , circumlocution) for improved generative naming and verbal expression skills  2  Patient will facilitate planning by completing thought organization tasks (e g , sequencing, deduction puzzles, etc ) with 80% accuracy to facilitate increased executive functioning, working memory, problem solving, and processing skills, to be achieved in 4-6 weeks      3  Patient will complete auditory immediate and short term memory tasks to 80% accuracy to facilitate increased ability to retell narratives and recall information within functional living environment, to be achieved in 4-6 weeks  4  To target mental manipulation and working memory, patient will participate in word finding activity (i e , anagrams) with 80% accuracy, to be achieved in 4-6 weeks  5  Patient will answer questions regarding story read aloud with 80% accuracy to facilitate improved auditory comprehension and recall, to be achieved in 4-6 weeks  6  Patient will demonstrate sustained and divided attention on tasks of increasing complexity with 80% accuracy, to be achieved in 4-6 weeks  7  Patient will complete concrete and abstract categorization tasks to 80% accuracy to facilitate improved generative naming skills and working memory, to be achieved in 4-6 weeks  8  Patient will be educated on the use of internal and external memory aids and compensatory strategies with 80% accuracy to facilitate increased recall of routine, personal information, and recent events, to be achieved in 4-6 weeks  Long-term goals:  1  Patient will develop functional, cognitive-linguistic based skills and utilize compensatory strategies as needed to communicate effectively to different conversational partners, maintain safety, and participate socially by discharge  2  Patient will improve ability to facilitate cognitive function and communication skills including use of compensatory strategies in a variety of functional living tasks to improve quality of like and to maximize level of independence  Impressions/Recommendations    Impressions:   -Patient presents with moderate cognitive-linguistic deficits characterized primarily by decline in memory and attention  Patient completed RBANS form A today, scoring overall as in the "borderline" range   In individual subsections, patient scoring "extremely low" in immediate/delayed memory, "low average" in language, and "average" in attention and visuospatial/constructional skills  Recommend skilled OP speech therapy services to focus on compensatory strategies and maximizing cognitive functioning in the home, as patient lives independently      Recommendations:  -Patient would benefit from outpatient skilled Speech Therapy services: Cognitive-linguistic therapy    -Frequency: 2x weekly  -Duration: 4-6 weeks    -Intervention certification from: 91/75/4514  -Intervention certification to: 00/53/9145    -Intervention comments:   IM, strategies, immediate memory (mental manipulation, immediate recall)

## 2022-10-27 NOTE — LETTER
November 3, 2022    [place]  [address]  [facility] [address]    Patient: Sandi Jaramillo  YOB: 1950   Date of Visit: 10/27/2022     Encounter Diagnosis     ICD-10-CM    1  Parkinson's disease ([facility] [address] )  G20    2  Cognitive communication deficit  R41 841        Dear Dr Nadir Yeager:    Thank you for your recent referral of Sandi Jaramillo    Please review the attached evaluation summary from Kingsley's recent visit  Please verify that you agree with the plan of care by signing the attached order  If you have any questions or concerns, please do not hesitate to call  I sincerely appreciate the opportunity to share in the care of one of your patients and hope to have another opportunity to work with you in the near future  Sincerely,    Marge Perez, SLP      Referring Provider:     Based upon review of the patient's progress and continued therapy plan, it is my medical opinion that José Luis Huertas should continue speech therapy treatment at the Speech Therapy at [address] [place] [address]  Ave:                    Ronald White, [address]  Via Fax: [phone]        Speech-Language Pathology Initial Evaluation    Today's date: 10/27/2022  Patient’s name: Sandi Jaramillo  : 1950  MRN: [id number]  Safety measures: Fall risk  Referring provider: Kacey Khan DO    Encounter Diagnosis     ICD-10-CM    1  Parkinson's disease ([facility] [address] )  G20    2  Cognitive communication deficit  R41  841          Visit tracking:  -Referring provider: Non-Epic  -Billing guidelines: CMS  -Visit #1/10  -Insurance: Medicare  -RE due 22    Subjective comments: Patient is a 72 y/o male, dx with Parkinson's disease ~1 5 years ago  Patient presenting for a cognitive evaluation due to decline in memory  Patient currently is receiving PT services  How did the patient hear about us?  Prior patient    Patient's goal(s): "Just to remember a little more"    Reason for referral: Change in cognitive status  Prior functional status: Communication effective and appropriate in all situations  Clinically complex situations: N/A    Hearing: Bilateral hearing aids  Vision: Prescription glasses    Home environment/lifestyle: Lives alone  Highest level of education: Graduated high school, college for [employer]  Vocational status: Retired; boat rental business owner    Mental status: Alert  Behavior status: Cooperative  Communication modalities: Verbal  Rehabilitation prognosis: Good rehab potential to reach the established goals    Assessments    COGNITIVE CHECKLIST:  *Patient indicated that he is experiencing difficulties in the following areas:    · Memory: Remembering what people have told you, Remembering peoples' names and Remembering the date/day of the week    · Attention: Easily distracted and Losing your train of thought    · Processing: Responding to questions in a timely manner     · Executive Functions: None as reported by patient  · Communication: Word finding in conversation and Expressing thoughts and ideas fluently    · Visual: Change in handwriting (i e , sloppier)    · Emotional: Increased anxiety    · Increased Sensitivities to: None as reported by patient  The Repeatable Battery for the Assessment of Neuropsychological Status (RBANS) is a brief, individually-administered assessment which measures attention, language, visuospatial/constructional abilities, and immediate & delayed memory  The RBANS is intended for use with adolescents to adults, ages 15 to 80 years  The following results were obtained during the administration of the assessment  Form: A     Cognitive Domain/Subtest: Index Score: Percentile Rank: Classification:   IMMEDIATE MEMORY 61 0 5%ile Extremely Low        1  List Learning (13/40)        2  Story Memory (10/24)       VISUOSPATIAL/  CONSTRUCTIONAL 96 39%ile Average        3  Figure Copy (20/20)        4   Line Orientation (11/20)       LANGUAGE 85 16%ile Low Average        5  Picture Naming (10/10)        6  Semantic Fluency (11/40)       ATTENTION 97 42%ile Average        7  Digit Span (16/16)        8  Coding (17/89)       DELAYED MEMORY 64 1%ile Extremely Low        9  List Recall (3/10)        10  List Recognition (15/20)        11  Story Recall (2/12)        12  Figure Recall (9/20)         Sum of Index Scores:  403   Total Score:  76   Percentile: 5%ile   Classification: Borderline       *Patient named 11 concrete category members (fruits/vegetables) in 60 sec (norm=15+)  -- BELOW AVERAGE      Goals    Short-term goals:  1  Patient will be educated on word finding strategies (i e , circumlocution) for improved generative naming and verbal expression skills  2  Patient will facilitate planning by completing thought organization tasks (e g , sequencing, deduction puzzles, etc ) with 80% accuracy to facilitate increased executive functioning, working memory, problem solving, and processing skills, to be achieved in 4-6 weeks  3  Patient will complete auditory immediate and short term memory tasks to 80% accuracy to facilitate increased ability to retell narratives and recall information within functional living environment, to be achieved in 4-6 weeks  4  To target mental manipulation and working memory, patient will participate in word finding activity (i e , anagrams) with 80% accuracy, to be achieved in 4-6 weeks  5  Patient will answer questions regarding story read aloud with 80% accuracy to facilitate improved auditory comprehension and recall, to be achieved in 4-6 weeks  6  Patient will demonstrate sustained and divided attention on tasks of increasing complexity with 80% accuracy, to be achieved in 4-6 weeks  7  Patient will complete concrete and abstract categorization tasks to 80% accuracy to facilitate improved generative naming skills and working memory, to be achieved in 4-6 weeks      8  Patient will be educated on the use of internal and external memory aids and compensatory strategies with 80% accuracy to facilitate increased recall of routine, personal information, and recent events, to be achieved in 4-6 weeks  Long-term goals:  1  Patient will develop functional, cognitive-linguistic based skills and utilize compensatory strategies as needed to communicate effectively to different conversational partners, maintain safety, and participate socially by discharge  2  Patient will improve ability to facilitate cognitive function and communication skills including use of compensatory strategies in a variety of functional living tasks to improve quality of like and to maximize level of independence  Impressions/Recommendations    Impressions:   -Patient presents with moderate cognitive-linguistic deficits characterized primarily by decline in memory and attention  Patient completed RBANS form A today, scoring overall as in the "borderline" range  In individual subsections, patient scoring "extremely low" in immediate/delayed memory, "low average" in language, and "average" in attention and visuospatial/constructional skills  Recommend skilled OP speech therapy services to focus on compensatory strategies and maximizing cognitive functioning in the home, as patient lives independently      Recommendations:  -Patient would benefit from outpatient skilled Speech Therapy services: Cognitive-linguistic therapy    -Frequency: 2x weekly  -Duration: 4-6 weeks    -Intervention certification from: 14/48/1749  -Intervention certification to: 23/80/3435    -Intervention comments:   IM, strategies, immediate memory (mental manipulation, immediate recall)

## 2022-11-01 ENCOUNTER — EVALUATION (OUTPATIENT)
Dept: PHYSICAL THERAPY | Facility: CLINIC | Age: 72
End: 2022-11-01

## 2022-11-01 DIAGNOSIS — G20 PARKINSON DISEASE (HCC): Primary | ICD-10-CM

## 2022-11-01 NOTE — PROGRESS NOTES
Daily Note     Today's date: 2022  Patient name: Ashlee Iniguez  : 1950  MRN: [id number]  Referring provider: Edgardo Jaimes DO  Dx:   Encounter Diagnosis     ICD-10-CM    1  Parkinson disease ([facility] [address] )  G20                   Subjective: I am doing my HEP but not always 100% sure if doing them properly       Objective: See treatment diary below    Balance Test 10/4 11/1   6 Minute Walk Test (ft): 930 ft no AD  1,052 ft no AD   FGA:    Gait Speed (ft/s): SSV = 0 87 m/s SSV = 0 92 m/s   5x Sit To Stand (s): 13 75 sec  10 63 sec   TUG: 10 31  Cog 11, slower counting   Manual 10 57 6 35 sec  6 87 sec with counting          Assessment: Tolerated treatment well   Patient 1* focus review HEP      Plan: DC to HEP with pt to benefit with f/u PT 4-5 months     Re-eval Date: 22    Date 10/20 10/25 10/27 11/1    Visit Count 6 7 8 9    FOTO - DC   Completed     Pain In        Pain Out             Precautions HTN, hx of CAD, hx of MI        Manuals                                        Neuro Re-Ed         Seated  - up  - rock  - twist  - step Up only  10x  timed Seated   Out/down/up/back 10x     Supine  Rock/twist  Focus bed mobility with eye boost  8 min   Seated   Out/down/up/back 10x      Seated   Out/down/up/back 10x     Stand  - up  - rock  - twist  - step  Fwd  -Step back and bow 10x ea  10x f/b 10x double time  With hand/eye/voice boost 10x ea  10x f/b 10x double time  With hand/eye/voice boost 10x ea  10x f/b 10x double time  With hand/eye/voice boost  Trial divided attention 10x ea   Focus on self set up  Min cuing    STS [id number] foam  10x foam w/ 4# DB 5x chair   5x chair with foam/feet  5x 12"+foam  0UE 5x chair   5x chair with foam/feet  5x 12"+foam  0UE     Multi Directional Step 5x  W/ and w/o metronome 60/65/70 bpm, firm 6x  With eye/hand boosts 6x  With eye/hand boosts     Floor transfer 10 min  With all 4's dynamic reach/ WS/ and step       Chair <>chair With turn nego  10x  Cues > step stride          Posterior ankle strategies w/ TB  5x  Cues > step stride Ankle strategies 3 way      Ther Ex        Aerobic SRC  10 min  Cues > RPM Nustep  L4 10 min Nustep  L4 10 min Nustep  L4 10 min                                                            Ther Activity                        Gait Training         Clinic/lobby / outside with 2 canes 1* focus arm swing, head turns  10 min Clinic focus on turn negotiation  Cues > arm swing              Modalities

## 2022-11-01 NOTE — PROGRESS NOTES
Progress Note/Discharge     Today's date: 2022  Patient name: Ahsan Le  : 1950  MRN: [id number]  Referring provider: Severa Funk, DO  Dx:   Encounter Diagnosis     ICD-10-CM    1  Parkinson disease ([facility] [address] )  G20        Start Time: 1100  Stop Time: 1200  Total time in clinic (min): 60 minutes    Subjective: No pain reported  Ready for D/C  Objective: See daily note by CV, PTA    Assessment: Progress note completed this session  He demo significant improvement in LE strength and balance per 5 x STS  He also demo improved balance and endurance per FGA and 6 MWT compared to IE and he is no longer considered a fall risk  Overall pt has made good progress with PT and will be d/c from skilled PT at this time         Plan: Discharge

## 2022-11-03 ENCOUNTER — APPOINTMENT (OUTPATIENT)
Dept: PHYSICAL THERAPY | Facility: CLINIC | Age: 72
End: 2022-11-03

## 2022-11-03 ENCOUNTER — OFFICE VISIT (OUTPATIENT)
Dept: SPEECH THERAPY | Facility: CLINIC | Age: 72
End: 2022-11-03

## 2022-11-03 DIAGNOSIS — R41.841 COGNITIVE COMMUNICATION DEFICIT: ICD-10-CM

## 2022-11-03 DIAGNOSIS — G20 PARKINSON'S DISEASE (HCC): Primary | ICD-10-CM

## 2022-11-03 NOTE — PROGRESS NOTES
Speech-Language Pathology Treatment Note    Today's date: 11/3/2022  Patient’s name: Davy Rao  : 1950  MRN: [id number]  Safety measures: Fall risk  Referring provider: Dominikc Dominique DO    Encounter Diagnosis     ICD-10-CM    1  Parkinson's disease ([facility] [address] )  G20    2  Cognitive communication deficit  R41  841        Visit tracking:  -Referring provider: Non-Epic  -Billing guidelines: CMS  -Visit #2/10  -Insurance: Medicare  -RE due 22    Subjective/Behavioral: Patient arrived on time to session  Objective  Short-term goals:  1  Patient will be educated on word finding strategies (i e , circumlocution) for improved generative naming and verbal expression skills  2  Patient will facilitate planning by completing thought organization tasks (e g , sequencing, deduction puzzles, etc ) with 80% accuracy to facilitate increased executive functioning, working memory, problem solving, and processing skills, to be achieved in 4-6 weeks  3  Patient will complete auditory immediate and short term memory tasks to 80% accuracy to facilitate increased ability to retell narratives and recall information within functional living environment, to be achieved in 4-6 weeks  4  To target mental manipulation and working memory, patient will participate in word finding activity (i e , anagrams) with 80% accuracy, to be achieved in 4-6 weeks  5  Patient will answer questions regarding story read aloud with 80% accuracy to facilitate improved auditory comprehension and recall, to be achieved in 4-6 weeks  6  Patient will demonstrate sustained and divided attention on tasks of increasing complexity with 80% accuracy, to be achieved in 4-6 weeks  11/3 Color/shape with visuals completed, mod errors noted, increasing errors with sustained attention       7  Patient will complete concrete and abstract categorization tasks to 80% accuracy to facilitate improved generative naming skills and working memory, to be achieved in 4-6 weeks  11/3 Drinks: 7 in 1 minute  Farm animals: 8 in 1 minute  Breakfast: 8 in 1 minute  8  Patient will be educated on the use of internal and external memory aids and compensatory strategies with 80% accuracy to facilitate increased recall of routine, personal information, and recent events, to be achieved in 4-6 weeks  11/3 Educated patient on memory strategies  Set patient up with reminders on his phone for medication management  Goal met  9  Patient will complete delayed recall activities @ 80% lakia  11/3 Patient able to remember 4/4 lakia with 5 min delay with verbalization, repetition, visualization, and association strategies  Recalled 3/4 pictures lakia after 10 min delay  Long-term goals:  1  Patient will develop functional, cognitive-linguistic based skills and utilize compensatory strategies as needed to communicate effectively to different conversational partners, maintain safety, and participate socially by discharge  2  Patient will improve ability to facilitate cognitive function and communication skills including use of compensatory strategies in a variety of functional living tasks to improve quality of like and to maximize level of independence  Assessment: Patient doing well today       Plan: Continue plan of care    -Frequency: 2x weekly  -Duration: 4-6 weeks    -Intervention certification from: 59/5/8546  -Intervention certification to: 73/07/5881    -Intervention comments:   IM, strategies, immediate memory (mental manipulation, immediate recall)

## 2022-11-07 ENCOUNTER — OFFICE VISIT (OUTPATIENT)
Dept: SPEECH THERAPY | Facility: CLINIC | Age: 72
End: 2022-11-07

## 2022-11-07 DIAGNOSIS — G20 PARKINSON'S DISEASE (HCC): Primary | ICD-10-CM

## 2022-11-07 DIAGNOSIS — R41.841 COGNITIVE COMMUNICATION DEFICIT: ICD-10-CM

## 2022-11-07 NOTE — PROGRESS NOTES
Speech-Language Pathology Treatment Note    Today's date: 2022  Patient’s name: Wili Montoya  : 1950  MRN: [id number]  Safety measures: Fall risk  Referring provider: Sampson Finney DO    Encounter Diagnosis     ICD-10-CM    1  Parkinson's disease ([facility] [address] )  G20    2  Cognitive communication deficit  R41 841            Visit tracking:  -Referring provider: Non-Epic  -Billing guidelines: CMS  -Visit #4/10  -Insurance: Medicare  -RE due 22    Subjective/Behavioral: Patient arrived on time to session  Objective  Short-term goals:  1  Patient will be educated on word finding strategies (i e , circumlocution) for improved generative naming and verbal expression skills  2  Patient will facilitate planning by completing thought organization tasks (e g , sequencing, deduction puzzles, etc ) with 80% accuracy to facilitate increased executive functioning, working memory, problem solving, and processing skills, to be achieved in 4-6 weeks   Patient completed calendar deduction puzzle with min-mod cues  Rush Hour level 1 in 1:46 with min cueing  Rush hour level 2 in 2:48 with min cueing   Patient completed Rush Hour level 3 in 3:20 with one min cue      3  Patient will complete auditory immediate and short term memory tasks to 80% accuracy to facilitate increased ability to retell narratives and recall information within functional living environment, to be achieved in 4-6 weeks   Mental manipulation 3 words BWD @ 100% lakia  4 words BWD @ 100% lakia  4  To target mental manipulation and working memory, patient will participate in word finding activity (i e , anagrams) with 80% accuracy, to be achieved in 4-6 weeks   Patient completed rhyming homework (words ending in "ow"), feeling it was too easy  Provided with 2 additional worksheets       5  Patient will answer questions regarding story read aloud with 80% accuracy to facilitate improved auditory comprehension and recall, to be achieved in 4-6 weeks  11/7 Patient answering questions about short information/advertisements @ 73% lakia  6  Patient will demonstrate sustained and divided attention on tasks of increasing complexity with 80% accuracy, to be achieved in 4-6 weeks  11/3 Color/shape with visuals completed, mod errors noted, increasing errors with sustained attention  11/7 color/shape with visuals completed in 6:00, 2 errors noted, patient used verbalization strategy  11/8 Started IM today, patient finding visuals to be distracting as compared to auditory        Date Task Task Average SRO Early Late Variance Average   11/8 BH 1 min aud  " + visuals  "  " + guide sounds  " 71  90  69  88  102 8  10  12  8  4 94  92  82  94  94 6  8  18  6  6 45  72  56  58  75                                                               7  Patient will complete concrete and abstract categorization tasks to 80% accuracy to facilitate improved generative naming skills and working memory, to be achieved in 4-6 weeks  11/3 Drinks: 7 in 1 minute  Farm animals: 8 in 1 minute  Breakfast: 8 in 1 minute  11/7 Zoo animals: 9 in 1 minute  Foods: 9 in 1 minute  Fruit: 9 in 1 minute  11/8 Named abstract category given 3 words 14/15, added word 13/15  Famous people: 7 in 1 minute  Things you can wear: 15 in 1 minute  Things in the ocean: 7 in 1 minute  8  Patient will be educated on the use of internal and external memory aids and compensatory strategies with 80% accuracy to facilitate increased recall of routine, personal information, and recent events, to be achieved in 4-6 weeks  11/3 Educated patient on memory strategies  Set patient up with reminders on his phone for medication management  Goal met  9  Patient will complete delayed recall activities @ 80% lakia  11/3 Patient able to remember 4/4 lakia with 5 min delay with verbalization, repetition, visualization, and association strategies  Recalled 3/4 pictures lakia after 10 min delay   11/7 Patient able to remember 4/4 pictures lakia after 10 min delay  11/8 Patient able to recall 2/4 pictures lakia after 15 minute delay with visualization strategy  Long-term goals:  1  Patient will develop functional, cognitive-linguistic based skills and utilize compensatory strategies as needed to communicate effectively to different conversational partners, maintain safety, and participate socially by discharge  2  Patient will improve ability to facilitate cognitive function and communication skills including use of compensatory strategies in a variety of functional living tasks to improve quality of like and to maximize level of independence  Assessment: Patient doing well today        Plan: Continue plan of care    -Frequency: 2x weekly  -Duration: 4-6 weeks    -Intervention certification from: 61/5/5819  -Intervention certification to: 63/26/3333    -Intervention comments:   IM, strategies, immediate memory (mental manipulation, immediate recall)

## 2022-11-07 NOTE — PROGRESS NOTES
Speech-Language Pathology Treatment Note    Today's date: 2022  Patient’s name: Ubaldo Mcclure  : 1950  MRN: [id number]  Safety measures: Fall risk  Referring provider: Rosi Fong DO    Encounter Diagnosis     ICD-10-CM    1  Parkinson's disease ([facility] [address] )  G20    2  Cognitive communication deficit  R41  841          Visit tracking:  -Referring provider: Non-Epic  -Billing guidelines: CMS  -Visit #310  -Insurance: Medicare  -RE due 22    Subjective/Behavioral: Patient arrived on time to session  Patient reporting reminders on phone to take medication are helping  Objective  Short-term goals:  1  Patient will be educated on word finding strategies (i e , circumlocution) for improved generative naming and verbal expression skills  2  Patient will facilitate planning by completing thought organization tasks (e g , sequencing, deduction puzzles, etc ) with 80% accuracy to facilitate increased executive functioning, working memory, problem solving, and processing skills, to be achieved in 4-6 weeks   Patient completed calendar deduction puzzle with min-mod cues  Rush Hour level 1 in 1:46 with min cueing  Rush hour level 2 in 2:48 with min cueing  3  Patient will complete auditory immediate and short term memory tasks to 80% accuracy to facilitate increased ability to retell narratives and recall information within functional living environment, to be achieved in 4-6 weeks   Mental manipulation 3 words BWD @ 100% lakia  4 words BWD @ 100% lakia  4  To target mental manipulation and working memory, patient will participate in word finding activity (i e , anagrams) with 80% accuracy, to be achieved in 4-6 weeks  5  Patient will answer questions regarding story read aloud with 80% accuracy to facilitate improved auditory comprehension and recall, to be achieved in 4-6 weeks   Patient answering questions about short information/advertisements @ 73% lakia       6  Patient will demonstrate sustained and divided attention on tasks of increasing complexity with 80% accuracy, to be achieved in 4-6 weeks  11/3 Color/shape with visuals completed, mod errors noted, increasing errors with sustained attention  11/7 color/shape with visuals completed in 6:00, 2 errors noted, patient used verbalization strategy  7  Patient will complete concrete and abstract categorization tasks to 80% accuracy to facilitate improved generative naming skills and working memory, to be achieved in 4-6 weeks  11/3 Drinks: 7 in 1 minute  Farm animals: 8 in 1 minute  Breakfast: 8 in 1 minute  11/7 Zoo animals: 9 in 1 minute  Foods: 9 in 1 minute  Fruit: 9 in 1 minute  8  Patient will be educated on the use of internal and external memory aids and compensatory strategies with 80% accuracy to facilitate increased recall of routine, personal information, and recent events, to be achieved in 4-6 weeks  11/3 Educated patient on memory strategies  Set patient up with reminders on his phone for medication management  Goal met  9  Patient will complete delayed recall activities @ 80% lakia  11/3 Patient able to remember 4/4 lakia with 5 min delay with verbalization, repetition, visualization, and association strategies  Recalled 3/4 pictures lakia after 10 min delay  11/7 Patient able to remember 4/4 pictures lakia after 10 min delay  Long-term goals:  1  Patient will develop functional, cognitive-linguistic based skills and utilize compensatory strategies as needed to communicate effectively to different conversational partners, maintain safety, and participate socially by discharge  2  Patient will improve ability to facilitate cognitive function and communication skills including use of compensatory strategies in a variety of functional living tasks to improve quality of like and to maximize level of independence  Assessment: Patient doing well today       Plan: Continue plan of care    -Frequency: 2x weekly  -Duration: 4-6 weeks    -Intervention certification from: 99/5/9799  -Intervention certification to: 46/74/5739    -Intervention comments:   IM, strategies, immediate memory (mental manipulation, immediate recall)

## 2022-11-08 ENCOUNTER — OFFICE VISIT (OUTPATIENT)
Dept: SPEECH THERAPY | Facility: CLINIC | Age: 72
End: 2022-11-08

## 2022-11-08 DIAGNOSIS — R41.841 COGNITIVE COMMUNICATION DEFICIT: ICD-10-CM

## 2022-11-08 DIAGNOSIS — G20 PARKINSON'S DISEASE (HCC): Primary | ICD-10-CM

## 2022-11-14 ENCOUNTER — OFFICE VISIT (OUTPATIENT)
Dept: SPEECH THERAPY | Facility: CLINIC | Age: 72
End: 2022-11-14

## 2022-11-14 DIAGNOSIS — G20 PARKINSON'S DISEASE (HCC): ICD-10-CM

## 2022-11-14 DIAGNOSIS — R41.841 COGNITIVE COMMUNICATION DEFICIT: Primary | ICD-10-CM

## 2022-11-14 NOTE — PROGRESS NOTES
Speech-Language Pathology Treatment Note    Today's date: 2022  Patient’s name: Malka Self  : 1950  MRN: [id number]  Safety measures: Fall risk  Referring provider: Mary Ward DO    Encounter Diagnosis     ICD-10-CM    1  Cognitive communication deficit  R41 841    2  Parkinson's disease ([facility] [address] )  [address]          Visit tracking:  -Referring provider: Liv Nevarez  -Billing guidelines: CMS  -Visit #5/10  -Insurance: Medicare  -RE due 22    Subjective/Behavioral: Patient arrived on time to session  Objective  Short-term goals:  1  Patient will be educated on word finding strategies (i e , circumlocution) for improved generative naming and verbal expression skills  2  Patient will facilitate planning by completing thought organization tasks (e g , sequencing, deduction puzzles, etc ) with 80% accuracy to facilitate increased executive functioning, working memory, problem solving, and processing skills, to be achieved in 4-6 weeks   Patient completed calendar deduction puzzle with min-mod cues  Rush Hour level 1 in 1:46 with min cueing  Rush hour level 2 in 2:48 with min cueing   Patient completed Rush Hour level 3 in 3:20 with one min cue   Rush Hour level 4 in 3:53 with mod cueing  3  Patient will complete auditory immediate and short term memory tasks to 80% accuracy to facilitate increased ability to retell narratives and recall information within functional living environment, to be achieved in 4-6 weeks   Mental manipulation 3 words BWD @ 100% lakia  4 words BWD @ 100% lakia   Category coding 5 word increments @ 80% lakia, 100% lakia  Color coding increments of 10 @ 50% lakia  4  To target mental manipulation and working memory, patient will participate in word finding activity (i e , anagrams) with 80% accuracy, to be achieved in 4-6 weeks   Patient completed rhyming homework (words ending in "ow"), feeling it was too easy   Provided with 2 additional worksheets  11/14 Completed "it" word worksheet and word puzzle with 5 letter words successfully  Scattegories list 1 letter S completed today, able to think of 12/12 words  List 2 letter E completed, able to think of 8/12 words  5  Patient will answer questions regarding story read aloud with 80% accuracy to facilitate improved auditory comprehension and recall, to be achieved in 4-6 weeks  11/7 Patient answering questions about short information/advertisements @ 73% lakia  11/14 Answered questions about ads at 66% lakia  6  Patient will demonstrate sustained and divided attention on tasks of increasing complexity with 80% accuracy, to be achieved in 4-6 weeks  11/3 Color/shape with visuals completed, mod errors noted, increasing errors with sustained attention  11/7 color/shape with visuals completed in 6:00, 2 errors noted, patient used verbalization strategy  11/8 Started IM today, patient finding visuals to be distracting as compared to auditory        Date Task Task Average SRO Early Late Variance Average   11/8 BH 1 min aud  " + visuals  "  " + guide sounds  " 71  90  69  88  102 8  10  12  8  4 94  92  82  94  94 6  8  18  6  6 45  72  56  58  75                                                               7  Patient will complete concrete and abstract categorization tasks to 80% accuracy to facilitate improved generative naming skills and working memory, to be achieved in 4-6 weeks  11/3 Drinks: 7 in 1 minute  Farm animals: 8 in 1 minute  Breakfast: 8 in 1 minute  11/7 Zoo animals: 9 in 1 minute  Foods: 9 in 1 minute  Fruit: 9 in 1 minute  11/8 Named abstract category given 3 words 14/15, added word 13/15  Famous people: 7 in 1 minute  Things you can wear: 15 in 1 minute  Things in the ocean: 7 in 1 minute  11/14 Things that are soft: 6 in 1 minute  Things in the kitchen: 14 in 1 minute  Games: 10 in 1 minute       8  Patient will be educated on the use of internal and external memory aids and compensatory strategies with 80% accuracy to facilitate increased recall of routine, personal information, and recent events, to be achieved in 4-6 weeks  11/3 Educated patient on memory strategies  Set patient up with reminders on his phone for medication management  Goal met  9  Patient will complete delayed recall activities @ 80% lakia  11/3 Patient able to remember 4/4 lakia with 5 min delay with verbalization, repetition, visualization, and association strategies  Recalled 3/4 pictures lakia after 10 min delay  11/7 Patient able to remember 4/4 pictures lakia after 10 min delay  11/8 Patient able to recall 2/4 pictures lakia after 15 minute delay with visualization strategy  11/14 Patient able to recall 3/4 pictures lakia after 15 min delay with verbalization, visualization, and remembering first strategy, association strategies  Long-term goals:  1  Patient will develop functional, cognitive-linguistic based skills and utilize compensatory strategies as needed to communicate effectively to different conversational partners, maintain safety, and participate socially by discharge  2  Patient will improve ability to facilitate cognitive function and communication skills including use of compensatory strategies in a variety of functional living tasks to improve quality of like and to maximize level of independence  Assessment: Patient doing well today        Plan: Continue plan of care    -Frequency: 2x weekly  -Duration: 4-6 weeks    -Intervention certification from: 87/61/8926  -Intervention certification to: 77/62/7434    -Intervention comments:   IM, strategies, immediate memory (mental manipulation, immediate recall)

## 2022-11-15 ENCOUNTER — OFFICE VISIT (OUTPATIENT)
Dept: SPEECH THERAPY | Facility: CLINIC | Age: 72
End: 2022-11-15

## 2022-11-15 DIAGNOSIS — G20 PARKINSON'S DISEASE (HCC): ICD-10-CM

## 2022-11-15 DIAGNOSIS — R41.841 COGNITIVE COMMUNICATION DEFICIT: Primary | ICD-10-CM

## 2022-11-15 NOTE — PROGRESS NOTES
Speech-Language Pathology Treatment Note    Today's date: 11/15/2022  Patient’s name: Stormy Davison  : 1950  MRN: [id number]  Safety measures: Fall risk  Referring provider: DO Cecy Muller Diagnosis     ICD-10-CM    1  Cognitive communication deficit  R41 841    2  Parkinson's disease ([address] )  [address]        Visit tracking:  -Referring provider: Bebeto Bolanos  -Billing guidelines: CMS  -Visit #6/10  -Insurance: Medicare  -RE due 22    Subjective/Behavioral: Patient arrived on time to session  Objective  Short-term goals:  1  Patient will be educated on word finding strategies (i e , circumlocution) for improved generative naming and verbal expression skills  Discontinue goal, patient not having difficulties at this time  2  Patient will facilitate planning by completing thought organization tasks (e g , sequencing, deduction puzzles, etc ) with 80% accuracy to facilitate increased executive functioning, working memory, problem solving, and processing skills, to be achieved in 4-6 weeks   Patient completed calendar deduction puzzle with min-mod cues  Rush Hour level 1 in 1:46 with min cueing  Rush hour level 2 in 2:48 with min cueing   Patient completed Rush Hour level 3 in 3:20 with one min cue   Rush Hour level 4 in 3:53 with mod cueing  11/15 Patient completed Rush Hour level 5 in ~9 minutes given mod cues  3  Patient will complete auditory immediate and short term memory tasks to 80% accuracy to facilitate increased ability to retell narratives and recall information within functional living environment, to be achieved in 4-6 weeks   Mental manipulation 3 words BWD @ 100% lakia  4 words BWD @ 100% lakia   Category coding 5 word increments @ 80% lakia, 100% lakia  Color coding increments of 10 @ 50% lakia       4  To target mental manipulation and working memory, patient will participate in word finding activity (i e , anagrams) with 80% accuracy, to be achieved in 4-6 weeks  11/8 Patient completed rhyming homework (words ending in "ow"), feeling it was too easy  Provided with 2 additional worksheets  11/14 Completed "it" word worksheet and word puzzle with 5 letter words successfully  Scattegories list 1 letter S completed today, able to think of 12/12 words  List 2 letter E completed, able to think of 8/12 words  11/15 Patient completed one homework worksheet, min difficulty with spelling "read"    5  Patient will answer questions regarding story read aloud with 80% accuracy to facilitate improved auditory comprehension and recall, to be achieved in 4-6 weeks  11/7 Patient answering questions about short information/advertisements @ 73% lakia  11/14 Answered questions about ads at 66% lakia  6  Patient will demonstrate sustained and divided attention on tasks of increasing complexity with 80% accuracy, to be achieved in 4-6 weeks  11/3 Color/shape with visuals completed, mod errors noted, increasing errors with sustained attention  11/7 color/shape with visuals completed in 6:00, 2 errors noted, patient used verbalization strategy  11/8 Started IM today, patient finding visuals to be distracting as compared to auditory 11/15 Completed metronome tasks at 2 min today       Date Task Task Average SRO Early Late Variance Average   11/8 BH 1 min aud  " + visuals  "  " + guide sounds  " 71  90  69  88  102 8  10  12  8  4 94  92  82  94  94 6  8  18  6  6 45  72  56  58  75   11/15 BH 2 min aud/vis  BH 2 min aud  " + visuals  73  45  94 8  25  7 83  76  95 17  24  5 68  45  66                                                      7  Patient will complete concrete and abstract categorization tasks to 80% accuracy to facilitate improved generative naming skills and working memory, to be achieved in 4-6 weeks  11/3 Drinks: 7 in 1 minute  Farm animals: 8 in 1 minute  Breakfast: 8 in 1 minute  11/7 Zoo animals: 9 in 1 minute  Foods: 9 in 1 minute  Fruit: 9 in 1 minute   11/8 Named abstract category given 3 words 14/15, added word 13/15  Famous people: 7 in 1 minute  Things you can wear: 15 in 1 minute  Things in the ocean: 7 in 1 minute  11/14 Things that are soft: 6 in 1 minute  Things in the kitchen: 14 in 1 minute  Games: 10 in 1 minute  11/15 Fruits: 10 in 1 minute  Things in a forest: 11 in 1 minute  Transportation: 11 in 1 minute  8  Patient will be educated on the use of internal and external memory aids and compensatory strategies with 80% accuracy to facilitate increased recall of routine, personal information, and recent events, to be achieved in 4-6 weeks  11/3 Educated patient on memory strategies  Set patient up with reminders on his phone for medication management  Goal met  9  Patient will complete delayed recall activities @ 80% lakia  11/3 Patient able to remember 4/4 lakia with 5 min delay with verbalization, repetition, visualization, and association strategies  Recalled 3/4 pictures lakia after 10 min delay  11/7 Patient able to remember 4/4 pictures lakia after 10 min delay  11/8 Patient able to recall 2/4 pictures lakia after 15 minute delay with visualization strategy  11/14 Patient able to recall 3/4 pictures lakia after 15 min delay with verbalization, visualization, and remembering first strategy, association strategies  11/15 Patient able to recall 4/4 pictures lakia after 15 min delay using association and verbalization strategies  Long-term goals:  1  Patient will develop functional, cognitive-linguistic based skills and utilize compensatory strategies as needed to communicate effectively to different conversational partners, maintain safety, and participate socially by discharge  2  Patient will improve ability to facilitate cognitive function and communication skills including use of compensatory strategies in a variety of functional living tasks to improve quality of like and to maximize level of independence          Assessment: Patient doing well today despite noting he has internal distractions        Plan: Continue plan of care    -Frequency: 2x weekly  -Duration: 4-6 weeks    -Intervention certification from: 68/59/2073  -Intervention certification to: 68/10/2762    -Intervention comments:   IM, strategies, immediate memory (mental manipulation, immediate recall)

## 2022-11-17 NOTE — PROGRESS NOTES
Speech-Language Pathology Treatment Note    Today's date: 2022  Patient’s name: Chantelle Sahu  : 1950  MRN: [id number]  Safety measures: Fall risk  Referring provider: Lexie Rosen DO    Encounter Diagnosis     ICD-10-CM    1  Cognitive communication deficit  R41 841       2  Parkinson's disease ([facility] [address] )  [address]           Visit tracking:  -Referring provider: Justin Quinonez  -Billing guidelines: CMS  -Visit #7/10  -Insurance: Medicare  -RE due 22    Subjective/Behavioral: Patient arrived on time to session  Objective  Short-term goals:  1  Patient will be educated on word finding strategies (i e , circumlocution) for improved generative naming and verbal expression skills  Discontinue goal, patient not having difficulties at this time  2  Patient will facilitate planning by completing thought organization tasks (e g , sequencing, deduction puzzles, etc ) with 80% accuracy to facilitate increased executive functioning, working memory, problem solving, and processing skills, to be achieved in 4-6 weeks   Patient completed calendar deduction puzzle with min-mod cues  Rush Hour level 1 in 1:46 with min cueing  Rush hour level 2 in 2:48 with min cueing   Patient completed Rush Hour level 3 in 3:20 with one min cue   Rush Hour level 4 in 3:53 with mod cueing  11/15 Patient completed Rush Hour level 5 in ~9 minutes given mod cues   Patient completed Rush Hour level 6 in 1:30 lakia  Level 7 in ~6:40 with mod cueing  3  Patient will complete auditory immediate and short term memory tasks to 80% accuracy to facilitate increased ability to retell narratives and recall information within functional living environment, to be achieved in 4-6 weeks   Mental manipulation 3 words BWD @ 100% lakia  4 words BWD @ 100% lakia   Category coding 5 word increments @ 80% lakia, 100% lakia  Color coding increments of 10 @ 50% lakia   4 words chronological @ 100% lakia       4  To target mental manipulation and working memory, patient will participate in word finding activity (i e , anagrams) with 80% accuracy, to be achieved in 4-6 weeks  11/8 Patient completed rhyming homework (words ending in "ow"), feeling it was too easy  Provided with 2 additional worksheets  11/14 Completed "it" word worksheet and word puzzle with 5 letter words successfully  Scattegories list 1 letter S completed today, able to think of 12/12 words  List 2 letter E completed, able to think of 8/12 words  11/15 Patient completed one homework worksheet, min difficulty with spelling "read"     5  Patient will answer questions regarding story read aloud with 80% accuracy to facilitate improved auditory comprehension and recall, to be achieved in 4-6 weeks  11/7 Patient answering questions about short information/advertisements @ 73% lakia  11/14 Answered questions about ads at 66% lakia  11/21 Answered questions about factual stories at 85% lakia  6  Patient will demonstrate sustained and divided attention on tasks of increasing complexity with 80% accuracy, to be achieved in 4-6 weeks  11/3 Color/shape with visuals completed, mod errors noted, increasing errors with sustained attention  11/7 color/shape with visuals completed in 6:00, 2 errors noted, patient used verbalization strategy  11/8 Started IM today, patient finding visuals to be distracting as compared to auditory 11/15 Completed metronome tasks at 2 min today       Date Task Task Average SRO Early Late Variance Average   11/8  1 min aud  " + visuals  "  " + guide sounds  " 71  90  69  88  102 8  10  12  8  4 94  92  82  94  94 6  8  18  6  6 45  72  56  58  75   11/15  2 min aud/vis   2 min aud  " + visuals  73  45  94 8  25  7 83  76  95 17  24  5 68  45  66                                                      7   Patient will complete concrete and abstract categorization tasks to 80% accuracy to facilitate improved generative naming skills and working memory, to be achieved in 4-6 weeks  11/3 Drinks: 7 in 1 minute  Farm animals: 8 in 1 minute  Breakfast: 8 in 1 minute  11/7 Zoo animals: 9 in 1 minute  Foods: 9 in 1 minute  Fruit: 9 in 1 minute  11/8 Named abstract category given 3 words 14/15, added word 13/15  Famous people: 7 in 1 minute  Things you can wear: 15 in 1 minute  Things in the ocean: 7 in 1 minute  11/14 Things that are soft: 6 in 1 minute  Things in the kitchen: 14 in 1 minute  Games: 10 in 1 minute  11/15 Fruits: 10 in 1 minute  Things in a forest: 11 in 1 minute  Transportation: 11 in 1 minute  11/21 places where there are more children than adults: 6 in 1 minute  Things parents hide from their children: 6 in 1 minute  Things that grow: 7 in 1 minute  8  Patient will be educated on the use of internal and external memory aids and compensatory strategies with 80% accuracy to facilitate increased recall of routine, personal information, and recent events, to be achieved in 4-6 weeks  11/3 Educated patient on memory strategies  Set patient up with reminders on his phone for medication management  Goal met  9  Patient will complete delayed recall activities @ 80% lakia  11/3 Patient able to remember 4/4 lakia with 5 min delay with verbalization, repetition, visualization, and association strategies  Recalled 3/4 pictures lakia after 10 min delay  11/7 Patient able to remember 4/4 pictures lakia after 10 min delay  11/8 Patient able to recall 2/4 pictures lakia after 15 minute delay with visualization strategy  11/14 Patient able to recall 3/4 pictures lakia after 15 min delay with verbalization, visualization, and remembering first strategy, association strategies  11/15 Patient able to recall 4/4 pictures lakia after 15 min delay using association and verbalization strategies  11/21 Patient recalled 4/4 pictures after 20 min delay using association strategy  Long-term goals:  1   Patient will develop functional, cognitive-linguistic based skills and utilize compensatory strategies as needed to communicate effectively to different conversational partners, maintain safety, and participate socially by discharge  2  Patient will improve ability to facilitate cognitive function and communication skills including use of compensatory strategies in a variety of functional living tasks to improve quality of like and to maximize level of independence  Assessment: Patient doing very well today      Plan: Continue plan of care    -Frequency: 2x weekly  -Duration: 4-6 weeks    -Intervention certification from: 09/03/7398  -Intervention certification to: 75/49/5501    -Intervention comments:   IM, strategies, immediate memory (mental manipulation, immediate recall)

## 2022-11-21 ENCOUNTER — OFFICE VISIT (OUTPATIENT)
Dept: SPEECH THERAPY | Facility: CLINIC | Age: 72
End: 2022-11-21

## 2022-11-21 DIAGNOSIS — R41.841 COGNITIVE COMMUNICATION DEFICIT: Primary | ICD-10-CM

## 2022-11-21 DIAGNOSIS — G20 PARKINSON'S DISEASE (HCC): ICD-10-CM

## 2022-11-22 ENCOUNTER — APPOINTMENT (OUTPATIENT)
Dept: LAB | Facility: MEDICAL CENTER | Age: 72
End: 2022-11-22

## 2022-11-22 DIAGNOSIS — I10 HYPERTENSION, UNSPECIFIED TYPE: ICD-10-CM

## 2022-11-22 DIAGNOSIS — C61 MALIGNANT NEOPLASM OF PROSTATE (HCC): ICD-10-CM

## 2022-11-22 DIAGNOSIS — E78.5 HYPERLIPIDEMIA, UNSPECIFIED HYPERLIPIDEMIA TYPE: ICD-10-CM

## 2022-11-22 LAB
ALBUMIN SERPL BCP-MCNC: 3.5 G/DL (ref 3.5–5)
ALP SERPL-CCNC: 102 U/L (ref 46–116)
ALT SERPL W P-5'-P-CCNC: 44 U/L (ref 12–78)
ANION GAP SERPL CALCULATED.3IONS-SCNC: 3 MMOL/L (ref 4–13)
AST SERPL W P-5'-P-CCNC: 26 U/L (ref 5–45)
BASOPHILS # BLD AUTO: 0.06 THOUSANDS/ÂΜL (ref 0–0.1)
BASOPHILS NFR BLD AUTO: 1 % (ref 0–1)
BILIRUB SERPL-MCNC: 0.66 MG/DL (ref 0.2–1)
BUN SERPL-MCNC: 13 MG/DL (ref 5–25)
CALCIUM SERPL-MCNC: 9.6 MG/DL (ref 8.3–10.1)
CHLORIDE SERPL-SCNC: 106 MMOL/L (ref 96–108)
CHOLEST SERPL-MCNC: 135 MG/DL
CO2 SERPL-SCNC: 29 MMOL/L (ref 21–32)
CREAT SERPL-MCNC: 1.23 MG/DL (ref 0.6–1.3)
EOSINOPHIL # BLD AUTO: 0.05 THOUSAND/ÂΜL (ref 0–0.61)
EOSINOPHIL NFR BLD AUTO: 1 % (ref 0–6)
ERYTHROCYTE [DISTWIDTH] IN BLOOD BY AUTOMATED COUNT: 15.5 % (ref 11.6–15.1)
GFR SERPL CREATININE-BSD FRML MDRD: 58 ML/MIN/1.73SQ M
GLUCOSE P FAST SERPL-MCNC: 101 MG/DL (ref 65–99)
HCT VFR BLD AUTO: 52.5 % (ref 36.5–49.3)
HDLC SERPL-MCNC: 51 MG/DL
HGB BLD-MCNC: 17.3 G/DL (ref 12–17)
IMM GRANULOCYTES # BLD AUTO: 0.03 THOUSAND/UL (ref 0–0.2)
IMM GRANULOCYTES NFR BLD AUTO: 1 % (ref 0–2)
LDLC SERPL CALC-MCNC: 69 MG/DL (ref 0–100)
LYMPHOCYTES # BLD AUTO: 2.21 THOUSANDS/ÂΜL (ref 0.6–4.47)
LYMPHOCYTES NFR BLD AUTO: 38 % (ref 14–44)
MCH RBC QN AUTO: 29.3 PG (ref 26.8–34.3)
MCHC RBC AUTO-ENTMCNC: 33 G/DL (ref 31.4–37.4)
MCV RBC AUTO: 89 FL (ref 82–98)
MONOCYTES # BLD AUTO: 0.49 THOUSAND/ÂΜL (ref 0.17–1.22)
MONOCYTES NFR BLD AUTO: 8 % (ref 4–12)
NEUTROPHILS # BLD AUTO: 2.97 THOUSANDS/ÂΜL (ref 1.85–7.62)
NEUTS SEG NFR BLD AUTO: 51 % (ref 43–75)
NONHDLC SERPL-MCNC: 84 MG/DL
NRBC BLD AUTO-RTO: 0 /100 WBCS
PLATELET # BLD AUTO: 708 THOUSANDS/UL (ref 149–390)
PMV BLD AUTO: 10.7 FL (ref 8.9–12.7)
POTASSIUM SERPL-SCNC: 4.3 MMOL/L (ref 3.5–5.3)
PROT SERPL-MCNC: 7.9 G/DL (ref 6.4–8.4)
PSA SERPL-MCNC: <0.1 NG/ML (ref 0–4)
RBC # BLD AUTO: 5.91 MILLION/UL (ref 3.88–5.62)
SODIUM SERPL-SCNC: 138 MMOL/L (ref 135–147)
TRIGL SERPL-MCNC: 76 MG/DL
WBC # BLD AUTO: 5.81 THOUSAND/UL (ref 4.31–10.16)

## 2022-11-23 ENCOUNTER — EVALUATION (OUTPATIENT)
Dept: SPEECH THERAPY | Facility: CLINIC | Age: 72
End: 2022-11-23

## 2022-11-23 DIAGNOSIS — G20 PARKINSON'S DISEASE (HCC): ICD-10-CM

## 2022-11-23 DIAGNOSIS — R41.841 COGNITIVE COMMUNICATION DEFICIT: Primary | ICD-10-CM

## 2022-11-23 NOTE — PROGRESS NOTES
Speech-Language Pathology Re-Evaluation/Discharge    Today's date: 2022   Patient’s name: Preet Collazo  : 1950  MRN: [id number]  Safety measures: Fall risk  Referring provider: Mana Costa DO    Encounter Diagnosis     ICD-10-CM    1  Cognitive communication deficit  R41 [id number]       2  Parkinson's disease ([facility] [address] )  G20           Visit tracking:  -Referring provider: Jennifer Young  -Billing guidelines: CMS  -Visit #1/10 (8 visits total)  -Insurance: Medicare  -RE due 22    Subjective comments: Patient arrived on time to session  Patient's goal(s): "Just to remember a little more"    Assessments    The Repeatable Battery for the Assessment of Neuropsychological Status (RBANS) is a brief, individually-administered assessment which measures attention, language, visuospatial/constructional abilities, and immediate & delayed memory  The RBANS is intended for use with adolescents to adults, ages 15 to 80 years  The following results were obtained during the administration of the assessment  Form: D    Cognitive Domain/Subtest: Index Score: Percentile Rank: Classification: IE Index Score: Status:   IMMEDIATE MEMORY 94 34%ile Average 61 IMPROVEMENT        1  List Learning ()          2  Story Memory ()           VISUOSPATIAL/  CONSTRUCTIONAL 89 23%ile Low Average 96 DECLINE        3  Figure Copy ()          4  Line Orientation (15/20)           LANGUAGE 92 30%ile Average 85 IMPROVEMENT        5  Picture Naming (10/10)          6  Semantic Fluency (15/40)           ATTENTION 97 42%ile Average 97 NO CHANGE        7  Digit Span ()          8  Coding ()           DELAYED MEMORY 86 18%ile Low Average 64 IMPROVEMENT        9  List Recall (1/10)          10  List Recognition ()          11  Story Recall ()          12   Figure Recall (15/20)           Sum of Index Scores:  458  403 IMPROVEMENT   Total Score:  87      Percentile: 19%ile      Classification: Low Average          “IE” indicates the scores from the initial evaluation (10/27/22)  Form: A    Goals    Short-term goals:  1  Patient will be educated on word finding strategies (i e , circumlocution) for improved generative naming and verbal expression skills  Discontinue goal, patient not having difficulties at this time  2  Patient will facilitate planning by completing thought organization tasks (e g , sequencing, deduction puzzles, etc ) with 80% accuracy to facilitate increased executive functioning, working memory, problem solving, and processing skills, to be achieved in 4-6 weeks  11/7 Patient completed calendar deduction puzzle with min-mod cues  [place] level 1 in 1:46 with min cueing  Rush hour level 2 in 2:48 with min cueing  11/8 Patient completed [place] level 3 in 3:20 with one min cue  11/14 [place] Hour level 4 in 3:53 with mod cueing  11/15 Patient completed [place] level 5 in ~9 minutes given mod cues  11/21 Patient completed [place] level 6 in 1:30 lakia  Level 7 in ~6:40 with mod cueing  Goal met  3  Patient will complete auditory immediate and short term memory tasks to 80% accuracy to facilitate increased ability to retell narratives and recall information within functional living environment, to be achieved in 4-6 weeks  11/7 Mental manipulation 3 words BWD @ 100% lakia  4 words BWD @ 100% lakia  11/14 Category coding 5 word increments @ 80% lakia, 100% lakia  Color coding increments of 10 @ 50% lakia  11/21 4 words chronological @ 100% lakia  Goal met  4  To target mental manipulation and working memory, patient will participate in word finding activity (i e , anagrams) with 80% accuracy, to be achieved in 4-6 weeks  11/8 Patient completed rhyming homework (words ending in "ow"), feeling it was too easy  Provided with 2 additional worksheets  11/14 Completed "it" word worksheet and word puzzle with 5 letter words successfully   Scattegories list 1 letter S completed today, able to think of 12/12 words  List 2 letter E completed, able to think of 8/12 words  11/15 Patient completed one homework worksheet, min difficulty with spelling "read" Goal met  5  Patient will answer questions regarding story read aloud with 80% accuracy to facilitate improved auditory comprehension and recall, to be achieved in 4-6 weeks  11/7 Patient answering questions about short information/advertisements @ 73% lakia  11/14 Answered questions about ads at 66% lakia  11/21 Answered questions about factual stories at 85% lakia  Goal partially met  6  Patient will demonstrate sustained and divided attention on tasks of increasing complexity with 80% accuracy, to be achieved in 4-6 weeks  11/3 Color/shape with visuals completed, mod errors noted, increasing errors with sustained attention  11/7 color/shape with visuals completed in 6:00, 2 errors noted, patient used verbalization strategy  11/8 Started IM today, patient finding visuals to be distracting as compared to auditory 11/15 Completed metronome tasks at 2 min today Goal met         Date Task Task Average SRO Early Late Variance Average   11/8 BH 1 min aud  " + visuals  "  " + guide sounds  " 71  90  69  88  102 8  10  12  8  4 94  92  82  94  94 6  8  18  6  6 45  72  56  58  75   11/15 BH 2 min aud/vis  BH 2 min aud  " + visuals  73  45  94 8  25  7 83  76  95 17  24  5 68  45  66                                                      7  Patient will complete concrete and abstract categorization tasks to 80% accuracy to facilitate improved generative naming skills and working memory, to be achieved in 4-6 weeks  11/3 Drinks: 7 in 1 minute  Farm animals: 8 in 1 minute  Breakfast: 8 in 1 minute  11/7 Zoo animals: 9 in 1 minute  Foods: 9 in 1 minute  Fruit: 9 in 1 minute  11/8 Named abstract category given 3 words 14/15, added word 13/15  Famous people: 7 in 1 minute  Things you can wear: 15 in 1 minute  Things in the ocean: 7 in 1 minute   11/14 Things that are soft: 6 in 1 minute  Things in the kitchen: 14 in 1 minute  Games: 10 in 1 minute  11/15 Fruits: 10 in 1 minute  Things in a forest: 11 in 1 minute  Transportation: 11 in 1 minute  11/21 places where there are more children than adults: 6 in 1 minute  Things parents hide from their children: 6 in 1 minute  Things that grow: 7 in 1 minute  Goal met  8  Patient will be educated on the use of internal and external memory aids and compensatory strategies with 80% accuracy to facilitate increased recall of routine, personal information, and recent events, to be achieved in 4-6 weeks  11/3 Educated patient on memory strategies  Set patient up with reminders on his phone for medication management  Goal met  9  Patient will complete delayed recall activities @ 80% lakia  11/3 Patient able to remember 4/4 lakia with 5 min delay with verbalization, repetition, visualization, and association strategies  Recalled 3/4 pictures lakia after 10 min delay  11/7 Patient able to remember 4/4 pictures lakia after 10 min delay  11/8 Patient able to recall 2/4 pictures lakia after 15 minute delay with visualization strategy  11/14 Patient able to recall 3/4 pictures lakia after 15 min delay with verbalization, visualization, and remembering first strategy, association strategies  11/15 Patient able to recall 4/4 pictures lakia after 15 min delay using association and verbalization strategies  11/21 Patient recalled 4/4 pictures after 20 min delay using association strategy  Goal met  Long-term goals:  1  Patient will develop functional, cognitive-linguistic based skills and utilize compensatory strategies as needed to communicate effectively to different conversational partners, maintain safety, and participate socially by discharge       2  Patient will improve ability to facilitate cognitive function and communication skills including use of compensatory strategies in a variety of functional living tasks to improve quality of like and to maximize level of independence  Impressions/Recommendations    Impressions:   -Patient completed RBANS form D today as compared to form A completed 10/27/22  Patient with overall improvement on testing, now scoring as "Low Average", as compared to "Borderline" on initial evaluation  Patient with significant improvement in immediate and delayed memory subtests, as well as improvement in language  Patient with slight decline in visuospatial/constructional and no change in attention  Patient to be discharged at this time as he is meeting all goals and reporting that he is doing well cognitively at home  Patient in agreement  Recommendations:  -Patient to be discharged from outpatient skilled Speech Therapy services: Patient achieved all goals in plan of care      -Frequency: No treatment is warranted at this time   -Duration: N/A    -Intervention comments:   RBANS administered and scored, results reviewed with patient, discharge survey

## 2022-11-28 ENCOUNTER — APPOINTMENT (OUTPATIENT)
Dept: SPEECH THERAPY | Facility: CLINIC | Age: 72
End: 2022-11-28

## 2022-11-30 ENCOUNTER — APPOINTMENT (OUTPATIENT)
Dept: SPEECH THERAPY | Facility: CLINIC | Age: 72
End: 2022-11-30

## 2022-12-14 ENCOUNTER — OFFICE VISIT (OUTPATIENT)
Dept: CARDIOLOGY CLINIC | Facility: CLINIC | Age: 72
End: 2022-12-14

## 2022-12-14 VITALS
WEIGHT: 186 LBS | HEART RATE: 80 BPM | DIASTOLIC BLOOD PRESSURE: 84 MMHG | HEIGHT: 70 IN | BODY MASS INDEX: 26.63 KG/M2 | SYSTOLIC BLOOD PRESSURE: 130 MMHG

## 2022-12-14 DIAGNOSIS — I25.119 CORONARY ARTERY DISEASE INVOLVING NATIVE CORONARY ARTERY OF NATIVE HEART WITH ANGINA PECTORIS (HCC): Primary | ICD-10-CM

## 2022-12-14 DIAGNOSIS — N18.31 STAGE 3A CHRONIC KIDNEY DISEASE (HCC): ICD-10-CM

## 2022-12-14 DIAGNOSIS — E78.2 MIXED HYPERLIPIDEMIA: ICD-10-CM

## 2022-12-14 DIAGNOSIS — I10 PRIMARY HYPERTENSION: ICD-10-CM

## 2022-12-14 NOTE — PROGRESS NOTES
Patient ID: Jeremias Hall  is a 71 y o  male  Plan:      Coronary artery disease involving native coronary artery of native heart with angina pectoris [facility]  Ambulation very limited by progressive parkinsonism  Its been many years since a coronary reassessment  Will obtain Elliott Pinta in the near term  Mixed hyperlipidemia  Values are terrific taking atorvastatin every other day  This helps him avoid constipation  Hypertension  Well-controlled  Follow up Plan/Other summary comments:  If the Myoview study is not worse than mildly abnormal then follow-up visit and EKG in 1 year  HPI: Patient is here for follow-up today regarding the above issues  Since the last visit he has done reasonably okay  He stopped working at the [employer] because of unsteadiness  No recent chest pain or chest pressure but again ambulation is quite limited  To reiterate, in December of 2014 he underwent heart catheterization which revealed a 50% left main stem stenosis as well as 95% lad stenosis  Drug-eluting stent of both lesions was performed  He has had no chest pain or chest pressure since then  Most recent or relevant cardiac/vascular testing:    Echo 4/2018 - EF >55%  Mild MR   Stress echo 1/2016 - normal EF, no evidence of ischemia   Cardiac cath 12/2014- 50% left main, 95% LAD, 40% CX  EF 40%        Past Surgical History:   Procedure Laterality Date   • CORONARY ANGIOPLASTY WITH STENT PLACEMENT  12/26/2014    EF 40%, ABRAM to LAD     • INCISIONAL HERNIA REPAIR     • [place] SHLDR ARTHROSCOP,SURG,W/ROTAT CUFF REPR Right 2/24/2021    Procedure: SHOULDER ARTHROSCOPY WITH ROTATOR CUFF REPAIR and acromioplasty;  Surgeon: Janet Romero DO;  Location: [address] [place] [address];  Service: Orthopedics   • PROSTATECTOMY  2014    for cancer   • TONSILLECTOMY         Lipid Profile:   Lab Results   Component Value Date    CHOL 138 06/07/2018    TRIG 76 11/22/2022    TRIG 140 06/07/2018    HDL 51 11/22/2022 HDL 44 06/07/2018         Review of Systems   10  point ROS  was otherwise non pertinent or negative except as per HPI or as below  Gait: Fair and slow  Objective:     /84   Pulse 80   Ht 5' 10" (1 778 m)   Wt 84 4 kg (186 lb)   BMI 26 69 kg/m²     PHYSICAL EXAM:    General:  Normal appearance in no distress  Eyes:  Anicteric  Oral mucosa:  Moist   Neck:  No JVD  Carotid upstrokes are brisk without bruits  No masses  Chest:  Clear to auscultation  Cardiac:  No palpable PMI  Normal S1 and S2  No murmur gallop or rub  Abdomen:  Soft and nontender  No palpable organomegaly or aortic enlargement  Extremities:  No peripheral edema  Musculoskeletal:  Symmetric  Vascular:  Femoral pulses are brisk without bruits  Popliteal pulses are intact bilaterally  Pedal pulses are intact  Neuro:  Grossly symmetric  Resting tremor  Psych:  Alert and oriented x3  Current Outpatient Medications:   •  aspirin (ECOTRIN LOW STRENGTH) 81 mg EC tablet, Take 81 mg by mouth daily, Disp: , Rfl:   •  atorvastatin (LIPITOR) 80 mg tablet, Take 1 tablet (80 mg total) by mouth daily (Patient taking differently: Take 80 mg by mouth every other day), Disp: 90 tablet, Rfl: 3  •  carbidopa-levodopa (SINEMET)  mg per tablet, Take 1 tablet by mouth 3 (three) times a day, Disp: , Rfl:   •  meloxicam (MOBIC) 15 mg tablet, Take 1 tablet (15 mg total) by mouth daily, Disp: 30 tablet, Rfl: 0  •  PARoxetine (PAXIL) 20 mg tablet, Take 20 mg by mouth daily, Disp: , Rfl:   No Known Allergies  Past Medical History:   Diagnosis Date   • Anxiety    • Benign hypertension    • [facility] )     prostate   • Coronary artery disease     ABRAM to LAD in 2014   • History of cardiovascular stress test 01/21/2016    Normal EF, normal study     • History of echocardiogram 05/05/2015    EF normal      • Ischemic cardiomyopathy     resolved   • Mixed hyperlipidemia    • Myocardial infarction [facility])    • Parkinson disease ([facility] ) • Prostate cancer [facility])     surgically removed   • STEMI (ST elevation myocardial infarction) ([facility] [address] ) 2014           Social History     Tobacco Use   Smoking Status Never   Smokeless Tobacco Never

## 2022-12-14 NOTE — ASSESSMENT & PLAN NOTE
Ambulation very limited by progressive parkinsonism  Its been many years since a coronary reassessment  Will obtain Ghada Farmer in the near term

## 2022-12-29 ENCOUNTER — HOSPITAL ENCOUNTER (OUTPATIENT)
Dept: NUCLEAR MEDICINE | Facility: HOSPITAL | Age: 72
End: 2022-12-29
Attending: INTERNAL MEDICINE

## 2022-12-29 ENCOUNTER — HOSPITAL ENCOUNTER (OUTPATIENT)
Dept: NON INVASIVE DIAGNOSTICS | Facility: HOSPITAL | Age: 72
Discharge: HOME | End: 2022-12-29
Attending: INTERNAL MEDICINE

## 2022-12-29 VITALS
DIASTOLIC BLOOD PRESSURE: 84 MMHG | RESPIRATION RATE: 16 BRPM | HEIGHT: 70 IN | BODY MASS INDEX: 26.63 KG/M2 | SYSTOLIC BLOOD PRESSURE: 136 MMHG | HEART RATE: 71 BPM | WEIGHT: 186 LBS | OXYGEN SATURATION: 95 %

## 2022-12-29 DIAGNOSIS — E78.2 MIXED HYPERLIPIDEMIA: ICD-10-CM

## 2022-12-29 DIAGNOSIS — I10 PRIMARY HYPERTENSION: ICD-10-CM

## 2022-12-29 DIAGNOSIS — N18.31 STAGE 3A CHRONIC KIDNEY DISEASE (HCC): ICD-10-CM

## 2022-12-29 DIAGNOSIS — I25.119 CORONARY ARTERY DISEASE INVOLVING NATIVE CORONARY ARTERY OF NATIVE HEART WITH ANGINA PECTORIS (HCC): ICD-10-CM

## 2022-12-29 LAB
CHEST PAIN STATEMENT: NORMAL
MAX DIASTOLIC BP: 90 MMHG
MAX HEART RATE: 142 BPM
MAX PREDICTED HEART RATE: 149 BPM
MAX. SYSTOLIC BP: 166 MMHG
NUC STRESS EJECTION FRACTION: 69 %
PROTOCOL NAME: NORMAL
RATE PRESSURE PRODUCT: NORMAL
REASON FOR TERMINATION: NORMAL
SL CV REST NUCLEAR ISOTOPE DOSE: 10.4 MCI
SL CV STRESS NUCLEAR ISOTOPE DOSE: 29.8 MCI
SL CV STRESS RECOVERY BP: NORMAL MMHG
SL CV STRESS RECOVERY HR: 110 BPM
SL CV STRESS RECOVERY O2 SAT: 98 %
STRESS ANGINA INDEX: 0
STRESS BASELINE BP: NORMAL MMHG
STRESS BASELINE HR: 71 BPM
STRESS O2 SAT REST: 95 %
STRESS PEAK HR: 142 BPM
STRESS POST O2 SAT PEAK: 98 %
STRESS POST PEAK BP: 164 MMHG
STRESS/REST PERFUSION RATIO: 0.98
TARGET HR FORMULA: NORMAL
TEST INDICATION: NORMAL
TIME IN EXERCISE PHASE: NORMAL

## 2022-12-29 RX ADMIN — REGADENOSON 0.4 MG: 0.08 INJECTION, SOLUTION INTRAVENOUS at 10:31

## 2022-12-29 NOTE — RESULT ENCOUNTER NOTE
Please call the patient regarding his abnormal result  Its consistent with a prior heart attack  Date unknown but I wouldn't change treatment at this point  Thanks

## 2023-04-04 ENCOUNTER — EVALUATION (OUTPATIENT)
Dept: PHYSICAL THERAPY | Facility: CLINIC | Age: 73
End: 2023-04-04

## 2023-04-04 DIAGNOSIS — G20 PARKINSON'S DISEASE (TREMOR, STIFFNESS, SLOW MOTION, UNSTABLE POSTURE) (HCC): Primary | ICD-10-CM

## 2023-04-04 NOTE — PROGRESS NOTES
PT Evaluation     Today's date: 2023  Patient name: Ebonie Irene  : 1950  MRN: [id number]  Referring provider: Oscar Welch DO  Dx:   Encounter Diagnosis     ICD-10-CM    1  Parkinson's disease (tremor, stiffness, slow motion, unstable posture) ([facility])  G20           Start Time: 1115  Stop Time: 1200  Total time in clinic (min): 45 minutes    Assessment  Assessment details: Pt is a 71year old male presenting to physical therapy for parkinson's  Overall appeared to have regressed in endurance and balance compared to when he was last in PT in Oct 2022  His current impairments include decreased mobility, endurance, LE muscular endurance, and impaired gait pattern  Increased shuffling noted when fatigued and decreased arm swings during gait  These limitations are preventing him from ambulating in the community, performing ADL's I, and performing outdoor yard tasks  He would be a good candidate for skilled therapy to address these functional limitation and return him to his PLOF  Impairments: abnormal coordination, abnormal gait, impaired balance, poor posture  and poor body mechanics  Understanding of Dx/Px/POC: good   Prognosis: good    Goals  STG 3 weeks:  1  Pt will decrease his TUG time > 3 seconds  2  Pt will increase his gait velocity by >  05 m/s  LTG 4-6 weeks:  1  Pt will improved his 6 MWT > or equal to 1,100 feet  2  Pt will improve his FGA score to 25 or greater         Plan  Patient would benefit from: skilled physical therapy  Planned therapy interventions: balance, neuromuscular re-education, patient education, therapeutic activities, therapeutic exercise, graded motor, graded exercise, graded activity, gait training and home exercise program  Frequency: 2x week  Duration in weeks: 6  Plan of Care beginning date: 2023  Plan of Care expiration date: 2023  Treatment plan discussed with: patient and PTA        Subjective Evaluation    History of Present Illness  Mechanism of injury: Pt reported everything is about the same since last discharge  He reported he is less active at home, but hasn't noticed any new imparements  He did say he thinks maybe his endurance has decreased slightly  Recurrent probem    Pain  No pain reported    Treatments  Previous treatment: physical therapy  Patient Goals  Patient goal: improve gait and balance         Objective      Balance Test 10/4 11/1   6 Minute Walk Test (ft): 930 ft no AD  1,052 ft no AD   FGA:    Gait Speed (ft/s): SSV = 0 87 m/s SSV = 0 92 m/s   5x Sit To Stand (s): 13 75 sec  10 63 sec   TUG: 10 31  Cog 11, slower counting   Manual 10 57 6 35 sec  6 87 sec with counting          Balance Test    6 Minute Walk Test (ft): 900 feet    FGA:    Gait Speed (ft/s): SSV=  88 m/s   5x Sit To Stand (s): 11 2 sec    TU 4 sec normal, 12 1 sec manual, 14 3 cog w/ 3 errors counting backwards by 3, from 100       Gait Assessment: parkinsonian shuffling/gait pattern, forward trunk lean  Re-eval Date: 23    Date        Visit Count 1       FOTO        Pain In        Pain Out             Precautions: parkinson's, falls risk,        Manuals                                        Neuro Re-Ed         Boxing        PWR        STS        [place] pullouts        Step ups                         Ther Ex        Patient education about POC, diagnoses, education, and functional testing    Performed 15 minutes                                                                Ther Activity                        Gait Training                        Modalities

## 2023-04-05 NOTE — PROGRESS NOTES
Daily Note     Today's date: 2023  Patient name: Saima Bernal  : 1950  MRN: [id number]  Referring provider: Lele Deleon DO  Dx:   Encounter Diagnosis     ICD-10-CM    1  Parkinson's disease (tremor, stiffness, slow motion, unstable posture) [facility] )  G20           Start Time: 0900  Stop Time: 0945  Total time in clinic (min): 45 minutes    Subjective: He reported feeling the same just tired today       Objective: See treatment diary below      Assessment: Tolerated treatment well  He performed STS w/ arm swings today as well as toe taps focusing on big movements  He also demo good form w/ STS boxing today  Continue to progress POC as tolerated  Patient exhibited good technique with therapeutic exercises and would benefit from continued PT      Plan: Continue per plan of care  Re-eval Date: 23    Date       Visit Count 1 2      FOTO        Pain In        Pain Out             Precautions: parkinson's, falls risk,        Manuals                                       Neuro Re-Ed         Boxing  STS w/ L/R hooks and mini squat    3 rounds x 6 sets          PWR        STS  3 x 10 w/ arm swings [place] and in front       [place] pullouts        Step ups                 Marches  Standing toe taps   3 x 10 b/l      [place] walks   3 laps w/ 10lb DB in ea  Hand  Side stepping w/ arm swings                                         Ther Ex        Patient education about POC, diagnoses, education, and functional testing  Performed 15 minutes                Nustep   L3, 12 min for CV endurance                                                 Ther Activity                        Gait Training                        Modalities

## 2023-04-06 ENCOUNTER — OFFICE VISIT (OUTPATIENT)
Dept: PHYSICAL THERAPY | Facility: CLINIC | Age: 73
End: 2023-04-06

## 2023-04-06 DIAGNOSIS — G20 PARKINSON'S DISEASE (TREMOR, STIFFNESS, SLOW MOTION, UNSTABLE POSTURE) (HCC): Primary | ICD-10-CM

## 2023-04-24 NOTE — PROGRESS NOTES
Daily Note     Today's date: 2023  Patient name: Elena Phillips  : 1950  MRN: [id number]  Referring provider: Simran Hillman DO  Dx:   Encounter Diagnosis     ICD-10-CM    1  Parkinson's disease (tremor, stiffness, slow motion, unstable posture) ([facility])  G20                      Subjective: No new co's  Denies any recent falls  Feel stiff today      Objective: See treatment diary below      Assessment: Tolerated treatment well  Pt demon < step stride/arm swing/ground clearance, fwd trunk posture with ambul   Pt has difficulty scan environment/eye spy/divided attention with ambul   Patient would benefit from continued PT      Plan: Continue per plan of care  Re-eval Date: 23    Date       Visit Count 6 7      FOTO        Pain In        Pain Out             Precautions: parkinson's, falls risk,        Manuals                                        Neuro Re-Ed         Boxing 2 rounds (STS w/ quick jabs and power hooks) Cross punch    10x quick 5 reps w/ squat FA    10x w/ STS FA    Stagger stance  R/L kick  R/L kick with jab  10x ea      PWR        STS        [place] pullouts        Step ups                 Fooducate        [place] walks 3 x 100 feet, 15lbs in ea  UE 3 x 100 feet, 15lbs in ea  UE  Cues posture/ > step stride, scan environment      Side stepping w/ arm swings         [place] ambul         Step back / bow        Multi directional step        Outdoor walking w/ 5lb DB in b/l UE and 3lb AW around b/l LE Walking for 25 min focusing on big steps/high knees, and arm swings during ambulation  Ther Ex        Patient education about POC, diagnoses, education, and functional testing  Nustep  L4, 12 min for CV endurance    L4, 12 min for CV endurance                                              Ther Activity        Floor negotiation  5 min  Min cues              Gait Training          Clinic/kristopher  HT/HN  BL SPC > UE swing, turn negotiation  Focus on BIG walking  10 min                Modalities                        * #1 = Jab, #2 = Upper cut, #3 Hook

## 2023-04-25 ENCOUNTER — OFFICE VISIT (OUTPATIENT)
Dept: PHYSICAL THERAPY | Facility: CLINIC | Age: 73
End: 2023-04-25

## 2023-04-25 DIAGNOSIS — G20 PARKINSON'S DISEASE (TREMOR, STIFFNESS, SLOW MOTION, UNSTABLE POSTURE) (HCC): Primary | ICD-10-CM

## 2023-04-27 ENCOUNTER — OFFICE VISIT (OUTPATIENT)
Dept: PHYSICAL THERAPY | Facility: CLINIC | Age: 73
End: 2023-04-27

## 2023-04-27 DIAGNOSIS — G20 PARKINSON'S DISEASE (TREMOR, STIFFNESS, SLOW MOTION, UNSTABLE POSTURE) (HCC): Primary | ICD-10-CM

## 2023-04-27 NOTE — PROGRESS NOTES
"Daily Note     Today's date: 2023  Patient name: Elena Phillips  : 1950  MRN: [id number]  Referring provider: Simran Hillman DO  Dx:   Encounter Diagnosis     ICD-10-CM    1  Parkinson's disease (tremor, stiffness, slow motion, unstable posture) ([facility])  G20                      Subjective: No new co's  Denies any recent fall       Objective: See treatment diary below      Assessment: Tolerated treatment well  Progressed poc, focus on turn and obstacle negotiation/strategies Pt demon < step stride 1* retro  LOB x 2 w/ CGA > balance recovery  Provided tactile cues with > retro step Patient would benefit from continued PT      Plan: Continue per plan of care  Re-eval Date: 23    Date      Visit Count 6 7 8     FOTO        Pain In        Pain Out             Precautions: parkinson's, falls risk,        Manuals                                        Neuro Re-Ed         Boxing 2 rounds (STS w/ quick jabs and power hooks) Cross punch    10x quick 5 reps w/ squat FA    10x w/ STS FA    Stagger stance  R/L kick  R/L kick with jab  10x ea Cross punch    10x quick 5 reps w/ squat FA    10x w/ STS FA    Stagger stance  R/L kick  R/L kick with jab  10x ea     PWR        STS        [place] pullouts        Step ups         Obstacle course   Turn [place], chair<>chair, step over 6\" obstacles, dual tasking 15 min     [place] walks 3 x 100 feet, 15lbs in ea  UE 3 x 100 feet, 15lbs in ea  UE  Cues posture/ > step stride, scan environment      Side stepping w/ arm swings         [place] ambul         Step back / bow        Multi directional step   Firm/foam  6 x with cues > step stride     Outdoor walking w/ 5lb DB in b/l UE and 3lb AW around b/l LE Walking for 25 min focusing on big steps/high knees, and arm swings during ambulation  Ther Ex        Patient education about POC, diagnoses, education, and functional testing                   Nustep  L4, 12 min for CV " endurance    L4, 12 min for CV endurance L4, 12 min for CV endurance                                             Ther Activity        Floor negotiation  5 min  Min cues              Gait Training          Clinic/kristopher  HT/HN  BL SPC > UE swing, turn negotiation  Focus on BIG walking  10 min                Modalities                        * #1 = Jab, #2 = Upper cut, #3 Hook

## 2023-05-01 ENCOUNTER — OFFICE VISIT (OUTPATIENT)
Dept: PHYSICAL THERAPY | Facility: CLINIC | Age: 73
End: 2023-05-01

## 2023-05-01 DIAGNOSIS — G20 PARKINSON'S DISEASE (TREMOR, STIFFNESS, SLOW MOTION, UNSTABLE POSTURE) (HCC): Primary | ICD-10-CM

## 2023-05-01 NOTE — PROGRESS NOTES
"  Daily Note     Today's date: 2023  Patient name: Minda Vargas  : 1950  MRN: [id number]  Referring provider: Rush Dumont DO  Dx:   Encounter Diagnosis     ICD-10-CM    1  Parkinson's disease (tremor, stiffness, slow motion, unstable posture) ([facility])  G20                      Subjective: I am sore today BL LE mm  Feeling very stiff and tight from yesterday PT session      Objective: See treatment diary below      Assessment: Tolerated treatment well  Modified TE 2* > mm tightness, added BL LE gentle stretching to pt ilia per discussion PT/TT  Resume POC as ilia upcoming  Pt reporting feeling looser end rx session  1* focus on bed mobility 2* new co's of difficulty getting out of bed  Patient would benefit from continued PT      Plan: Continue per plan of care  Re-eval Date: 23    Date    Visit Count 6 7 8 9 10   FOTO        Pain In        Pain Out             Precautions: parkinson's, falls risk,        Manuals             BL LE  HS, SKTC, Gastroc, LTR  15 min                           Neuro Re-Ed         Boxing 2 rounds (STS w/ quick jabs and power hooks) Cross punch    10x quick 5 reps w/ squat FA    10x w/ STS FA    Stagger stance  R/L kick  R/L kick with jab  10x ea Cross punch    10x quick 5 reps w/ squat FA    10x w/ STS FA    Stagger stance  R/L kick  R/L kick with jab  10x ea Cross punch    10x quick 5 reps w/ squat FA    10x w/ STS FA    Stagger stance  R/L kick  R/L kick with jab  10x ea resume   PWR        STS     5x  5x 2 with word/letter association   [place] pullouts        Step ups         Obstacle course   Turn [place], chair<>chair, step over 6\" obstacles, dual tasking 15 min Turn [place], chair<>chair, step over 6\" obstacles, dual tasking 15 min    [place] walks 3 x 100 feet, 15lbs in ea  UE 3 x 100 feet, 15lbs in ea   UE  Cues posture/ > step stride, scan environment      Side stepping w/ arm swings         [place] ambul       " Step back / bow        Multi directional step   Firm/foam  6 x with cues > step stride Firm/foam  6 x with cues > step stride  Added UE reach  Pt self cued    Outdoor walking w/ 5lb DB in b/l UE and 3lb AW around b/l LE Walking for 25 min focusing on big steps/high knees, and arm swings during ambulation  Ther Ex        Patient education about POC, diagnoses, education, and functional testing  Nustep  L4, 12 min for CV endurance    L4, 12 min for CV endurance L4, 12 min for CV endurance L4, 12 min for CV endurance L5, 12 min for CV endurance                                           Ther Activity        Floor negotiation  5 min  Min cues   10 x ea   Cues    All 4's  -[place] / step  -Trunk rotation  - Up   Bed Mobility     - [place] 10x  - [place] with step 10x  - Trunk rotation             Gait Training          Clinic/kristopher  HT/HN  BL SPC > UE swing, turn negotiation  Focus on BIG walking  10 min    Outdoors  Level/uneven, incline/decline, HT/HN, turn and cone nego,   Trial BL canes focus > UE swing, cues postural awareness, step stride  15 min  CS/CGA            Modalities                        * #1 = Jab, #2 = Upper cut, #3 Hook

## 2023-05-01 NOTE — PROGRESS NOTES
"Daily Note     Today's date: 2023  Patient name: Calvin Forrester  : 1950  MRN: [id number]  Referring provider: Ofe Davey DO  Dx:   Encounter Diagnosis     ICD-10-CM    1  Parkinson's disease (tremor, stiffness, slow motion, unstable posture) ([facility])  G20                      Subjective: I am feeling tight today, legs dont want to move  Denies any recent falls but indicates occ FOG      Objective: See treatment diary below      Assessment: Tolerated treatment well  Pt with difficulty with turn nego, chair <>chair nego, ambul tight space/threshold  Pt ambul with < step stride, < ground clearance, < UE swing, with FHRS posture  Pt motivated to progress PT goals as activ [place] business and [place] and [employer]  Patient would benefit from continued PT      Plan: Continue per plan of care  Re-eval Date: 23    Date     Visit Count 6 7 8 8    FOTO        Pain In        Pain Out             Precautions: parkinson's, falls risk,        Manuals                                        Neuro Re-Ed         Boxing 2 rounds (STS w/ quick jabs and power hooks) Cross punch    10x quick 5 reps w/ squat FA    10x w/ STS FA    Stagger stance  R/L kick  R/L kick with jab  10x ea Cross punch    10x quick 5 reps w/ squat FA    10x w/ STS FA    Stagger stance  R/L kick  R/L kick with jab  10x ea Cross punch    10x quick 5 reps w/ squat FA    10x w/ STS FA    Stagger stance  R/L kick  R/L kick with jab  10x ea    PWR        STS        [place] pullouts        Step ups         Obstacle course   Turn [place], chair<>chair, step over 6\" obstacles, dual tasking 15 min Turn [place], chair<>chair, step over 6\" obstacles, dual tasking 15 min            [place] walks 3 x 100 feet, 15lbs in ea  UE 3 x 100 feet, 15lbs in ea   UE  Cues posture/ > step stride, scan environment      Side stepping w/ arm swings         [place] ambul         Step back / bow        Multi directional step   " Firm/foam  6 x with cues > step stride Firm/foam  6 x with cues > step stride  Added UE reach  Pt self cued    Outdoor walking w/ 5lb DB in b/l UE and 3lb AW around b/l LE Walking for 25 min focusing on big steps/high knees, and arm swings during ambulation  Ther Ex        Patient education about POC, diagnoses, education, and functional testing  Nustep  L4, 12 min for CV endurance    L4, 12 min for CV endurance L4, 12 min for CV endurance L4, 12 min for CV endurance                                            Ther Activity        Floor negotiation  5 min  Min cues              Gait Training          Clinic/lobby  HT/HN  BL SPC > UE swing, turn negotiation  Focus on BIG walking  10 min    Outdoors  Level/uneven, incline/decline, HT/HN, turn and cone ne[place],   Trial BL canes focus > UE swing, cues postural awareness, step stride  15 min  CS/CGA            Modalities                        * #1 = Jab, #2 = Upper cut, #3 Hook

## 2023-05-02 ENCOUNTER — OFFICE VISIT (OUTPATIENT)
Dept: PHYSICAL THERAPY | Facility: CLINIC | Age: 73
End: 2023-05-02

## 2023-05-02 DIAGNOSIS — G20 PARKINSON'S DISEASE (TREMOR, STIFFNESS, SLOW MOTION, UNSTABLE POSTURE) (HCC): Primary | ICD-10-CM

## 2023-05-04 ENCOUNTER — APPOINTMENT (OUTPATIENT)
Dept: PHYSICAL THERAPY | Facility: CLINIC | Age: 73
End: 2023-05-04
Payer: MEDICARE

## 2023-05-08 ENCOUNTER — EVALUATION (OUTPATIENT)
Dept: PHYSICAL THERAPY | Facility: CLINIC | Age: 73
End: 2023-05-08

## 2023-05-08 DIAGNOSIS — G20 PARKINSON'S DISEASE (TREMOR, STIFFNESS, SLOW MOTION, UNSTABLE POSTURE) (HCC): Primary | ICD-10-CM

## 2023-05-08 NOTE — PROGRESS NOTES
Progress Note/Discharge    Today's date: 2023  Patient name: Anamaria Meza  : 1950  MRN: [id number]  Referring provider: Karuna Schultz DO  Dx:   Encounter Diagnosis     ICD-10-CM    1  Parkinson's disease (tremor, stiffness, slow motion, unstable posture) ([facility])  G20                      Subjective: No pain  Objective: See treatment diary below      Assessment: Progress note completed this session  See note below for outcome measures  He demo improved endurance per 6 MWT and balance per FGA  At this time, pt is independent with HEP and met most of his goals  He will be d/c from skilled PT at this time  Goals  STG 3 weeks:  1  Pt will decrease his TUG time > 3 seconds  - D/C   2  Pt will increase his gait velocity by > 0 05 m/s  - D/C  LTG 4-6 weeks:  1  Pt will improved his 6 MWT > or equal to 1,100 feet  - MET   2   Pt will improve his FGA score to 25 or greater  - MET     Plan - discharge   Plan of Care beginning date: 2023  Plan of Care expiration date: 2023  Treatment plan discussed with: patient and PTA

## 2023-05-08 NOTE — PROGRESS NOTES
Daily Note     Today's date: 2023  Patient name: Kristopher Martinez  : 1950  MRN: [id number]  Referring provider: Naa Barragan DO  Dx:   Encounter Diagnosis     ICD-10-CM    1  Parkinson's disease (tremor, stiffness, slow motion, unstable posture) ([facility])  G20                      Subjective: No new co's  Feeling good today      Objective: See treatment diary below    6 MWT = 1,146 ft no AD      Assessment: Tolerated treatment well  1* focus review HEP for max carryover with handouts/review  Pt with 1 LOB with turn nego in small space w/ self correction  Pt ambul with < step stride/ground clearance as pt fatigued with 6 MWT        Plan: DC to HEP discuss possible joining community wellness       Re-eval Date: 23    Date        Visit Count 11       FOTO        Pain In        Pain Out             Precautions: parkinson's, falls risk,        Manuals                                        Neuro Re-Ed         Boxing Cross punch    10x quick 5 reps w/ squat FA    10x w/ STS FA    Stagger stance  R/L kick  R/L kick with jab  10x ea       PWR Seated x10 ea         STS 5x  5x 2 with word/letter association       [place] pullouts        Step ups         Obstacle course        [place]        [place] walks        Side stepping w/ arm swings         [place] ambul         Step back / bow        Multi directional step        HKM w/ cross hook, step out with reach 2x10 ea         Outdoor walking w/ 5lb DB in b/l UE and 3lb AW around b/l LE        Ther Ex        Patient education about POC, diagnoses, education, and functional testing                   Nustep  L5, 12 min for CV endurance                                               Ther Activity        Floor negotiation        Bed Mobility                  Gait Training                        Modalities                        * #1 = Jab, #2 = Upper cut, #3 Hook

## 2023-05-12 ENCOUNTER — APPOINTMENT (OUTPATIENT)
Dept: PHYSICAL THERAPY | Facility: CLINIC | Age: 73
End: 2023-05-12
Payer: MEDICARE

## 2023-05-15 ENCOUNTER — APPOINTMENT (OUTPATIENT)
Dept: PHYSICAL THERAPY | Facility: CLINIC | Age: 73
End: 2023-05-15
Payer: MEDICARE

## 2023-05-18 ENCOUNTER — APPOINTMENT (OUTPATIENT)
Dept: PHYSICAL THERAPY | Facility: CLINIC | Age: 73
End: 2023-05-18
Payer: MEDICARE

## 2023-06-12 DIAGNOSIS — I25.10 CORONARY ARTERY DISEASE INVOLVING NATIVE CORONARY ARTERY OF NATIVE HEART WITHOUT ANGINA PECTORIS: ICD-10-CM

## 2023-06-12 RX ORDER — ATORVASTATIN CALCIUM 80 MG/1
80 TABLET, FILM COATED ORAL DAILY
Qty: 90 TABLET | Refills: 3 | Status: SHIPPED | OUTPATIENT
Start: 2023-06-12 | End: 2023-06-13

## 2023-06-13 DIAGNOSIS — I25.10 CORONARY ARTERY DISEASE INVOLVING NATIVE CORONARY ARTERY OF NATIVE HEART WITHOUT ANGINA PECTORIS: ICD-10-CM

## 2023-06-13 RX ORDER — ATORVASTATIN CALCIUM 80 MG/1
80 TABLET, FILM COATED ORAL DAILY
Qty: 90 TABLET | Refills: 3 | Status: SHIPPED | OUTPATIENT
Start: 2023-06-13

## 2023-06-13 RX ORDER — ATORVASTATIN CALCIUM 80 MG/1
80 TABLET, FILM COATED ORAL DAILY
Qty: 90 TABLET | Refills: 3 | OUTPATIENT
Start: 2023-06-13

## 2023-07-03 ENCOUNTER — APPOINTMENT (OUTPATIENT)
Age: 73
End: 2023-07-03
Payer: MEDICARE

## 2023-07-03 DIAGNOSIS — D75.839 THROMBOCYTOSIS: ICD-10-CM

## 2023-07-03 DIAGNOSIS — D75.1 ERYTHROCYTOSIS: ICD-10-CM

## 2023-07-03 LAB
BASOPHILS # BLD AUTO: 0.07 THOUSANDS/ÂΜL (ref 0–0.1)
BASOPHILS NFR BLD AUTO: 1 % (ref 0–1)
EOSINOPHIL # BLD AUTO: 0.07 THOUSAND/ÂΜL (ref 0–0.61)
EOSINOPHIL NFR BLD AUTO: 1 % (ref 0–6)
ERYTHROCYTE [DISTWIDTH] IN BLOOD BY AUTOMATED COUNT: 15.4 % (ref 11.6–15.1)
HCT VFR BLD AUTO: 57.2 % (ref 36.5–49.3)
HGB BLD-MCNC: 18.7 G/DL (ref 12–17)
IMM GRANULOCYTES # BLD AUTO: 0.05 THOUSAND/UL (ref 0–0.2)
IMM GRANULOCYTES NFR BLD AUTO: 1 % (ref 0–2)
LYMPHOCYTES # BLD AUTO: 2.28 THOUSANDS/ÂΜL (ref 0.6–4.47)
LYMPHOCYTES NFR BLD AUTO: 29 % (ref 14–44)
MCH RBC QN AUTO: 28.9 PG (ref 26.8–34.3)
MCHC RBC AUTO-ENTMCNC: 32.7 G/DL (ref 31.4–37.4)
MCV RBC AUTO: 88 FL (ref 82–98)
MONOCYTES # BLD AUTO: 0.64 THOUSAND/ÂΜL (ref 0.17–1.22)
MONOCYTES NFR BLD AUTO: 8 % (ref 4–12)
NEUTROPHILS # BLD AUTO: 4.86 THOUSANDS/ÂΜL (ref 1.85–7.62)
NEUTS SEG NFR BLD AUTO: 60 % (ref 43–75)
NRBC BLD AUTO-RTO: 0 /100 WBCS
PLATELET # BLD AUTO: 665 THOUSANDS/UL (ref 149–390)
PMV BLD AUTO: 11.5 FL (ref 8.9–12.7)
RBC # BLD AUTO: 6.48 MILLION/UL (ref 3.88–5.62)
WBC # BLD AUTO: 7.97 THOUSAND/UL (ref 4.31–10.16)

## 2023-07-03 PROCEDURE — 36415 COLL VENOUS BLD VENIPUNCTURE: CPT

## 2023-07-03 PROCEDURE — 85025 COMPLETE CBC W/AUTO DIFF WBC: CPT

## 2023-07-14 ENCOUNTER — TELEPHONE (OUTPATIENT)
Dept: ADMINISTRATIVE | Facility: OTHER | Age: 73
End: 2023-07-14

## 2023-07-14 ENCOUNTER — OFFICE VISIT (OUTPATIENT)
Dept: FAMILY MEDICINE CLINIC | Facility: CLINIC | Age: 73
End: 2023-07-14
Payer: MEDICARE

## 2023-07-14 ENCOUNTER — TELEPHONE (OUTPATIENT)
Dept: HEMATOLOGY ONCOLOGY | Facility: CLINIC | Age: 73
End: 2023-07-14

## 2023-07-14 VITALS
BODY MASS INDEX: 27.25 KG/M2 | HEIGHT: 69 IN | SYSTOLIC BLOOD PRESSURE: 130 MMHG | TEMPERATURE: 98.4 F | WEIGHT: 184 LBS | DIASTOLIC BLOOD PRESSURE: 80 MMHG

## 2023-07-14 DIAGNOSIS — C61 MALIGNANT NEOPLASM OF PROSTATE (HCC): ICD-10-CM

## 2023-07-14 DIAGNOSIS — I25.119 CORONARY ARTERY DISEASE INVOLVING NATIVE CORONARY ARTERY OF NATIVE HEART WITH ANGINA PECTORIS (HCC): ICD-10-CM

## 2023-07-14 DIAGNOSIS — Z12.11 SCREENING FOR COLON CANCER: ICD-10-CM

## 2023-07-14 DIAGNOSIS — D75.1 POLYCYTHEMIA: ICD-10-CM

## 2023-07-14 DIAGNOSIS — Z00.00 MEDICARE ANNUAL WELLNESS VISIT, SUBSEQUENT: Primary | ICD-10-CM

## 2023-07-14 DIAGNOSIS — I10 PRIMARY HYPERTENSION: ICD-10-CM

## 2023-07-14 DIAGNOSIS — Z12.5 SCREENING FOR PROSTATE CANCER: ICD-10-CM

## 2023-07-14 DIAGNOSIS — G20 PARKINSON'S DISEASE (HCC): ICD-10-CM

## 2023-07-14 DIAGNOSIS — E78.2 MIXED HYPERLIPIDEMIA: ICD-10-CM

## 2023-07-14 PROBLEM — G20.A1 PARKINSON'S DISEASE: Status: ACTIVE | Noted: 2023-07-14

## 2023-07-14 PROCEDURE — G0438 PPPS, INITIAL VISIT: HCPCS | Performed by: INTERNAL MEDICINE

## 2023-07-14 NOTE — TELEPHONE ENCOUNTER
I called Fabrice Pfeiffer in response to a referral that was received for patient to establish care with Hematology. Outreach was made to schedule a consultation. .    I left a voicemail explaining the reason for my call and advised patient to call [facility] at [phone]. Another attempt will be made to contact patient.

## 2023-07-14 NOTE — PROGRESS NOTES
Name: Janeen Rush. : 1950      MRN: [id number]  Encounter Provider: Maria Antonia Rosado DO  Encounter Date: 2023   [facility]    Assessment & Plan     1. Medicare annual wellness visit, subsequent  Assessment & Plan:  Patient is doing well. Offers no chest pain or shortness of breath. He is up-to-date on colonoscopies. Up-to-date on vaccines. Continue to recommend, exercise and smart dietary decisions. 2. Screening for colon cancer    3. Primary hypertension  Assessment & Plan:  Continue current medical therapy. Orders:  -     CBC and differential; Future; Expected date: 2023  -     Comprehensive metabolic panel; Future; Expected date: 2023    4. Coronary artery disease involving native coronary artery of native heart with angina pectoris [facility])  Assessment & Plan:  Continue with current medical therapy, noting recent stress test was negative for any reversible ischemia. Orders:  -     CBC and differential; Future; Expected date: 2023  -     Comprehensive metabolic panel; Future; Expected date: 2023  -     Lipid Panel with Direct LDL reflex; Future; Expected date: 2023    5. Mixed hyperlipidemia  Assessment & Plan:  Continues on statin therapy and reaching targets. Due for blood work next appointment. Orders:  -     CBC and differential; Future; Expected date: 2023  -     Comprehensive metabolic panel; Future; Expected date: 2023  -     Lipid Panel with Direct LDL reflex; Future; Expected date: 2023    6. Parkinson's disease [facility])  Assessment & Plan:  Remains on Sinemet. Gets recurrent therapy. Feels well overall. Continues to work out at [employer]. 7. Polycythemia  Assessment & Plan:  Ambulatory referral to hematology. Suspect suspect underlying myelodysplastic syndrome.     Orders:  -     CBC and differential; Future; Expected date: 2023  -     Comprehensive metabolic panel; Future; Expected date: 11/14/2023  -     Ambulatory Referral to Hematology / Oncology; Future    8. Screening for prostate cancer  -     PSA, Total Screen; Future    9. Malignant neoplasm of prostate ([address])           Subjective      HPI  Review of Systems   Constitutional: Negative for chills and fever. HENT: Negative for rhinorrhea and sore throat. Eyes: Negative for visual disturbance. Respiratory: Negative for cough and shortness of breath. Cardiovascular: Negative for chest pain and leg swelling. Gastrointestinal: Negative for abdominal pain, diarrhea, nausea and vomiting. Genitourinary: Negative for dysuria. Musculoskeletal: Positive for back pain and myalgias. Skin: Negative for rash. Neurological: Positive for tremors. Negative for dizziness and headaches. Psychiatric/Behavioral: Negative for confusion. All other systems reviewed and are negative. Current Outpatient Medications on File Prior to Visit   Medication Sig   • aspirin (ECOTRIN LOW STRENGTH) 81 mg EC tablet Take 81 mg by mouth daily   • atorvastatin (LIPITOR) 80 mg tablet Take 1 tablet (80 mg total) by mouth daily   • carbidopa-levodopa (SINEMET)  mg per tablet Take 1 tablet by mouth 3 (three) times a day   • PARoxetine (PAXIL) 20 mg tablet Take 20 mg by mouth daily   • meloxicam (MOBIC) 15 mg tablet Take 1 tablet (15 mg total) by mouth daily (Patient not taking: Reported on 12/29/2022)       Objective     /80 (BP Location: Left arm, Patient Position: Sitting, Cuff Size: Large)   Temp 98.4 °F (36.9 °C)   Ht 5' 9" (1.753 m)   Wt 83.5 kg (184 lb)   BMI 27.17 kg/m²     Physical Exam  Constitutional:       Appearance: Normal appearance. HENT:      Head: Normocephalic and atraumatic. Nose: No congestion or rhinorrhea. Eyes:      Extraocular Movements: Extraocular movements intact. Pupils: Pupils are equal, round, and reactive to light. Neck:      Vascular: No carotid bruit. Cardiovascular:      Rate and Rhythm: Normal rate and regular rhythm. Heart sounds: No murmur heard. Pulmonary:      Effort: No respiratory distress. Breath sounds: No wheezing. Chest:      Chest wall: No tenderness. Abdominal:      General: There is no distension. Tenderness: There is no abdominal tenderness. Hernia: No hernia is present. Musculoskeletal:         General: No swelling or tenderness. Right lower leg: No edema. Left lower leg: No edema. Lymphadenopathy:      Cervical: No cervical adenopathy. Skin:     Findings: No rash. Neurological:      General: No focal deficit present. Mental Status: He is alert and oriented to person, place, and time. Sensory: No sensory deficit. Comments: Typical Parkinson's facies, right hand tremor. Typical Parkinson's gait with loss of armswing.    Psychiatric:         Mood and Affect: Mood normal.         Behavior: Behavior normal.       Marsha Street DO

## 2023-07-14 NOTE — PROGRESS NOTES
Assessment and Plan:     Problem List Items Addressed This Visit        Cardiovascular and Mediastinum    Hypertension    Coronary artery disease involving native coronary artery of native heart with angina pectoris ([facility]CC)       Nervous and Auditory    Parkinson's disease ([address])       Other    Mixed hyperlipidemia   Other Visit Diagnoses     Encounter for immunization    -  Primary    Screening for colon cancer        Polycythemia               Preventive health issues were discussed with patient, and age appropriate screening tests were ordered as noted in patient's After Visit Summary. Personalized health advice and appropriate referrals for health education or preventive services given if needed, as noted in patient's After Visit Summary. History of Present Illness:     Patient presents for a Medicare Wellness Visit    HPI   Patient Care Team:  Jhonny Sever, DO as PCP - General (Internal Medicine)     Review of Systems:     Review of Systems     Problem List:     Patient Active Problem List   Diagnosis   • Hypertension   • Coronary artery disease involving native coronary artery of native heart with angina pectoris ([address])   • Mixed hyperlipidemia   • Dislocation of right shoulder joint   • Traumatic complete tear of right rotator cuff   • Complete tear of right rotator cuff   • Stage 3a chronic kidney disease ([address])   • Parkinson's disease ([address])      Past Medical and Surgical History:     Past Medical History:   Diagnosis Date   • Anxiety    • Benign hypertension    • Cancer ([address])     prostate   • Coronary artery disease     ABRAM to LAD in 2014   • History of cardiovascular stress test 01/21/2016    Normal EF, normal study.    • History of echocardiogram 05/05/2015    EF normal.     • Ischemic cardiomyopathy     resolved   • Mixed hyperlipidemia    • Myocardial infarction [facility])    • Parkinson disease ([address])    • Prostate cancer ([address])     surgically removed   • STEMI (ST elevation myocardial infarction) ([address]) 2014     Past Surgical History:   Procedure Laterality Date   • CORONARY ANGIOPLASTY WITH STENT PLACEMENT  12/26/2014    EF 40%, ABRAM to LAD. • INCISIONAL HERNIA REPAIR     • ND SURGICAL ARTHROSCOPY SHOULDER W/ROTATOR CUFF RPR Right 2/24/2021    Procedure: SHOULDER ARTHROSCOPY WITH ROTATOR CUFF REPAIR and acromioplasty;  Surgeon: Althea Herron DO;  Location: [address] [place] [address];  Service: Orthopedics   • PROSTATECTOMY  2014    for cancer   • TONSILLECTOMY        Family History:     Family History   Problem Relation Age of Onset   • Heart attack Father    • Stroke Father       Social History:     Social History     Socioeconomic History   • Marital status: Single     Spouse name: None   • Number of children: None   • Years of education: None   • Highest education level: None   Occupational History   • None   Tobacco Use   • Smoking status: Never   • Smokeless tobacco: Never   Vaping Use   • Vaping Use: Never used   Substance and Sexual Activity   • Alcohol use:  Yes     Alcohol/week: 2.0 standard drinks of alcohol     Types: 2 Cans of beer per week     Comment: daily   • Drug use: Never   • Sexual activity: None   Other Topics Concern   • None   Social History Narrative    Caffeine - 2 cups coffee/day     Social Determinants of Health     Financial Resource Strain: Not on file   Food Insecurity: Not on file   Transportation Needs: Not on file   Physical Activity: Not on file   Stress: Not on file   Social Connections: Not on file   Intimate Partner Violence: Not on file   Housing Stability: Not on file      Medications and Allergies:     Current Outpatient Medications   Medication Sig Dispense Refill   • aspirin (ECOTRIN LOW STRENGTH) 81 mg EC tablet Take 81 mg by mouth daily     • atorvastatin (LIPITOR) 80 mg tablet Take 1 tablet (80 mg total) by mouth daily 90 tablet 3   • carbidopa-levodopa (SINEMET)  mg per tablet Take 1 tablet by mouth 3 (three) times a day     • PARoxetine (PAXIL) 20 mg tablet Take 20 mg by mouth daily     • meloxicam (MOBIC) 15 mg tablet Take 1 tablet (15 mg total) by mouth daily (Patient not taking: Reported on 12/29/2022) 30 tablet 0     No current facility-administered medications for this visit. No Known Allergies   Immunizations:     Immunization History   Administered Date(s) Administered   • COVID-19 PFIZER VACCINE 0.3 ML IM 03/27/2021, 04/17/2021   • INFLUENZA 02/08/2016, 09/23/2016, 11/22/2021   • Influenza Split High Dose Preservative Free IM 10/15/2019, 11/25/2020   • Influenza, high dose seasonal 0.7 mL 11/12/2020   • Influenza, seasonal, injectable 12/29/2014   • Pneumococcal Conjugate 13-Valent 09/23/2016   • Pneumococcal Polysaccharide PPV23 12/29/2014, 06/19/2018   • Tdap 11/13/2020      Health Maintenance:         Topic Date Due   • Hepatitis C Screening  Never done   • Colorectal Cancer Screening  Never done         Topic Date Due   • COVID-19 Vaccine (3 - Pfizer series) 06/12/2021   • Influenza Vaccine (1) 09/01/2023      Medicare Screening Tests and Risk Assessments:         Health Risk Assessment:   Patient rates overall health as poor. Patient is dissatisfied with their life. Eyesight was rated as same. Hearing was rated as same. Patient feels that their emotional and mental health rating is same. Patients states they are never, rarely angry. Patient states they are always unusually tired/fatigued. Patient states that he has experienced no weight loss or gain in last 6 months. Depression Screening:   PHQ-2 Score: 0      Fall Risk Screening: In the past year, patient has experienced: no history of falling in past year      Home Safety:  Patient does not have trouble with stairs inside or outside of their home. Patient has working smoke alarms and has no working carbon monoxide detector. Home safety hazards include: none. Nutrition:   Current diet is Regular. Medications:   Patient is not currently taking any over-the-counter supplements.  Patient is able to manage medications. Activities of Daily Living (ADLs)/Instrumental Activities of Daily Living (IADLs):   Walk and transfer into and out of bed and chair?: Yes  Dress and groom yourself?: Yes    Bathe or shower yourself?: Yes    Feed yourself? Yes  Do your laundry/housekeeping?: Yes  Manage your money, pay your bills and track your expenses?: Yes  Make your own meals?: Yes    Do your own shopping?: Yes    Previous Hospitalizations:   Any hospitalizations or ED visits within the last 12 months?: No      Advance Care Planning:   Living will: Yes    Durable POA for healthcare: Yes    Advanced directive: Yes    Advanced directive counseling given: Yes    Five wishes given: Yes    End of Life Decisions reviewed with patient: Yes      Cognitive Screening:   Provider or family/friend/caregiver concerned regarding cognition?: No    PREVENTIVE SCREENINGS      Cardiovascular Screening:    General: Screening Not Indicated and History Lipid Disorder      Diabetes Screening:     General: Screening Current      Colorectal Cancer Screening:       Due for: Cologuard      Prostate Cancer Screening:    General: History Prostate Cancer      Abdominal Aortic Aneurysm (AAA) Screening:    Risk factors include: age between 70-77 yo        General: Risks and Benefits Discussed      Lung Cancer Screening:     General: Screening Not Indicated      Hepatitis C Screening:    General: Screening Not Indicated    Screening, Brief Intervention, and Referral to Treatment (SBIRT)    Screening  Typical number of drinks in a day: 4  Typical number of drinks in a week: 28  Interpretation: Risky drinking behavior. Single Item Drug Screening:  How often have you used an illegal drug (including marijuana) or a prescription medication for non-medical reasons in the past year? never    Single Item Drug Screen Score: 0  Interpretation: Negative screen for possible drug use disorder    No results found.      Physical Exam:     /80 (BP Location: Left arm, Patient Position: Sitting, Cuff Size: Large)   Temp 98.4 °F (36.9 °C)   Ht 5' 9" (1.753 m)   Wt 83.5 kg (184 lb)   BMI 27.17 kg/m²     Physical Exam     Anni Villa DO

## 2023-07-14 NOTE — PATIENT INSTRUCTIONS
Medicare Preventive Visit Patient Instructions  Thank you for completing your Welcome to Medicare Visit or Medicare Annual Wellness Visit today. Your next wellness visit will be due in one year (7/14/2024). The screening/preventive services that you may require over the next 5-10 years are detailed below. Some tests may not apply to you based off risk factors and/or age. Screening tests ordered at today's visit but not completed yet may show as past due. Also, please note that scanned in results may not display below. Preventive Screenings:  Service Recommendations Previous Testing/Comments   Colorectal Cancer Screening  · Colonoscopy    · Fecal Occult Blood Test (FOBT)/Fecal Immunochemical Test (FIT)  · Fecal DNA/Cologuard Test  · Flexible Sigmoidoscopy Age: 43-73 years old   Colonoscopy: every 10 years (May be performed more frequently if at higher risk)  OR  FOBT/FIT: every 1 year  OR  Cologuard: every 3 years  OR  Sigmoidoscopy: every 5 years  Screening may be recommended earlier than age 39 if at higher risk for colorectal cancer. Also, an individualized decision between you and your healthcare provider will decide whether screening between the ages of 77-80 would be appropriate.  Colonoscopy: Not on file  FOBT/FIT: Not on file  Cologuard: Not on file  Sigmoidoscopy: Not on file    Due for Cologuard     Prostate Cancer Screening Individualized decision between patient and health care provider in men between ages of 53-66   Medicare will cover every 12 months beginning on the day after your 50th birthday PSA: <0.1 ng/mL     History Prostate Cancer     Hepatitis C Screening Once for adults born between 1945 and 1965  More frequently in patients at high risk for Hepatitis C Hep C Antibody: Not on file    Screening Not Indicated   Diabetes Screening 1-2 times per year if you're at risk for diabetes or have pre-diabetes Fasting glucose: 101 mg/dL (11/22/2022)  A1C: No results in last 5 years (No results in last 5 years)  Screening Current   Cholesterol Screening Once every 5 years if you don't have a lipid disorder. May order more often based on risk factors. Lipid panel: 11/22/2022  Screening Not Indicated  History Lipid Disorder      Other Preventive Screenings Covered by Medicare:  1. Abdominal Aortic Aneurysm (AAA) Screening: covered once if your at risk. You're considered to be at risk if you have a family history of AAA or a male between the age of 70-76 who smoking at least 100 cigarettes in your lifetime. 2. Lung Cancer Screening: covers low dose CT scan once per year if you meet all of the following conditions: (1) Age 48-67; (2) No signs or symptoms of lung cancer; (3) Current smoker or have quit smoking within the last 15 years; (4) You have a tobacco smoking history of at least 20 pack years (packs per day x number of years you smoked); (5) You get a written order from a healthcare provider. 3. Glaucoma Screening: covered annually if you're considered high risk: (1) You have diabetes OR (2) Family history of glaucoma OR (3)  aged 48 and older OR (3)  American aged 72 and older  3. Osteoporosis Screening: covered every 2 years if you meet one of the following conditions: (1) Have a vertebral abnormality; (2) On glucocorticoid therapy for more than 3 months; (3) Have primary hyperparathyroidism; (4) On osteoporosis medications and need to assess response to drug therapy. 5. HIV Screening: covered annually if you're between the age of 14-79. Also covered annually if you are younger than 13 and older than 72 with risk factors for HIV infection. For pregnant patients, it is covered up to 3 times per pregnancy.     Immunizations:  Immunization Recommendations   Influenza Vaccine Annual influenza vaccination during flu season is recommended for all persons aged >= 6 months who do not have contraindications   Pneumococcal Vaccine   * Pneumococcal conjugate vaccine = PCV13 (Prevnar 13), PCV15 (Vaxneuvance), PCV20 (Prevnar 20)  * Pneumococcal polysaccharide vaccine = PPSV23 (Pneumovax) Adults 2364 years old: 1-3 doses may be recommended based on certain risk factors  Adults 72 years old: 1-2 doses may be recommended based off what pneumonia vaccine you previously received   Hepatitis B Vaccine 3 dose series if at intermediate or high risk (ex: diabetes, end stage renal disease, liver disease)   Tetanus (Td) Vaccine - COST NOT COVERED BY MEDICARE PART B Following completion of primary series, a booster dose should be given every 10 years to maintain immunity against tetanus. Td may also be given as tetanus wound prophylaxis. Tdap Vaccine - COST NOT COVERED BY MEDICARE PART B Recommended at least once for all adults. For pregnant patients, recommended with each pregnancy. Shingles Vaccine (Shingrix) - COST NOT COVERED BY MEDICARE PART B  2 shot series recommended in those aged 48 and above     Health Maintenance Due:      Topic Date Due   • Hepatitis C Screening  Never done   • Colorectal Cancer Screening  Never done     Immunizations Due:      Topic Date Due   • COVID-19 Vaccine (3 - Pfizer series) 06/12/2021   • Influenza Vaccine (1) 09/01/2023     Advance Directives   What are advance directives? Advance directives are legal documents that state your wishes and plans for medical care. These plans are made ahead of time in case you lose your ability to make decisions for yourself. Advance directives can apply to any medical decision, such as the treatments you want, and if you want to donate organs. What are the types of advance directives? There are many types of advance directives, and each state has rules about how to use them. You may choose a combination of any of the following:  · Living will: This is a written record of the treatment you want. You can also choose which treatments you do not want, which to limit, and which to stop at a certain time.  This includes surgery, medicine, IV fluid, and tube feedings. · Durable power of  for healthcare [facility]): This is a written record that states who you want to make healthcare choices for you when you are unable to make them for yourself. This person, called a proxy, is usually a family member or a friend. You may choose more than 1 proxy. · Do not resuscitate (DNR) order:  A DNR order is used in case your heart stops beating or you stop breathing. It is a request not to have certain forms of treatment, such as CPR. A DNR order may be included in other types of advance directives. · Medical directive: This covers the care that you want if you are in a coma, near death, or unable to make decisions for yourself. You can list the treatments you want for each condition. Treatment may include pain medicine, surgery, blood transfusions, dialysis, IV or tube feedings, and a ventilator (breathing machine). · Values history: This document has questions about your views, beliefs, and how you feel and think about life. This information can help others choose the care that you would choose. Why are advance directives important? An advance directive helps you control your care. Although spoken wishes may be used, it is better to have your wishes written down. Spoken wishes can be misunderstood, or not followed. Treatments may be given even if you do not want them. An advance directive may make it easier for your family to make difficult choices about your care. Weight Management   Why it is important to manage your weight:  Being overweight increases your risk of health conditions such as heart disease, high blood pressure, type 2 diabetes, and certain types of cancer. It can also increase your risk for osteoarthritis, sleep apnea, and other respiratory problems. Aim for a slow, steady weight loss. Even a small amount of weight loss can lower your risk of health problems.   How to lose weight safely:  A safe and healthy way to lose weight is to eat fewer calories and get regular exercise. You can lose up about 1 pound a week by decreasing the number of calories you eat by 500 calories each day. Healthy meal plan for weight management:  A healthy meal plan includes a variety of foods, contains fewer calories, and helps you stay healthy. A healthy meal plan includes the following:  · Eat whole-grain foods more often. A healthy meal plan should contain fiber. Fiber is the part of grains, fruits, and vegetables that is not broken down by your body. Whole-grain foods are healthy and provide extra fiber in your diet. Some examples of whole-grain foods are whole-wheat breads and pastas, oatmeal, brown rice, and bulgur. · Eat a variety of vegetables every day. Include dark, leafy greens such as spinach, kale, filippo greens, and mustard greens. Eat yellow and orange vegetables such as carrots, sweet potatoes, and winter squash. · Eat a variety of fruits every day. Choose fresh or canned fruit (canned in its own juice or light syrup) instead of juice. Fruit juice has very little or no fiber. · Eat low-fat dairy foods. Drink fat-free (skim) milk or 1% milk. Eat fat-free yogurt and low-fat cottage cheese. Try low-fat cheeses such as mozzarella and other reduced-fat cheeses. · Choose meat and other protein foods that are low in fat. Choose beans or other legumes such as split peas or lentils. Choose fish, skinless poultry (chicken or turkey), or lean cuts of red meat (beef or pork). Before you cook meat or poultry, cut off any visible fat. · Use less fat and oil. Try baking foods instead of frying them. Add less fat, such as margarine, sour cream, regular salad dressing and mayonnaise to foods. Eat fewer high-fat foods. Some examples of high-fat foods include french fries, doughnuts, ice cream, and cakes. · Eat fewer sweets. Limit foods and drinks that are high in sugar. This includes candy, cookies, regular soda, and sweetened drinks.   Exercise: Exercise at least 30 minutes per day on most days of the week. Some examples of exercise include walking, biking, dancing, and swimming. You can also fit in more physical activity by taking the stairs instead of the elevator or parking farther away from stores. Ask your healthcare provider about the best exercise plan for you. © Copyright [employer] 2018 Information is for End User's use only and may not be sold, redistributed or otherwise used for commercial purposes.  All illustrations and images included in CareNotes® are the copyrighted property of A.D.A.M., Inc. or  [employer]

## 2023-07-14 NOTE — LETTER
Procedure Request Form: Colonoscopy      Date Requested: 23  Patient: Madison Chambers. Patient : 1950   Referring Provider: Danyel Smith, DO        Date of Procedure _________5 years_____________________       The above patient has informed us that they have completed their   most recent Colonoscopy at your facility. Please complete   this form and attach all corresponding procedure reports/results. Comments __________________________________________________________  ____________________________________________________________________  ____________________________________________________________________  ____________________________________________________________________    Facility Completing Procedure _________________________________________    Form Completed By (print name) _______________________________________      Signature __________________________________________________________      These reports are needed for  compliance. Please fax this completed form and a copy of the procedure report to our office located at [address] [place] [address] as soon as possible to Fax [phone] wilber [place]: Phone [phone]    We thank you for your assistance in treating our mutual patient.

## 2023-07-14 NOTE — ASSESSMENT & PLAN NOTE
Continue with current medical therapy, noting recent stress test was negative for any reversible ischemia.

## 2023-07-14 NOTE — TELEPHONE ENCOUNTER
----- Message from Mey Zepeda MA sent at 7/14/2023  8:59 AM EDT -----  Regarding: colonoscopy  07/14/23 9:00 AM    Hello, our patient Denisa Hayes. has had CRC: Colonoscopy completed/performed. Please assist in updating the patient chart by making an External outreach to [facility] facility located in [facility]. The date of service is within the past 5 years .     Thank you,  Mey Zepeda MA  [facility]

## 2023-07-14 NOTE — ASSESSMENT & PLAN NOTE
Patient is doing well. Offers no chest pain or shortness of breath. He is up-to-date on colonoscopies. Up-to-date on vaccines. Continue to recommend, exercise and smart dietary decisions.

## 2023-07-17 NOTE — TELEPHONE ENCOUNTER
Upon review of the In Basket request and the patient's chart, initial outreach has been made via fax to facility. Please see Contacts section for details.      Thank you  Jah Wong

## 2023-07-26 NOTE — TELEPHONE ENCOUNTER
Upon review of the In Basket request we were able to locate, review, and update the patient chart as requested for CRC: Colonoscopy. Any additional questions or concerns should be emailed to the Practice Liaisons via the appropriate education email address, please do not reply via In Basket.     Thank you  Raine Mckoy

## 2023-08-31 DIAGNOSIS — F41.9 ANXIETY: Primary | ICD-10-CM

## 2023-08-31 RX ORDER — PAROXETINE HYDROCHLORIDE 20 MG/1
20 TABLET, FILM COATED ORAL DAILY
Qty: 90 TABLET | Refills: 3 | Status: SHIPPED | OUTPATIENT
Start: 2023-08-31

## 2023-09-05 NOTE — PROGRESS NOTES
DR RG'S OFFICE CALLED MADE AWARE OF PATIENTS POSITIVE COVID HISTORY 8-19-23 AND NEGATIVE HISTORY 8-29-23 SPOKE WITH Carlo Michaels Daily Note     Today's date: 2022  Patient name: Carolina Cruz  : 1950  MRN: [id number]  Referring provider: Gregg Wallace DO  Dx:   Encounter Diagnosis     ICD-10-CM    1  Parkinson disease ([facility] [address] )  G20                   Subjective: [place]  Pt denies any recent falls with occ FOG/LOB      Objective: See treatment diary below      Assessment: Tolerated treatment well  Improvement with divided attention/memory recall this date with neuro activities completed in busy clinic setting  Patient demonstrated fatigue post treatment and would benefit from continued PT      Plan: Continue per plan of care        Re-eval Date:     Date 3/28 3/30 4/6 4/12 4/14   Visit Count 1 2 3 4 5   FOTO        Pain In        Pain Out          Manuals                                        Neuro Re-Ed          Mini Best  15 min      PWR  Supine  - up  - WS  -Rotation  -Step  Max cues    15 min Seated  - up  -WS  -twist  -step    15 min Seated  - up  -WS  -Twist  -step  Eye/hand boosts, pt self cues  Partial perform quiet environment  Mod cues  15 min All 4's  - up  -WS  -Twist  -step  Eye/hand boosts, pt self cues  Busy clinic environment  Mod cues  15 min   Chair <>chair transfer   10x  "Feet/clock" cues CS     STS   5x  16 3sec  W/ UE    5x 15 7sec  W/o UE  5x w/ UE  5x w/o UE  < 14" ea   Obstacle course    3 yellow circles focus >step stride, red foam, turn negotiation  With divided attention, memory recall  15 min STS, turn negotiation, step over 6" obstacles,  Thresholds    Memory recall, match word/picture,word in conversation  15 min           HEP  PWR  Supine   *pt to write 2-3 hierarchy goals NV  PWR  seated    Ther Ex        Nustep   L3 10 min  Warm up L3 10 min  Cues UE/LE extension  L3 10 min  Cues UE/LE extension  L3 10 min  Cues UE/LE extension    Knee ext/flex    22#  3x10 ea                                                      Ther Activity Gait Training           Clinic  3x   Small spaces/threshold,turn negotiations    CS    Cues ground clearance, arm swing, step stride, postural awareness    Educ/Practiced  4 S's with pt self cue Clinic  4x   Small spaces/threshold,turn negotiations, memory recall 1 min trial/5 objects    CS    Cues ground clearance, arm swing, step stride, postural awareness    Practiced  4 S's with pt self cues Practiced  4 S's with pt self cues  Clinic/kristopher  Min cues   5min           Modalities

## 2023-09-12 PROBLEM — Z00.00 MEDICARE ANNUAL WELLNESS VISIT, SUBSEQUENT: Status: RESOLVED | Noted: 2023-07-14 | Resolved: 2023-09-12

## 2023-09-13 ENCOUNTER — CONSULT (OUTPATIENT)
Dept: HEMATOLOGY ONCOLOGY | Facility: CLINIC | Age: 73
End: 2023-09-13
Payer: MEDICARE

## 2023-09-13 VITALS
OXYGEN SATURATION: 98 % | TEMPERATURE: 98.2 F | RESPIRATION RATE: 18 BRPM | SYSTOLIC BLOOD PRESSURE: 126 MMHG | DIASTOLIC BLOOD PRESSURE: 76 MMHG | HEART RATE: 90 BPM | HEIGHT: 69 IN | WEIGHT: 184 LBS | BODY MASS INDEX: 27.25 KG/M2

## 2023-09-13 DIAGNOSIS — D53.9 NUTRITIONAL ANEMIA: ICD-10-CM

## 2023-09-13 DIAGNOSIS — D75.839 THROMBOCYTOSIS: ICD-10-CM

## 2023-09-13 DIAGNOSIS — D75.1 POLYCYTHEMIA: Primary | ICD-10-CM

## 2023-09-13 PROCEDURE — 99204 OFFICE O/P NEW MOD 45 MIN: CPT | Performed by: NURSE PRACTITIONER

## 2023-09-13 NOTE — PROGRESS NOTES
Hematology/Oncology Outpatient Consultation  Joce Phillips. 68 y.o. male 1950 [id number]    Date:  9/13/2023      Assessment and Plan:  1. Polycythemia  Did review with patient the possible etiologies of his polycythemia/thrombocytosis. He has never smoked and denies any lung disease. Denies sleep apnea. Given patient has elevation of his red cells and platelets there is high suspicion of myeloproliferative disorder. I did encourage him to continue his baby aspirin daily without interruption which he has been taking routinely. He will be sent to the lab to have extensive work-up done including checking BCR ABL and reflexive JAK2 mutational studies. We will also check abdominal ultrasound to evaluate the spleen/liver/kidneys. Patient will follow-up again with Dr. Trevin Schroeder in about 4 to 6 weeks from now to review the findings of his initial work-up which be acted on accordingly.    - CBC and differential; Future  - Comprehensive metabolic panel; Future  - C-reactive protein; Future  - Sedimentation rate, automated; Future  - Erythropoietin; Future  - Carboxyhemoglobin; Future  - Peripheral Smear; Future  - IgG, IgA, IgM; Future  - Protein electrophoresis, serum; Future  - BCR/ABL, Fish Manual; Future  - JAK2 V617F rfx CALR/MPL/E12-15; Future  - Iron Panel (Includes Ferritin, Iron Sat%, Iron, and TIBC); Future  - Ferritin; Future  - LD,Blood; Future  - Vitamin B12; Future  - Uric acid; Future  - US abdomen complete; Future    2. Thrombocytosis  - CBC and differential; Future  - Comprehensive metabolic panel; Future  - C-reactive protein; Future  - Sedimentation rate, automated; Future  - Erythropoietin; Future  - Carboxyhemoglobin; Future  - Peripheral Smear; Future  - IgG, IgA, IgM; Future  - Protein electrophoresis, serum; Future  - BCR/ABL, Fish Manual; Future  - JAK2 V617F rfx CALR/MPL/E12-15; Future  - Iron Panel (Includes Ferritin, Iron Sat%, Iron, and TIBC); Future  - Ferritin;  Future  - LD,Blood; Future  - Vitamin B12; Future  - Uric acid; Future  - US abdomen complete; Future      HPI:  Patient is a pleasant 62-year-old male who presents today for further evaluation of his polycythemia/thrombocytosis. He has past medical history of Parkinson's disease, hypertension, CAD status post MI, hyperlipidemia, chronic kidney disease, and prostate cancer status post robotic prostatectomy in 2014. He does admit to consuming alcohol regularly on a daily basis about 4 beers per day. Patient does report ongoing fatigue but attributes this to his Parkinson's disease. Reports occasional dizziness. Otherwise denies any significant bony pain, aqua genic pruritus, night sweats or abdominal discomfort comfort. He does admit that he sometimes gets full easily. Weight has been stable. His most recent laboratory studies from 7/3/2023 showed normal WBC 7.97 with normal white cell differential, he has erythrocytosis RBC 6.48, H&H 18.7/57.2, MCV normal 88, also has thrombocytosis platelet count 632. Looking back at his prior laboratory studies appears that he has had thrombocytosis at least since 2021 and erythrocytosis since 2020/2021. ROS: Review of Systems   Constitutional: Positive for activity change, appetite change and fatigue. HENT: Positive for hearing loss. Respiratory: Positive for shortness of breath. Musculoskeletal: Positive for arthralgias, gait problem and myalgias. Neurological: Positive for dizziness and tremors. All other systems reviewed and are negative. Past Medical History:   Diagnosis Date   • Anxiety    • Benign hypertension    • Cancer [facility])     prostate   • Coronary artery disease     ABRAM to LAD in 2014   • History of cardiovascular stress test 01/21/2016    Normal EF, normal study.    • History of echocardiogram 05/05/2015    EF normal.     • Ischemic cardiomyopathy     resolved   • Mixed hyperlipidemia    • Myocardial infarction [facility])    • Parkinson disease [facility])    • Prostate cancer [facility])     surgically removed   • STEMI (ST elevation myocardial infarction) ([address] ) 2014       Past Surgical History:   Procedure Laterality Date   • CORONARY ANGIOPLASTY WITH STENT PLACEMENT  12/26/2014    EF 40%, ABRAM to LAD. • INCISIONAL HERNIA REPAIR     • [place] SURGICAL ARTHROSCOPY SHOULDER W/ROTATOR CUFF RPR Right 2/24/2021    Procedure: SHOULDER ARTHROSCOPY WITH ROTATOR CUFF REPAIR and acromioplasty;  Surgeon: Moise Kearney DO;  Location: [facility] MAIN OR;  Service: Orthopedics   • PROSTATECTOMY  2014    for cancer   • TONSILLECTOMY         Social History     Socioeconomic History   • Marital status: Single     Spouse name: None   • Number of children: None   • Years of education: None   • Highest education level: None   Occupational History   • None   Tobacco Use   • Smoking status: Never   • Smokeless tobacco: Never   Vaping Use   • Vaping Use: Never used   Substance and Sexual Activity   • Alcohol use:  Yes     Alcohol/week: 2.0 standard drinks of alcohol     Types: 2 Cans of beer per week     Comment: daily   • Drug use: Never   • Sexual activity: None   Other Topics Concern   • None   Social History Narrative    Caffeine - 2 cups coffee/day     Social Determinants of Health     Financial Resource Strain: Not on file   Food Insecurity: Not on file   Transportation Needs: Not on file   Physical Activity: Not on file   Stress: Not on file   Social Connections: Not on file   Intimate Partner Violence: Not on file   Housing Stability: Not on file       Family History   Problem Relation Age of Onset   • Heart attack Father    • Stroke Father        Allergies   Allergen Reactions   • Carbidopa-Levodopa-Entacapone Confusion         Current Outpatient Medications:   •  aspirin (ECOTRIN LOW STRENGTH) 81 mg EC tablet, Take 81 mg by mouth daily, Disp: , Rfl:   •  atorvastatin (LIPITOR) 80 mg tablet, Take 1 tablet (80 mg total) by mouth daily, Disp: 90 tablet, Rfl: 3  •  carbidopa-levodopa (SINEMET)  mg per tablet, Take 1 tablet by mouth 3 (three) times a day, Disp: , Rfl:   •  PARoxetine (PAXIL) 20 mg tablet, TAKE ONE TABLET DAILY. .., Disp: 90 tablet, Rfl: 3  •  meloxicam (MOBIC) 15 mg tablet, Take 1 tablet (15 mg total) by mouth daily (Patient not taking: Reported on 12/29/2022), Disp: 30 tablet, Rfl: 0      Physical Exam:  /76 (BP Location: Right arm, Patient Position: Sitting, Cuff Size: Adult)   Pulse 90   Temp 98.2 °F (36.8 °C)   Resp 18   Ht 5' 9" (1.753 m)   Wt 83.5 kg (184 lb)   SpO2 98%   BMI 27.17 kg/m²     Physical Exam  Vitals reviewed. Constitutional:       General: He is not in acute distress. Appearance: He is well-developed. He is not diaphoretic. HENT:      Head: Normocephalic and atraumatic. Eyes:      General: Lids are normal. No scleral icterus. Conjunctiva/sclera: Conjunctivae normal.      Pupils: Pupils are equal, round, and reactive to light. Neck:      Thyroid: No thyromegaly. Cardiovascular:      Rate and Rhythm: Normal rate and regular rhythm. Heart sounds: Normal heart sounds. No murmur heard. Pulmonary:      Effort: Pulmonary effort is normal. No respiratory distress. Breath sounds: Normal breath sounds. Abdominal:      General: There is no distension. Palpations: Abdomen is soft. There is no hepatomegaly or splenomegaly. Tenderness: There is no abdominal tenderness. Hernia: A hernia is present. Musculoskeletal:         General: No swelling. Normal range of motion. Cervical back: Normal range of motion and neck supple. Comments: Shuffling gait   Lymphadenopathy:      Cervical: No cervical adenopathy. Upper Body:      Right upper body: No axillary adenopathy. Left upper body: No axillary adenopathy. Skin:     General: Skin is warm and dry. Findings: Erythema (facial) present. No rash. Neurological:      General: No focal deficit present.       Mental Status: He is alert and oriented to person, place, and time. Motor: Tremor present. Psychiatric:         Mood and Affect: Mood normal. Affect is blunt (masked facies). Behavior: Behavior normal. Behavior is cooperative. Thought Content: Thought content normal.         Judgment: Judgment normal.           Labs:  Lab Results   Component Value Date    WBC 7.97 07/03/2023    HGB 18.7 (H) 07/03/2023    HCT 57.2 (H) 07/03/2023    MCV 88 07/03/2023     (H) 07/03/2023        Patient voiced understanding and agreement in the above discussion. Aware to contact our office with questions/symptoms in the interim. This note has been generated by voice recognition software system. Therefore, there may be spelling, grammar, and or syntax errors. Please contact if questions arise.

## 2023-09-20 ENCOUNTER — APPOINTMENT (OUTPATIENT)
Dept: LAB | Facility: HOSPITAL | Age: 73
End: 2023-09-20
Payer: MEDICARE

## 2023-09-20 ENCOUNTER — APPOINTMENT (OUTPATIENT)
Age: 73
End: 2023-09-20
Payer: MEDICARE

## 2023-09-20 DIAGNOSIS — I10 PRIMARY HYPERTENSION: ICD-10-CM

## 2023-09-20 DIAGNOSIS — D75.839 THROMBOCYTOSIS: ICD-10-CM

## 2023-09-20 DIAGNOSIS — D75.1 POLYCYTHEMIA: ICD-10-CM

## 2023-09-20 DIAGNOSIS — E78.2 MIXED HYPERLIPIDEMIA: ICD-10-CM

## 2023-09-20 DIAGNOSIS — I25.119 CORONARY ARTERY DISEASE INVOLVING NATIVE CORONARY ARTERY OF NATIVE HEART WITH ANGINA PECTORIS (HCC): ICD-10-CM

## 2023-09-20 DIAGNOSIS — D53.9 NUTRITIONAL ANEMIA: ICD-10-CM

## 2023-09-20 LAB
ALBUMIN SERPL BCP-MCNC: 4.1 G/DL (ref 3.5–5)
ALP SERPL-CCNC: 85 U/L (ref 34–104)
ALT SERPL W P-5'-P-CCNC: 21 U/L (ref 7–52)
ANION GAP SERPL CALCULATED.3IONS-SCNC: 8 MMOL/L
AST SERPL W P-5'-P-CCNC: 19 U/L (ref 13–39)
BASOPHILS NFR BLD MANUAL: 1 % (ref 0–1)
BILIRUB SERPL-MCNC: 1.05 MG/DL (ref 0.2–1)
BUN SERPL-MCNC: 14 MG/DL (ref 5–25)
BURR CELLS BLD QL SMEAR: PRESENT
CALCIUM SERPL-MCNC: 9.5 MG/DL (ref 8.4–10.2)
CHLORIDE SERPL-SCNC: 102 MMOL/L (ref 96–108)
CO2 SERPL-SCNC: 29 MMOL/L (ref 21–32)
CREAT SERPL-MCNC: 1.27 MG/DL (ref 0.6–1.3)
CRP SERPL QL: <1 MG/L
ERYTHROCYTE [DISTWIDTH] IN BLOOD BY AUTOMATED COUNT: 15.9 % (ref 11.6–15.1)
ERYTHROCYTE [SEDIMENTATION RATE] IN BLOOD: 10 MM/HOUR (ref 0–19)
FERRITIN SERPL-MCNC: 46 NG/ML (ref 24–336)
GAS + CO PNL BLDA: 1.7 % (ref 0–1.5)
GFR SERPL CREATININE-BSD FRML MDRD: 56 ML/MIN/1.73SQ M
GLUCOSE P FAST SERPL-MCNC: 90 MG/DL (ref 65–99)
HCT VFR BLD AUTO: 55.2 % (ref 36.5–49.3)
HGB BLD-MCNC: 18.5 G/DL (ref 12–17)
IMM EOSINOPHIL NFR BLD MANUAL: 1 % (ref 0–6)
LDH SERPL-CCNC: 224 U/L (ref 140–271)
LG PLATELETS BLD QL SMEAR: PRESENT
LYMPHOCYTES NFR BLD: 16 % (ref 14–44)
MCH RBC QN AUTO: 28.2 PG (ref 26.8–34.3)
MCHC RBC AUTO-ENTMCNC: 33.5 G/DL (ref 31.4–37.4)
MCV RBC AUTO: 84 FL (ref 82–98)
MONOCYTES NFR BLD AUTO: 4 % (ref 4–12)
NEUTS BAND NFR BLD MANUAL: 1 THOUSAND/UL
NEUTS SEG NFR BLD AUTO: 72 % (ref 45–77)
NRBC BLD AUTO-RTO: 0 /100 WBCS
OVALOCYTES BLD QL SMEAR: PRESENT
PLATELET # BLD AUTO: 727 THOUSANDS/UL (ref 149–390)
PLATELET BLD QL SMEAR: ABNORMAL
PMV BLD AUTO: 11 FL (ref 8.9–12.7)
POIKILOCYTOSIS BLD QL SMEAR: PRESENT
POTASSIUM SERPL-SCNC: 4.2 MMOL/L (ref 3.5–5.3)
PROT SERPL-MCNC: 7.2 G/DL (ref 6.4–8.4)
RBC # BLD AUTO: 6.57 MILLION/UL (ref 3.88–5.62)
SODIUM SERPL-SCNC: 139 MMOL/L (ref 135–147)
TOTAL CELLS COUNTED SPEC: 100
VARIANT LYMPHS BLD QL SMEAR: 5 % (ref 0–0)
VIT B12 SERPL-MCNC: 111 PG/ML (ref 180–914)
WBC # BLD AUTO: 7.04 THOUSAND/UL (ref 4.31–10.16)

## 2023-09-20 PROCEDURE — 83540 ASSAY OF IRON: CPT

## 2023-09-20 PROCEDURE — 83615 LACTATE (LD) (LDH) ENZYME: CPT

## 2023-09-20 PROCEDURE — 85007 BL SMEAR W/DIFF WBC COUNT: CPT

## 2023-09-20 PROCEDURE — 82375 ASSAY CARBOXYHB QUANT: CPT

## 2023-09-20 PROCEDURE — 83550 IRON BINDING TEST: CPT

## 2023-09-20 PROCEDURE — 88374 M/PHMTRC ALYS ISHQUANT/SEMIQ: CPT

## 2023-09-20 PROCEDURE — 84165 PROTEIN E-PHORESIS SERUM: CPT

## 2023-09-20 PROCEDURE — 80053 COMPREHEN METABOLIC PANEL: CPT

## 2023-09-20 PROCEDURE — 82607 VITAMIN B-12: CPT

## 2023-09-20 PROCEDURE — 85652 RBC SED RATE AUTOMATED: CPT

## 2023-09-20 PROCEDURE — 36415 COLL VENOUS BLD VENIPUNCTURE: CPT

## 2023-09-20 PROCEDURE — 82728 ASSAY OF FERRITIN: CPT

## 2023-09-20 PROCEDURE — 82784 ASSAY IGA/IGD/IGG/IGM EACH: CPT

## 2023-09-20 PROCEDURE — 86140 C-REACTIVE PROTEIN: CPT

## 2023-09-20 PROCEDURE — 84550 ASSAY OF BLOOD/URIC ACID: CPT

## 2023-09-20 PROCEDURE — 82668 ASSAY OF ERYTHROPOIETIN: CPT

## 2023-09-21 ENCOUNTER — TELEPHONE (OUTPATIENT)
Dept: HEMATOLOGY ONCOLOGY | Facility: CLINIC | Age: 73
End: 2023-09-21

## 2023-09-21 LAB
ALBUMIN SERPL ELPH-MCNC: 3.93 G/DL (ref 3.2–5.1)
ALBUMIN SERPL ELPH-MCNC: 57.8 % (ref 48–70)
ALPHA1 GLOB SERPL ELPH-MCNC: 0.34 G/DL (ref 0.15–0.47)
ALPHA1 GLOB SERPL ELPH-MCNC: 5 % (ref 1.8–7)
ALPHA2 GLOB SERPL ELPH-MCNC: 0.7 G/DL (ref 0.42–1.04)
ALPHA2 GLOB SERPL ELPH-MCNC: 10.3 % (ref 5.9–14.9)
BETA GLOB ABNORMAL SERPL ELPH-MCNC: 0.42 G/DL (ref 0.31–0.57)
BETA1 GLOB SERPL ELPH-MCNC: 6.2 % (ref 4.7–7.7)
BETA2 GLOB SERPL ELPH-MCNC: 4.2 % (ref 3.1–7.9)
BETA2+GAMMA GLOB SERPL ELPH-MCNC: 0.29 G/DL (ref 0.2–0.58)
EPO SERPL-ACNC: 3 MIU/ML (ref 2.6–18.5)
GAMMA GLOB ABNORMAL SERPL ELPH-MCNC: 1.12 G/DL (ref 0.4–1.66)
GAMMA GLOB SERPL ELPH-MCNC: 16.5 % (ref 6.9–22.3)
IGA SERPL-MCNC: 190 MG/DL (ref 66–433)
IGG SERPL-MCNC: 974 MG/DL (ref 635–1741)
IGG/ALB SER: 1.37 {RATIO} (ref 1.1–1.8)
IGM SERPL-MCNC: 459 MG/DL (ref 45–281)
IRON SATN MFR SERPL: 31 % (ref 15–50)
IRON SERPL-MCNC: 96 UG/DL (ref 50–212)
PROT PATTERN SERPL ELPH-IMP: NORMAL
PROT SERPL-MCNC: 6.8 G/DL (ref 6.4–8.4)
TIBC SERPL-MCNC: 311 UG/DL (ref 250–450)
UIBC SERPL-MCNC: 215 UG/DL (ref 155–355)
URATE SERPL-MCNC: 6.8 MG/DL (ref 3.5–8.5)

## 2023-09-21 PROCEDURE — 84165 PROTEIN E-PHORESIS SERUM: CPT | Performed by: STUDENT IN AN ORGANIZED HEALTH CARE EDUCATION/TRAINING PROGRAM

## 2023-09-21 NOTE — TELEPHONE ENCOUNTER
----- Message from Leah Maldonado MD sent at 9/21/2023 12:45 PM EDT -----  Patient should get vitamin B12 injections weekly for 5 treatment then started vitamin B12 supplements daily. Attempted to phone patient with above information. Left voice message with direct call back number.

## 2023-09-26 LAB — SCAN RESULT: NORMAL

## 2023-09-27 ENCOUNTER — TELEPHONE (OUTPATIENT)
Dept: HEMATOLOGY ONCOLOGY | Facility: CLINIC | Age: 73
End: 2023-09-27

## 2023-09-27 ENCOUNTER — HOSPITAL ENCOUNTER (OUTPATIENT)
Dept: ULTRASOUND IMAGING | Facility: HOSPITAL | Age: 73
Discharge: HOME | End: 2023-09-27
Payer: MEDICARE

## 2023-09-27 DIAGNOSIS — D75.1 POLYCYTHEMIA: ICD-10-CM

## 2023-09-27 DIAGNOSIS — D75.839 THROMBOCYTOSIS: ICD-10-CM

## 2023-09-27 DIAGNOSIS — E53.8 B12 DEFICIENCY: Primary | ICD-10-CM

## 2023-09-27 PROCEDURE — 76700 US EXAM ABDOM COMPLETE: CPT

## 2023-09-27 RX ORDER — CYANOCOBALAMIN 1000 UG/ML
1000 INJECTION, SOLUTION INTRAMUSCULAR; SUBCUTANEOUS ONCE
Status: CANCELLED | OUTPATIENT
Start: 2023-10-04

## 2023-09-27 NOTE — TELEPHONE ENCOUNTER
----- Message from Guy Alcantar MD sent at 9/21/2023 12:45 PM EDT -----  Patient should get vitamin B12 injections weekly for 5 treatment then started vitamin B12 supplements daily. Phoned patient with above recommendations. Patient aware and agreeable for B12 injections weekly X 5. Patient prefers [address] [place] infusion.

## 2023-09-27 NOTE — TELEPHONE ENCOUNTER
What would be a preferred day of the week that would work best for your infusion appointment? Any day  Do you prefer mornings or afternoons for your appointments? Late morning/early afternoon  Are there any days or dates that do not work for your schedule, including any upcoming vacations? N/a  We are going to try our best to schedule you at the infusion center closest to your home. In the event that we are unable to what would be your next preferred infusion site or sites? [address]  Do you have transportation to take you to all of your appointments?  yes

## 2023-10-04 ENCOUNTER — HOSPITAL ENCOUNTER (OUTPATIENT)
Dept: INFUSION CENTER | Facility: HOSPITAL | Age: 73
Discharge: HOME | End: 2023-10-04
Attending: INTERNAL MEDICINE
Payer: MEDICARE

## 2023-10-04 VITALS — TEMPERATURE: 97.7 F

## 2023-10-04 DIAGNOSIS — E53.8 B12 DEFICIENCY: Primary | ICD-10-CM

## 2023-10-04 PROCEDURE — 96372 THER/PROPH/DIAG INJ SC/IM: CPT

## 2023-10-04 RX ORDER — CYANOCOBALAMIN 1000 UG/ML
1000 INJECTION, SOLUTION INTRAMUSCULAR; SUBCUTANEOUS ONCE
Status: COMPLETED | OUTPATIENT
Start: 2023-10-04 | End: 2023-10-04

## 2023-10-04 RX ORDER — CYANOCOBALAMIN 1000 UG/ML
1000 INJECTION, SOLUTION INTRAMUSCULAR; SUBCUTANEOUS ONCE
Status: CANCELLED | OUTPATIENT
Start: 2023-10-11

## 2023-10-04 RX ADMIN — CYANOCOBALAMIN 1000 MCG: 1000 INJECTION, SOLUTION INTRAMUSCULAR at 11:41

## 2023-10-04 NOTE — PROGRESS NOTES
Patient received B12 injection to left deltoid per order. Patient given AVS, and ambulated self out.

## 2023-10-11 ENCOUNTER — APPOINTMENT (OUTPATIENT)
Dept: LAB | Facility: HOSPITAL | Age: 73
End: 2023-10-11
Payer: MEDICARE

## 2023-10-11 ENCOUNTER — HOSPITAL ENCOUNTER (OUTPATIENT)
Dept: INFUSION CENTER | Facility: HOSPITAL | Age: 73
Discharge: HOME | End: 2023-10-11
Attending: INTERNAL MEDICINE
Payer: MEDICARE

## 2023-10-11 DIAGNOSIS — D75.1 POLYCYTHEMIA: ICD-10-CM

## 2023-10-11 DIAGNOSIS — E53.8 B12 DEFICIENCY: Primary | ICD-10-CM

## 2023-10-11 DIAGNOSIS — D75.839 THROMBOCYTOSIS: ICD-10-CM

## 2023-10-11 PROCEDURE — 36415 COLL VENOUS BLD VENIPUNCTURE: CPT

## 2023-10-11 PROCEDURE — 96372 THER/PROPH/DIAG INJ SC/IM: CPT

## 2023-10-11 RX ORDER — CYANOCOBALAMIN 1000 UG/ML
1000 INJECTION, SOLUTION INTRAMUSCULAR; SUBCUTANEOUS ONCE
Status: CANCELLED | OUTPATIENT
Start: 2023-10-18

## 2023-10-11 RX ORDER — CYANOCOBALAMIN 1000 UG/ML
1000 INJECTION, SOLUTION INTRAMUSCULAR; SUBCUTANEOUS ONCE
Status: COMPLETED | OUTPATIENT
Start: 2023-10-11 | End: 2023-10-11

## 2023-10-11 RX ADMIN — CYANOCOBALAMIN 1000 MCG: 1000 INJECTION, SOLUTION INTRAMUSCULAR at 12:07

## 2023-10-11 NOTE — PROGRESS NOTES
Pt offers no complaints. Tolerated vit b12 injection in left deltoid without incident. Pt discharged in stable condition and is aware of next infusion appointment. AVS declined.

## 2023-10-12 ENCOUNTER — TELEPHONE (OUTPATIENT)
Dept: HEMATOLOGY ONCOLOGY | Facility: CLINIC | Age: 73
End: 2023-10-12

## 2023-10-12 NOTE — TELEPHONE ENCOUNTER
Patient Call    Who are you speaking with? Child    If it is not the patient, are they listed on an active communication consent form? Yes   What is the reason for this call? Imaging results and why genetic test?   Does this require a call back? Yes   If a call back is required, please list best call back number [phone]    If a call back is required, advise that a message will be forwarded to their care team and someone will return their call as soon as possible. Did you relay this information to the patient?  Yes

## 2023-10-12 NOTE — TELEPHONE ENCOUNTER
Returned a call to pt's dtr and reveiwed the uS results and explained the reason for the gene 2 studies. Dtr will accompany pt at his next OV. Social Work NICU Follow-Up    Data: SW checked in with pts mother Olga via phone    Assessment: Olga reports that she is feeling better now in terms of her mental health.  Things are going well for the twins.  Olga reports staff has kept her well informed around twins when she is unable to be at the hospital.  Olga states twins still on CPAP and they will continue to try and wean this as able.  Olga also states they will try to bottle feed at 34 weeks.      Olga states she is currently looking for work to assist with paying bills and such.      Intervention: SW provided supportive listing and encouragement.    Plan:  SW will continue to follow and check in throughout NICU stay.     JENNIFER Swanson on 2022 at 10:43 AM

## 2023-10-18 ENCOUNTER — HOSPITAL ENCOUNTER (OUTPATIENT)
Dept: INFUSION CENTER | Facility: HOSPITAL | Age: 73
Discharge: HOME | End: 2023-10-18
Attending: INTERNAL MEDICINE
Payer: MEDICARE

## 2023-10-18 DIAGNOSIS — E53.8 B12 DEFICIENCY: Primary | ICD-10-CM

## 2023-10-18 PROCEDURE — 96372 THER/PROPH/DIAG INJ SC/IM: CPT

## 2023-10-18 RX ORDER — CYANOCOBALAMIN 1000 UG/ML
1000 INJECTION, SOLUTION INTRAMUSCULAR; SUBCUTANEOUS ONCE
Status: CANCELLED | OUTPATIENT
Start: 2023-10-25

## 2023-10-18 RX ORDER — CYANOCOBALAMIN 1000 UG/ML
1000 INJECTION, SOLUTION INTRAMUSCULAR; SUBCUTANEOUS ONCE
Status: COMPLETED | OUTPATIENT
Start: 2023-10-18 | End: 2023-10-18

## 2023-10-18 RX ADMIN — CYANOCOBALAMIN 1000 MCG: 1000 INJECTION, SOLUTION INTRAMUSCULAR at 11:54

## 2023-10-18 NOTE — PROGRESS NOTES
Patient tolerated B12 injection to left arm without reaction or issues. AVS given to patient. Patient ambulated off unit without incident. All personal belongings taken with patient.

## 2023-10-23 ENCOUNTER — DOCUMENTATION (OUTPATIENT)
Dept: HEMATOLOGY ONCOLOGY | Facility: CLINIC | Age: 73
End: 2023-10-23

## 2023-10-23 NOTE — PROGRESS NOTES
Oncology Finance Advocacy Intake and Intervention  Oncology Finance Counselor/Advocate placed call to patient. This writer informed patient that this writer is here to assist patient with billing questions, financial assistance, payment/payment plans, quotes, copayment assistance, insurance optimization, and insurance navigation.    This writer conducted a thorough benefit review of copayment, deductible, and out of pocket cost. This information is documented below and has been reviewed with patient.     Copayment: N/A  Deductible: N/A  Out of Pocket Cost: N/A  Insurance optimization (Limited benefit vs self-pay): N/A  Patient assistance status: N/A  Free Drug Applications: N/A  Interventions:  Pt only has medicare will reach out to the pt to see about any other ins  Added to careteam  Called pt on [phone]  got voicemail left a message for pt to call me back  Called [phone] spoke to pts step daughter cash she handles everything for the pt. She sd that she thought he had humana I old her that the only thing I see is medicare. I told her that I called the pt but I had to leave a voicemail she sd that she will talk to him & she will call me back        Information above was review thoroughly with patient and patient was advise of possible assistance programs/interventions. If any question arise patient can contact this writer at below information. This information was given to patient at time of contact.      Faina Arnett  Phone:[phone]  Email: [email or web address]

## 2023-10-24 ENCOUNTER — TELEPHONE (OUTPATIENT)
Dept: HEMATOLOGY ONCOLOGY | Facility: CLINIC | Age: 73
End: 2023-10-24

## 2023-10-24 ENCOUNTER — OFFICE VISIT (OUTPATIENT)
Dept: HEMATOLOGY ONCOLOGY | Facility: CLINIC | Age: 73
End: 2023-10-24
Payer: MEDICARE

## 2023-10-24 VITALS
RESPIRATION RATE: 18 BRPM | OXYGEN SATURATION: 93 % | WEIGHT: 183 LBS | SYSTOLIC BLOOD PRESSURE: 128 MMHG | DIASTOLIC BLOOD PRESSURE: 72 MMHG | BODY MASS INDEX: 27.11 KG/M2 | HEIGHT: 69 IN | HEART RATE: 91 BPM | TEMPERATURE: 98.3 F

## 2023-10-24 DIAGNOSIS — D75.839 THROMBOCYTOSIS: ICD-10-CM

## 2023-10-24 DIAGNOSIS — D75.1 POLYCYTHEMIA: Primary | ICD-10-CM

## 2023-10-24 DIAGNOSIS — E53.8 B12 DEFICIENCY: ICD-10-CM

## 2023-10-24 PROCEDURE — 99214 OFFICE O/P EST MOD 30 MIN: CPT | Performed by: INTERNAL MEDICINE

## 2023-10-24 NOTE — PROGRESS NOTES
Hematology/Oncology Outpatient Follow-up  Chantal Gonzales. 68 y.o. male 1950 [id number]    Date:  10/24/2023        Assessment and Plan:  1. 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 The JAK2 mutation analysis is still pending. The patient was told that after confirming the diagnosis of polycythemia vera he would be a candidate for Hydrea to keep his hematocrit less than 45% to avoid vascular events. He was encouraged to continue with the baby aspirin on a daily basis. - CBC and differential; Future  - Comprehensive metabolic panel; Future  - Magnesium; Future    2. Thrombocytosis  Most likely related to myeloproliferative disorder. We will wait for the JAK2 mutation and reflective mutation analysis. - CBC and differential; Future  - Comprehensive metabolic panel; Future  - Magnesium; Future    3. B12 deficiency  He seems to have significant vitamin B12 deficiency which is being corrected with vitamin B12 injections x5. I did ask him to get started on vitamin B12 supplements daily as well. - CBC and differential; Future  - Vitamin B12; Future        HPI:  Patient is a pleasant 69-year-old male who presents today for further evaluation of his polycythemia/thrombocytosis. He has past medical history of Parkinson's disease, hypertension, CAD status post MI, hyperlipidemia, chronic kidney disease, and prostate cancer status post robotic prostatectomy in 2014. He does admit to consuming alcohol regularly on a daily basis about 4 beers per day. Interval history:  The patient came today for follow-up visit accompanied by his stepdaughter. He continues to have a tremor from the Parkinson's disease. He had extensive work-up for his polycythemia and thrombocytosis on 9/20/2023 which continues to show increased hemoglobin level and hematocrit of 55%. The platelet count is also elevated 727. BCR/ABL by PCR was negative.   Creatinine 1.2 with normal calcium and liver enzymes. C-reactive protein and sed rate were within normal range. Erythropoietin 3.0. Immunoglobulins showed mildly elevated IgM. SPEP was negative for M protein. LDH was normal.  Vitamin B12 was very low 111 he was already started on vitamin B12 injections. Uric acid was normal 6.8. Iron panel showed saturation of 31% and ferritin of 46. JAK2 mutation is still pending. Carboxyhemoglobin level was 1.7%. ROS: Review of Systems   Constitutional:  Negative for chills and fever. HENT:  Positive for hearing loss. Negative for ear pain and sore throat. Eyes:  Negative for pain and visual disturbance. Respiratory:  Negative for cough and shortness of breath. Cardiovascular:  Negative for chest pain and palpitations. Gastrointestinal:  Negative for abdominal pain and vomiting. Genitourinary:  Negative for dysuria and hematuria. Musculoskeletal:  Negative for arthralgias and back pain. Skin:  Negative for color change and rash. Neurological:  Positive for tremors. Negative for seizures and syncope. Psychiatric/Behavioral:  Positive for sleep disturbance. All other systems reviewed and are negative. Past Medical History:   Diagnosis Date    Anxiety     Benign hypertension     Cancer ([address])     prostate    Coronary artery disease     ABRAM to LAD in 2014    History of cardiovascular stress test 01/21/2016    Normal EF, normal study. History of echocardiogram 05/05/2015    EF normal.      Ischemic cardiomyopathy     resolved    Mixed hyperlipidemia     Myocardial infarction ([address])     Parkinson disease     Prostate cancer ([address])     surgically removed    STEMI (ST elevation myocardial infarction) ([address]) 2014       Past Surgical History:   Procedure Laterality Date    CORONARY ANGIOPLASTY WITH STENT PLACEMENT  12/26/2014    EF 40%, ABRAM to LAD.     INCISIONAL HERNIA REPAIR      [place] SURGICAL ARTHROSCOPY SHOULDER W/ROTATOR CUFF RPR Right 2/24/2021    Procedure: SHOULDER ARTHROSCOPY WITH ROTATOR CUFF REPAIR and acromioplasty;  Surgeon: Shira Cancino DO;  Location: [address] [place] [address] OR;  Service: Orthopedics    PROSTATECTOMY  2014    for cancer    TONSILLECTOMY         Social History     Socioeconomic History    Marital status: Single     Spouse name: None    Number of children: None    Years of education: None    Highest education level: None   Occupational History    None   Tobacco Use    Smoking status: Never    Smokeless tobacco: Never   Vaping Use    Vaping Use: Never used   Substance and Sexual Activity    Alcohol use: Yes     Alcohol/week: 2.0 standard drinks of alcohol     Types: 2 Cans of beer per week     Comment: daily    Drug use: Never    Sexual activity: None   Other Topics Concern    None   Social History Narrative    Caffeine - 2 cups coffee/day     Social Determinants of Health     Financial Resource Strain: Not on file   Food Insecurity: Not on file   Transportation Needs: Not on file   Physical Activity: Not on file   Stress: Not on file   Social Connections: Not on file   Intimate Partner Violence: Not on file   Housing Stability: Not on file       Family History   Problem Relation Age of Onset    Heart attack Father     Stroke Father        Allergies   Allergen Reactions    Carbidopa-Levodopa-Entacapone Confusion     Patient denies allergy to this medication         Current Outpatient Medications:     aspirin (ECOTRIN LOW STRENGTH) 81 mg EC tablet, Take 81 mg by mouth daily, Disp: , Rfl:     atorvastatin (LIPITOR) 80 mg tablet, Take 1 tablet (80 mg total) by mouth daily, Disp: 90 tablet, Rfl: 3    carbidopa-levodopa (SINEMET)  mg per tablet, Take 1 tablet by mouth 3 (three) times a day, Disp: , Rfl:     PARoxetine (PAXIL) 20 mg tablet, TAKE ONE TABLET DAILY. .., Disp: 90 tablet, Rfl: 3    meloxicam (MOBIC) 15 mg tablet, Take 1 tablet (15 mg total) by mouth daily (Patient not taking: Reported on 12/29/2022), Disp: 30 tablet, Rfl: 0      Physical Exam:  BP 128/72 (BP Location: Right arm, Patient Position: Sitting, Cuff Size: Adult)   Pulse 91   Temp 98.3 °F (36.8 °C)   Resp 18   Ht 5' 9" (1.753 m)   Wt 83 kg (183 lb)   SpO2 93%   BMI 27.02 kg/m²     Physical Exam  Constitutional:       Appearance: He is well-developed. HENT:      Head: Normocephalic and atraumatic. Nose: Nose normal.   Eyes:      General: No scleral icterus. Right eye: No discharge. Left eye: No discharge. Conjunctiva/sclera: Conjunctivae normal.      Pupils: Pupils are equal, round, and reactive to light. Neck:      Thyroid: No thyromegaly. Trachea: No tracheal deviation. Cardiovascular:      Rate and Rhythm: Normal rate and regular rhythm. Heart sounds: Normal heart sounds. No murmur heard. No friction rub. Pulmonary:      Effort: Pulmonary effort is normal. No respiratory distress. Breath sounds: Normal breath sounds. No wheezing or rales. Chest:      Chest wall: No tenderness. Abdominal:      General: There is no distension. Palpations: Abdomen is soft. There is no hepatomegaly or splenomegaly. Tenderness: There is no abdominal tenderness. There is no guarding or rebound. Musculoskeletal:         General: No tenderness or deformity. Normal range of motion. Cervical back: Normal range of motion and neck supple. Lymphadenopathy:      Cervical: No cervical adenopathy. Skin:     General: Skin is warm and dry. Coloration: Skin is not pale. Findings: No erythema or rash. Neurological:      Mental Status: He is alert and oriented to person, place, and time. Deep Tendon Reflexes: Reflexes are normal and symmetric. Psychiatric:         Behavior: Behavior normal.         Thought Content:  Thought content normal.         Judgment: Judgment normal.           Labs:  Lab Results   Component Value Date    WBC 7.04 09/20/2023    HGB 18.5 (H) 09/20/2023    HCT 55.2 (H) 09/20/2023    MCV 84 09/20/2023     (H) 09/20/2023     Lab Results   Component Value Date     06/07/2018    K 4.2 09/20/2023     09/20/2023    CO2 29 09/20/2023    ANIONGAP 13.2 06/07/2018    BUN 14 09/20/2023    CREATININE 1.27 09/20/2023    GLUF 90 09/20/2023    CALCIUM 9.5 09/20/2023    AST 19 09/20/2023    ALT 21 09/20/2023    ALKPHOS 85 09/20/2023    PROT 6.8 06/07/2018    BILITOT 0.6 06/07/2018    EGFR 56 09/20/2023     No results found for: "TSH"    Patient voiced understanding and agreement in the above discussion. Aware to contact our office with questions/symptoms in the interim.

## 2023-10-25 ENCOUNTER — HOSPITAL ENCOUNTER (OUTPATIENT)
Dept: INFUSION CENTER | Facility: HOSPITAL | Age: 73
Discharge: HOME | End: 2023-10-25
Attending: INTERNAL MEDICINE
Payer: MEDICARE

## 2023-10-25 DIAGNOSIS — E53.8 B12 DEFICIENCY: Primary | ICD-10-CM

## 2023-10-25 DIAGNOSIS — D75.1 POLYCYTHEMIA: Primary | ICD-10-CM

## 2023-10-25 LAB
BASOPHILS # BLD AUTO: 0.08 THOUSANDS/ÂΜL (ref 0–0.1)
BASOPHILS NFR BLD AUTO: 1 % (ref 0–1)
EOSINOPHIL # BLD AUTO: 0.1 THOUSAND/ÂΜL (ref 0–0.61)
EOSINOPHIL NFR BLD AUTO: 1 % (ref 0–6)
ERYTHROCYTE [DISTWIDTH] IN BLOOD BY AUTOMATED COUNT: 17.9 % (ref 11.6–15.1)
HCT VFR BLD AUTO: 56.6 % (ref 36.5–49.3)
HGB BLD-MCNC: 18.3 G/DL (ref 12–17)
IMM GRANULOCYTES # BLD AUTO: 0.03 THOUSAND/UL (ref 0–0.2)
IMM GRANULOCYTES NFR BLD AUTO: 0 % (ref 0–2)
LYMPHOCYTES # BLD AUTO: 1.89 THOUSANDS/ÂΜL (ref 0.6–4.47)
LYMPHOCYTES NFR BLD AUTO: 23 % (ref 14–44)
MCH RBC QN AUTO: 28.3 PG (ref 26.8–34.3)
MCHC RBC AUTO-ENTMCNC: 32.3 G/DL (ref 31.4–37.4)
MCV RBC AUTO: 88 FL (ref 82–98)
MONOCYTES # BLD AUTO: 0.64 THOUSAND/ÂΜL (ref 0.17–1.22)
MONOCYTES NFR BLD AUTO: 8 % (ref 4–12)
NEUTROPHILS # BLD AUTO: 5.43 THOUSANDS/ÂΜL (ref 1.85–7.62)
NEUTS SEG NFR BLD AUTO: 67 % (ref 43–75)
NRBC BLD AUTO-RTO: 0 /100 WBCS
PLATELET # BLD AUTO: 778 THOUSANDS/UL (ref 149–390)
PMV BLD AUTO: 11.5 FL (ref 8.9–12.7)
RBC # BLD AUTO: 6.47 MILLION/UL (ref 3.88–5.62)
WBC # BLD AUTO: 8.17 THOUSAND/UL (ref 4.31–10.16)

## 2023-10-25 PROCEDURE — 96372 THER/PROPH/DIAG INJ SC/IM: CPT

## 2023-10-25 PROCEDURE — 36415 COLL VENOUS BLD VENIPUNCTURE: CPT

## 2023-10-25 PROCEDURE — 85025 COMPLETE CBC W/AUTO DIFF WBC: CPT

## 2023-10-25 RX ORDER — CYANOCOBALAMIN 1000 UG/ML
1000 INJECTION, SOLUTION INTRAMUSCULAR; SUBCUTANEOUS ONCE
Status: COMPLETED | OUTPATIENT
Start: 2023-10-25 | End: 2023-10-25

## 2023-10-25 RX ORDER — HYDROXYUREA 500 MG/1
500 CAPSULE ORAL DAILY
Qty: 30 CAPSULE | Refills: 11 | Status: SHIPPED | OUTPATIENT
Start: 2023-10-25

## 2023-10-25 RX ORDER — CYANOCOBALAMIN 1000 UG/ML
1000 INJECTION, SOLUTION INTRAMUSCULAR; SUBCUTANEOUS ONCE
Status: CANCELLED | OUTPATIENT
Start: 2023-11-01

## 2023-10-25 RX ADMIN — CYANOCOBALAMIN 1000 MCG: 1000 INJECTION, SOLUTION INTRAMUSCULAR at 11:51

## 2023-10-25 NOTE — TELEPHONE ENCOUNTER
Phoned pt's dtr in law Bianca Dandy and let her know that gene 3 test was positive and pt needs to start Rx hydrea 500  mg daily and continue baby As daily and have CBC checked once a week. Bianca Dandy verbalized understanding.

## 2023-10-25 NOTE — PROGRESS NOTES
Pt offers no complaints. Tolerated vit b12 in right arm without incident. Discharged in stable condition and pt aware of next infusion appointment. AVS provided.

## 2023-11-01 ENCOUNTER — HOSPITAL ENCOUNTER (OUTPATIENT)
Dept: INFUSION CENTER | Facility: HOSPITAL | Age: 73
Discharge: HOME | End: 2023-11-01
Attending: INTERNAL MEDICINE
Payer: MEDICARE

## 2023-11-01 VITALS — TEMPERATURE: 97.3 F

## 2023-11-01 DIAGNOSIS — E53.8 B12 DEFICIENCY: Primary | ICD-10-CM

## 2023-11-01 PROCEDURE — 96372 THER/PROPH/DIAG INJ SC/IM: CPT

## 2023-11-01 RX ORDER — CYANOCOBALAMIN 1000 UG/ML
1000 INJECTION, SOLUTION INTRAMUSCULAR; SUBCUTANEOUS ONCE
Status: CANCELLED | OUTPATIENT
Start: 2023-11-01

## 2023-11-01 RX ORDER — CYANOCOBALAMIN 1000 UG/ML
1000 INJECTION, SOLUTION INTRAMUSCULAR; SUBCUTANEOUS ONCE
Status: COMPLETED | OUTPATIENT
Start: 2023-11-01 | End: 2023-11-01

## 2023-11-01 RX ADMIN — CYANOCOBALAMIN 1000 MCG: 1000 INJECTION, SOLUTION INTRAMUSCULAR at 11:47

## 2023-11-01 NOTE — PROGRESS NOTES
Patient tolerated B12 injection in left deltoid without reaction or issues. AVS given to patient. Patient ambulated off unit without incident. All personal belongings taken with patient.

## 2023-11-03 ENCOUNTER — APPOINTMENT (OUTPATIENT)
Age: 73
End: 2023-11-03
Payer: MEDICARE

## 2023-11-03 DIAGNOSIS — E53.8 B12 DEFICIENCY: ICD-10-CM

## 2023-11-03 DIAGNOSIS — I25.119 CORONARY ARTERY DISEASE INVOLVING NATIVE CORONARY ARTERY OF NATIVE HEART WITH ANGINA PECTORIS (HCC): ICD-10-CM

## 2023-11-03 DIAGNOSIS — Z12.5 SCREENING FOR PROSTATE CANCER: ICD-10-CM

## 2023-11-03 DIAGNOSIS — D75.839 THROMBOCYTOSIS: ICD-10-CM

## 2023-11-03 DIAGNOSIS — I10 PRIMARY HYPERTENSION: ICD-10-CM

## 2023-11-03 DIAGNOSIS — D75.1 POLYCYTHEMIA: ICD-10-CM

## 2023-11-03 DIAGNOSIS — E78.2 MIXED HYPERLIPIDEMIA: ICD-10-CM

## 2023-11-03 LAB
ALBUMIN SERPL BCP-MCNC: 4.4 G/DL (ref 3.5–5)
ALP SERPL-CCNC: 93 U/L (ref 34–104)
ALT SERPL W P-5'-P-CCNC: 43 U/L (ref 7–52)
ANION GAP SERPL CALCULATED.3IONS-SCNC: 11 MMOL/L
AST SERPL W P-5'-P-CCNC: 36 U/L (ref 13–39)
BILIRUB SERPL-MCNC: 0.9 MG/DL (ref 0.2–1)
BUN SERPL-MCNC: 17 MG/DL (ref 5–25)
CALCIUM SERPL-MCNC: 10.2 MG/DL (ref 8.4–10.2)
CHLORIDE SERPL-SCNC: 99 MMOL/L (ref 96–108)
CHOLEST SERPL-MCNC: 140 MG/DL
CO2 SERPL-SCNC: 29 MMOL/L (ref 21–32)
CREAT SERPL-MCNC: 1.13 MG/DL (ref 0.6–1.3)
GFR SERPL CREATININE-BSD FRML MDRD: 64 ML/MIN/1.73SQ M
GLUCOSE P FAST SERPL-MCNC: 89 MG/DL (ref 65–99)
HDLC SERPL-MCNC: 40 MG/DL
LDLC SERPL CALC-MCNC: 81 MG/DL (ref 0–100)
POTASSIUM SERPL-SCNC: 3.7 MMOL/L (ref 3.5–5.3)
PROT SERPL-MCNC: 7.4 G/DL (ref 6.4–8.4)
PSA SERPL-MCNC: 0.02 NG/ML (ref 0–4)
SODIUM SERPL-SCNC: 139 MMOL/L (ref 135–147)
TRIGL SERPL-MCNC: 97 MG/DL

## 2023-11-03 PROCEDURE — G0103 PSA SCREENING: HCPCS

## 2023-11-03 PROCEDURE — 80053 COMPREHEN METABOLIC PANEL: CPT

## 2023-11-03 PROCEDURE — 36415 COLL VENOUS BLD VENIPUNCTURE: CPT

## 2023-11-03 PROCEDURE — 80061 LIPID PANEL: CPT

## 2023-11-06 ENCOUNTER — EVALUATION (OUTPATIENT)
Dept: PHYSICAL THERAPY | Facility: CLINIC | Age: 73
End: 2023-11-06
Payer: MEDICARE

## 2023-11-06 DIAGNOSIS — G20.A1 PARKINSON'S DISEASE, UNSPECIFIED WHETHER DYSKINESIA PRESENT, UNSPECIFIED WHETHER MANIFESTATIONS FLUCTUATE: Primary | ICD-10-CM

## 2023-11-06 PROCEDURE — 97112 NEUROMUSCULAR REEDUCATION: CPT | Performed by: PHYSICAL THERAPIST

## 2023-11-06 PROCEDURE — 97162 PT EVAL MOD COMPLEX 30 MIN: CPT | Performed by: PHYSICAL THERAPIST

## 2023-11-06 NOTE — PROGRESS NOTES
PT Evaluation     Today's date: 2023  Patient name: Nadia Llamas. : 1950  MRN: [id number]  Referring provider: Garth Bland DO  Dx:   Encounter Diagnosis     ICD-10-CM    1. Parkinson's disease, unspecified whether dyskinesia present, unspecified whether manifestations fluctuate  G20. A1           Start Time: 1000  Stop Time: 1040  Total time in clinic (min): 40 minutes    Assessment  Assessment details: Pt is a 68 YOM who presents to OPPT for PD. Compared to his last eval in May 2023, pt has declined in function and demo increased FOG and freezing episodes. Pt demo decreased LE strength, impaired balance, difficulty with dual tasking, slow gait speed, decreased endurance, and is considered a high fall risk. Due to above impairments, pt has difficulty with donning clothes and freezing episodes. Pt would benefit from skilled PT to maximize functional mobility, decrease fall risk, improve strength, balance, and endurance, and improve QOL. Goals  ST weeks  TUG improve by 3 sec or > to demo improved mobility and balance  5 x STS improve by 3 sec or > to demo improved LE strength and balance    LT weeks  FGA improve by 5 points or > to demo improved balance and decreased fall risk   Independent with updated HEP to self manage and maintain progress outside of PT      Plan  Planned therapy interventions: patient education, postural training, neuromuscular re-education, balance, gait training, home exercise program, therapeutic exercise, stretching, strengthening and therapeutic activities  Frequency: 2x week  Duration in weeks: 8  Plan of Care beginning date: 2023  Plan of Care expiration date: 2024  Treatment plan discussed with: patient        Subjective Evaluation    History of Present Illness  Mechanism of injury: Was initially diagnosed with PD about 3-4 years ago per pt. Saw Dr. Teddy Sher and had infusions for 5 weeks polycythemia/thrombocytosis.  States that he is low on Vit B. Has to get blood work every week. Will be f/u with Dr. Bocanegra Offer again in 3 months. Finished infusion at the beginning of Nov. Does not feel much better. Walking and balance is good, but he cannot go as far as he used to it. Denies any recent falls. Has been having more freezing episodes, and they are lasting longer. Does not have any daily exercise routine. Does not f/u with neurologist since he has none in the area. Denies n/t in feet.       Work: retired    Hobbies: going to different bars     Patient Goals  Patient goal: to survive  Pain  No pain reported          Objective      Balance Test 11/6   6 Minute Walk Test (ft):    FGA 14/30   Gait Speed (ft/s): 0.48 m/s    5x Sit To Stand (s): 16   TUG: 10.78  Manual 14.68  Cog 16.22     Mini Best: 15/28    Coordination Left Right   Heel To [place] WNL WNL   Finger To Nose     Rapid Alternating Movement     UE     LE Bradykinesia  Bradykinesia       Manual Muscle Testing - Hip Left Right   Flexion 4 4   Extension     Abduction     External Rotation       Manual Muscle Testing - Knee Left Right   Flexion 4+ 4   Extension 4+ 4+     Manual Muscle Testing - Ankle Left Right   Doriflexion 4 4   Plantarflexion            Re-eval Date: 12/6    Date 11/6       Visit Count        FOTO        Pain In        Pain Out        Vitals             Precautions PD, fall risk        Manuals                                        Neuro Re-Ed         HKM        Sidestepping         BW amb        Step ups                 STS Reviewed for HEP                Education  On how to navigate freezing episodes 10 min        Ther Ex        Nustep                                                                 Ther Activity        Donning jacket                 Gait Training        Dual tasking         BIG walking         Modalities

## 2023-11-07 LAB — JAK2 P.V617F BLD/T QL: NORMAL

## 2023-11-08 ENCOUNTER — OFFICE VISIT (OUTPATIENT)
Dept: PHYSICAL THERAPY | Facility: CLINIC | Age: 73
End: 2023-11-08
Payer: MEDICARE

## 2023-11-08 DIAGNOSIS — G20.A1 PARKINSON'S DISEASE, UNSPECIFIED WHETHER DYSKINESIA PRESENT, UNSPECIFIED WHETHER MANIFESTATIONS FLUCTUATE: Primary | ICD-10-CM

## 2023-11-08 PROCEDURE — 97112 NEUROMUSCULAR REEDUCATION: CPT

## 2023-11-08 PROCEDURE — 97110 THERAPEUTIC EXERCISES: CPT

## 2023-11-08 NOTE — PROGRESS NOTES
Daily Note     Today's date: 2023  Patient name: Otto Asif. : 1950  MRN: [id number]  Referring provider: Lucila Fritz DO  Dx:   Encounter Diagnosis     ICD-10-CM    1. Parkinson's disease, unspecified whether dyskinesia present, unspecified whether manifestations fluctuate  G20. A1                      Subjective: I am having > FOG, feet get stuck more and more lately  Denies any falls but a lot of close calls, find things to grab a hold of  I can get up off of the floor if there is light but not if it is dark out        Objective: See treatment diary below      Assessment: Tolerated treatment well. Pt reports R >L ease with step up activ  Pt demon difficulty with retro step L>R , < step stride Pt with FHRS posture, cues for postural awareness t/o rx session  Pt ambul with < ground clearance, step stride, UE swing  Pt discussion of possible community wellness after DC  Pt to consider to max carryover   Patient would benefit from continued PT      Plan: Continue per plan of care.       Re-eval Date:     Date       Visit Count  2      FOTO        Pain In        Pain Out        Vitals             Precautions PD, fall risk        Manuals                                        Neuro Re-Ed         HKM  2x 40'  CS/cues      Sidestepping   2x40'  CS/cues  Toe neutral        BW amb  2x20'      Step ups   6"  2x 10   1 UE      Chair <>chair  Cross threshold  5 min      Multi directional step  3x      STS Reviewed for HEP  5x  5x w/ bolster  5x WB / foam      Boxing  2x10  - jabs  - cross punches  - upper cut  - hook    2x 30" cross punches, pt fatigue      Education  On how to navigate freezing episodes 10 min        Ther Ex        Nustep   L3 10 min  Focus cardiovascular endurance  Cues pacing  > UE/LE                                                              Ther Activity        Donning jacket                 Gait Training        Dual tasking         BIG walking   Clinic  5 min Modalities

## 2023-11-10 ENCOUNTER — APPOINTMENT (OUTPATIENT)
Age: 73
End: 2023-11-10
Payer: MEDICARE

## 2023-11-13 ENCOUNTER — OFFICE VISIT (OUTPATIENT)
Dept: PHYSICAL THERAPY | Facility: CLINIC | Age: 73
End: 2023-11-13
Payer: MEDICARE

## 2023-11-13 DIAGNOSIS — G20.A1 PARKINSON'S DISEASE, UNSPECIFIED WHETHER DYSKINESIA PRESENT, UNSPECIFIED WHETHER MANIFESTATIONS FLUCTUATE: Primary | ICD-10-CM

## 2023-11-13 PROCEDURE — 97110 THERAPEUTIC EXERCISES: CPT

## 2023-11-13 PROCEDURE — 97112 NEUROMUSCULAR REEDUCATION: CPT

## 2023-11-13 NOTE — PROGRESS NOTES
Daily Note     Today's date: 2023  Patient name: Estella Dykes. : 1950  MRN: [id number]  Referring provider: Marilou Babb DO  Dx:   Encounter Diagnosis     ICD-10-CM    1. Parkinson's disease, unspecified whether dyskinesia present, unspecified whether manifestations fluctuate  G20. A1                      Subjective: I fell leaving the [place], missed last 2 steps  landed at the bottom of the stairs, didn't hurt anything but my pride, able to get up on my own after sitting a few min      Objective: See treatment diary below      Assessment: Tolerated treatment fairly well   pt reporting feeling more shaky today with exercises  Noted > retropulsion with retro step, required Mod A fwd wt shift  Pt demon > ground clearance  with HK step with boxing with visual cues  Noted > FOG this date with 4S's cuing . Patient would benefit from continued PT      Plan: Continue per plan of care.       Re-eval Date:     Date      Visit Count  2 3     FOTO        Pain In        Pain Out        Vitals             Precautions PD, fall risk        Manuals                                        Neuro Re-Ed         HKM  2x 40'  CS/cues 2x 40'  CS/cues     Sidestepping   2x40'  CS/cues  Toe neutral   2x40'  CS/cues  Toe neutral      BW amb  2x20' 2x30'  > FOG     Step ups   6"  2x 10   1 UE 8"  2x 10   1 UE     Chair <>chair  Cross threshold  5 min      Multi directional step  3x 3x with change order     STS Reviewed for HEP  5x  5x w/ bolster  5x WB / foam 5x 21 sec     Boxing  2x10  - jabs  - cross punches  - upper cut  - hook    2x 30" cross punches, pt fatigue 10 min  - jabs  - cross punches  - upper cut  - hook  - 10x cross punches with duck 10 reps  - 10x cross punches w/HK step R/L alt  10reps     Education  On how to navigate freezing episodes 10 min        Ther Ex        Nustep   L3 10 min  Focus cardiovascular endurance  Cues pacing  > UE/LE L3 10 min  Focus cardiovascular endurance  Cues pacing  > UE/LE                                                             Ther Activity        Tierney jackbrad                 Gait Training        Dual tasking         BIG walking   Clinic  5 min      Modalities

## 2023-11-15 ENCOUNTER — OFFICE VISIT (OUTPATIENT)
Dept: PHYSICAL THERAPY | Facility: CLINIC | Age: 73
End: 2023-11-15
Payer: MEDICARE

## 2023-11-15 DIAGNOSIS — G20.A1 PARKINSON'S DISEASE, UNSPECIFIED WHETHER DYSKINESIA PRESENT, UNSPECIFIED WHETHER MANIFESTATIONS FLUCTUATE: Primary | ICD-10-CM

## 2023-11-15 PROCEDURE — 97112 NEUROMUSCULAR REEDUCATION: CPT | Performed by: PHYSICAL THERAPIST

## 2023-11-15 PROCEDURE — 97110 THERAPEUTIC EXERCISES: CPT | Performed by: PHYSICAL THERAPIST

## 2023-11-15 NOTE — PROGRESS NOTES
Daily Note     Today's date: 11/15/2023  Patient name: Estella Dykes. : 1950  MRN: [id number]  Referring provider: Marilou Babb DO  Dx:   Encounter Diagnosis     ICD-10-CM    1. Parkinson's disease, unspecified whether dyskinesia present, unspecified whether manifestations fluctuate  G20. A1                      Subjective: No new complaints. Objective: See treatment diary below      Assessment: Pt did not maintain high amplitude of steps during HKM, and he struggled with bigger steps for bw amb despite multiple VC. Pt demo good power with boxing. Patient would benefit from continued PT      Plan: Progress treatment as tolerated.        Re-eval Date:     Date 11/6 11/8 11/13 11/15    Visit Count  2 3 4    FOTO        Pain In        Pain Out        Vitals             Precautions PD, fall risk        Manuals                                        Neuro Re-Ed         HKM  2x 40'  CS/cues 2x 40'  CS/cues 40'x   4    Sidestepping   2x40'  CS/cues  Toe neutral   2x40'  CS/cues  Toe neutral      BW amb  2x20' 2x30'  > FOG 40' x 2     Step ups   6"  2x 10   1 UE 8"  2x 10   1 UE     Chair <>chair  Cross threshold  5 min      Multi directional step  3x 3x with change order     STS Reviewed for HEP  5x  5x w/ bolster  5x WB / foam 5x 21 sec 10x  10x with bolster    Boxing  2x10  - jabs  - cross punches  - upper cut  - hook    2x 30" cross punches, pt fatigue 10 min  - jabs  - cross punches  - upper cut  - hook  - 10x cross punches with duck 10 reps  - 10x cross punches w/HK step R/L alt  10reps 15 min  - jabs 2 x 10  - cross punches 2 x 10  - upper cut 10x  - hook 10x ea  - Jab cross Jab  - Jab Jab Cross    2 min as fast as possible     Education  On how to navigate freezing episodes 10 min        Ther Ex        Nustep   L3 10 min  Focus cardiovascular endurance  Cues pacing  > UE/LE L3 10 min  Focus cardiovascular endurance  Cues pacing  > UE/LE L4 10 min  Focus cardiovascular endurance  Cues pacing  > UE/LE                                                            Ther Activity        Tierney swain                 Gait Training        Dual tasking         BIG walking   Clinic  5 min      Modalities

## 2023-11-16 ENCOUNTER — OFFICE VISIT (OUTPATIENT)
Dept: FAMILY MEDICINE CLINIC | Facility: CLINIC | Age: 73
End: 2023-11-16
Payer: MEDICARE

## 2023-11-16 VITALS
HEIGHT: 69 IN | BODY MASS INDEX: 26.66 KG/M2 | SYSTOLIC BLOOD PRESSURE: 122 MMHG | OXYGEN SATURATION: 96 % | HEART RATE: 69 BPM | DIASTOLIC BLOOD PRESSURE: 78 MMHG | WEIGHT: 180 LBS | TEMPERATURE: 96.8 F

## 2023-11-16 DIAGNOSIS — D75.1 POLYCYTHEMIA: ICD-10-CM

## 2023-11-16 DIAGNOSIS — G20.A2 PARKINSON'S DISEASE WITH FLUCTUATING MANIFESTATIONS, UNSPECIFIED WHETHER DYSKINESIA PRESENT: Primary | ICD-10-CM

## 2023-11-16 DIAGNOSIS — I25.119 CORONARY ARTERY DISEASE INVOLVING NATIVE CORONARY ARTERY OF NATIVE HEART WITH ANGINA PECTORIS (HCC): ICD-10-CM

## 2023-11-16 DIAGNOSIS — Z23 ENCOUNTER FOR IMMUNIZATION: ICD-10-CM

## 2023-11-16 DIAGNOSIS — E53.8 B12 DEFICIENCY: ICD-10-CM

## 2023-11-16 DIAGNOSIS — Z12.11 SCREENING FOR COLON CANCER: ICD-10-CM

## 2023-11-16 DIAGNOSIS — E78.2 MIXED HYPERLIPIDEMIA: ICD-10-CM

## 2023-11-16 DIAGNOSIS — I10 PRIMARY HYPERTENSION: ICD-10-CM

## 2023-11-16 PROCEDURE — 90662 IIV NO PRSV INCREASED AG IM: CPT

## 2023-11-16 PROCEDURE — 99214 OFFICE O/P EST MOD 30 MIN: CPT | Performed by: INTERNAL MEDICINE

## 2023-11-16 PROCEDURE — G0008 ADMIN INFLUENZA VIRUS VAC: HCPCS

## 2023-11-16 RX ORDER — LANOLIN ALCOHOL/MO/W.PET/CERES
1000 CREAM (GRAM) TOPICAL DAILY
Start: 2023-11-16

## 2023-11-16 NOTE — PROGRESS NOTES
Name: Faina Carlos. : 1950      MRN: [id number]  Encounter Provider: Jassi Mcrae DO  Encounter Date: 2023   Encounter department: [address] [facility]    Assessment & Plan     1. Parkinson's disease with fluctuating manifestations, unspecified whether dyskinesia present  Assessment & Plan:  Like to see a neurologist regards to this. We did have him set up in the past but I believe he had his coronary event at that point. Continues on carbidopa levodopa. He currently is doing therapy with regards to his Parkinson's and is doing very well. Has had no falls. Ambulatory dysfunction is clearly impacted by his Parkinson's. Orders:  -     Ambulatory Referral to Neurology; Future; Expected date: 2024    2. Polycythemia  Assessment & Plan:  Continues to follow with hematology. 3. Coronary artery disease involving native coronary artery of native heart with angina pectoris [facility])  Assessment & Plan:  Remains on aspirin therapy. Issues with coronary insufficiency. 4. Primary hypertension  Assessment & Plan:  Stable off all meds at this point. 5. Encounter for immunization  -     influenza vaccine, high-dose, PF 0.7 mL (FLUZONE HIGH-DOSE)    6. Screening for colon cancer  Comments:  Recent Cologuard noted    7. Mixed hyperlipidemia  Assessment & Plan:  Labs appear exceptional.      8. B12 deficiency  Assessment & Plan:  Had infusions now taking B12 orally. Orders:  -     vitamin B-12 (VITAMIN B-12) 1,000 mcg tablet; Take 1 tablet (1,000 mcg total) by mouth daily      BMI Counseling: Body mass index is 26.58 kg/m². The BMI is above normal. Nutrition recommendations include moderation in carbohydrate intake. Rationale for BMI follow-up plan is due to patient being overweight or obese. Subjective      . States she is doing well. He said no recent falls. Continues with therapy with guards to his Parkinson's disease.   Reviewed his medications, these are quite stable. Following with Dr. Jose Juan Gonzalez now from hematology/oncology for his polycythemia. Has been diagnosed with B12 deficiency as well. No coronary symptomatology. Bowels and bladder been doing well. He had a Cologuard about a year ago. Reviewed recent blood work. Review of Systems   Constitutional:  Negative for chills and fever. HENT:  Negative for rhinorrhea and sore throat. Eyes:  Negative for visual disturbance. Respiratory:  Negative for cough and shortness of breath. Cardiovascular:  Negative for chest pain and leg swelling. Gastrointestinal:  Negative for abdominal pain, diarrhea, nausea and vomiting. Genitourinary:  Negative for dysuria. Musculoskeletal:  Negative for back pain and myalgias. Skin:  Negative for rash. Neurological:  Negative for dizziness and headaches. Psychiatric/Behavioral:  Negative for confusion. All other systems reviewed and are negative. Current Outpatient Medications on File Prior to Visit   Medication Sig    aspirin (ECOTRIN LOW STRENGTH) 81 mg EC tablet Take 81 mg by mouth daily    atorvastatin (LIPITOR) 80 mg tablet Take 1 tablet (80 mg total) by mouth daily    carbidopa-levodopa (SINEMET)  mg per tablet Take 1 tablet by mouth 3 (three) times a day    hydroxyurea (HYDREA) 500 mg capsule Take 1 capsule (500 mg total) by mouth daily    PARoxetine (PAXIL) 20 mg tablet TAKE ONE TABLET DAILY. ..    meloxicam (MOBIC) 15 mg tablet Take 1 tablet (15 mg total) by mouth daily (Patient not taking: Reported on 12/29/2022)       Objective     /78 (BP Location: Left arm, Patient Position: Sitting, Cuff Size: Large)   Pulse 69   Temp (!) 96.8 °F (36 °C) (Tympanic)   Ht 5' 9" (1.753 m)   Wt 81.6 kg (180 lb)   SpO2 96%   BMI 26.58 kg/m²     Physical Exam  Shanda Cross,

## 2023-11-17 ENCOUNTER — APPOINTMENT (OUTPATIENT)
Age: 73
End: 2023-11-17
Payer: MEDICARE

## 2023-11-20 ENCOUNTER — OFFICE VISIT (OUTPATIENT)
Dept: PHYSICAL THERAPY | Facility: CLINIC | Age: 73
End: 2023-11-20
Payer: MEDICARE

## 2023-11-20 DIAGNOSIS — G20.A1 PARKINSON'S DISEASE, UNSPECIFIED WHETHER DYSKINESIA PRESENT, UNSPECIFIED WHETHER MANIFESTATIONS FLUCTUATE: Primary | ICD-10-CM

## 2023-11-20 PROCEDURE — 97110 THERAPEUTIC EXERCISES: CPT

## 2023-11-20 PROCEDURE — 97112 NEUROMUSCULAR REEDUCATION: CPT

## 2023-11-20 NOTE — PROGRESS NOTES
Daily Note     Today's date: 2023  Patient name: Sheela Olmstead. : 1950  MRN: [id number]  Referring provider: Nanci Hutton DO  Dx:   Encounter Diagnosis     ICD-10-CM    1. Parkinson's disease, unspecified whether dyskinesia present, unspecified whether manifestations fluctuate  G20. A1                      Subjective: Pt denies any recent fall   My PCP is scheduling me to f/u with neurologist upcoming      Objective: See treatment diary below      Assessment: Tolerated treatment well. Progressed POC to pt ilia with > focus HKM, ulisses negotiation to address < ground clearance with ambul  Demon > R toe out with side step/Lat ulisses nego, cues > correction  Patient would benefit from continued PT      Plan: Continue per plan of care.       Re-eval Date:     Date 11/6 11/8 11/13 11/15 11/20   Visit Count  2 3 4 5   FOTO        Pain In        Pain Out        Vitals             Precautions PD, fall risk        Manuals                                        Neuro Re-Ed         HKM  2x 40'  CS/cues 2x 40'  CS/cues 40'x   2     40'x   2    Alt toe tap  8"  0 UE  30x   Sidestepping   2x40'  CS/cues  Toe neutral   2x40'  CS/cues  Toe neutral  2x40'  CS/cues  Toe neutral  2x40'  CS/cues  Toe neutral    BW amb  2x20' 2x30'  > FOG 40' x 2  // 10x 0 UE  Mirroring/max cues focus > step stride/ count steps   Step ups   6"  2x 10   1 UE 8"  2x 10   1 UE 8"  2x 10   1 UE 8"  2x 10   1 UE   Chair <>chair  Cross threshold  5 min      Multi directional step  3x 3x with change order  resume   STS Reviewed for HEP  5x  5x w/ bolster  5x WB / foam 5x 21 sec 10x  10x with bolster 5x 14 sec    10x with bolster   Hurdles     Fwd  3 - 6" 4 laps  2 - 6" +1 -12" 4 laps    Lat 3 - 6"  5 laps    0 UE occ 1 UE>LOB     Boxing  2x10  - jabs  - cross punches  - upper cut  - hook    2x 30" cross punches, pt fatigue 10 min  - jabs  - cross punches  - upper cut  - hook  - 10x cross punches with duck 10 reps  - 10x cross punches w/HK step R/L alt  10reps 15 min  - jabs 2 x 10  - cross punches 2 x 10  - upper cut 10x  - hook 10x ea  - Jab cross Jab  - Jab Jab Cross    2 min as fast as possible  resume   Education  On how to navigate freezing episodes 10 min        Ther Ex        Nustep   L3 10 min  Focus cardiovascular endurance  Cues pacing  > UE/LE L3 10 min  Focus cardiovascular endurance  Cues pacing  > UE/LE L4 10 min  Focus cardiovascular endurance  Cues pacing  > UE/LE L4 10 min  Focus cardiovascular endurance  Cues pacing  > UE/LE                                                           Ther Activity        Donning jacket                 Gait Training        Dual tasking         BIG walking   Clinic  5 min      Modalities

## 2023-11-22 ENCOUNTER — OFFICE VISIT (OUTPATIENT)
Dept: PHYSICAL THERAPY | Facility: CLINIC | Age: 73
End: 2023-11-22
Payer: MEDICARE

## 2023-11-22 DIAGNOSIS — G20.A1 PARKINSON'S DISEASE, UNSPECIFIED WHETHER DYSKINESIA PRESENT, UNSPECIFIED WHETHER MANIFESTATIONS FLUCTUATE: Primary | ICD-10-CM

## 2023-11-22 PROCEDURE — 97110 THERAPEUTIC EXERCISES: CPT | Performed by: PHYSICAL THERAPIST

## 2023-11-22 PROCEDURE — 97112 NEUROMUSCULAR REEDUCATION: CPT | Performed by: PHYSICAL THERAPIST

## 2023-11-22 NOTE — PROGRESS NOTES
Daily Note     Today's date: 2023  Patient name: Rajan Baires. : 1950  MRN: [id number]  Referring provider: Arsalan Barbosa DO  Dx:   Encounter Diagnosis     ICD-10-CM    1. Parkinson's disease, unspecified whether dyskinesia present, unspecified whether manifestations fluctuate  G20. A1                      Subjective: No new complaints. Objective: See treatment diary below      Assessment: Tolerated new progressions well, however he was fatigued at the end of session. He had difficulty maintaining big step height during HKM. During boxing, he had difficulty lifting LE high enough for taps on bottom of heavy bag juan manuel when he fatigued. Patient would benefit from continued PT      Plan: Progress treatment as tolerated.        Re-eval Date:     Date 11/22   11/15 11/20   Visit Count 6   4 5   FOTO        Pain In        Pain Out        Vitals             Precautions PD, fall risk        Manuals                                        Neuro Re-Ed         HKM 40' x 2 carrying bolster   40'x   2     40'x   2    Alt toe tap  8"  0 UE  30x   Sidestepping     2x40'  CS/cues  Toe neutral  2x40'  CS/cues  Toe neutral    BW amb    40' x 2  // 10x 0 UE  Mirroring/max cues focus > step stride/ count steps   Step ups  8"   20x fw  0 UE    8"  2x 10   1 UE 8"  2x 10   1 UE   Chair <>chair        Multi directional step     resume   STS 10x with bolster  10x with bolster on foam    10x  10x with bolster 5x 14 sec    10x with bolster   Hurdles Fw/lat mix hi/low 6 laps 1 UE     Fwd  3 - 6" 4 laps  2 - 6" +1 -12" 4 laps    Lat 3 - 6"  5 laps    0 UE occ 1 UE>LOB     Boxing 20 min  - jabs 2 x 10  - cross punches 2 x 10  - upper cut 10x  - hook 10x ea  - 10 punches, 10 alt taps x 3  - Jab cross Jab  - Jab Jab Cross  - Jab Cross Hook    2 min as fast as possible    15 min  - jabs 2 x 10  - cross punches 2 x 10  - upper cut 10x  - hook 10x ea  - Jab cross Jab  - Jab Jab Cross    2 min as fast as possible resume   Education         Ther Ex        Nustep  L4 10 min  Focus cardiovascular endurance  Cues pacing  > UE/LE   L4 10 min  Focus cardiovascular endurance  Cues pacing  > UE/LE L4 10 min  Focus cardiovascular endurance  Cues pacing  > UE/LE                                                           Ther Activity        Donning jacket                 Gait Training        Dual tasking         BIG walking   Clinic  5 min      Modalities

## 2023-11-24 ENCOUNTER — APPOINTMENT (OUTPATIENT)
Age: 73
End: 2023-11-24
Payer: MEDICARE

## 2023-11-27 ENCOUNTER — OFFICE VISIT (OUTPATIENT)
Dept: PHYSICAL THERAPY | Facility: CLINIC | Age: 73
End: 2023-11-27
Payer: MEDICARE

## 2023-11-27 DIAGNOSIS — G20.A1 PARKINSON'S DISEASE, UNSPECIFIED WHETHER DYSKINESIA PRESENT, UNSPECIFIED WHETHER MANIFESTATIONS FLUCTUATE: Primary | ICD-10-CM

## 2023-11-27 PROCEDURE — 97110 THERAPEUTIC EXERCISES: CPT

## 2023-11-27 PROCEDURE — 97112 NEUROMUSCULAR REEDUCATION: CPT

## 2023-11-27 NOTE — PROGRESS NOTES
Daily Note     Today's date: 2023  Patient name: Joce Phillips. : 1950  MRN: [id number]  Referring provider: Dakota Stevens DO  Dx:   Encounter Diagnosis     ICD-10-CM    1. Parkinson's disease, unspecified whether dyskinesia present, unspecified whether manifestations fluctuate  G20. A1                      Subjective: Denies any recent fall but cont's to report FOG/near misses with ambul      Objective: See treatment diary below      Assessment: Tolerated treatment well. Pt demon > difficulty with STS transfer stand / foam, required Bob this date  Noted > festering with boxing, 1* with fast pace  Pt provided TC's for WS with boxing, encourage voice boost  Patient would benefit from continued PT      Plan: Continue per plan of care.       Re-eval Date:     Date       Visit Count 6 7      FOTO        Pain In        Pain Out        Vitals             Precautions PD, fall risk        Manuals                                        Neuro Re-Ed         HKM 40' x 2 carrying bolster 40' x 2 carrying bolster      Sidestepping         BW amb        Step ups  8"   20x fw  0 UE  8"   20x fw  0 UE       Chair <>chair        Multi directional step        STS 10x with bolster  10x with bolster on foam  10x with bolster  10x with bolster on foam w/ Bob      Hurdles Fw/lat mix hi/low 6 laps 1 UE  Fw/lat mix hi/low 6 laps 1 UE       Boxing 20 min  - jabs 2 x 10  - cross punches 2 x 10  - upper cut 10x  - hook 10x ea  - 10 punches, 10 alt taps x 3  - Jab cross Jab  - Jab Jab Cross  - Jab Cross Hook    2 min as fast as possible  15 min  - jabs 2 x 10  - cross punches 2 x 10  - upper cut 10x  - hook 10x ea  - 10 punches, 10 alt taps x 3  - Jab cross Jab  - Jab Jab Cross  - Jab Cross Hook    30-45 sec to pt ilia as fast as possible       Education         Ther Ex        Nustep  L4 10 min  Focus cardiovascular endurance  Cues pacing  > UE/LE L4 10 min  Focus cardiovascular endurance  Cues pacing  > UE/LE                                                              Ther Activity        Tierney jacket                 Gait Training        Dual tasking         BIG walking   Clinic  5 min      Modalities

## 2023-11-29 ENCOUNTER — OFFICE VISIT (OUTPATIENT)
Dept: PHYSICAL THERAPY | Facility: CLINIC | Age: 73
End: 2023-11-29
Payer: MEDICARE

## 2023-11-29 DIAGNOSIS — G20.A1 PARKINSON'S DISEASE, UNSPECIFIED WHETHER DYSKINESIA PRESENT, UNSPECIFIED WHETHER MANIFESTATIONS FLUCTUATE: Primary | ICD-10-CM

## 2023-11-29 PROCEDURE — 97110 THERAPEUTIC EXERCISES: CPT

## 2023-11-29 PROCEDURE — 97112 NEUROMUSCULAR REEDUCATION: CPT

## 2023-11-29 NOTE — PROGRESS NOTES
Daily Note     Today's date: 2023  Patient name: Suzette Dubois. : 1950  MRN: [id number]  Referring provider: Tory Mcneill DO  Dx:   Encounter Diagnosis     ICD-10-CM    1. Parkinson's disease, unspecified whether dyskinesia present, unspecified whether manifestations fluctuate  G20. A1                      Subjective: No new co's  denies any recent falls  I do have FOG episodes occ      Objective: See treatment diary below      Assessment: Tolerated treatment well. Demon > retro step strides this date  Pt struggles with busy clinic setting, easily distracted. Noted improved balance with trial of ball kicking/passing  Ambul with fwd trunk postures, < step stride, ground clearance, UE swing  Patient would benefit from continued PT      Plan: Continue per plan of care.       Re-eval Date:     Date      Visit Count 6 7 8     FOTO        Pain In        Pain Out        Vitals             Precautions PD, fall risk        Manuals                                        Neuro Re-Ed         HKM 40' x 2 carrying bolster 36' x 2 carrying bolster 40' x 2 carrying bolster     Sidestepping         BW amb   8x 12'    Retro step with reach  10x ea     Step ups  8"   20x fw  0 UE  8"   20x fw  0 UE  6"  20x fwd  0 ue Resume 8"    Chair <>chair   5 min  W/ turn nego       Multi directional step        STS 10x with bolster  10x with bolster on foam  10x with bolster  10x with bolster on foam w/ Bob 10x with bolster  10x with bolster on foam w/ occ Bob     Hurdles Fw/lat mix hi/low 6 laps 1 UE  Fw/lat mix hi/low 6 laps 1 UE  resume     Boxing 20 min  - jabs 2 x 10  - cross punches 2 x 10  - upper cut 10x  - hook 10x ea  - 10 punches, 10 alt taps x 3  - Jab cross Jab  - Jab Jab Cross  - Jab Cross Hook    2 min as fast as possible  15 min  - jabs 2 x 10  - cross punches 2 x 10  - upper cut 10x  - hook 10x ea  - 10 punches, 10 alt taps x 3  - Jab cross Jab  - Jab Jab Cross  - [employer]    30-45 sec to pt ilia as fast as possible  resume     Kick ball/pass   5 min  W/ occ 1 UE     Education         Ther Ex        Nustep  L4 10 min  Focus cardiovascular endurance  Cues pacing  > UE/LE L4 10 min  Focus cardiovascular endurance  Cues pacing  > UE/LE L5 10 min  Focus cardiovascular endurance  Cues pacing  > UE/LE                                                             Ther Activity        Donning jacket                 Gait Training        Dual tasking         BIG walking   Clinic  5 min      Modalities

## 2023-12-01 ENCOUNTER — APPOINTMENT (OUTPATIENT)
Age: 73
End: 2023-12-01
Payer: MEDICARE

## 2023-12-04 ENCOUNTER — OFFICE VISIT (OUTPATIENT)
Dept: PHYSICAL THERAPY | Facility: CLINIC | Age: 73
End: 2023-12-04
Payer: MEDICARE

## 2023-12-04 DIAGNOSIS — G20.A1 PARKINSON'S DISEASE, UNSPECIFIED WHETHER DYSKINESIA PRESENT, UNSPECIFIED WHETHER MANIFESTATIONS FLUCTUATE: Primary | ICD-10-CM

## 2023-12-04 PROCEDURE — 97112 NEUROMUSCULAR REEDUCATION: CPT

## 2023-12-04 PROCEDURE — 97110 THERAPEUTIC EXERCISES: CPT

## 2023-12-04 NOTE — PROGRESS NOTES
Daily Note     Today's date: 2023  Patient name: Yuriy Tai. : 1950  MRN: [id number]  Referring provider: Minerva Pedersen DO  Dx:   Encounter Diagnosis     ICD-10-CM    1. Parkinson's disease, unspecified whether dyskinesia present, unspecified whether manifestations fluctuate  G20. A1                      Subjective: I have good / bad days  more bad vs good  maybe 3 good to 4 bad days in a week with FOG  March is when I see neurologist  PD meds dont seem to be helping at all  R hand tremors are ~ 80%        Objective: See treatment diary below      Assessment: Tolerated treatment well. Pt denies performing/carryover with HEP  Pt review benefits with compliancy and PT goals  cont's with difficulty with step stride, < ground clearance  demon fwd trunk posture  difficulty scanning environment with ambul  Easily distraction, performs better in private setting Patient would benefit from continued PT      Plan: Continue per plan of care.       Re-eval Date:     Date     Visit Count 6 7 8 9    FOTO        Pain In        Pain Out        Vitals             Precautions PD, fall risk        Manuals                                        Neuro Re-Ed         HKM 40' x 2 carrying bolster 36' x 2 carrying bolster 36' x 2 carrying bolster 40' x 2 carrying bolster    Sidestepping         BW amb   8x 12'    Retro step with reach  10x ea 8x 12'    Retro step with reach  10x ea w/ reach UE R/L    Step ups  8"   20x fw  0 UE  8"   20x fw  0 UE  6"  20x fwd  0 ue Resume 8"NV    Chair <>chair   5 min  W/ turn nego       Multi directional step        STS 10x with bolster  10x with bolster on foam  10x with bolster  10x with bolster on foam w/ Bob 10x with bolster  10x with bolster on foam w/ occ Bob 18"    10x with bolster  10x with bolster on foam w/ occ Bob    Hurdles Fw/lat mix hi/low 6 laps 1 UE  Fw/lat mix hi/low 6 laps 1 UE  resume     Boxing 20 min  - jabs 2 x 10  - cross punches 2 x 10  - upper cut 10x  - hook 10x ea  - 10 punches, 10 alt taps x 3  - Jab cross Jab  - Jab Jab Cross  - Jab Cross Hook    2 min as fast as possible  15 min  - jabs 2 x 10  - cross punches 2 x 10  - upper cut 10x  - hook 10x ea  - 10 punches, 10 alt taps x 3  - Jab cross Jab  - Jab Jab Cross  - Jab Cross Hook    30-45 sec to pt ilia as fast as possible  resume 15 min  - jabs 2 x 10  - cross punches 2 x 10  - upper cut 10x  - hook 10x ea  - 10 punches, 10 alt taps x 3  - Jab cross Jab  - Jab Jab Cross  - Jab Cross Hook    15-20 sec to pt ilia as fast as possible     Kick ball/pass   5 min  W/ occ 1 UE 5 min  Occ LOB CGA    Education         Ther Ex        Nustep  L4 10 min  Focus cardiovascular endurance  Cues pacing  > UE/LE L4 10 min  Focus cardiovascular endurance  Cues pacing  > UE/LE L5 10 min  Focus cardiovascular endurance  Cues pacing  > UE/LE L5 10 min  Focus cardiovascular endurance  Cues pacing  > UE/LE                                                            Ther Activity        Donning jacket                 Gait Training        Dual tasking         BIG walking   Clinic  5 min      Modalities

## 2023-12-05 ENCOUNTER — TELEPHONE (OUTPATIENT)
Dept: HEMATOLOGY ONCOLOGY | Facility: CLINIC | Age: 73
End: 2023-12-05

## 2023-12-05 NOTE — TELEPHONE ENCOUNTER
Phoned pt's dtr in law Radha Zayas to let her know that pt's hgb and hct is still high so pt is to increase his hydrea to 1000 mg po QOD with 500 mg QOD. Radha Zayas verbalized understanding. Pt to have weekly CBC.

## 2023-12-06 ENCOUNTER — OFFICE VISIT (OUTPATIENT)
Dept: PHYSICAL THERAPY | Facility: CLINIC | Age: 73
End: 2023-12-06
Payer: MEDICARE

## 2023-12-06 DIAGNOSIS — G20.A1 PARKINSON'S DISEASE, UNSPECIFIED WHETHER DYSKINESIA PRESENT, UNSPECIFIED WHETHER MANIFESTATIONS FLUCTUATE: Primary | ICD-10-CM

## 2023-12-06 PROCEDURE — 97112 NEUROMUSCULAR REEDUCATION: CPT

## 2023-12-06 PROCEDURE — 97110 THERAPEUTIC EXERCISES: CPT

## 2023-12-06 NOTE — PROGRESS NOTES
Daily Note     Today's date: 2023  Patient name: Dominique Jones. : 1950  MRN: [id number]  Referring provider: Catracho Lin DO  Dx:   Encounter Diagnosis     ICD-10-CM    1. Parkinson's disease, unspecified whether dyskinesia present, unspecified whether manifestations fluctuate  G20. A1                      Subjective: I have been practicing my Big steps fwd/retro      Objective: See treatment diary below      Assessment: Tolerated treatment well. Pt demon challenge with turn negotiation  Demon < ground clearance, step stride with ambul  Pt trial performing neuro exer in busy clinic setting, mod distracted difficulty with step over step/retro ambul  Patient would benefit from continued PT      Plan: Continue per plan of care.       Re-eval Date:     Date    Visit Count 6 7 8 9 10   FOTO        Pain In        Pain Out        Vitals             Precautions PD, fall risk        Manuals                                        Neuro Re-Ed         HKM 40' x 2 carrying bolster 36' x 2 carrying bolster 36' x 2 carrying bolster 36' x 2 carrying bolster 2 laps / clinic   carrying bolster   Sidestepping         BW amb   8x 12'    Retro step with reach  10x ea 8x 12'    Retro step with reach  10x ea w/ reach UE R/L // 10x   Pt self cue   Step ups  8"   20x fw  0 UE  8"   20x fw  0 UE  6"  20x fwd  0 ue resume 8"  2x15  1 UE   Chair <>chair   5 min  W/ turn nego    Turn negotiation/ change direct  Clinic   5 min   Multi directional step        STS 10x with bolster  10x with bolster on foam  10x with bolster  10x with bolster on foam w/ Bob 10x with bolster  10x with bolster on foam w/ occ Bob 18"    10x with bolster  10x with bolster on foam w/ occ Bob 10x with bolster  10x with bolster on foam w/ occ Bob   Hurdles Fw/lat mix hi/low 6 laps 1 UE  Fw/lat mix hi/low 6 laps 1 UE  resume     Boxing 20 min  - jabs 2 x 10  - cross punches 2 x 10  - upper cut 10x  - hook 10x ea  - 10 punches, 10 alt taps x 3  - Jab cross Jab  - Jab Jab Cross  - Jab Cross Hook    2 min as fast as possible  15 min  - jabs 2 x 10  - cross punches 2 x 10  - upper cut 10x  - hook 10x ea  - 10 punches, 10 alt taps x 3  - Jab cross Jab  - Jab Jab Cross  - Jab Cross Hook    30-45 sec to pt ilia as fast as possible  resume 15 min  - jabs 2 x 10  - cross punches 2 x 10  - upper cut 10x  - hook 10x ea  - 10 punches, 10 alt taps x 3  - Jab cross Jab  - Jab Jab Cross  - Jab Cross Hook    15-20 sec to pt ilia as fast as possible     Osbtacle course     4x 6" hurdles, yellow foam fwd, lat, change spacing    10 min   Kick ball/pass   5 min  W/ occ 1 UE 5 min  Occ LOB CGA    Education         Ther Ex        Nustep  L4 10 min  Focus cardiovascular endurance  Cues pacing  > UE/LE L4 10 min  Focus cardiovascular endurance  Cues pacing  > UE/LE L5 10 min  Focus cardiovascular endurance  Cues pacing  > UE/LE L5 10 min  Focus cardiovascular endurance  Cues pacing  > UE/LE L5 10 min  Focus cardiovascular endurance  Cues pacing  > UE/LE                                                           Ther Activity        Donning jacket                 Gait Training        Dual tasking         BIG walking   Clinic  5 min      Modalities

## 2023-12-08 ENCOUNTER — APPOINTMENT (OUTPATIENT)
Age: 73
End: 2023-12-08
Payer: MEDICARE

## 2023-12-08 DIAGNOSIS — G20.A2 PARKINSON'S DISEASE WITH FLUCTUATING MANIFESTATIONS, UNSPECIFIED WHETHER DYSKINESIA PRESENT: Primary | ICD-10-CM

## 2023-12-11 ENCOUNTER — OFFICE VISIT (OUTPATIENT)
Dept: PHYSICAL THERAPY | Facility: CLINIC | Age: 73
End: 2023-12-11
Payer: MEDICARE

## 2023-12-11 DIAGNOSIS — G20.A1 PARKINSON'S DISEASE, UNSPECIFIED WHETHER DYSKINESIA PRESENT, UNSPECIFIED WHETHER MANIFESTATIONS FLUCTUATE: Primary | ICD-10-CM

## 2023-12-11 PROCEDURE — 97110 THERAPEUTIC EXERCISES: CPT

## 2023-12-11 PROCEDURE — 97140 MANUAL THERAPY 1/> REGIONS: CPT

## 2023-12-11 NOTE — PROGRESS NOTES
Daily Note     Today's date: 2023  Patient name: Janine Dumont. : 1950  MRN: [id number]  Referring provider: Brittaney Morgan DO  Dx:   Encounter Diagnosis     ICD-10-CM    1. Parkinson's disease, unspecified whether dyskinesia present, unspecified whether manifestations fluctuate  G20. A1           Start Time: 1000  Stop Time: 1100  Total time in clinic (min): 60 minutes    Subjective: No new co's  Nothing better/worse  I dont do my exer, dont think about it      Objective: See treatment diary below      Assessment: Tolerated treatment fairly well. Pt challenged with ambul with divided attention/memory recall, demon < step stride/ground clearance/arm swing  difficulty 1* with retro step, < step stride, challenge with reciprocal pattern   Pt cont's to struggle with performing exer in busy clinic setting, easily distracted  Patient would benefit from continued PT      Plan: Continue per plan of care.       Re-eval Date:     Date        Visit Count        FOTO        Pain In        Pain Out        Vitals             Precautions PD, fall risk        Manuals                                        Neuro Re-Ed         HKM 4 laps / 36'  carrying bolster       Sidestepping         BW amb 10' x 8 laps       Step ups  8"  2x15  1 UE       Chair <>chair Cues Turn negotiation/ change direct  With obstacles   6 min       Multi directional step        STS NP  10x with bolster  10x with bolster on foam w/ occ Bob       Hurdles        Boxing 15 min  - jabs 2 x 10  - cross punches 2 x 10  - upper cut 10x  - hook 10x ea  - 10 punches, 10 alt taps x 3  - Jab cross Jab  - Jab Jab Cross  - Jab Cross Hook  - Jab with fwd step    13-8 sec to pt ilia as fast as possible        Osbtacle course NP  4x 6" hurdles, yellow foam fwd, lat, change spacing         Kick ball/pass        Education         Ther Ex        Nustep  L5 10 min  Focus cardiovascular endurance  Cues pacing  > UE/LE Ther Activity        Tierney jackbrad                 Gait Training        Dual tasking         BIG walking  Clinic  5 min       Modalities

## 2023-12-13 ENCOUNTER — OFFICE VISIT (OUTPATIENT)
Dept: PHYSICAL THERAPY | Facility: CLINIC | Age: 73
End: 2023-12-13
Payer: MEDICARE

## 2023-12-13 DIAGNOSIS — G20.A1 PARKINSON'S DISEASE, UNSPECIFIED WHETHER DYSKINESIA PRESENT, UNSPECIFIED WHETHER MANIFESTATIONS FLUCTUATE: Primary | ICD-10-CM

## 2023-12-13 PROCEDURE — 97112 NEUROMUSCULAR REEDUCATION: CPT

## 2023-12-13 PROCEDURE — 97110 THERAPEUTIC EXERCISES: CPT | Performed by: PHYSICAL THERAPIST

## 2023-12-13 NOTE — PROGRESS NOTES
Progress Note     Today's date: 2023  Patient name: Keenan Amaro. : 1950  MRN: [id number]  Referring provider: Charolet Holstein, DO  Dx:   Encounter Diagnosis     ICD-10-CM    1. Parkinson's disease, unspecified whether dyskinesia present, unspecified whether manifestations fluctuate  G20. A1                      Subjective: Feels like he is not good. PD is getting worse. Does not exercise outside of PT. No pain. Pt's goal: walk better. Objective: See treatment diary below      Assessment: Progress note completed this session. Compared to IE, he demo faster gait speed and improved LE strength and balance per 5 x STS. He did not make significant improvement in balance per FGA and continues to be a very high fall risk. He demo shuffling gait juan manuel with bw amb and increased freezing episodes. Overall, progress is limited due to pt not being consistent with HEP and carrying over strategies from PT. He would benefit from PT 2/week for 2-4 weeks and encouraged pt to do HEP and join gym once he is done with PT.      Goals  ST weeks  TUG improve by 3 sec or > to demo improved mobility and balance - NOT MET   5 x STS improve by 3 sec or > to demo improved LE strength and balance - MET      LT weeks  FGA improve by 5 points or > to demo improved balance and decreased fall risk   Independent with updated HEP to self manage and maintain progress outside of PT        Plan  Planned therapy interventions: patient education, postural training, neuromuscular re-education, balance, gait training, home exercise program, therapeutic exercise, stretching, strengthening and therapeutic activities  Frequency: 2x week  Duration in weeks: 2-4  Plan of Care beginning date: 2023  Plan of Care expiration date: 2024  Treatment plan discussed with: patient      Balance Test    6 Minute Walk Test (ft):      FGA 14/30 15/30   Gait Speed (ft/s): 0.48 m/s  0.68 m/s    5x Sit To Stand (s): 16 sec 13 sec    TUG: 10.78  Manual 14.68  Cog 16.22 11  Manual 14  Cog 17      Mini Best: 11/6: 15/28        Re-eval Date: 12/6    Date 12/11 12/13      Visit Count        FOTO        Pain In        Pain Out        Vitals             Precautions PD, fall risk        Manuals                                        Neuro Re-Ed         HKM 4 laps / 36'  carrying bolster       Sidestepping         BW amb 10' x 8 laps 10' x 8 laps      Step ups  8"  2x15  1 UE       Chair <>chair Cues Turn negotiation/ change direct  With obstacles   6 min Cues Turn negotiation/ change direct  With obstacles   6 min      Multi directional step  5 min  Change order  Verbal boosts      STS NP  10x with bolster  10x with bolster on foam w/ occ Bob       Hurdles        Boxing 15 min  - jabs 2 x 10  - cross punches 2 x 10  - upper cut 10x  - hook 10x ea  - 10 punches, 10 alt taps x 3  - Jab cross Jab  - Jab Jab Cross  - Jab Cross Hook  - Jab with fwd step    13-8 sec to pt ilia as fast as possible        Osbtacle course NP  4x 6" hurdles, yellow foam fwd, lat, change spacing         Kick ball/pass        Education         Ther Ex        Nustep  L5 10 min  Focus cardiovascular endurance  Cues pacing  > UE/LE L5 10 min  Focus cardiovascular endurance  Cues pacing  > UE/LE                                                              Ther Activity        Donning jacket                 Gait Training        Dual tasking         BIG walking  Clinic  5 min       Modalities

## 2023-12-15 ENCOUNTER — APPOINTMENT (OUTPATIENT)
Age: 73
End: 2023-12-15
Payer: MEDICARE

## 2023-12-18 ENCOUNTER — OFFICE VISIT (OUTPATIENT)
Dept: PHYSICAL THERAPY | Facility: CLINIC | Age: 73
End: 2023-12-18
Payer: MEDICARE

## 2023-12-18 DIAGNOSIS — G20.A1 PARKINSON'S DISEASE, UNSPECIFIED WHETHER DYSKINESIA PRESENT, UNSPECIFIED WHETHER MANIFESTATIONS FLUCTUATE: Primary | ICD-10-CM

## 2023-12-18 PROCEDURE — 97112 NEUROMUSCULAR REEDUCATION: CPT

## 2023-12-18 PROCEDURE — 97110 THERAPEUTIC EXERCISES: CPT

## 2023-12-18 NOTE — PROGRESS NOTES
"Daily Note     Today's date: 2023  Patient name: Kinglsey Grider Jr.  : 1950  MRN: [id number]  Referring provider: Evan Díaz DO  Dx:   Encounter Diagnosis     ICD-10-CM    1. Parkinson's disease, unspecified whether dyskinesia present, unspecified whether manifestations fluctuate  G20.A1                      Subjective: Pt arrived to our clinic today stating a change in overall status > 1 week.  Co's of constant shaking/R hand, > FOG episodes/denies recent falls with frequent near misses.  Not sleeping > 2 hours. No change in any meds.  I stopped leaving his home other than to come to PT today  Feel safe to drive.  I do not meet up with neurology until March time frame.   I have been dealing with head cold for a couple of days now but not taking anything for it      Objective: See treatment diary below      Assessment: Tolerated treatment farily well.  Pt demon > R hand tremors, < step stride, < LILLIANA, < ground clearance, < reciprocol arm swing with ambul  Noted > FOG with ambul, difficulty initiating gait.  Noted > anterior trunk lean with standing/walking.  Pt f/u with reaching out to MD  CV/PTA left MD message re: change in PD sx's  per SL/PT recommendation  Patient would benefit from continued PT      Plan: Continue per plan of care.      Re-eval Date:     Date      Visit Count        FOTO        Pain In        Pain Out        Vitals             Precautions PD, fall risk        Manuals                                        Neuro Re-Ed         HKM 4 laps / 40'  carrying bolster  30x   Marches       Sidestepping    6 laps  Mirror  Cues toe neutral     BW amb 10' x 8 laps 10' x 8 laps 8 laps  @ mirror  Pt self cue     Step ups  8\"  2x15  1 UE  8\"   Fwd/lat  2x15  1 UE     Chair <>chair Cues Turn negotiation/ change direct  With obstacles   6 min Cues Turn negotiation/ change direct  With obstacles   6 min      Multi directional step  5 min  Change order  Verbal boosts    " "  STS NP  10x with bolster  10x with bolster on foam w/ occ Bob  10x  10x w/ bolster     Hurdles        Boxing 15 min  - jabs 2 x 10  - cross punches 2 x 10  - upper cut 10x  - hook 10x ea  - 10 punches, 10 alt taps x 3  - Jab cross Jab  - Jab Jab Cross  - Jab Cross Hook  - Jab with fwd step    13-8 sec to pt ilia as fast as possible        Osbtacle course NP  4x 6\" hurdles, yellow foam fwd, lat, change spacing         Kick ball/pass        Education         Ther Ex        Nustep  L5 10 min  Focus cardiovascular endurance  Cues pacing  > UE/LE L5 10 min  Focus cardiovascular endurance  Cues pacing  > UE/LE L5 10 min  Focus cardiovascular endurance  Cues pacing  > UE/LE                                                             Ther Activity        Donning jacket                 Gait Training        Dual tasking         BIG walking  Clinic  5 min  Clinic/lobby  W/ UE asst SPC  Several stop/reset  8 min     Modalities                                                       "

## 2023-12-19 ENCOUNTER — DOCUMENTATION (OUTPATIENT)
Dept: FAMILY MEDICINE CLINIC | Facility: CLINIC | Age: 73
End: 2023-12-19

## 2023-12-19 ENCOUNTER — TELEPHONE (OUTPATIENT)
Dept: FAMILY MEDICINE CLINIC | Facility: CLINIC | Age: 73
End: 2023-12-19

## 2023-12-19 NOTE — TELEPHONE ENCOUNTER
PATIENT STOPPED IN OFFICE TO SEE IF THERE WERE ANY MESSAGES FROM DR LICONA.  DR LICONA WOULD LIKE PATIENT TO INCREASE HIS SINEMET TO 1 AND A HALF TABLETS IN THE MORNING, 1 AND A HALF TABLET IN THE AFTERNOON AND TAKE 1 TABLET IN THE EVENING.  MADE AN APPT FOR PATIENT TO SEE DR LICONA IN THE OFFICE ON 1/5/24

## 2023-12-19 NOTE — PROGRESS NOTES
Patient stopped in office to see if there was any message fromDr Díaz.  Gave patient the instructions on increasing his Sinemet and made him an appt to see Dr Díaz on 1/5/24

## 2023-12-19 NOTE — TELEPHONE ENCOUNTER
----- Message from Evan Díaz DO sent at 12/19/2023 10:01 AM EST -----  Regarding: FW: Decline in status  Notify patient, increase his Sinemet from 1 pill 3 times a day to 1-1/2 pills in the morning, 1-1/2 pills in the afternoon, and 1 pill in the evening.  Make a follow-up appointment with me within 2 to 3 weeks.  ----- Message -----  From: Heather Sagastume PTA  Sent: 12/18/2023  10:51 AM EST  To: Evan Díaz DO  Subject: Decline in status                                Good Morning!    We are currently seeing Luis Daniel Grider  for Parkinson's/balance - PT/Ileana  Pt arrived to our clinic today stating a change in overall status > 1 week.  Co's of constant shaking/R hand, > FOG episodes/denies recent falls with frequent near misses.  He is not sleeping > 2 hours. Denies change in any meds.  He reports that he stopped leaving his home other than to come to PT today, and reports feels safe to drive.  He mentioned he does not meet up with neurology until March time frame.  We informed Don to f/u with your office to discuss his decline status/>PD sx's.      Please feel free to reach out to our office [phone]    Zahra Navarro, PT 1# therapist      Thank You! Heather

## 2023-12-20 ENCOUNTER — OFFICE VISIT (OUTPATIENT)
Dept: PHYSICAL THERAPY | Facility: CLINIC | Age: 73
End: 2023-12-20
Payer: MEDICARE

## 2023-12-20 DIAGNOSIS — G20.A1 PARKINSON'S DISEASE, UNSPECIFIED WHETHER DYSKINESIA PRESENT, UNSPECIFIED WHETHER MANIFESTATIONS FLUCTUATE: Primary | ICD-10-CM

## 2023-12-20 PROCEDURE — 97112 NEUROMUSCULAR REEDUCATION: CPT

## 2023-12-20 PROCEDURE — 97110 THERAPEUTIC EXERCISES: CPT

## 2023-12-20 NOTE — PROGRESS NOTES
"Daily Note     Today's date: 2023  Patient name: Kingsley Grider Jr.  : 1950  MRN: [id number]  Referring provider: Evan Díaz DO  Dx:   Encounter Diagnosis     ICD-10-CM    1. Parkinson's disease, unspecified whether dyskinesia present, unspecified whether manifestations fluctuate  G20.A1                      Subjective: I am feeling  much better  I f/u with MD, changed my meds with NV schedule 24      Objective: See treatment diary below      Assessment: Tolerated treatment well. Demon < tremors in hands today, < FOG episodes  Cont's to struggle with retro step  Trial floor negotiation with difficulty transition to supine and provided max cues for negotation floor<>stand  Patient would benefit from continued PT      Plan: Continue per plan of care.      Re-eval Date:     Date     Visit Count        FOTO        Pain In        Pain Out        Vitals             Precautions PD, fall risk        Manuals                                        Neuro Re-Ed         HKM  4 laps / 40'  carrying bolster  30x   Marches      Sidestepping     6 laps  Mirror  Cues toe neutral    BW amb  10' x 8 laps 10' x 8 laps 8 laps      Step ups   8\"  2x15  1 UE      Chair <>chair  Cues Turn negotiation/ change direct  With obstacles   6 min Cues Turn negotiation/ change direct  With obstacles   6 min     Multi directional step   5 min  Change order  Verbal boosts     STS  NP  10x with bolster  10x with bolster on foam w/ occ Bob  10x  10x w/ bolster    Hurdles    Fwd/lat  6 laps ea    Standing  - step fwd  - dynamic reach  - Rock and reach    2x10 ea    Boxing  15 min  - jabs 2 x 10  - cross punches 2 x 10  - upper cut 10x  - hook 10x ea  - 10 punches, 10 alt taps x 3  - Jab cross Jab  - Jab Jab Cross  - Jab Cross Hook  - Jab with fwd step    13-8 sec to pt ilia as fast as possible       Osbtacle course  NP  4x 6\" hurdles, yellow foam fwd, lat, change spacing        Kick ball/pass      "   Education         Ther Ex        Nustep   L5 10 min  Focus cardiovascular endurance  Cues pacing  > UE/LE L5 10 min  Focus cardiovascular endurance  Cues pacing  > UE/LE L5 10 min  Focus cardiovascular endurance  Cues pacing  > UE/LE                                                            Ther Activity        Donning jacket     Floor transfers  10 min            Gait Training        Dual tasking         BIG walking   Clinic  5 min Clinic/lobby  W/ UE asst SPC  Several stop/reset  8 min     Modalities

## 2023-12-22 ENCOUNTER — APPOINTMENT (OUTPATIENT)
Age: 73
End: 2023-12-22
Payer: MEDICARE

## 2023-12-26 ENCOUNTER — OFFICE VISIT (OUTPATIENT)
Dept: CARDIOLOGY CLINIC | Facility: CLINIC | Age: 73
End: 2023-12-26
Payer: MEDICARE

## 2023-12-26 VITALS
HEART RATE: 62 BPM | RESPIRATION RATE: 16 BRPM | WEIGHT: 179 LBS | DIASTOLIC BLOOD PRESSURE: 86 MMHG | BODY MASS INDEX: 27.13 KG/M2 | HEIGHT: 68 IN | SYSTOLIC BLOOD PRESSURE: 132 MMHG | OXYGEN SATURATION: 93 %

## 2023-12-26 DIAGNOSIS — I25.10 CORONARY ARTERY DISEASE INVOLVING NATIVE CORONARY ARTERY OF NATIVE HEART WITHOUT ANGINA PECTORIS: ICD-10-CM

## 2023-12-26 DIAGNOSIS — G20.B1 PARKINSON'S DISEASE WITH DYSKINESIA WITHOUT FLUCTUATING MANIFESTATIONS: ICD-10-CM

## 2023-12-26 DIAGNOSIS — I25.119 CORONARY ARTERY DISEASE INVOLVING NATIVE CORONARY ARTERY OF NATIVE HEART WITH ANGINA PECTORIS (HCC): Primary | ICD-10-CM

## 2023-12-26 DIAGNOSIS — E78.2 MIXED HYPERLIPIDEMIA: ICD-10-CM

## 2023-12-26 DIAGNOSIS — N18.31 STAGE 3A CHRONIC KIDNEY DISEASE (HCC): ICD-10-CM

## 2023-12-26 PROCEDURE — 99214 OFFICE O/P EST MOD 30 MIN: CPT | Performed by: INTERNAL MEDICINE

## 2023-12-26 PROCEDURE — 93000 ELECTROCARDIOGRAM COMPLETE: CPT | Performed by: INTERNAL MEDICINE

## 2023-12-26 NOTE — PROGRESS NOTES
Patient ID: Kingsley Grider Jr. is a 73 y.o. male.        Plan:      Coronary artery disease involving native coronary artery of native heart without angina pectoris  No current symptoms.    Mixed hyperlipidemia  Tolerating high-intensity statin therapy and will continue current regimen.      Parkinson's disease (HCC)  This is his biggest disability at present.       Follow up Plan/Other summary comments:  Return in about 1 year (around 12/26/2024).    HPI:Patient seen in f/u regarding the above issues.  Since last visit he has felt reasonably well.  He is increasingly worried about his parkinsonism and functionality but so far things are okay.  No chest pain or chest pressure.  No syncope or near syncope.  Since the last visit he underwent a Myoview study and this was nonischemic.    To reiterate, in December of 2014 he underwent heart catheterization which revealed a 50% left main stem stenosis as well as 95% lad stenosis. Drug-eluting stent of both lesions was performed. He has had no chest pain or chest pressure since then.   Results for orders placed or performed in visit on 12/26/23   POCT ECG    Impression    NSR. NSSTs.         Most recent or relevant cardiac/vascular testing:    Echo 4/2018 - EF >55%. Mild MR   Stress echo 1/2016 - normal EF, no evidence of ischemia   Cardiac cath 12/2014- 50% left main, 95% LAD, 40% CX. EF 40%  Myoview 12/29/2023: Nl LVEF. Can't exclude small prior inferobasal MI.  Past Surgical History:   Procedure Laterality Date    CORONARY ANGIOPLASTY WITH STENT PLACEMENT  12/26/2014    EF 40%, ABRAM to LAD.    INCISIONAL HERNIA REPAIR      PA SURGICAL ARTHROSCOPY SHOULDER W/ROTATOR CUFF RPR Right 2/24/2021    Procedure: SHOULDER ARTHROSCOPY WITH ROTATOR CUFF REPAIR and acromioplasty;  Surgeon: Rene Valdez DO;  Location: [facility];  Service: Orthopedics    PROSTATECTOMY  2014    for cancer    TONSILLECTOMY         Lipid Profile:   Lab Results   Component Value Date    CHOL 138  "06/07/2018    TRIG 97 11/03/2023    TRIG 140 06/07/2018    HDL 40 11/03/2023    HDL 44 06/07/2018         Review of Systems   10  point ROS  was otherwise non pertinent or negative except as per HPI or as below.   Gait: Slow.  Balance is fair.        Objective:     /86 (BP Location: Left arm, Patient Position: Sitting, Cuff Size: Large)   Pulse 62   Resp 16   Ht 5' 8.11\" (1.73 m)   Wt 81.2 kg (179 lb)   SpO2 93%   BMI 27.13 kg/m²     PHYSICAL EXAM:    General:  Normal appearance in no distress.  Eyes:  Anicteric.  Oral mucosa:  Moist.  Neck:  No JVD. Carotid upstrokes are brisk without bruits.  No masses.  Chest:  Clear to auscultation.  Cardiac:  No palpable PMI.  Normal S1 and S2.  No murmur gallop or rub.  Abdomen:  Soft and nontender. No palpable organomegaly or aortic enlargement.  Extremities:  No peripheral edema.  Musculoskeletal:  Symmetric.   Vascular:  Femoral pulses are brisk without bruits.  Popliteal pulses are intact bilaterally.   Pedal pulses are intact.  Neuro:  Grossly symmetric.  Resting tremor in both hands.  Gait is wide.  Psych:  Alert and oriented x3.        Current Outpatient Medications:     aspirin (ECOTRIN LOW STRENGTH) 81 mg EC tablet, Take 81 mg by mouth daily, Disp: , Rfl:     atorvastatin (LIPITOR) 80 mg tablet, Take 1 tablet (80 mg total) by mouth daily, Disp: 90 tablet, Rfl: 3    carbidopa-levodopa (SINEMET)  mg per tablet, TAKE (1) TABLET THREE TIMES DAILY., Disp: 90 tablet, Rfl: 5    hydroxyurea (HYDREA) 500 mg capsule, Take 1 capsule (500 mg total) by mouth daily, Disp: 30 capsule, Rfl: 11    PARoxetine (PAXIL) 20 mg tablet, TAKE ONE TABLET DAILY..., Disp: 90 tablet, Rfl: 3    vitamin B-12 (VITAMIN B-12) 1,000 mcg tablet, Take 1 tablet (1,000 mcg total) by mouth daily, Disp: , Rfl:     meloxicam (MOBIC) 15 mg tablet, Take 1 tablet (15 mg total) by mouth daily (Patient not taking: Reported on 12/29/2022), Disp: 30 tablet, Rfl: 0  Allergies   Allergen Reactions    " Carbidopa-Levodopa-Entacapone Confusion     Patient denies allergy to this medication     Past Medical History:   Diagnosis Date    Anxiety     Benign hypertension     Cancer (HCC)     prostate    Coronary artery disease     ABRAM to LAD in 2014    History of cardiovascular stress test 01/21/2016    Normal EF, normal study.    History of echocardiogram 05/05/2015    EF normal.      Ischemic cardiomyopathy     resolved    Mixed hyperlipidemia     Myocardial infarction (HCC)     Parkinson disease     Prostate cancer (HCC)     surgically removed    STEMI (ST elevation myocardial infarction) ([facility]) 2014           Social History     Tobacco Use   Smoking Status Never   Smokeless Tobacco Never

## 2023-12-27 ENCOUNTER — OFFICE VISIT (OUTPATIENT)
Dept: PHYSICAL THERAPY | Facility: CLINIC | Age: 73
End: 2023-12-27
Payer: MEDICARE

## 2023-12-27 DIAGNOSIS — G20.A1 PARKINSON'S DISEASE, UNSPECIFIED WHETHER DYSKINESIA PRESENT, UNSPECIFIED WHETHER MANIFESTATIONS FLUCTUATE: Primary | ICD-10-CM

## 2023-12-27 PROCEDURE — 97112 NEUROMUSCULAR REEDUCATION: CPT

## 2023-12-27 PROCEDURE — 97110 THERAPEUTIC EXERCISES: CPT

## 2023-12-27 NOTE — PROGRESS NOTES
"Daily Note     Today's date: 2023  Patient name: Kingsley Grider Jr.  : 1950  MRN: [id number]  Referring provider: Evan Díaz DO  Dx:   Encounter Diagnosis     ICD-10-CM    1. Parkinson's disease, unspecified whether dyskinesia present, unspecified whether manifestations fluctuate  G20.A1                      Subjective: Still having tremors 1* right hand, enough that it keeps me up at night  New meds have helped only min Denies any falls, but unsteady on my feet  FOG daily        Objective: See treatment diary below      Assessment: Tolerated treatment well. Pt HEP update/encourage carryover  Pt limited progression of POC 2* non compliant with carryover with HEP  Pt ambul with < step stride, ground clearance  No FOG episodes this PT session  Patient would benefit from continued PT      Plan: Continue per plan of care.      Re-eval Date:     Date        Visit Count        FOTO        Pain In        Pain Out        Vitals             Precautions PD, fall risk        Manuals                                        Neuro Re-Ed         HKM Resume  4 laps / 40'  carrying bolster       Sidestepping  6 laps  Mirror  Cues toe neutral       BW amb Resume  8 laps       Step ups  8\"  2x15  1 UE       Chair <>chair        Multi directional step        STS 10x with bolster  10x with bolster on foam w/ occ Bob       Hurdles        Seated  - Floor <> ceiling  -Reach Big & hold  Standing  - step fwd  -step side  - step back/reach  - side Roch & twist  - rock and reach 10x ea  With mod cues  W/ voice/hand boost        Boxing        Osbtacle course        Kick ball/pass        Education         Ther Ex        Nustep  L4-5 10 min  Focus cardiovascular endurance  Cues pacing                                                                 Ther Activity        Donning jacket                 Gait Training        Dual tasking         BIG walking         Modalities                      "

## 2023-12-29 ENCOUNTER — APPOINTMENT (OUTPATIENT)
Age: 73
End: 2023-12-29
Payer: MEDICARE

## 2024-01-03 ENCOUNTER — OFFICE VISIT (OUTPATIENT)
Dept: PHYSICAL THERAPY | Facility: CLINIC | Age: 74
End: 2024-01-03
Payer: MEDICARE

## 2024-01-03 ENCOUNTER — RA CDI HCC (OUTPATIENT)
Dept: OTHER | Facility: HOSPITAL | Age: 74
End: 2024-01-03

## 2024-01-03 DIAGNOSIS — G20.A1 PARKINSON'S DISEASE, UNSPECIFIED WHETHER DYSKINESIA PRESENT, UNSPECIFIED WHETHER MANIFESTATIONS FLUCTUATE: Primary | ICD-10-CM

## 2024-01-03 PROCEDURE — 97116 GAIT TRAINING THERAPY: CPT

## 2024-01-03 PROCEDURE — 97140 MANUAL THERAPY 1/> REGIONS: CPT

## 2024-01-03 PROCEDURE — 97110 THERAPEUTIC EXERCISES: CPT

## 2024-01-03 NOTE — PROGRESS NOTES
"Daily Note     Today's date: 1/3/2024  Patient name: Kingsley Grider Jr.  : 1950  MRN: [id number]  Referring provider: Evan Díaz DO  Dx:   Encounter Diagnosis     ICD-10-CM    1. Parkinson's disease, unspecified whether dyskinesia present, unspecified whether manifestations fluctuate  G20.A1           Start Time: 1030  Stop Time: 1115  Total time in clinic (min): 45 minutes    Subjective: I am not leaving the house for anything other than to come to PT  Stopped going out 2* not feeling safe with waling / > FOG episodes      Objective: See treatment diary below      Assessment: Tolerated treatment fair.  Demon significant > FOG episodes with noted decline with < step stride/ground clearance, < arm swing, w/ > FHRS posture  Trial MT per discussion with PT/SL demon > trunk rigidity/< flex/mobility  Pt indicates not being compliant with carryover with HEP, states lack of motivation  Pt indicates > difficulty with bed mobility, currently lives alone  Pt currently ambul w/o AD but may benefit to improve safety/balance, < risk / falls   Patient would benefit from continued PT      Plan: Continue per plan of care.      Re-eval Date:     Date 12/27 1/3      Visit Count        FOTO        Pain In        Pain Out        Vitals             Precautions PD, fall risk        Manuals          BL LE  HS, piriformis, SKTC, LTR  15 min                              Neuro Re-Ed         HKM Resume  4 laps / 40'  carrying bolster resume      Sidestepping  6 laps  Mirror  Cues toe neutral       BW amb Resume  8 laps       Step ups  8\"  2x15  1 UE       Chair <>chair        Multi directional step        STS 10x with bolster  10x with bolster on foam w/ occ Bob       Hurdles        Seated  - Floor <> ceiling  -Reach Big & hold  Standing  - step fwd  -step side  - step back/reach  - side Roch & twist  - rock and reach 10x ea  With mod cues  W/ voice/hand boost        Boxing        Osbtacle course        Kick ball/pass  "       Education         Ther Ex        Nustep  L4-5 10 min  Focus cardiovascular endurance  Cues pacing   L4-5 10 min  Focus cardiovascular endurance  Cues pacing                                                              Ther Activity        Donning jacket                 Gait Training        Dual tasking   Clinic/ lobby  Use SPC>UE swing, turn negotiation, 4 S's  Outside, car negotiation    20 min      BIG walking         Modalities

## 2024-01-05 ENCOUNTER — OFFICE VISIT (OUTPATIENT)
Dept: FAMILY MEDICINE CLINIC | Facility: CLINIC | Age: 74
End: 2024-01-05

## 2024-01-05 ENCOUNTER — APPOINTMENT (OUTPATIENT)
Age: 74
End: 2024-01-05
Payer: MEDICARE

## 2024-01-05 VITALS
BODY MASS INDEX: 26.76 KG/M2 | WEIGHT: 176.6 LBS | TEMPERATURE: 97.3 F | OXYGEN SATURATION: 94 % | HEIGHT: 68 IN | DIASTOLIC BLOOD PRESSURE: 76 MMHG | SYSTOLIC BLOOD PRESSURE: 126 MMHG | HEART RATE: 88 BPM

## 2024-01-05 DIAGNOSIS — C61 MALIGNANT NEOPLASM OF PROSTATE (HCC): ICD-10-CM

## 2024-01-05 DIAGNOSIS — E53.8 B12 DEFICIENCY: ICD-10-CM

## 2024-01-05 DIAGNOSIS — G20.A2 PARKINSON'S DISEASE WITH FLUCTUATING MANIFESTATIONS, UNSPECIFIED WHETHER DYSKINESIA PRESENT: Primary | ICD-10-CM

## 2024-01-05 DIAGNOSIS — D75.1 POLYCYTHEMIA: ICD-10-CM

## 2024-01-05 DIAGNOSIS — I25.10 CORONARY ARTERY DISEASE INVOLVING NATIVE CORONARY ARTERY OF NATIVE HEART WITHOUT ANGINA PECTORIS: ICD-10-CM

## 2024-01-05 PROBLEM — N18.31 STAGE 3A CHRONIC KIDNEY DISEASE (HCC): Status: RESOLVED | Noted: 2022-12-14 | Resolved: 2024-01-05

## 2024-01-05 RX ORDER — CARBIDOPA AND LEVODOPA 25; 100 MG/1; MG/1
1 TABLET, EXTENDED RELEASE ORAL 3 TIMES DAILY
Qty: 90 TABLET | Refills: 3 | Status: SHIPPED | OUTPATIENT
Start: 2024-01-05

## 2024-01-05 NOTE — PROGRESS NOTES
Name: Kingsley Grider Jr.      : 1950      MRN: [id number]  Encounter Provider: Evan Díaz DO  Encounter Date: 2024   Encounter department: [facility] PRIMARY CARE    Assessment & Plan     1. Parkinson's disease with fluctuating manifestations, unspecified whether dyskinesia present  Assessment & Plan:  Increase Sinemet dose to 25/100 *3 times a day.  Continue with therapy, increase to 3 times a week if needed.  Will check in in 4 to 6 weeks and see how the changes have affected him.  Informed of potential side effects such as hallucinations.    Orders:  -     carbidopa-levodopa (SINEMET)  mg per tablet 1 tablet    2. Malignant neoplasm of prostate (HCC)  Assessment & Plan:  Remains in remission      3. Polycythemia  Assessment & Plan:  Recent labs improved, continue to follow with hematology.  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 B12 deficiency  Assessment & Plan:  Remains on oral replacement.      5. Coronary artery disease involving native coronary artery of native heart without angina pectoris  Assessment & Plan:  Remains free of coronary symptoms at this time.  Continues his aspirin and atorvastatin.          Depression Screening and Follow-up Plan: Patient was screened for depression during today's encounter. They screened negative with a PHQ-2 score of 0.        Subjective      Kingsley says his gait is getting worse again.  We tried to increase his Sinemet from 10/100 3 times a day to 1-1/2 in the morning and afternoon and 1 in the evening.  He thinks this helped a little but not a lot.  He continues with therapy twice a week at this time, and they noticed he has been declining as well.  Cannot get into the neurologist until May.  He has had no falls, but has had some near misses.  He is also currently following with Dr. Whitaker for both B12 deficiency and suspected polycythemia vera.      Review of Systems   Constitutional:  Negative for chills and fever.   HENT:  Negative  "for rhinorrhea and sore throat.    Eyes:  Negative for visual disturbance.   Respiratory:  Negative for cough and shortness of breath.    Cardiovascular:  Negative for chest pain and leg swelling.   Gastrointestinal:  Negative for abdominal pain, diarrhea, nausea and vomiting.   Genitourinary:  Negative for dysuria.   Musculoskeletal:  Positive for arthralgias, back pain and gait problem. Negative for myalgias.   Skin:  Negative for rash.   Neurological:  Positive for tremors. Negative for dizziness and headaches.   Psychiatric/Behavioral:  Negative for confusion.    All other systems reviewed and are negative.      Current Outpatient Medications on File Prior to Visit   Medication Sig    aspirin (ECOTRIN LOW STRENGTH) 81 mg EC tablet Take 81 mg by mouth daily    atorvastatin (LIPITOR) 80 mg tablet Take 1 tablet (80 mg total) by mouth daily    hydroxyurea (HYDREA) 500 mg capsule Take 1 capsule (500 mg total) by mouth daily    PARoxetine (PAXIL) 20 mg tablet TAKE ONE TABLET DAILY...    vitamin B-12 (VITAMIN B-12) 1,000 mcg tablet Take 1 tablet (1,000 mcg total) by mouth daily    [DISCONTINUED] carbidopa-levodopa (SINEMET)  mg per tablet TAKE (1) TABLET THREE TIMES DAILY.    meloxicam (MOBIC) 15 mg tablet Take 1 tablet (15 mg total) by mouth daily (Patient not taking: Reported on 12/29/2022)       Objective     /76 (BP Location: Left arm, Patient Position: Sitting, Cuff Size: Large)   Pulse 88   Temp (!) 97.3 °F (36.3 °C) (Tympanic)   Ht 5' 8\" (1.727 m)   Wt 80.1 kg (176 lb 9.6 oz)   SpO2 94%   BMI 26.85 kg/m²     Physical Exam  Constitutional:       Appearance: Normal appearance.   HENT:      Head: Normocephalic and atraumatic.      Nose: No congestion or rhinorrhea.   Eyes:      Extraocular Movements: Extraocular movements intact.      Pupils: Pupils are equal, round, and reactive to light.   Neck:      Vascular: No carotid bruit.   Cardiovascular:      Rate and Rhythm: Normal rate and regular " rhythm.      Heart sounds: No murmur heard.  Pulmonary:      Effort: No respiratory distress.      Breath sounds: No wheezing.   Chest:      Chest wall: No tenderness.   Abdominal:      General: There is no distension.      Tenderness: There is no abdominal tenderness.      Hernia: No hernia is present.   Musculoskeletal:         General: No swelling or tenderness.      Right lower leg: No edema.      Left lower leg: No edema.   Lymphadenopathy:      Cervical: No cervical adenopathy.   Skin:     Findings: No rash.   Neurological:      General: No focal deficit present.      Mental Status: He is alert and oriented to person, place, and time.      Sensory: No sensory deficit.      Gait: Gait abnormal.      Comments: Right hand tremor, cogwheeling both arms, shuffling gait.   Psychiatric:         Mood and Affect: Mood normal.         Behavior: Behavior normal.       Evan Díaz, DO

## 2024-01-05 NOTE — ASSESSMENT & PLAN NOTE
Increase Sinemet dose to 25/100 *3 times a day.  Continue with therapy, increase to 3 times a week if needed.  Will check in in 4 to 6 weeks and see how the changes have affected him.  Informed of potential side effects such as hallucinations.

## 2024-01-09 ENCOUNTER — OFFICE VISIT (OUTPATIENT)
Dept: PHYSICAL THERAPY | Facility: CLINIC | Age: 74
End: 2024-01-09
Payer: MEDICARE

## 2024-01-09 DIAGNOSIS — G20.A1 PARKINSON'S DISEASE, UNSPECIFIED WHETHER DYSKINESIA PRESENT, UNSPECIFIED WHETHER MANIFESTATIONS FLUCTUATE: Primary | ICD-10-CM

## 2024-01-09 PROCEDURE — 97110 THERAPEUTIC EXERCISES: CPT

## 2024-01-09 PROCEDURE — 97140 MANUAL THERAPY 1/> REGIONS: CPT

## 2024-01-09 NOTE — PROGRESS NOTES
"Daily Note     Today's date: 2024  Patient name: Kingsley Grider Jr.  : 1950  MRN: [id number]  Referring provider: Evan Díaz DO  Dx:   Encounter Diagnosis     ICD-10-CM    1. Parkinson's disease, unspecified whether dyskinesia present, unspecified whether manifestations fluctuate  G20.A1                      Subjective: I went to 's office Friday, changed my meds  I am feeling good but only 1 day so far on new dose/meds      Objective: See treatment diary below      Assessment: Tolerated treatment well. Noted < FOG this date  Ambul with < step stride,ground clearance, LILLIANA  Pt review 4 S's with ambul clinic >Lobby  Patient would benefit from continued PT review HEP with pt self DC NV      Plan: Continue per plan of care.      Re-eval Date:     Date 12/27 1/3 1/9     Visit Count        FOTO        Pain In        Pain Out        Vitals             Precautions PD, fall risk        Manuals          BL LE  HS, piriformis, SKTC, LTR  15 min                              Neuro Re-Ed         HKM Resume  4 laps / 40'  carrying bolster resume      Sidestepping  6 laps  Mirror  Cues toe neutral       BW amb Resume  8 laps       Step ups  8\"  2x15  1 UE       Chair <>chair        Multi directional step        STS 10x with bolster  10x with bolster on foam w/ occ Bob       Hurdles        Seated  - Floor <> ceiling  -Reach Big & hold  Standing  - step fwd  -step side  - step back/reach  - side Roch & twist  - rock and reach 10x ea  With mod cues  W/ voice/hand boost   10x ea  With mod cues  W/ voice/hand boost     Supine   -step /step/shift  10x ea dir  - Rotation R/L  10x ea         Boxing   10 min  Jab, cross punch, hook,  Fast jabs 30\" x 2  Cross punch with STS       Osbtacle course        Kick ball/pass        Education         Ther Ex        Nustep  L4-5 10 min  Focus cardiovascular endurance  Cues pacing   L4-5 10 min  Focus cardiovascular endurance  Cues pacing L5 10 min                   "                                           Ther Activity        Tierney swain                 Gait Training        Dual tasking   Clinic/ lobby  Use SPC>UE swing, turn negotiation, 4 S's  Outside, car negotiation    20 min      BIG walking         Modalities

## 2024-01-11 ENCOUNTER — OFFICE VISIT (OUTPATIENT)
Dept: PHYSICAL THERAPY | Facility: CLINIC | Age: 74
End: 2024-01-11
Payer: MEDICARE

## 2024-01-11 DIAGNOSIS — G20.A1 PARKINSON'S DISEASE, UNSPECIFIED WHETHER DYSKINESIA PRESENT, UNSPECIFIED WHETHER MANIFESTATIONS FLUCTUATE: Primary | ICD-10-CM

## 2024-01-11 PROCEDURE — 97112 NEUROMUSCULAR REEDUCATION: CPT

## 2024-01-11 PROCEDURE — 97110 THERAPEUTIC EXERCISES: CPT

## 2024-01-11 NOTE — PROGRESS NOTES
Daily Note     Today's date: 2024  Patient name: Ruddy Grider Jr.  : 1950  MRN: [id number]  Referring provider: Evan Díaz DO  Dx:   Encounter Diagnosis     ICD-10-CM    1. Parkinson's disease, unspecified whether dyskinesia present, unspecified whether manifestations fluctuate  G20.A1           Start Time: 1106  Stop Time: 1200  Total time in clinic (min): 54 minutes    Subjective: Reports that he is doing well. No new falls to report. Notes that he is continuing to feel much better since his medication was changed.       Objective: See treatment diary below     Balance Test     6 Minute Walk Test (ft):        FGA 14/30 15/30 16/30    Gait Speed (ft/s): 0.48 m/s  0.68 m/s  0.71 m/s    30 second chair rise   9 reps    5x Sit To Stand (s): 16 sec 13 sec  12.5 seconds no UE    TUG: 10.78  Manual 14.68  Cog 16.22 11  Manual 14  Cog 17 10.5   Man 11.4 sec  Cog 11.62 sec (100 --> 87)         Assessment: Tolerated treatment well. Performed outcome measure testing as patient is preparing to d/c next session. Upon re-testing of outcome measures ruddy is showing solid progress - small improvmeents made in TUG, 5x STS, gait speed, and FGA although all within MDC and none greater than MCID. Gait speed continues to classify ruddy as limited community Ambulator and at greater fall risk + hospitalization. Implemented some circuit training to keep noreen engaged with higher RPE levels throughout session - seemed to respond well. Had some difficulty while ambulating backwards - taking small shuffling steps. Seemingly improved anterior weight shift during sit <> stand holding weight - as well as reduced retropulsion during amb while holding YMB. Added some resisted amb with patient did very well with. Proximal muscle strength weakness most evident with lateral step ups with quickly developing fatigue. Patient demonstrated fatigue post treatment, exhibited good technique with therapeutic  "exercises, and would benefit from continued PT      Plan: Continue per plan of care.  Progress treatment as tolerated.  Potential D/C nv      Re-eval Date: 1/13    Date 12/27 1/3 1/9 1/11    Visit Count        FOTO        Pain In        Pain Out        Vitals             Precautions PD, fall risk        Manuals          BL LE  HS, piriformis, SKTC, LTR  15 min                              Neuro Re-Ed     Outcome measures - see results above     Resisted amb     50' one 180 deg turn   1: not timed  2: 25s  3: 17.3s   4: 15.4s     HKM Resume  4 laps / 40'  carrying bolster resume  40' 2x4 laps with bolster carry     Sidestepping  6 laps  Mirror  Cues toe neutral   6 laps mirror     BW amb Resume  8 laps       Step ups  8\"  2x15  1 UE   6\" 2x15 in //bar with UUE fwd and lat     Chair <>chair        Multi directional step        STS 10x with bolster  10x with bolster on foam w/ occ Bob   Circuit 3x (holding YMB)  5x STS  Goblet carry fwd/bwd 10'  5x STS     Hurdles        Seated  - Floor <> ceiling  -Reach Big & hold  Standing  - step fwd  -step side  - step back/reach  - side Roch & twist  - rock and reach 10x ea  With mod cues  W/ voice/hand boost   10x ea  With mod cues  W/ voice/hand boost     Supine   -step /step/shift  10x ea dir  - Rotation R/L  10x ea         Boxing   10 min  Jab, cross punch, hook,  Fast jabs 30\" x 2  Cross punch with STS       Osbtacle course        Kick ball/pass        Education         Ther Ex        Nustep  L4-5 10 min  Focus cardiovascular endurance  Cues pacing   L4-5 10 min  Focus cardiovascular endurance  Cues pacing L5 10 min L5 10 min CV - cuing for pacing                                                             Ther Activity        Donning jacket                 Gait Training        Dual tasking   Clinic/ lobby  Use SPC>UE swing, turn negotiation, 4 S's  Outside, car negotiation    20 min      BIG walking         Modalities                      "

## 2024-01-12 ENCOUNTER — APPOINTMENT (OUTPATIENT)
Age: 74
End: 2024-01-12
Payer: MEDICARE

## 2024-01-12 LAB
ALBUMIN SERPL BCP-MCNC: 4.3 G/DL (ref 3.5–5)
ALP SERPL-CCNC: 100 U/L (ref 34–104)
ALT SERPL W P-5'-P-CCNC: 18 U/L (ref 7–52)
ANION GAP SERPL CALCULATED.3IONS-SCNC: 11 MMOL/L
AST SERPL W P-5'-P-CCNC: 20 U/L (ref 13–39)
BASOPHILS # BLD AUTO: 0.02 THOUSANDS/ÂΜL (ref 0–0.1)
BASOPHILS NFR BLD AUTO: 1 % (ref 0–1)
BILIRUB SERPL-MCNC: 0.79 MG/DL (ref 0.2–1)
BUN SERPL-MCNC: 13 MG/DL (ref 5–25)
CALCIUM SERPL-MCNC: 9.4 MG/DL (ref 8.4–10.2)
CHLORIDE SERPL-SCNC: 100 MMOL/L (ref 96–108)
CO2 SERPL-SCNC: 30 MMOL/L (ref 21–32)
CREAT SERPL-MCNC: 1.02 MG/DL (ref 0.6–1.3)
EOSINOPHIL # BLD AUTO: 0.02 THOUSAND/ÂΜL (ref 0–0.61)
EOSINOPHIL NFR BLD AUTO: 1 % (ref 0–6)
ERYTHROCYTE [DISTWIDTH] IN BLOOD BY AUTOMATED COUNT: 23.5 % (ref 11.6–15.1)
GFR SERPL CREATININE-BSD FRML MDRD: 73 ML/MIN/1.73SQ M
GLUCOSE SERPL-MCNC: 74 MG/DL (ref 65–140)
HCT VFR BLD AUTO: 48.2 % (ref 36.5–49.3)
HGB BLD-MCNC: 16.1 G/DL (ref 12–17)
IMM GRANULOCYTES # BLD AUTO: 0.03 THOUSAND/UL (ref 0–0.2)
IMM GRANULOCYTES NFR BLD AUTO: 1 % (ref 0–2)
LYMPHOCYTES # BLD AUTO: 1.57 THOUSANDS/ÂΜL (ref 0.6–4.47)
LYMPHOCYTES NFR BLD AUTO: 45 % (ref 14–44)
MAGNESIUM SERPL-MCNC: 2.2 MG/DL (ref 1.9–2.7)
MCH RBC QN AUTO: 31.6 PG (ref 26.8–34.3)
MCHC RBC AUTO-ENTMCNC: 33.4 G/DL (ref 31.4–37.4)
MCV RBC AUTO: 95 FL (ref 82–98)
MONOCYTES # BLD AUTO: 0.24 THOUSAND/ÂΜL (ref 0.17–1.22)
MONOCYTES NFR BLD AUTO: 7 % (ref 4–12)
NEUTROPHILS # BLD AUTO: 1.59 THOUSANDS/ÂΜL (ref 1.85–7.62)
NEUTS SEG NFR BLD AUTO: 45 % (ref 43–75)
NRBC BLD AUTO-RTO: 0 /100 WBCS
PLATELET # BLD AUTO: 149 THOUSANDS/UL (ref 149–390)
PMV BLD AUTO: 11.3 FL (ref 8.9–12.7)
POTASSIUM SERPL-SCNC: 4.1 MMOL/L (ref 3.5–5.3)
PROT SERPL-MCNC: 7.2 G/DL (ref 6.4–8.4)
RBC # BLD AUTO: 5.1 MILLION/UL (ref 3.88–5.62)
SODIUM SERPL-SCNC: 141 MMOL/L (ref 135–147)
WBC # BLD AUTO: 3.47 THOUSAND/UL (ref 4.31–10.16)

## 2024-01-12 PROCEDURE — 80053 COMPREHEN METABOLIC PANEL: CPT

## 2024-01-12 PROCEDURE — 83735 ASSAY OF MAGNESIUM: CPT

## 2024-01-12 PROCEDURE — 85025 COMPLETE CBC W/AUTO DIFF WBC: CPT

## 2024-01-12 PROCEDURE — 82607 VITAMIN B-12: CPT

## 2024-01-13 LAB — VIT B12 SERPL-MCNC: 437 PG/ML (ref 180–914)

## 2024-01-18 ENCOUNTER — OFFICE VISIT (OUTPATIENT)
Dept: PHYSICAL THERAPY | Facility: CLINIC | Age: 74
End: 2024-01-18
Payer: MEDICARE

## 2024-01-18 DIAGNOSIS — G20.A1 PARKINSON'S DISEASE, UNSPECIFIED WHETHER DYSKINESIA PRESENT, UNSPECIFIED WHETHER MANIFESTATIONS FLUCTUATE: Primary | ICD-10-CM

## 2024-01-18 PROCEDURE — 97112 NEUROMUSCULAR REEDUCATION: CPT

## 2024-01-18 PROCEDURE — 97110 THERAPEUTIC EXERCISES: CPT

## 2024-01-18 NOTE — PROGRESS NOTES
"Daily Note     Today's date: 2024  Patient name: Kingsley Grider Jr.  : 1950  MRN: [id number]  Referring provider: Evan Díaz DO  Dx:   Encounter Diagnosis     ICD-10-CM    1. Parkinson's disease, unspecified whether dyskinesia present, unspecified whether manifestations fluctuate  G20.A1                      Subjective: I feel good  See neuro MD in May       Objective: See treatment diary below      Assessment: Tolerated treatment well. Patient review HEP with min cues with focus of eye/hand boost perform HEP with proper form / tech  Struggles with step back exer  Pt with  FOG this date  noted R hand tremor        Plan: DC to HEP to benefit with f/u in 4 months per discussion with PT/SL     Re-eval Date:     Date 12/27 1/3 1/9 1/11 11/18   Visit Count        FOTO        Pain In        Pain Out        Vitals             Precautions PD, fall risk        Manuals          BL LE  HS, piriformis, SKTC, LTR  15 min                              Neuro Re-Ed     Outcome measures - see results above     Resisted amb     50' one 180 deg turn   1: not timed  2: 25s  3: 17.3s   4: 15.4s     HKM Resume  4 laps / 40'  carrying bolster resume  40' 2x4 laps with bolster carry     Sidestepping  6 laps  Mirror  Cues toe neutral   6 laps mirror     BW amb Resume  8 laps       Step ups  8\"  2x15  1 UE   6\" 2x15 in //bar with UUE fwd and lat     Chair <>chair        Multi directional step     10 min   STS 10x with bolster  10x with bolster on foam w/ occ Bob   Circuit 3x (holding YMB)  5x STS  Goblet carry fwd/bwd 10'  5x STS  Circuit 3x (holding YMB)  5x STS  Goblet carry fwd/bwd 10'  5x STS    Hurdles        Seated  - Floor <> ceiling  -Reach Big & hold  Standing  - step fwd  -step side  - step back/reach  - side Roch & twist  - rock and reach 10x ea  With mod cues  W/ voice/hand boost   10x ea  With mod cues  W/ voice/hand boost     Supine   -step /step/shift  10x ea dir  - Rotation R/L  10x ea      10x " "ea  With mod cues  W/ voice/hand boost     Supine   -step /step/shift  10x ea dir  - Rotation R/L  10x ea   Boxing   10 min  Jab, cross punch, hook,  Fast jabs 30\" x 2  Cross punch with STS       Osbtacle course        Kick ball/pass        Education         Ther Ex        Nustep  L4-5 10 min  Focus cardiovascular endurance  Cues pacing   L4-5 10 min  Focus cardiovascular endurance  Cues pacing L5 10 min L5 10 min CV - cuing for pacing  L5 10 min CV - cuing for pacing                                                           Ther Activity        Doneugenie jacket                 Gait Training        Dual tasking   Clinic/ lobby  Use SPC>UE swing, turn negotiation, 4 S's  Outside, car negotiation    20 min      BIG walking         Modalities                                                                   "

## 2024-01-24 ENCOUNTER — OFFICE VISIT (OUTPATIENT)
Dept: HEMATOLOGY ONCOLOGY | Facility: CLINIC | Age: 74
End: 2024-01-24
Payer: MEDICARE

## 2024-01-24 VITALS
TEMPERATURE: 97.9 F | WEIGHT: 180 LBS | HEART RATE: 55 BPM | HEIGHT: 68 IN | DIASTOLIC BLOOD PRESSURE: 72 MMHG | SYSTOLIC BLOOD PRESSURE: 122 MMHG | RESPIRATION RATE: 18 BRPM | OXYGEN SATURATION: 95 % | BODY MASS INDEX: 27.28 KG/M2

## 2024-01-24 DIAGNOSIS — E53.8 B12 DEFICIENCY: ICD-10-CM

## 2024-01-24 DIAGNOSIS — D75.1 POLYCYTHEMIA: Primary | ICD-10-CM

## 2024-01-24 PROCEDURE — 99214 OFFICE O/P EST MOD 30 MIN: CPT | Performed by: INTERNAL MEDICINE

## 2024-01-24 NOTE — PROGRESS NOTES
Hematology/Oncology Outpatient Follow-up  Kingsley Grider Jr. 73 y.o. male 1950 [id number]    Date:  1/24/2024        Assessment and Plan:  1. 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  He is tolerating the current dose of Hydrea relatively well.  He is however not at the goal of hematocrit less than 45%.  The patient was asked to continue with the Hydrea 500 mg daily during the week and 500 mg twice a day on Saturdays and Sundays.  He is also encouraged to continue with the baby aspirin daily to avoid vascular events.  - CBC and differential; Standing  - Comprehensive metabolic panel; Standing  - CBC and differential; Future  - Comprehensive metabolic panel; Future  - LD,Blood; Future  - C-reactive protein; Future  - Sedimentation rate, automated; Future    2. B12 deficiency  His vitamin B12 level is within normal range.  I did ask him to continue vitamin B12 supplements daily.  - Vitamin B12; Future        HPI:  Patient is a pleasant 73-year-old male who presents today for further evaluation of his polycythemia/thrombocytosis.  He has past medical history of Parkinson's disease, hypertension, CAD status post MI, hyperlipidemia, chronic kidney disease, and prostate cancer status post robotic prostatectomy in 2014.  He does admit to consuming alcohol regularly on a daily basis about 4 beers per day.   He had extensive workup including a positive [id number] mutation.  He was then started on Hydrea 500 mg daily along with baby aspirin around November 2023.    Interval history:  The patient came today for follow-up visit accompanied with a family member.  He continues to struggle with the Parkinson's disease.  He is taking the Hydrea 500 mg daily along with baby aspirin.  Recent blood work on 1/12/2024 showed hemoglobin of 16.1 with white cell count of 3.4.  Platelet count 149.  ANC 1.5.  Creatinine 1.0 with normal calcium and liver enzymes.  Magnesium  2.2.  Vitamin B12 437.  PSA 0.02.  ROS: Review of Systems   Constitutional:  Positive for fatigue. Negative for chills and fever.   HENT:  Negative for ear pain and sore throat.    Eyes:  Negative for pain and visual disturbance.   Respiratory:  Negative for cough and shortness of breath.    Cardiovascular:  Negative for chest pain and palpitations.   Gastrointestinal:  Negative for abdominal pain and vomiting.   Genitourinary:  Negative for dysuria and hematuria.   Musculoskeletal:  Negative for arthralgias and back pain.   Skin:  Negative for color change and rash.   Neurological:  Negative for seizures and syncope.   Psychiatric/Behavioral:  Positive for sleep disturbance.    All other systems reviewed and are negative.      Past Medical History:   Diagnosis Date    Anxiety     Benign hypertension     Cancer (HCC)     prostate    Coronary artery disease     ABRAM to LAD in 2014    History of cardiovascular stress test 01/21/2016    Normal EF, normal study.    History of echocardiogram 05/05/2015    EF normal.      Ischemic cardiomyopathy     resolved    Mixed hyperlipidemia     Myocardial infarction (HCC)     Parkinson disease     Prostate cancer (HCC)     surgically removed    STEMI (ST elevation myocardial infarction) ([facility]) 2014       Past Surgical History:   Procedure Laterality Date    CORONARY ANGIOPLASTY WITH STENT PLACEMENT  12/26/2014    EF 40%, ABRAM to LAD.    INCISIONAL HERNIA REPAIR      FL SURGICAL ARTHROSCOPY SHOULDER W/ROTATOR CUFF RPR Right 2/24/2021    Procedure: SHOULDER ARTHROSCOPY WITH ROTATOR CUFF REPAIR and acromioplasty;  Surgeon: Rene Valdez DO;  Location: [facility];  Service: Orthopedics    PROSTATECTOMY  2014    for cancer    TONSILLECTOMY         Social History     Socioeconomic History    Marital status: Single     Spouse name: None    Number of children: None    Years of education: None    Highest education level: None   Occupational History    None   Tobacco Use    Smoking status:  "Never    Smokeless tobacco: Never   Vaping Use    Vaping status: Never Used   Substance and Sexual Activity    Alcohol use: Yes     Alcohol/week: 2.0 standard drinks of alcohol     Types: 2 Cans of beer per week     Comment: daily    Drug use: Never    Sexual activity: None   Other Topics Concern    None   Social History Narrative    Caffeine - 2 cups coffee/day     Social Determinants of Health     Financial Resource Strain: Not on file   Food Insecurity: Not on file   Transportation Needs: Not on file   Physical Activity: Not on file   Stress: Not on file   Social Connections: Not on file   Intimate Partner Violence: Not on file   Housing Stability: Not on file       Family History   Problem Relation Age of Onset    Heart attack Father     Stroke Father        Allergies   Allergen Reactions    Carbidopa-Levodopa-Entacapone Confusion     Patient denies allergy to this medication         Current Outpatient Medications:     aspirin (ECOTRIN LOW STRENGTH) 81 mg EC tablet, Take 81 mg by mouth daily, Disp: , Rfl:     atorvastatin (LIPITOR) 80 mg tablet, Take 1 tablet (80 mg total) by mouth daily, Disp: 90 tablet, Rfl: 3    carbidopa-levodopa (SINEMET CR)  mg TBCR per ER tablet, Take 1 tablet by mouth 3 (three) times a day, Disp: 90 tablet, Rfl: 3    hydroxyurea (HYDREA) 500 mg capsule, Take 1 capsule (500 mg total) by mouth daily, Disp: 30 capsule, Rfl: 11    PARoxetine (PAXIL) 20 mg tablet, TAKE ONE TABLET DAILY..., Disp: 90 tablet, Rfl: 3    vitamin B-12 (VITAMIN B-12) 1,000 mcg tablet, Take 1 tablet (1,000 mcg total) by mouth daily, Disp: , Rfl:     meloxicam (MOBIC) 15 mg tablet, Take 1 tablet (15 mg total) by mouth daily (Patient not taking: Reported on 12/29/2022), Disp: 30 tablet, Rfl: 0      Physical Exam:  /72 (BP Location: Left arm, Patient Position: Sitting, Cuff Size: Adult)   Pulse 55   Temp 97.9 °F (36.6 °C)   Resp 18   Ht 5' 8\" (1.727 m)   Wt 81.6 kg (180 lb)   SpO2 95%   BMI 27.37 " kg/m²     Physical Exam  Constitutional:       Appearance: He is well-developed.   HENT:      Head: Normocephalic and atraumatic.      Nose: Nose normal.   Eyes:      General: No scleral icterus.        Right eye: No discharge.         Left eye: No discharge.      Conjunctiva/sclera: Conjunctivae normal.      Pupils: Pupils are equal, round, and reactive to light.   Neck:      Thyroid: No thyromegaly.      Trachea: No tracheal deviation.   Cardiovascular:      Rate and Rhythm: Normal rate and regular rhythm.      Heart sounds: Normal heart sounds. No murmur heard.     No friction rub.   Pulmonary:      Effort: Pulmonary effort is normal. No respiratory distress.      Breath sounds: Normal breath sounds. No wheezing or rales.   Chest:      Chest wall: No tenderness.   Abdominal:      General: There is no distension.      Palpations: Abdomen is soft. There is no hepatomegaly or splenomegaly.      Tenderness: There is no abdominal tenderness. There is no guarding or rebound.   Musculoskeletal:         General: No tenderness or deformity. Normal range of motion.      Cervical back: Normal range of motion and neck supple.   Lymphadenopathy:      Cervical: No cervical adenopathy.   Skin:     General: Skin is warm and dry.      Coloration: Skin is not pale.      Findings: No erythema or rash.   Neurological:      Mental Status: He is alert and oriented to person, place, and time.      Cranial Nerves: No cranial nerve deficit.      Coordination: Coordination normal.      Deep Tendon Reflexes: Reflexes are normal and symmetric.   Psychiatric:         Behavior: Behavior normal.         Thought Content: Thought content normal.         Judgment: Judgment normal.           Labs:  Lab Results   Component Value Date    WBC 3.47 (L) 01/12/2024    HGB 16.1 01/12/2024    HCT 48.2 01/12/2024    MCV 95 01/12/2024     01/12/2024     Lab Results   Component Value Date     06/07/2018    K 4.1 01/12/2024     01/12/2024  "   CO2 30 01/12/2024    ANIONGAP 13.2 06/07/2018    BUN 13 01/12/2024    CREATININE 1.02 01/12/2024    GLUF 89 11/03/2023    CALCIUM 9.4 01/12/2024    AST 20 01/12/2024    ALT 18 01/12/2024    ALKPHOS 100 01/12/2024    PROT 6.8 06/07/2018    BILITOT 0.6 06/07/2018    EGFR 73 01/12/2024     No results found for: \"TSH\"    Patient voiced understanding and agreement in the above discussion. Aware to contact our office with questions/symptoms in the interim.   "

## 2024-01-26 ENCOUNTER — APPOINTMENT (OUTPATIENT)
Age: 74
End: 2024-01-26
Payer: MEDICARE

## 2024-01-26 DIAGNOSIS — D75.1 POLYCYTHEMIA: ICD-10-CM

## 2024-01-26 LAB
ALBUMIN SERPL BCP-MCNC: 4.2 G/DL (ref 3.5–5)
ALP SERPL-CCNC: 91 U/L (ref 34–104)
ALT SERPL W P-5'-P-CCNC: 18 U/L (ref 7–52)
ANION GAP SERPL CALCULATED.3IONS-SCNC: 9 MMOL/L
AST SERPL W P-5'-P-CCNC: 17 U/L (ref 13–39)
BASOPHILS # BLD AUTO: 0.02 THOUSANDS/ÂΜL (ref 0–0.1)
BASOPHILS NFR BLD AUTO: 1 % (ref 0–1)
BILIRUB SERPL-MCNC: 1.12 MG/DL (ref 0.2–1)
BUN SERPL-MCNC: 13 MG/DL (ref 5–25)
CALCIUM SERPL-MCNC: 9.6 MG/DL (ref 8.4–10.2)
CHLORIDE SERPL-SCNC: 99 MMOL/L (ref 96–108)
CO2 SERPL-SCNC: 30 MMOL/L (ref 21–32)
CREAT SERPL-MCNC: 1.06 MG/DL (ref 0.6–1.3)
EOSINOPHIL # BLD AUTO: 0.01 THOUSAND/ÂΜL (ref 0–0.61)
EOSINOPHIL NFR BLD AUTO: 0 % (ref 0–6)
ERYTHROCYTE [DISTWIDTH] IN BLOOD BY AUTOMATED COUNT: 25.3 % (ref 11.6–15.1)
GFR SERPL CREATININE-BSD FRML MDRD: 69 ML/MIN/1.73SQ M
GLUCOSE SERPL-MCNC: 87 MG/DL (ref 65–140)
HCT VFR BLD AUTO: 43.6 % (ref 36.5–49.3)
HGB BLD-MCNC: 14.7 G/DL (ref 12–17)
IMM GRANULOCYTES # BLD AUTO: 0.01 THOUSAND/UL (ref 0–0.2)
IMM GRANULOCYTES NFR BLD AUTO: 0 % (ref 0–2)
LYMPHOCYTES # BLD AUTO: 1.94 THOUSANDS/ÂΜL (ref 0.6–4.47)
LYMPHOCYTES NFR BLD AUTO: 48 % (ref 14–44)
MCH RBC QN AUTO: 32.8 PG (ref 26.8–34.3)
MCHC RBC AUTO-ENTMCNC: 33.7 G/DL (ref 31.4–37.4)
MCV RBC AUTO: 97 FL (ref 82–98)
MONOCYTES # BLD AUTO: 0.33 THOUSAND/ÂΜL (ref 0.17–1.22)
MONOCYTES NFR BLD AUTO: 8 % (ref 4–12)
NEUTROPHILS # BLD AUTO: 1.7 THOUSANDS/ÂΜL (ref 1.85–7.62)
NEUTS SEG NFR BLD AUTO: 43 % (ref 43–75)
NRBC BLD AUTO-RTO: 0 /100 WBCS
PLATELET # BLD AUTO: 157 THOUSANDS/UL (ref 149–390)
PMV BLD AUTO: 11.3 FL (ref 8.9–12.7)
POTASSIUM SERPL-SCNC: 3.8 MMOL/L (ref 3.5–5.3)
PROT SERPL-MCNC: 6.9 G/DL (ref 6.4–8.4)
RBC # BLD AUTO: 4.48 MILLION/UL (ref 3.88–5.62)
SODIUM SERPL-SCNC: 138 MMOL/L (ref 135–147)
WBC # BLD AUTO: 4.01 THOUSAND/UL (ref 4.31–10.16)

## 2024-01-26 PROCEDURE — 36415 COLL VENOUS BLD VENIPUNCTURE: CPT

## 2024-01-26 PROCEDURE — 85025 COMPLETE CBC W/AUTO DIFF WBC: CPT

## 2024-01-26 PROCEDURE — 80053 COMPREHEN METABOLIC PANEL: CPT

## 2024-02-12 ENCOUNTER — OFFICE VISIT (OUTPATIENT)
Dept: FAMILY MEDICINE CLINIC | Facility: CLINIC | Age: 74
End: 2024-02-12
Payer: MEDICARE

## 2024-02-12 VITALS
DIASTOLIC BLOOD PRESSURE: 70 MMHG | OXYGEN SATURATION: 98 % | SYSTOLIC BLOOD PRESSURE: 118 MMHG | TEMPERATURE: 96.5 F | HEART RATE: 82 BPM | HEIGHT: 68 IN | BODY MASS INDEX: 27.43 KG/M2 | WEIGHT: 181 LBS

## 2024-02-12 DIAGNOSIS — D75.1 POLYCYTHEMIA: ICD-10-CM

## 2024-02-12 DIAGNOSIS — I25.10 CORONARY ARTERY DISEASE INVOLVING NATIVE CORONARY ARTERY OF NATIVE HEART WITHOUT ANGINA PECTORIS: ICD-10-CM

## 2024-02-12 DIAGNOSIS — G20.A2 PARKINSON'S DISEASE WITH FLUCTUATING MANIFESTATIONS, UNSPECIFIED WHETHER DYSKINESIA PRESENT: Primary | ICD-10-CM

## 2024-02-12 DIAGNOSIS — E78.2 MIXED HYPERLIPIDEMIA: ICD-10-CM

## 2024-02-12 DIAGNOSIS — G20.A2 PARKINSON'S DISEASE WITHOUT DYSKINESIA, WITH FLUCTUATING MANIFESTATIONS: Primary | ICD-10-CM

## 2024-02-12 DIAGNOSIS — I10 PRIMARY HYPERTENSION: ICD-10-CM

## 2024-02-12 DIAGNOSIS — Z11.59 NEED FOR HEPATITIS C SCREENING TEST: ICD-10-CM

## 2024-02-12 PROCEDURE — 99213 OFFICE O/P EST LOW 20 MIN: CPT | Performed by: INTERNAL MEDICINE

## 2024-02-12 NOTE — ASSESSMENT & PLAN NOTE
Remains on hydroxyurea daily.  Labs are consistent with reasonably good control of his polycythemia.

## 2024-02-12 NOTE — PROGRESS NOTES
Name: Kingsley Grider Jr.      : 1950      MRN: [id number]  Encounter Provider: Evan Díaz DO  Encounter Date: 2024   Encounter department: [facility] PRIMARY CARE    Assessment & Plan     1. Parkinson's disease without dyskinesia, with fluctuating manifestations  Assessment & Plan:  Remains on Sinemet at 25/100 3 times daily until he sees his neurologist for further instruction.  Discussed safety.  Also the possibility of using a walker for safety purposes as well.  He is holding off on this.      2. Polycythemia  Assessment & Plan:  Remains on hydroxyurea daily.  Labs are consistent with reasonably good control of his polycythemia.    Orders:  -     CBC and differential; Future; Expected date: 2024    3. Primary hypertension  Assessment & Plan:  Continue current medical therapy.    Orders:  -     CBC and differential; Future; Expected date: 2024  -     Comprehensive metabolic panel; Future; Expected date: 2024    4. Mixed hyperlipidemia  Assessment & Plan:  Doing quite well on statin therapy    Orders:  -     Lipid Panel with Direct LDL reflex; Future; Expected date: 2024    5. Coronary artery disease involving native coronary artery of native heart without angina pectoris  Assessment & Plan:  Remains on aspirin and a statin.  Blood pressure is well-controlled.    Orders:  -     Lipid Panel with Direct LDL reflex; Future; Expected date: 2024    6. Need for hepatitis C screening test  -     Hepatitis C antibody; Future; Expected date: 2024           Subjective      Here for follow-up.  Doing much better since we have increased his carbidopa levodopa from 10/100--->25/100 she is taking this 3 times a day.  Has follow-up with neurology in the near future.  Has finished with this round of therapy.  Will begin it again in May or  he thinks.  No falls.  Walking slowly.  Still has the right hand tremor consistent with his Parkinson's as well as  "bradykinesia and classic Parkinson's gait.  Denies chest pain at this time.  No shortness of breath.      Review of Systems   Constitutional:  Negative for chills and fever.   HENT:  Negative for ear pain, rhinorrhea and sore throat.    Eyes:  Negative for pain, redness and visual disturbance.   Respiratory:  Negative for cough and shortness of breath.    Cardiovascular:  Negative for chest pain and leg swelling.   Gastrointestinal:  Negative for abdominal pain, diarrhea, nausea and vomiting.   Genitourinary:  Negative for dysuria, flank pain, frequency and urgency.   Musculoskeletal:  Positive for gait problem. Negative for back pain, myalgias and neck pain.   Skin:  Negative for rash.   Neurological:  Positive for tremors. Negative for dizziness, weakness, light-headedness and headaches.   Hematological: Negative.    Psychiatric/Behavioral:  Positive for confusion. Negative for agitation and suicidal ideas. The patient is not nervous/anxious.    All other systems reviewed and are negative.      Current Outpatient Medications on File Prior to Visit   Medication Sig    aspirin (ECOTRIN LOW STRENGTH) 81 mg EC tablet Take 81 mg by mouth daily    atorvastatin (LIPITOR) 80 mg tablet Take 1 tablet (80 mg total) by mouth daily    carbidopa-levodopa (SINEMET CR)  mg TBCR per ER tablet Take 1 tablet by mouth 3 (three) times a day    hydroxyurea (HYDREA) 500 mg capsule Take 1 capsule (500 mg total) by mouth daily    PARoxetine (PAXIL) 20 mg tablet TAKE ONE TABLET DAILY...    vitamin B-12 (VITAMIN B-12) 1,000 mcg tablet Take 1 tablet (1,000 mcg total) by mouth daily    [DISCONTINUED] meloxicam (MOBIC) 15 mg tablet Take 1 tablet (15 mg total) by mouth daily (Patient not taking: Reported on 12/29/2022)       Objective     /70 (BP Location: Right arm, Patient Position: Sitting, Cuff Size: Standard)   Pulse 82   Temp (!) 96.5 °F (35.8 °C) (Tympanic)   Ht 5' 8\" (1.727 m)   Wt 82.1 kg (181 lb)   SpO2 98%   BMI " 27.52 kg/m²     Physical Exam  Constitutional:       Appearance: Normal appearance.   HENT:      Head: Normocephalic and atraumatic.      Nose: No congestion or rhinorrhea.   Eyes:      Extraocular Movements: Extraocular movements intact.      Pupils: Pupils are equal, round, and reactive to light.   Neck:      Vascular: No carotid bruit.   Cardiovascular:      Rate and Rhythm: Normal rate and regular rhythm.      Heart sounds: No murmur heard.  Pulmonary:      Effort: No respiratory distress.      Breath sounds: No wheezing.   Chest:      Chest wall: No tenderness.   Abdominal:      General: There is no distension.      Tenderness: There is no abdominal tenderness.      Hernia: No hernia is present.   Musculoskeletal:         General: No swelling or tenderness. Normal range of motion.      Right lower leg: No edema.      Left lower leg: No edema.   Lymphadenopathy:      Cervical: No cervical adenopathy.   Skin:     Findings: No rash.   Neurological:      General: No focal deficit present.      Mental Status: He is alert and oriented to person, place, and time.      Sensory: No sensory deficit.      Comments: Right hand pill-rolling tremor, notable apathetic facies, bradykinesia and classical parkinsonian shuffling gait.     Psychiatric:         Mood and Affect: Mood normal.         Behavior: Behavior normal.       Evan Díaz,

## 2024-02-12 NOTE — ASSESSMENT & PLAN NOTE
Remains on Sinemet at 25/100 3 times daily until he sees his neurologist for further instruction.  Discussed safety.  Also the possibility of using a walker for safety purposes as well.  He is holding off on this.

## 2024-02-16 ENCOUNTER — APPOINTMENT (OUTPATIENT)
Age: 74
End: 2024-02-16
Payer: MEDICARE

## 2024-02-16 DIAGNOSIS — D75.1 POLYCYTHEMIA: ICD-10-CM

## 2024-02-16 LAB
ALBUMIN SERPL BCP-MCNC: 4.3 G/DL (ref 3.5–5)
ALP SERPL-CCNC: 92 U/L (ref 34–104)
ALT SERPL W P-5'-P-CCNC: 20 U/L (ref 7–52)
ANION GAP SERPL CALCULATED.3IONS-SCNC: 10 MMOL/L
AST SERPL W P-5'-P-CCNC: 18 U/L (ref 13–39)
BASOPHILS # BLD AUTO: 0.02 THOUSANDS/ÂΜL (ref 0–0.1)
BASOPHILS NFR BLD AUTO: 1 % (ref 0–1)
BILIRUB SERPL-MCNC: 1.03 MG/DL (ref 0.2–1)
BUN SERPL-MCNC: 15 MG/DL (ref 5–25)
CALCIUM SERPL-MCNC: 9.3 MG/DL (ref 8.4–10.2)
CHLORIDE SERPL-SCNC: 101 MMOL/L (ref 96–108)
CO2 SERPL-SCNC: 28 MMOL/L (ref 21–32)
CREAT SERPL-MCNC: 1.12 MG/DL (ref 0.6–1.3)
EOSINOPHIL # BLD AUTO: 0.02 THOUSAND/ÂΜL (ref 0–0.61)
EOSINOPHIL NFR BLD AUTO: 1 % (ref 0–6)
ERYTHROCYTE [DISTWIDTH] IN BLOOD BY AUTOMATED COUNT: 27.1 % (ref 11.6–15.1)
GFR SERPL CREATININE-BSD FRML MDRD: 65 ML/MIN/1.73SQ M
GLUCOSE P FAST SERPL-MCNC: 112 MG/DL (ref 65–99)
HCT VFR BLD AUTO: 41.6 % (ref 36.5–49.3)
HGB BLD-MCNC: 13.9 G/DL (ref 12–17)
IMM GRANULOCYTES # BLD AUTO: 0.01 THOUSAND/UL (ref 0–0.2)
IMM GRANULOCYTES NFR BLD AUTO: 0 % (ref 0–2)
LYMPHOCYTES # BLD AUTO: 2.13 THOUSANDS/ÂΜL (ref 0.6–4.47)
LYMPHOCYTES NFR BLD AUTO: 53 % (ref 14–44)
MCH RBC QN AUTO: 35.2 PG (ref 26.8–34.3)
MCHC RBC AUTO-ENTMCNC: 33.4 G/DL (ref 31.4–37.4)
MCV RBC AUTO: 105 FL (ref 82–98)
MONOCYTES # BLD AUTO: 0.32 THOUSAND/ÂΜL (ref 0.17–1.22)
MONOCYTES NFR BLD AUTO: 8 % (ref 4–12)
NEUTROPHILS # BLD AUTO: 1.46 THOUSANDS/ÂΜL (ref 1.85–7.62)
NEUTS SEG NFR BLD AUTO: 37 % (ref 43–75)
NRBC BLD AUTO-RTO: 0 /100 WBCS
PLATELET # BLD AUTO: 143 THOUSANDS/UL (ref 149–390)
PMV BLD AUTO: 11.1 FL (ref 8.9–12.7)
POTASSIUM SERPL-SCNC: 3.8 MMOL/L (ref 3.5–5.3)
PROT SERPL-MCNC: 7.1 G/DL (ref 6.4–8.4)
RBC # BLD AUTO: 3.95 MILLION/UL (ref 3.88–5.62)
SODIUM SERPL-SCNC: 139 MMOL/L (ref 135–147)
WBC # BLD AUTO: 3.96 THOUSAND/UL (ref 4.31–10.16)

## 2024-02-16 PROCEDURE — 85025 COMPLETE CBC W/AUTO DIFF WBC: CPT

## 2024-02-16 PROCEDURE — 36415 COLL VENOUS BLD VENIPUNCTURE: CPT

## 2024-02-16 PROCEDURE — 80053 COMPREHEN METABOLIC PANEL: CPT

## 2024-03-01 ENCOUNTER — APPOINTMENT (OUTPATIENT)
Age: 74
End: 2024-03-01
Payer: MEDICARE

## 2024-03-01 DIAGNOSIS — D75.1 POLYCYTHEMIA: ICD-10-CM

## 2024-03-01 LAB
ALBUMIN SERPL BCP-MCNC: 4.5 G/DL (ref 3.5–5)
ALP SERPL-CCNC: 97 U/L (ref 34–104)
ALT SERPL W P-5'-P-CCNC: 16 U/L (ref 7–52)
ANION GAP SERPL CALCULATED.3IONS-SCNC: 10 MMOL/L
AST SERPL W P-5'-P-CCNC: 18 U/L (ref 13–39)
BASOPHILS # BLD AUTO: 0.02 THOUSANDS/ÂΜL (ref 0–0.1)
BASOPHILS NFR BLD AUTO: 0 % (ref 0–1)
BILIRUB SERPL-MCNC: 1.01 MG/DL (ref 0.2–1)
BUN SERPL-MCNC: 14 MG/DL (ref 5–25)
CALCIUM SERPL-MCNC: 9.7 MG/DL (ref 8.4–10.2)
CHLORIDE SERPL-SCNC: 100 MMOL/L (ref 96–108)
CO2 SERPL-SCNC: 28 MMOL/L (ref 21–32)
CREAT SERPL-MCNC: 0.98 MG/DL (ref 0.6–1.3)
EOSINOPHIL # BLD AUTO: 0.01 THOUSAND/ÂΜL (ref 0–0.61)
EOSINOPHIL NFR BLD AUTO: 0 % (ref 0–6)
ERYTHROCYTE [DISTWIDTH] IN BLOOD BY AUTOMATED COUNT: 26.2 % (ref 11.6–15.1)
GFR SERPL CREATININE-BSD FRML MDRD: 76 ML/MIN/1.73SQ M
GLUCOSE P FAST SERPL-MCNC: 83 MG/DL (ref 65–99)
HCT VFR BLD AUTO: 41.2 % (ref 36.5–49.3)
HGB BLD-MCNC: 14.1 G/DL (ref 12–17)
IMM GRANULOCYTES # BLD AUTO: 0.01 THOUSAND/UL (ref 0–0.2)
IMM GRANULOCYTES NFR BLD AUTO: 0 % (ref 0–2)
LYMPHOCYTES # BLD AUTO: 2.4 THOUSANDS/ÂΜL (ref 0.6–4.47)
LYMPHOCYTES NFR BLD AUTO: 54 % (ref 14–44)
MCH RBC QN AUTO: 38.2 PG (ref 26.8–34.3)
MCHC RBC AUTO-ENTMCNC: 34.2 G/DL (ref 31.4–37.4)
MCV RBC AUTO: 112 FL (ref 82–98)
MONOCYTES # BLD AUTO: 0.37 THOUSAND/ÂΜL (ref 0.17–1.22)
MONOCYTES NFR BLD AUTO: 8 % (ref 4–12)
NEUTROPHILS # BLD AUTO: 1.69 THOUSANDS/ÂΜL (ref 1.85–7.62)
NEUTS SEG NFR BLD AUTO: 38 % (ref 43–75)
NRBC BLD AUTO-RTO: 0 /100 WBCS
PLATELET # BLD AUTO: 148 THOUSANDS/UL (ref 149–390)
PMV BLD AUTO: 10.6 FL (ref 8.9–12.7)
POTASSIUM SERPL-SCNC: 3.7 MMOL/L (ref 3.5–5.3)
PROT SERPL-MCNC: 7.4 G/DL (ref 6.4–8.4)
RBC # BLD AUTO: 3.69 MILLION/UL (ref 3.88–5.62)
SODIUM SERPL-SCNC: 138 MMOL/L (ref 135–147)
WBC # BLD AUTO: 4.5 THOUSAND/UL (ref 4.31–10.16)

## 2024-03-01 PROCEDURE — 80053 COMPREHEN METABOLIC PANEL: CPT

## 2024-03-01 PROCEDURE — 36415 COLL VENOUS BLD VENIPUNCTURE: CPT

## 2024-03-01 PROCEDURE — 85025 COMPLETE CBC W/AUTO DIFF WBC: CPT

## 2024-03-22 ENCOUNTER — APPOINTMENT (OUTPATIENT)
Age: 74
End: 2024-03-22
Payer: MEDICARE

## 2024-03-22 DIAGNOSIS — D75.1 POLYCYTHEMIA: ICD-10-CM

## 2024-03-22 LAB
ALBUMIN SERPL BCP-MCNC: 4.5 G/DL (ref 3.5–5)
ALP SERPL-CCNC: 94 U/L (ref 34–104)
ALT SERPL W P-5'-P-CCNC: 10 U/L (ref 7–52)
ANION GAP SERPL CALCULATED.3IONS-SCNC: 8 MMOL/L (ref 4–13)
AST SERPL W P-5'-P-CCNC: 26 U/L (ref 13–39)
BASOPHILS # BLD AUTO: 0.02 THOUSANDS/ÂΜL (ref 0–0.1)
BASOPHILS NFR BLD AUTO: 1 % (ref 0–1)
BILIRUB SERPL-MCNC: 1.16 MG/DL (ref 0.2–1)
BUN SERPL-MCNC: 17 MG/DL (ref 5–25)
CALCIUM SERPL-MCNC: 9.7 MG/DL (ref 8.4–10.2)
CHLORIDE SERPL-SCNC: 103 MMOL/L (ref 96–108)
CO2 SERPL-SCNC: 28 MMOL/L (ref 21–32)
CREAT SERPL-MCNC: 1.07 MG/DL (ref 0.6–1.3)
EOSINOPHIL # BLD AUTO: 0.02 THOUSAND/ÂΜL (ref 0–0.61)
EOSINOPHIL NFR BLD AUTO: 1 % (ref 0–6)
ERYTHROCYTE [DISTWIDTH] IN BLOOD BY AUTOMATED COUNT: 19.3 % (ref 11.6–15.1)
GFR SERPL CREATININE-BSD FRML MDRD: 68 ML/MIN/1.73SQ M
GLUCOSE P FAST SERPL-MCNC: 94 MG/DL (ref 65–99)
HCT VFR BLD AUTO: 40.1 % (ref 36.5–49.3)
HGB BLD-MCNC: 14 G/DL (ref 12–17)
IMM GRANULOCYTES # BLD AUTO: 0.02 THOUSAND/UL (ref 0–0.2)
IMM GRANULOCYTES NFR BLD AUTO: 1 % (ref 0–2)
LYMPHOCYTES # BLD AUTO: 2.5 THOUSANDS/ÂΜL (ref 0.6–4.47)
LYMPHOCYTES NFR BLD AUTO: 55 % (ref 14–44)
MCH RBC QN AUTO: 43.1 PG (ref 26.8–34.3)
MCHC RBC AUTO-ENTMCNC: 34.9 G/DL (ref 31.4–37.4)
MCV RBC AUTO: 123 FL (ref 82–98)
MONOCYTES # BLD AUTO: 0.39 THOUSAND/ÂΜL (ref 0.17–1.22)
MONOCYTES NFR BLD AUTO: 9 % (ref 4–12)
NEUTROPHILS # BLD AUTO: 1.47 THOUSANDS/ÂΜL (ref 1.85–7.62)
NEUTS SEG NFR BLD AUTO: 33 % (ref 43–75)
NRBC BLD AUTO-RTO: 0 /100 WBCS
PLATELET # BLD AUTO: 160 THOUSANDS/UL (ref 149–390)
PMV BLD AUTO: 11.1 FL (ref 8.9–12.7)
POTASSIUM SERPL-SCNC: 5.7 MMOL/L (ref 3.5–5.3)
PROT SERPL-MCNC: 7.3 G/DL (ref 6.4–8.4)
RBC # BLD AUTO: 3.25 MILLION/UL (ref 3.88–5.62)
SODIUM SERPL-SCNC: 139 MMOL/L (ref 135–147)
WBC # BLD AUTO: 4.42 THOUSAND/UL (ref 4.31–10.16)

## 2024-03-22 PROCEDURE — 80053 COMPREHEN METABOLIC PANEL: CPT

## 2024-03-22 PROCEDURE — 85025 COMPLETE CBC W/AUTO DIFF WBC: CPT

## 2024-03-22 PROCEDURE — 36415 COLL VENOUS BLD VENIPUNCTURE: CPT

## 2024-04-12 ENCOUNTER — APPOINTMENT (OUTPATIENT)
Age: 74
End: 2024-04-12
Payer: MEDICARE

## 2024-04-12 DIAGNOSIS — D75.1 POLYCYTHEMIA: ICD-10-CM

## 2024-04-12 LAB
ALBUMIN SERPL BCP-MCNC: 4.1 G/DL (ref 3.5–5)
ALP SERPL-CCNC: 81 U/L (ref 34–104)
ALT SERPL W P-5'-P-CCNC: 14 U/L (ref 7–52)
ANION GAP SERPL CALCULATED.3IONS-SCNC: 10 MMOL/L (ref 4–13)
AST SERPL W P-5'-P-CCNC: 15 U/L (ref 13–39)
BASOPHILS # BLD AUTO: 0.02 THOUSANDS/ÂΜL (ref 0–0.1)
BASOPHILS NFR BLD AUTO: 1 % (ref 0–1)
BILIRUB SERPL-MCNC: 0.86 MG/DL (ref 0.2–1)
BUN SERPL-MCNC: 15 MG/DL (ref 5–25)
CALCIUM SERPL-MCNC: 9.5 MG/DL (ref 8.4–10.2)
CHLORIDE SERPL-SCNC: 100 MMOL/L (ref 96–108)
CO2 SERPL-SCNC: 26 MMOL/L (ref 21–32)
CREAT SERPL-MCNC: 0.99 MG/DL (ref 0.6–1.3)
EOSINOPHIL # BLD AUTO: 0.01 THOUSAND/ÂΜL (ref 0–0.61)
EOSINOPHIL NFR BLD AUTO: 0 % (ref 0–6)
ERYTHROCYTE [DISTWIDTH] IN BLOOD BY AUTOMATED COUNT: 13.8 % (ref 11.6–15.1)
GFR SERPL CREATININE-BSD FRML MDRD: 75 ML/MIN/1.73SQ M
GLUCOSE P FAST SERPL-MCNC: 90 MG/DL (ref 65–99)
HCT VFR BLD AUTO: 33.9 % (ref 36.5–49.3)
HGB BLD-MCNC: 12.1 G/DL (ref 12–17)
IMM GRANULOCYTES # BLD AUTO: 0.01 THOUSAND/UL (ref 0–0.2)
IMM GRANULOCYTES NFR BLD AUTO: 0 % (ref 0–2)
LYMPHOCYTES # BLD AUTO: 1.69 THOUSANDS/ÂΜL (ref 0.6–4.47)
LYMPHOCYTES NFR BLD AUTO: 52 % (ref 14–44)
MCH RBC QN AUTO: 45.7 PG (ref 26.8–34.3)
MCHC RBC AUTO-ENTMCNC: 35.7 G/DL (ref 31.4–37.4)
MCV RBC AUTO: 128 FL (ref 82–98)
MONOCYTES # BLD AUTO: 0.32 THOUSAND/ÂΜL (ref 0.17–1.22)
MONOCYTES NFR BLD AUTO: 10 % (ref 4–12)
NEUTROPHILS # BLD AUTO: 1.21 THOUSANDS/ÂΜL (ref 1.85–7.62)
NEUTS SEG NFR BLD AUTO: 37 % (ref 43–75)
NRBC BLD AUTO-RTO: 0 /100 WBCS
PLATELET # BLD AUTO: 143 THOUSANDS/UL (ref 149–390)
PMV BLD AUTO: 10.5 FL (ref 8.9–12.7)
POTASSIUM SERPL-SCNC: 3.9 MMOL/L (ref 3.5–5.3)
PROT SERPL-MCNC: 6.9 G/DL (ref 6.4–8.4)
RBC # BLD AUTO: 2.65 MILLION/UL (ref 3.88–5.62)
SODIUM SERPL-SCNC: 136 MMOL/L (ref 135–147)
WBC # BLD AUTO: 3.26 THOUSAND/UL (ref 4.31–10.16)

## 2024-04-12 PROCEDURE — 36415 COLL VENOUS BLD VENIPUNCTURE: CPT

## 2024-04-12 PROCEDURE — 85025 COMPLETE CBC W/AUTO DIFF WBC: CPT

## 2024-04-12 PROCEDURE — 80053 COMPREHEN METABOLIC PANEL: CPT

## 2024-05-06 ENCOUNTER — APPOINTMENT (OUTPATIENT)
Age: 74
End: 2024-05-06
Payer: MEDICARE

## 2024-05-06 DIAGNOSIS — D75.1 POLYCYTHEMIA: ICD-10-CM

## 2024-05-06 DIAGNOSIS — E53.8 B12 DEFICIENCY: ICD-10-CM

## 2024-05-06 LAB
ALBUMIN SERPL BCP-MCNC: 4.4 G/DL (ref 3.5–5)
ALP SERPL-CCNC: 77 U/L (ref 34–104)
ALT SERPL W P-5'-P-CCNC: 11 U/L (ref 7–52)
ANION GAP SERPL CALCULATED.3IONS-SCNC: 9 MMOL/L (ref 4–13)
AST SERPL W P-5'-P-CCNC: 15 U/L (ref 13–39)
BASOPHILS # BLD AUTO: 0.02 THOUSANDS/ÂΜL (ref 0–0.1)
BASOPHILS NFR BLD AUTO: 1 % (ref 0–1)
BILIRUB SERPL-MCNC: 0.77 MG/DL (ref 0.2–1)
BUN SERPL-MCNC: 15 MG/DL (ref 5–25)
CALCIUM SERPL-MCNC: 9.5 MG/DL (ref 8.4–10.2)
CHLORIDE SERPL-SCNC: 104 MMOL/L (ref 96–108)
CO2 SERPL-SCNC: 26 MMOL/L (ref 21–32)
CREAT SERPL-MCNC: 0.98 MG/DL (ref 0.6–1.3)
CRP SERPL QL: <1 MG/L
EOSINOPHIL # BLD AUTO: 0.01 THOUSAND/ÂΜL (ref 0–0.61)
EOSINOPHIL NFR BLD AUTO: 0 % (ref 0–6)
ERYTHROCYTE [DISTWIDTH] IN BLOOD BY AUTOMATED COUNT: 12.2 % (ref 11.6–15.1)
ERYTHROCYTE [SEDIMENTATION RATE] IN BLOOD: 28 MM/HOUR (ref 0–19)
GFR SERPL CREATININE-BSD FRML MDRD: 76 ML/MIN/1.73SQ M
GLUCOSE P FAST SERPL-MCNC: 98 MG/DL (ref 65–99)
HCT VFR BLD AUTO: 37.8 % (ref 36.5–49.3)
HGB BLD-MCNC: 13.3 G/DL (ref 12–17)
IMM GRANULOCYTES # BLD AUTO: 0.01 THOUSAND/UL (ref 0–0.2)
IMM GRANULOCYTES NFR BLD AUTO: 0 % (ref 0–2)
LDH SERPL-CCNC: 199 U/L (ref 140–271)
LYMPHOCYTES # BLD AUTO: 1.64 THOUSANDS/ÂΜL (ref 0.6–4.47)
LYMPHOCYTES NFR BLD AUTO: 46 % (ref 14–44)
MCH RBC QN AUTO: 45.9 PG (ref 26.8–34.3)
MCHC RBC AUTO-ENTMCNC: 35.2 G/DL (ref 31.4–37.4)
MCV RBC AUTO: 130 FL (ref 82–98)
MONOCYTES # BLD AUTO: 0.3 THOUSAND/ÂΜL (ref 0.17–1.22)
MONOCYTES NFR BLD AUTO: 9 % (ref 4–12)
NEUTROPHILS # BLD AUTO: 1.55 THOUSANDS/ÂΜL (ref 1.85–7.62)
NEUTS SEG NFR BLD AUTO: 44 % (ref 43–75)
NRBC BLD AUTO-RTO: 0 /100 WBCS
PLATELET # BLD AUTO: 129 THOUSANDS/UL (ref 149–390)
PMV BLD AUTO: 11.1 FL (ref 8.9–12.7)
POTASSIUM SERPL-SCNC: 3.8 MMOL/L (ref 3.5–5.3)
PROT SERPL-MCNC: 7.2 G/DL (ref 6.4–8.4)
RBC # BLD AUTO: 2.9 MILLION/UL (ref 3.88–5.62)
SODIUM SERPL-SCNC: 139 MMOL/L (ref 135–147)
VIT B12 SERPL-MCNC: 394 PG/ML (ref 180–914)
WBC # BLD AUTO: 3.53 THOUSAND/UL (ref 4.31–10.16)

## 2024-05-06 PROCEDURE — 36415 COLL VENOUS BLD VENIPUNCTURE: CPT

## 2024-05-06 PROCEDURE — 82607 VITAMIN B-12: CPT

## 2024-05-06 PROCEDURE — 85025 COMPLETE CBC W/AUTO DIFF WBC: CPT

## 2024-05-06 PROCEDURE — 80053 COMPREHEN METABOLIC PANEL: CPT

## 2024-05-06 PROCEDURE — 83615 LACTATE (LD) (LDH) ENZYME: CPT

## 2024-05-06 PROCEDURE — 85652 RBC SED RATE AUTOMATED: CPT

## 2024-05-06 PROCEDURE — 86140 C-REACTIVE PROTEIN: CPT

## 2024-05-21 ENCOUNTER — OFFICE VISIT (OUTPATIENT)
Dept: FAMILY MEDICINE CLINIC | Facility: CLINIC | Age: 74
End: 2024-05-21
Payer: MEDICARE

## 2024-05-21 VITALS
HEART RATE: 77 BPM | DIASTOLIC BLOOD PRESSURE: 80 MMHG | SYSTOLIC BLOOD PRESSURE: 114 MMHG | WEIGHT: 178.4 LBS | HEIGHT: 68 IN | OXYGEN SATURATION: 95 % | TEMPERATURE: 97.2 F | BODY MASS INDEX: 27.04 KG/M2

## 2024-05-21 DIAGNOSIS — G20.B1 PARKINSON'S DISEASE WITH DYSKINESIA WITHOUT FLUCTUATING MANIFESTATIONS: ICD-10-CM

## 2024-05-21 DIAGNOSIS — C61 MALIGNANT NEOPLASM OF PROSTATE (HCC): ICD-10-CM

## 2024-05-21 DIAGNOSIS — E53.8 B12 DEFICIENCY: ICD-10-CM

## 2024-05-21 DIAGNOSIS — I25.10 CORONARY ARTERY DISEASE INVOLVING NATIVE CORONARY ARTERY OF NATIVE HEART WITHOUT ANGINA PECTORIS: ICD-10-CM

## 2024-05-21 DIAGNOSIS — I10 PRIMARY HYPERTENSION: Primary | ICD-10-CM

## 2024-05-21 DIAGNOSIS — D45 POLYCYTHEMIA VERA (HCC): ICD-10-CM

## 2024-05-21 PROCEDURE — 99214 OFFICE O/P EST MOD 30 MIN: CPT | Performed by: INTERNAL MEDICINE

## 2024-05-21 PROCEDURE — G2211 COMPLEX E/M VISIT ADD ON: HCPCS | Performed by: INTERNAL MEDICINE

## 2024-05-21 NOTE — ASSESSMENT & PLAN NOTE
Follows with hematology/oncology.  Appropriate genetic make-up for polycythemia vera.  Remains on hydroxyurea.  Labs have been stable.

## 2024-05-21 NOTE — ASSESSMENT & PLAN NOTE
Follow-up with neurology.  Remains on carbidopa levodopa.  Due for therapy in the near future.  Medications reviewed.

## 2024-05-21 NOTE — PROGRESS NOTES
Ambulatory Visit  Name: Kingsley Grider Jr.      : 1950      MRN: [id number]  Encounter Provider: Evan Díaz DO  Encounter Date: 2024   Encounter department: [facility] PRIMARY CARE    Assessment & Plan   1. Primary hypertension  Assessment & Plan:  Continue current medical therapy.  2. Parkinson's disease with dyskinesia without fluctuating manifestations  Assessment & Plan:  Follow-up with neurology.  Remains on carbidopa levodopa.  Due for therapy in the near future.  Medications reviewed.  3. Coronary artery disease involving native coronary artery of native heart without angina pectoris  Assessment & Plan:  Remains pain-free.  Continue aspirin.  Follows regular with cardiology.  4. B12 deficiency  Assessment & Plan:  Continues oral replacement.  5. Polycythemia vera (HCC)  Assessment & Plan:  Follows with hematology/oncology.  Appropriate genetic make-up for polycythemia vera.  Remains on hydroxyurea.  Labs have been stable.  6. Malignant neoplasm of prostate (HCC)  Assessment & Plan:  PSA remains undetectable.       History of Present Illness     Patient feels reasonably well.  Still quite tremulous.  Has follow-up with neurology in the near future.  No falls since her last evaluation.  Scheduled to go back to therapy in .  No recent changes in medications.  Follows with hematology with regards to his polycythemia.  Has had no chest pain, no shortness of breath, denies any change in bowel or bladder habits.  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        Review of Systems   Constitutional:  Negative for chills and fever.   HENT:  Negative for rhinorrhea and sore throat.    Eyes:  Negative for visual disturbance.   Respiratory:  Negative for cough and shortness of breath.    Cardiovascular:  Negative for chest pain and leg swelling.   Gastrointestinal:  Negative for abdominal pain, diarrhea, nausea and vomiting.   Genitourinary:  Negative for dysuria.  "  Musculoskeletal:  Positive for arthralgias, back pain, gait problem and joint swelling. Negative for myalgias.   Skin:  Negative for rash.   Neurological:  Positive for tremors. Negative for dizziness and headaches.   Psychiatric/Behavioral:  Negative for confusion.    All other systems reviewed and are negative.      Objective     /80 (BP Location: Left arm, Patient Position: Sitting, Cuff Size: Adult)   Pulse 77   Temp (!) 97.2 °F (36.2 °C) (Tympanic)   Ht 5' 8\" (1.727 m)   Wt 80.9 kg (178 lb 6.4 oz)   SpO2 95%   BMI 27.13 kg/m²     Physical Exam  Vitals and nursing note reviewed.   Constitutional:       Appearance: Normal appearance. He is well-developed.   HENT:      Head: Normocephalic.      Nose: Nose normal.      Mouth/Throat:      Mouth: Oropharynx is clear and moist.      Pharynx: No oropharyngeal exudate.   Eyes:      General: No scleral icterus.     Extraocular Movements: EOM normal.      Conjunctiva/sclera: Conjunctivae normal.      Pupils: Pupils are equal, round, and reactive to light.   Neck:      Vascular: No JVD.      Trachea: No tracheal deviation.   Cardiovascular:      Rate and Rhythm: Normal rate and regular rhythm.      Pulses: Normal pulses.      Heart sounds: Normal heart sounds. No murmur heard.  Pulmonary:      Effort: Pulmonary effort is normal. No respiratory distress.      Breath sounds: Normal breath sounds. No wheezing or rales.   Abdominal:      General: Bowel sounds are normal.      Palpations: Abdomen is soft.      Tenderness: There is no abdominal tenderness. There is no guarding.   Musculoskeletal:         General: No tenderness or edema. Normal range of motion.      Cervical back: Normal range of motion and neck supple.   Skin:     General: Skin is warm and dry.   Neurological:      General: No focal deficit present.      Mental Status: He is alert and oriented to person, place, and time.      Cranial Nerves: No cranial nerve deficit.      Sensory: No sensory " deficit.      Motor: No abnormal muscle tone.      Comments: 5/5 motor, nl sens.  Left hand tremor.   Psychiatric:         Mood and Affect: Mood and affect and mood normal.         Behavior: Behavior normal.       Administrative Statements

## 2024-05-30 ENCOUNTER — OFFICE VISIT (OUTPATIENT)
Dept: HEMATOLOGY ONCOLOGY | Facility: CLINIC | Age: 74
End: 2024-05-30
Payer: MEDICARE

## 2024-05-30 ENCOUNTER — EVALUATION (OUTPATIENT)
Dept: PHYSICAL THERAPY | Facility: CLINIC | Age: 74
End: 2024-05-30
Payer: MEDICARE

## 2024-05-30 VITALS
SYSTOLIC BLOOD PRESSURE: 138 MMHG | BODY MASS INDEX: 26.98 KG/M2 | DIASTOLIC BLOOD PRESSURE: 80 MMHG | TEMPERATURE: 98.1 F | RESPIRATION RATE: 18 BRPM | WEIGHT: 178 LBS | HEART RATE: 101 BPM | OXYGEN SATURATION: 96 % | HEIGHT: 68 IN

## 2024-05-30 DIAGNOSIS — D75.1 POLYCYTHEMIA: ICD-10-CM

## 2024-05-30 DIAGNOSIS — E53.8 B12 DEFICIENCY: ICD-10-CM

## 2024-05-30 DIAGNOSIS — G20.C PARKINSONISM, UNSPECIFIED PARKINSONISM TYPE: Primary | ICD-10-CM

## 2024-05-30 DIAGNOSIS — D45 POLYCYTHEMIA VERA (HCC): Primary | ICD-10-CM

## 2024-05-30 PROCEDURE — 97162 PT EVAL MOD COMPLEX 30 MIN: CPT | Performed by: PHYSICAL THERAPIST

## 2024-05-30 PROCEDURE — 97112 NEUROMUSCULAR REEDUCATION: CPT | Performed by: PHYSICAL THERAPIST

## 2024-05-30 PROCEDURE — 99214 OFFICE O/P EST MOD 30 MIN: CPT | Performed by: INTERNAL MEDICINE

## 2024-05-30 PROCEDURE — G2211 COMPLEX E/M VISIT ADD ON: HCPCS | Performed by: INTERNAL MEDICINE

## 2024-05-30 NOTE — PROGRESS NOTES
Hematology/Oncology Outpatient Follow-up  Kingsley Grider Jr. 73 y.o. male 1950 [id number]    Date:  5/30/2024        Assessment and Plan:  1. Polycythemia vera (HCC)  His polycythemia is compatible with polycythemia vera with positive [id number] mutation.   Tolerating the Hydrea at the current dose of 500 mg daily.  Recent CBC from 5/6/2024 showed borderline low white cells and platelets.  His hematocrit level is less than 45%.  The patient was encouraged to continue with the same dose and continue with the baby aspirin on a daily basis.  We will continue to monitor his blood count closely on a monthly basis while on Hydrea.  - CBC and differential; Future  - Comprehensive metabolic panel; Future  - Magnesium; Future  - LD,Blood; Future  - CBC and differential; Standing  - Comprehensive metabolic panel; Standing    2. Polycythemia  As above.    3. B12 deficiency  He was instructed to continue with vitamin B12 supplements daily.  - Vitamin B12; Future        HPI:  Patient is a pleasant 73-year-old male who presents today for further evaluation of his polycythemia/thrombocytosis.  He has past medical history of Parkinson's disease, hypertension, CAD status post MI, hyperlipidemia, chronic kidney disease, and prostate cancer status post robotic prostatectomy in 2014.  He does admit to consuming alcohol regularly on a daily basis about 4 beers per day.    He had extensive workup including a positive [id number] mutation.  He was then started on Hydrea 500 mg daily along with baby aspirin around November 2023.     Interval history:  The patient came there for follow-up visit.  He stated he is taking the Hydrea at the current dose 500 mg daily without interruption.  Recent available blood work on 5/6/2024 showed white cell count 3.5 with ANC of 1.5.  Hemoglobin 13.3 with MCV of 130.  Platelet count 129.  His creatinine 0.9 with normal calcium and liver enzymes.  Vitamin B12 was 394.  ROS: Review of Systems    Constitutional:  Positive for appetite change and fatigue. Negative for chills and fever.   HENT:  Positive for hearing loss and trouble swallowing. Negative for ear pain and sore throat.    Eyes:  Negative for pain and visual disturbance.   Respiratory:  Positive for shortness of breath. Negative for cough.    Cardiovascular:  Negative for chest pain and palpitations.   Gastrointestinal:  Positive for constipation. Negative for abdominal pain and vomiting.   Genitourinary:  Negative for dysuria and hematuria.   Musculoskeletal:  Positive for back pain. Negative for arthralgias.   Skin:  Negative for color change and rash.   Neurological:  Positive for dizziness and tremors. Negative for seizures and syncope.   Psychiatric/Behavioral:  Positive for sleep disturbance.    All other systems reviewed and are negative.      Past Medical History:   Diagnosis Date    Anxiety     Benign hypertension     Cancer (HCC)     prostate    Coronary artery disease     ABRAM to LAD in 2014    History of cardiovascular stress test 01/21/2016    Normal EF, normal study.    History of echocardiogram 05/05/2015    EF normal.      Ischemic cardiomyopathy     resolved    Mixed hyperlipidemia     Myocardial infarction (HCC)     Parkinson disease     Prostate cancer (HCC)     surgically removed    STEMI (ST elevation myocardial infarction) ([facility]) 2014       Past Surgical History:   Procedure Laterality Date    CORONARY ANGIOPLASTY WITH STENT PLACEMENT  12/26/2014    EF 40%, ABRAM to LAD.    INCISIONAL HERNIA REPAIR      SC SURGICAL ARTHROSCOPY SHOULDER W/ROTATOR CUFF RPR Right 2/24/2021    Procedure: SHOULDER ARTHROSCOPY WITH ROTATOR CUFF REPAIR and acromioplasty;  Surgeon: Rene Valdez DO;  Location: [facility];  Service: Orthopedics    PROSTATECTOMY  2014    for cancer    TONSILLECTOMY         Social History     Socioeconomic History    Marital status: Single     Spouse name: None    Number of children: None    Years of education: None     "Highest education level: None   Occupational History    None   Tobacco Use    Smoking status: Never    Smokeless tobacco: Never   Vaping Use    Vaping status: Never Used   Substance and Sexual Activity    Alcohol use: Not Currently     Alcohol/week: 2.0 standard drinks of alcohol     Types: 2 Cans of beer per week     Comment: daily    Drug use: Never    Sexual activity: None   Other Topics Concern    None   Social History Narrative    Caffeine - 2 cups coffee/day     Social Determinants of Health     Financial Resource Strain: Not on file   Food Insecurity: Not on file   Transportation Needs: Not on file   Physical Activity: Not on file   Stress: Not on file   Social Connections: Not on file   Intimate Partner Violence: Not on file   Housing Stability: Not on file       Family History   Problem Relation Age of Onset    Heart attack Father     Stroke Father        Allergies   Allergen Reactions    Carbidopa-Levodopa-Entacapone Confusion     Patient denies allergy to this medication         Current Outpatient Medications:     aspirin (ECOTRIN LOW STRENGTH) 81 mg EC tablet, Take 81 mg by mouth daily, Disp: , Rfl:     atorvastatin (LIPITOR) 80 mg tablet, Take 1 tablet (80 mg total) by mouth daily, Disp: 90 tablet, Rfl: 3    carbidopa-levodopa (SINEMET CR)  mg TBCR per ER tablet, Take 1 tablet by mouth 3 (three) times a day, Disp: 90 tablet, Rfl: 3    hydroxyurea (HYDREA) 500 mg capsule, Take 1 capsule (500 mg total) by mouth daily, Disp: 30 capsule, Rfl: 11    PARoxetine (PAXIL) 20 mg tablet, TAKE ONE TABLET DAILY..., Disp: 90 tablet, Rfl: 3    vitamin B-12 (VITAMIN B-12) 1,000 mcg tablet, Take 1 tablet (1,000 mcg total) by mouth daily, Disp: , Rfl:       Physical Exam:  /80 (BP Location: Left arm, Patient Position: Sitting, Cuff Size: Adult)   Pulse 101   Temp 98.1 °F (36.7 °C)   Resp 18   Ht 5' 8\" (1.727 m)   Wt 80.7 kg (178 lb)   SpO2 96%   BMI 27.06 kg/m²     Physical Exam  Constitutional:       " Appearance: He is well-developed.   HENT:      Head: Normocephalic and atraumatic.      Nose: Nose normal.   Eyes:      General: No scleral icterus.        Right eye: No discharge.         Left eye: No discharge.      Conjunctiva/sclera: Conjunctivae normal.      Pupils: Pupils are equal, round, and reactive to light.   Neck:      Thyroid: No thyromegaly.      Trachea: No tracheal deviation.   Cardiovascular:      Rate and Rhythm: Normal rate and regular rhythm.      Heart sounds: Normal heart sounds. No murmur heard.     No friction rub.   Pulmonary:      Effort: Pulmonary effort is normal. No respiratory distress.      Breath sounds: Normal breath sounds. No wheezing or rales.   Chest:      Chest wall: No tenderness.   Abdominal:      General: There is no distension.      Palpations: Abdomen is soft. There is no hepatomegaly or splenomegaly.      Tenderness: There is no abdominal tenderness. There is no guarding or rebound.   Musculoskeletal:         General: No tenderness or deformity. Normal range of motion.      Cervical back: Normal range of motion and neck supple.   Lymphadenopathy:      Cervical: No cervical adenopathy.   Skin:     General: Skin is warm and dry.      Coloration: Skin is not pale.      Findings: No erythema or rash.   Neurological:      Mental Status: He is alert and oriented to person, place, and time.      Cranial Nerves: No cranial nerve deficit.      Coordination: Coordination normal.      Deep Tendon Reflexes: Reflexes are normal and symmetric.   Psychiatric:         Behavior: Behavior normal.         Thought Content: Thought content normal.         Judgment: Judgment normal.           Labs:  Lab Results   Component Value Date    WBC 3.53 (L) 05/06/2024    HGB 13.3 05/06/2024    HCT 37.8 05/06/2024     (H) 05/06/2024     (L) 05/06/2024     Lab Results   Component Value Date     06/07/2018    K 3.8 05/06/2024     05/06/2024    CO2 26 05/06/2024    ANIONGAP 13.2  "06/07/2018    BUN 15 05/06/2024    CREATININE 0.98 05/06/2024    GLUF 98 05/06/2024    CALCIUM 9.5 05/06/2024    AST 15 05/06/2024    ALT 11 05/06/2024    ALKPHOS 77 05/06/2024    PROT 6.8 06/07/2018    BILITOT 0.6 06/07/2018    EGFR 76 05/06/2024     No results found for: \"TSH\"    Patient voiced understanding and agreement in the above discussion. Aware to contact our office with questions/symptoms in the interim.   "

## 2024-05-30 NOTE — PROGRESS NOTES
"PT Evaluation     Today's date: 2024  Patient name: Kingsley Grider Jr.  : 1950  MRN: [id number]  Referring provider: Evan Díaz DO  Dx:   Encounter Diagnosis     ICD-10-CM    1. Parkinsonism, unspecified Parkinsonism type  G20.C                      Assessment    Assessment details: Pt is a 73 y.o. male referred to OPPT for Parkinsonism. Pt demo slow gait speed, impaired balance, impaired coordination, decreased endurance, and is considered a high fall risk. He demo significant difficulty with dual task per TUG cog. Due to above impairments, pt reports moving slower and having increased freezing episodes. Pt would benefit from skilled PT to maximize functional mobility, decrease fall risk, improve strength, balance, and endurance, and improve QOL.     Goals  ST weeks  TUG improve < 12 demo improved mobility and balance  5 x STS improve by 3 sec or > to demo improved LE strength and balance    LT weeks  Mini best improved by 3 points or > to demo improved balance and decreased fall risk   Independent with updated HEP to self manage and maintain progress outside of PT  Initiate gym or [employer] sneakers       Plan    Planned therapy interventions: neuromuscular re-education, balance, coordination, gait training, patient/caregiver education, strengthening, stretching, therapeutic activities, therapeutic exercise and home exercise program    Frequency: 2x week  Duration in weeks: 8  Plan of Care beginning date: 2024  Plan of Care expiration date: 2024  Treatment plan discussed with: PTA and patient        Subjective Evaluation    History of Present Illness  Mechanism of injury: Saw [facility] neurologist Dr. Brown yesterday and per EMR, \"he likely has unilateral parkinsonism. He has been using extended release carbidopa/levodopa, 25/100 only twice a day. He was advised to use it at least 3 or even 4 times a day if needed. The side effect of the medicine including dizziness discussed with " "him.    I plan to obtain MRI of the brain to rule out any intracranial pathology contributing towards his unilateral tremors and gait disturbance.    He has history of ataxia and found to have hyperreflexia of both upper and lower extremities. I am concerned that he has cervical spinal stenosis with cervical spondylosis contributing towards his gait disturbance. I plan to obtain MRI of cervical spine.\"    Last seen in PT 2024. Will be getting cervical MRI and brain MRI on .  Feels like he is doing worse. He feels like he slowed down a lot and walks very slow. Getting a lot of freezing episodes when he tries to move. Has been spending too long in the recliner. Has not gone to gym or any exercise program. Has not had any falls.    Having some memory issues but does not want to work on it at this time.   Patient Goals  Patient goal: make it here 1-2x/week  Pain  No pain reported          Objective      Balance Test    6 Minute Walk Test (ft):    2 Minute Walk Test (ft):    Mini Best     Gait Speed (m/s): 0.6 m/s no AD   5x Sit To Stand (s): 26 sec (took 8 sec to initiate)   TU.03 no AD  Cog 23.85 sec     Flowsheet Rows      Flowsheet Row Most Recent Value   MiniBEST    Sit to stand 2   Rise to toes 1   Stand on 1 leg  1   Compensatory stepping correction forward  2   Compensatory stepping correction backward 1   Compensatory stepping correction lateral 2   Stance (feet together) eyes open, firm surface 2   Stance (feet together) eyes closed, foam surface 2   Incline eyes closed 2   Change in gait speed 1   Walk with head turns horizontal  1   Walk with pivot turns  2   Step over obstacles 2   TUG 0   Total score 21                Coordination Left Right   Heel To [place] WNL WNL   Finger To Nose WNL WNL   Rapid Alternating Movement     UE     LE WNL WNL         Manual Muscle Testing - Hip Left Right   Flexion 4+ 4+   Extension     Abduction 4+ 4+   External Rotation       Manual Muscle Testing - " Knee Left Right   Flexion 4+ 4+   Extension 5 5     Manual Muscle Testing - Ankle Left Right   Doriflexion 4+ 4+   Plantarflexion       Gait Assessment: decreased arm swings B, decreased push off B         Insurance:  (no secondary)  Auth # of visits: NA  Expiration date: NA     FOTO: NA     Re-eval Date: 6/30    Date 5/30       Visit Count 1       FOTO        Pain In        Pain Out        Vitals             Precautions fall risk        Manuals                                        Neuro Re-Ed         HKM        Sidestepping         Hurdles         BW amb        Step ups         Amb with HT/HN          STS        Boxing         Education  On exercise and carrying over to gym program 10 min        Ther Ex        Nustep                                                                 Ther Activity                        Gait Training                        Modalities

## 2024-06-03 DIAGNOSIS — G20.A2 PARKINSON'S DISEASE WITH FLUCTUATING MANIFESTATIONS, UNSPECIFIED WHETHER DYSKINESIA PRESENT: ICD-10-CM

## 2024-06-03 RX ORDER — CARBIDOPA AND LEVODOPA 25; 100 MG/1; MG/1
1 TABLET, EXTENDED RELEASE ORAL 3 TIMES DAILY
Qty: 270 TABLET | Refills: 1 | Status: SHIPPED | OUTPATIENT
Start: 2024-06-03

## 2024-06-04 ENCOUNTER — APPOINTMENT (OUTPATIENT)
Dept: PHYSICAL THERAPY | Facility: CLINIC | Age: 74
End: 2024-06-04
Payer: MEDICARE

## 2024-06-06 ENCOUNTER — APPOINTMENT (OUTPATIENT)
Dept: PHYSICAL THERAPY | Facility: CLINIC | Age: 74
End: 2024-06-06
Payer: MEDICARE

## 2024-06-11 ENCOUNTER — OFFICE VISIT (OUTPATIENT)
Dept: PHYSICAL THERAPY | Facility: CLINIC | Age: 74
End: 2024-06-11
Payer: MEDICARE

## 2024-06-11 DIAGNOSIS — G20.C PARKINSONISM, UNSPECIFIED PARKINSONISM TYPE: Primary | ICD-10-CM

## 2024-06-11 PROCEDURE — 97112 NEUROMUSCULAR REEDUCATION: CPT | Performed by: PHYSICAL THERAPIST

## 2024-06-11 NOTE — PROGRESS NOTES
Daily Note     Today's date: 2024  Patient name: Kingsley Grider Jr.  : 1950  MRN: [id number]  Referring provider: Evan Díaz DO  Dx:   Encounter Diagnosis     ICD-10-CM    1. Parkinsonism, unspecified Parkinsonism type  G20.C                      Subjective: Has done LSVT-BIG before but it has been 3-4 years. Has not kept up with. Was sick after having shingles shot so did not make it last week to PT.       Objective: See treatment diary below      Assessment: Plan to trial LSVT-BIG again in hopes pt will carry over as HEP once he is finished with PT. Overall demo good recall of MDE but required VC to increase amplitude of movements. No LOB but constant cueing required to increase step length and shaping for UE and trunk. He demo significant bradykinesia and trunk rigidity throughout and occasional freezing. Recommended pt trial MDE at least 5 reps each for HEP. Patient would benefit from continued PT      Plan: Progress treatment as tolerated.       Insurance:  (no secondary)  Auth # of visits: NA  Expiration date: NA     FOTO: NA     Re-eval Date:     Date       Visit Count 1 2      FOTO        Pain In        Pain Out        Vitals             Precautions fall risk        Manuals                                        Neuro Re-Ed         LSVT-BIG MDE  10x flicks      HKM        Sidestepping         Hurdles         BW amb        Step ups         Amb with HT/HN          STS  10x      Boxing         Education  On exercise and carrying over to gym program 10 min        Ther Ex        Nustep   L3, 10 min for CV endurance                                                               Ther Activity                        Gait Training                        Modalities

## 2024-06-13 ENCOUNTER — OFFICE VISIT (OUTPATIENT)
Dept: PHYSICAL THERAPY | Facility: CLINIC | Age: 74
End: 2024-06-13
Payer: MEDICARE

## 2024-06-13 DIAGNOSIS — G20.C PARKINSONISM, UNSPECIFIED PARKINSONISM TYPE: Primary | ICD-10-CM

## 2024-06-13 DIAGNOSIS — I25.10 CORONARY ARTERY DISEASE INVOLVING NATIVE CORONARY ARTERY OF NATIVE HEART WITHOUT ANGINA PECTORIS: ICD-10-CM

## 2024-06-13 PROCEDURE — 97110 THERAPEUTIC EXERCISES: CPT

## 2024-06-13 PROCEDURE — 97112 NEUROMUSCULAR REEDUCATION: CPT

## 2024-06-13 RX ORDER — ATORVASTATIN CALCIUM 80 MG/1
80 TABLET, FILM COATED ORAL DAILY
Qty: 90 TABLET | Refills: 0 | Status: SHIPPED | OUTPATIENT
Start: 2024-06-13

## 2024-06-13 NOTE — PROGRESS NOTES
Daily Note     Today's date: 2024  Patient name: Kingsley Grider Jr.  : 1950  MRN: [id number]  Referring provider: Evan Díaz DO  Dx:   Encounter Diagnosis     ICD-10-CM    1. Parkinsonism, unspecified Parkinsonism type  G20.C                      Subjective: denies any recent fall  I have been getting out of house more even doing grocery shopping  have trouble walking with grocery cart  it gets away from me      Objective: See treatment diary below      Assessment: Tolerated treatment well. Pt easily distracted in busy clinic setting  pt provided private areas for exer for pt to demon > focus, attention and carryover  pt ambul with < arm swing, step stride < R LE ground clearance with FHRS posture  Pt with poor chair / turn negotiation  Pt lacks carryover HEP / <motivation to progress with goals  Noted 2 FOG episodies, practiced 4S's - pt verbalizing steps  Patient would benefit from continued PT      Plan: Continue per plan of care.      Insurance:  (no secondary)  Auth # of visits: NA  Expiration date: NA     FOTO: NA     Re-eval Date:     Date      Visit Count 1 2 3     FOTO        Pain In        Pain Out        Vitals             Precautions fall risk        Manuals                                        Neuro Re-Ed         LSVT-BIG MDE  10x flicks Stand trunk rotation  10x R/L    Step back  10x R/L     HKM   30'  R/L 10 x ea visual target     Sidestepping         Hurdles         BW amb        Step ups         Amb with HT/HN     2x 50'     STS  10x 5x     Dynavision   Amb fwd/retro  10x  Chair/turn nego  2x 60'       Boxing         Education  On exercise and carrying over to gym program 10 min        Ther Ex        Nustep   L3, 10 min for CV endurance  L3, 10 min for CV endurance                                                              Ther Activity                        Gait Training           Big walking  Cues > arm swing, step stride, ground clearance, postural  awareness   FOG- 4S's  10 min             Modalities

## 2024-06-18 ENCOUNTER — OFFICE VISIT (OUTPATIENT)
Dept: PHYSICAL THERAPY | Facility: CLINIC | Age: 74
End: 2024-06-18
Payer: MEDICARE

## 2024-06-18 DIAGNOSIS — G20.C PARKINSONISM, UNSPECIFIED PARKINSONISM TYPE: Primary | ICD-10-CM

## 2024-06-18 PROCEDURE — 97112 NEUROMUSCULAR REEDUCATION: CPT | Performed by: PHYSICAL THERAPIST

## 2024-06-18 PROCEDURE — 97110 THERAPEUTIC EXERCISES: CPT | Performed by: PHYSICAL THERAPIST

## 2024-06-18 NOTE — PROGRESS NOTES
"Daily Note     Today's date: 2024  Patient name: Kingsley Grider Jr.  : 1950  MRN: [id number]  Referring provider: Evan Díaz DO  Dx:   Encounter Diagnosis     ICD-10-CM    1. Parkinsonism, unspecified Parkinsonism type  G20.C                      Subjective: No new complaints.       Objective: See treatment diary below      Assessment: Tolerated treatment well. He did demo some freezing during sidestepping activity, but pt was able to break out of it with VC and eventually was able to self cue. He had difficulty maintaining big walking without frequent VC. Patient would benefit from continued PT      Plan: Progress treatment as tolerated.       Insurance:  (no secondary)  Auth # of visits: NA  Expiration date: NA     FOTO: NA     Re-eval Date:     Date     Visit Count 1 2 3 4    FOTO        Pain In        Pain Out        Vitals             Precautions fall risk        Manuals                                        Neuro Re-Ed         LSVT-BIG MDE  10x flicks Stand trunk rotation  10x R/L    Step back  10x R/L Stand trunk rotation  20x 2 R/L with blazepods    HKM   30'  R/L 10 x ea visual target 40'   10x     Sidestepping     Blazepods 20x 2L/R different colors     Hurdles         BW amb        Step ups         Step taps    Blazepods L/R different colors 20 x 2 6\"     Amb with HT/HN     2x 50'     STS  10x 5x     Dynavision   Amb fwd/retro  10x  Chair/turn nego  2x 60'       Boxing         Education  On exercise and carrying over to gym program 10 min        Ther Ex        Nustep   L3, 10 min for CV endurance  L3, 10 min for CV endurance  L4, 10 min for CV endurance                                                             Ther Activity                        Gait Training           Big walking  Cues > arm swing, step stride, ground clearance, postural awareness   FOG- 4S's  10 min Big walking 5 min             Modalities                                     "

## 2024-06-20 ENCOUNTER — OFFICE VISIT (OUTPATIENT)
Dept: PHYSICAL THERAPY | Facility: CLINIC | Age: 74
End: 2024-06-20
Payer: MEDICARE

## 2024-06-20 DIAGNOSIS — G20.C PARKINSONISM, UNSPECIFIED PARKINSONISM TYPE: Primary | ICD-10-CM

## 2024-06-20 PROCEDURE — 97110 THERAPEUTIC EXERCISES: CPT | Performed by: PHYSICAL THERAPIST

## 2024-06-20 PROCEDURE — 97112 NEUROMUSCULAR REEDUCATION: CPT | Performed by: PHYSICAL THERAPIST

## 2024-06-20 NOTE — PROGRESS NOTES
"Daily Note     Today's date: 2024  Patient name: Kingsley Grider Jr.  : 1950  MRN: [id number]  Referring provider: Evan Díaz DO  Dx:   Encounter Diagnosis     ICD-10-CM    1. Parkinsonism, unspecified Parkinsonism type  G20.C                      Subjective: No new complaints.       Objective: See treatment diary below      Assessment: Tolerated treatment fair. He required constant VC for bigger steps but does not maintain. Constant cueing required for LE alignment during sidestepping with blazepods. Patient would benefit from continued PT      Plan: Progress treatment as tolerated.       Insurance:  (no secondary)  Auth # of visits: NA  Expiration date: NA     FOTO: NA     Re-eval Date:     Date    Visit Count 1 2 3 4 5   FOTO        Pain In        Pain Out        Vitals             Precautions fall risk        Manuals                                        Neuro Re-Ed         LSVT-BIG MDE  10x flicks Stand trunk rotation  10x R/L    Step back  10x R/L Stand trunk rotation  20x 2 R/L with blazepods    HKM   30'  R/L 10 x ea visual target 40'   10x  Carrying bolster 40' x 4   Sidestepping     Blazepods 20x 2L/R different colors  Blazepods 10x 2 L/R different colors    Hurdles      Fw/lat 8 laps no UE    BW amb        Step ups         Step taps    Blazepods L/R different colors 20 x 2 6\"     Amb with HT/HN     2x 50'     STS  10x 5x     Dynavision   Amb fwd/retro  10x  Chair/turn nego  2x 60'       Boxing         Education  On exercise and carrying over to gym program 10 min        Ther Ex        Nustep   L3, 10 min for CV endurance  L3, 10 min for CV endurance  L4, 10 min for CV endurance  L4, 10 min for CV endurance                                                            Ther Activity                        Gait Training           Big walking  Cues > arm swing, step stride, ground clearance, postural awareness   FOG- 4S's  10 min Big walking 5 min  Blazepods "   L/R different colors 5 min            Modalities

## 2024-06-21 ENCOUNTER — TELEPHONE (OUTPATIENT)
Dept: HEMATOLOGY ONCOLOGY | Facility: CLINIC | Age: 74
End: 2024-06-21

## 2024-06-24 ENCOUNTER — APPOINTMENT (OUTPATIENT)
Age: 74
End: 2024-06-24
Payer: MEDICARE

## 2024-06-24 DIAGNOSIS — D45 POLYCYTHEMIA VERA (HCC): ICD-10-CM

## 2024-06-24 LAB
ALBUMIN SERPL BCG-MCNC: 4.1 G/DL (ref 3.5–5)
ALP SERPL-CCNC: 83 U/L (ref 34–104)
ALT SERPL W P-5'-P-CCNC: 12 U/L (ref 7–52)
ANION GAP SERPL CALCULATED.3IONS-SCNC: 10 MMOL/L (ref 4–13)
AST SERPL W P-5'-P-CCNC: 14 U/L (ref 13–39)
BASOPHILS # BLD AUTO: 0.02 THOUSANDS/ÂΜL (ref 0–0.1)
BASOPHILS NFR BLD AUTO: 1 % (ref 0–1)
BILIRUB SERPL-MCNC: 0.83 MG/DL (ref 0.2–1)
BUN SERPL-MCNC: 16 MG/DL (ref 5–25)
CALCIUM SERPL-MCNC: 9.8 MG/DL (ref 8.4–10.2)
CHLORIDE SERPL-SCNC: 102 MMOL/L (ref 96–108)
CO2 SERPL-SCNC: 27 MMOL/L (ref 21–32)
CREAT SERPL-MCNC: 0.98 MG/DL (ref 0.6–1.3)
EOSINOPHIL # BLD AUTO: 0.01 THOUSAND/ÂΜL (ref 0–0.61)
EOSINOPHIL NFR BLD AUTO: 0 % (ref 0–6)
ERYTHROCYTE [DISTWIDTH] IN BLOOD BY AUTOMATED COUNT: 11.6 % (ref 11.6–15.1)
GFR SERPL CREATININE-BSD FRML MDRD: 76 ML/MIN/1.73SQ M
GLUCOSE P FAST SERPL-MCNC: 86 MG/DL (ref 65–99)
HCT VFR BLD AUTO: 38.4 % (ref 36.5–49.3)
HGB BLD-MCNC: 13.7 G/DL (ref 12–17)
IMM GRANULOCYTES # BLD AUTO: 0.01 THOUSAND/UL (ref 0–0.2)
IMM GRANULOCYTES NFR BLD AUTO: 0 % (ref 0–2)
LYMPHOCYTES # BLD AUTO: 2.07 THOUSANDS/ÂΜL (ref 0.6–4.47)
LYMPHOCYTES NFR BLD AUTO: 59 % (ref 14–44)
MCH RBC QN AUTO: 44.5 PG (ref 26.8–34.3)
MCHC RBC AUTO-ENTMCNC: 35.7 G/DL (ref 31.4–37.4)
MCV RBC AUTO: 125 FL (ref 82–98)
MONOCYTES # BLD AUTO: 0.29 THOUSAND/ÂΜL (ref 0.17–1.22)
MONOCYTES NFR BLD AUTO: 8 % (ref 4–12)
NEUTROPHILS # BLD AUTO: 1.11 THOUSANDS/ÂΜL (ref 1.85–7.62)
NEUTS SEG NFR BLD AUTO: 32 % (ref 43–75)
NRBC BLD AUTO-RTO: 0 /100 WBCS
PLATELET # BLD AUTO: 164 THOUSANDS/UL (ref 149–390)
PMV BLD AUTO: 11.1 FL (ref 8.9–12.7)
POTASSIUM SERPL-SCNC: 3.9 MMOL/L (ref 3.5–5.3)
PROT SERPL-MCNC: 7.2 G/DL (ref 6.4–8.4)
RBC # BLD AUTO: 3.08 MILLION/UL (ref 3.88–5.62)
SODIUM SERPL-SCNC: 139 MMOL/L (ref 135–147)
WBC # BLD AUTO: 3.51 THOUSAND/UL (ref 4.31–10.16)

## 2024-06-24 PROCEDURE — 85025 COMPLETE CBC W/AUTO DIFF WBC: CPT

## 2024-06-24 PROCEDURE — 80053 COMPREHEN METABOLIC PANEL: CPT

## 2024-06-24 PROCEDURE — 36415 COLL VENOUS BLD VENIPUNCTURE: CPT

## 2024-06-25 ENCOUNTER — OFFICE VISIT (OUTPATIENT)
Dept: PHYSICAL THERAPY | Facility: CLINIC | Age: 74
End: 2024-06-25
Payer: MEDICARE

## 2024-06-25 DIAGNOSIS — G20.C PARKINSONISM, UNSPECIFIED PARKINSONISM TYPE: Primary | ICD-10-CM

## 2024-06-25 PROCEDURE — 97110 THERAPEUTIC EXERCISES: CPT | Performed by: PHYSICAL THERAPIST

## 2024-06-25 PROCEDURE — 97112 NEUROMUSCULAR REEDUCATION: CPT | Performed by: PHYSICAL THERAPIST

## 2024-06-25 NOTE — PROGRESS NOTES
"Daily Note     Today's date: 2024  Patient name: Kingsley Grider Jr.  : 1950  MRN: [id number]  Referring provider: Evan Díaz DO  Dx:   Encounter Diagnosis     ICD-10-CM    1. Parkinsonism, unspecified Parkinsonism type  G20.C                      Subjective: Tired today.       Objective: See treatment diary below      Assessment: Pt was challenged with hurdles with multiple LOB, demoing multiple LOB, a lot of path deviation, and wide LILLIANA. Trialed boxing but pt appeared to be lacking motivation and lacking power with punches. Patient would benefit from continued PT      Plan: Progress treatment as tolerated.       Insurance:  (no secondary)  Auth # of visits: NA  Expiration date: NA     FOTO: NA     Re-eval Date:     Date    Visit Count 6  3 4 5   FOTO        Pain In        Pain Out        Vitals             Precautions fall risk        Manuals                                        Neuro Re-Ed         LSVT-BIG MDE   Stand trunk rotation  10x R/L    Step back  10x R/L Stand trunk rotation  20x 2 R/L with blazepods    HKM   30'  R/L 10 x ea visual target 40'   10x  Carrying bolster 40' x 4   Sidestepping     Blazepods 20x 2L/R different colors  Blazepods 10x 2 L/R different colors    Hurdles  Fw/lat 8 laps no UE   Hi/lo    Fw/lat 8 laps no UE    BW amb        Step ups         Step taps    Blazepods L/R different colors 20 x 2 6\"     Amb with HT/HN     2x 50'     STS   5x     Dynavision   Amb fwd/retro  10x  Chair/turn nego  2x 60'       Boxing  Jab  Cross  Uppercut  Hook  10x ea     Combos   Jab cross jab  Jab jab hook  10x ea     20 punches, 10 LE taps x 2 bouts    1 min as hard as possible x 2         Education         Ther Ex        Nustep  L4, 10 min for CV endurance   L3, 10 min for CV endurance  L4, 10 min for CV endurance  L4, 10 min for CV endurance                                                            Ther Activity                        Gait Training     "       Big walking  Cues > arm swing, step stride, ground clearance, postural awareness   FOG- 4S's  10 min Big walking 5 min  Blazepods   L/R different colors 5 min            Modalities

## 2024-06-27 ENCOUNTER — OFFICE VISIT (OUTPATIENT)
Dept: PHYSICAL THERAPY | Facility: CLINIC | Age: 74
End: 2024-06-27
Payer: MEDICARE

## 2024-06-27 DIAGNOSIS — G20.C PARKINSONISM, UNSPECIFIED PARKINSONISM TYPE: Primary | ICD-10-CM

## 2024-06-27 PROCEDURE — 97110 THERAPEUTIC EXERCISES: CPT | Performed by: PHYSICAL THERAPIST

## 2024-06-27 PROCEDURE — 97112 NEUROMUSCULAR REEDUCATION: CPT | Performed by: PHYSICAL THERAPIST

## 2024-06-27 NOTE — PROGRESS NOTES
"Daily Note     Today's date: 2024  Patient name: Kingsley Grider Jr.  : 1950  MRN: [id number]  Referring provider: Evan Díaz DO  Dx:   Encounter Diagnosis     ICD-10-CM    1. Parkinsonism, unspecified Parkinsonism type  G20.C                      Subjective: Had MRI done yesterday and saw Dr. Brown (neurologist at [facility]). Per EMR of recent neurology visit, \"The MRI of the brain shows cerebral ventriculomegaly, white matter changes and cortical atrophy. There is also concern about presence of normal pressure hydrocephalus. I have discussed the need to obtain diagnostic evaluation which can include a spinal tap and neurosurgical evaluation. We discussed the management of normal pressure hydrocephalus which include  shunt placement.\"      Objective: See treatment diary below      Assessment: Pt continued to require constant VC to increase step stride B juan manuel during bw amb. He is able to achieve larger amplitude of movements but does not maintain without constant VC. Question carryover since he does not do anything at home. He lacks motivation to complete any type of HEP and pt acknowledges it. Discuss how to incorporate daily walking routine into his life.     Plan: Progress treatment as tolerated.  PN NV.      Insurance:  (no secondary)  Auth # of visits: NA  Expiration date: NA     FOTO: NA     Re-eval Date:     Date        Visit Count 6 7      FOTO        Pain In        Pain Out        Vitals             Precautions fall risk        Manuals                                        Neuro Re-Ed   3# B AW      LSVT-BIG MDE        HKM     Carrying bolster 40' x 4   Sidestepping      Blazepods 10x 2 L/R different colors    Hurdles  Fw/lat 8 laps no UE   Hi/lo Fw/lat 6 laps   Hi/lo   Fw/lat 8 laps no UE    BW amb  40' x 1      Step ups   8\" 20x fw       Step taps        Amb with HT/HN          STS        Dynavision        Boxing  Jab  Cross  Uppercut  Hook  10x ea     Combos   Jab cross " mitzi  Jaelysia jab hook  10x ea     20 punches, 10 LE taps x 2 bouts    1 min as hard as possible x 2         Education         Ther Ex        Nustep  L4, 10 min for CV endurance  L4, 10 min for CV endurance    L4, 10 min for CV endurance                                                            Ther Activity                        Gait Training          Big walking     40' x 2  Big walking  Cues > arm swing, step stride, ground clearance, postural awareness   FOG- 4S's  10 min Big walking 5 min  Blazepods   L/R different colors 5 min            Modalities

## 2024-07-02 ENCOUNTER — EVALUATION (OUTPATIENT)
Dept: PHYSICAL THERAPY | Facility: CLINIC | Age: 74
End: 2024-07-02
Payer: MEDICARE

## 2024-07-02 DIAGNOSIS — G20.C PARKINSONISM, UNSPECIFIED PARKINSONISM TYPE: Primary | ICD-10-CM

## 2024-07-02 PROCEDURE — 97112 NEUROMUSCULAR REEDUCATION: CPT | Performed by: PHYSICAL THERAPIST

## 2024-07-02 NOTE — PROGRESS NOTES
Progress Note     Today's date: 2024  Patient name: Kingsley Grider Jr.  : 1950  MRN: [id number]  Referring provider: Evan Díaz DO  Dx:   Encounter Diagnosis     ICD-10-CM    1. Parkinsonism, unspecified Parkinsonism type  G20.C                      Subjective: Has speech therapy scheduled. Thinks he is doing terrible. Has not attempted to do any walking or HEP at all.       Patient Goals  Patient goal: make it here 1-2x/week  Pain  No pain reported      Objective: See treatment diary below      Assessment: PN completed this session. He had subjective report of feeling like he is doing worse than IE. Compared to IE, he made significant improvement in mobility, balance, and gait speed per all outcome measures. He also demo significant improvement in dual tasking. However, pt has not carried over any of his recommended HEP or walking program at this time and does not appear to be motivated to change. Since he is not motivated to change his lifestyle to add more movement and exercise into his routine, he will be d/c from skilled PT at this time. He is f/u with neurosurgeon in regards to NPH and possible  shunt.       Goals  ST weeks  TUG improve < 12 demo improved mobility and balance - MET  5 x STS improve by 3 sec or > to demo improved LE strength and balance - MET     LT weeks  Mini best improved by 3 points or > to demo improved balance and decreased fall risk - MET   Independent with updated HEP to self manage and maintain progress outside of PT - NOT MET  Initiate gym or silver sneakers - NOT MET        Plan - Discharge      Planned therapy interventions: neuromuscular re-education, balance, coordination, gait training, patient/caregiver education, strengthening, stretching, therapeutic activities, therapeutic exercise and home exercise program    Plan of Care beginning date: 2024  Plan of Care expiration date: 2024  Treatment plan discussed with: PTA and patient        "  Balance Test    6 Minute Walk Test (ft):      2 Minute Walk Test (ft):      Mini Best     Gait Speed (m/s): 0.6 m/s no AD SSV = 0.67 m/s  Fast = 1.2 m/s    5x Sit To Stand (s): 26 sec (took 8 sec to initiate) 14.47 sec    TU.03 no AD  Cog 23.85 sec 10.28 no AD   Cog 14.81         Insurance:  (no secondary)  Auth # of visits: NA  Expiration date: NA     FOTO: NA     Re-eval Date:     Date      Visit Count 6 7 8     FOTO        Pain In        Pain Out        Vitals             Precautions fall risk        Manuals                                        Neuro Re-Ed   3# B AW      LSVT-BIG MDE        HKM     Carrying bolster 40' x 4   Sidestepping      Blazepods 10x 2 L/R different colors    Hurdles  Fw/lat 8 laps no UE   Hi/lo Fw/lat 6 laps   Hi/lo   Fw/lat 8 laps no UE    BW amb  40' x 1      Step ups   8\" 20x fw       Step taps        Amb with HT/HN          STS        Dynavision        Boxing  Jab  Cross  Uppercut  Hook  10x ea     Combos   Jab cross jab  Jab jab hook  10x ea     20 punches, 10 LE taps x 2 bouts    1 min as hard as possible x 2         Education         Ther Ex        Nustep  L4, 10 min for CV endurance  L4, 10 min for CV endurance  L5, 10 min for CV endurance   L4, 10 min for CV endurance                                                            Ther Activity                        Gait Training          Big walking     40' x 2   Big walking 5 min  Blazepods   L/R different colors 5 min            Modalities                                             "

## 2024-07-09 ENCOUNTER — APPOINTMENT (OUTPATIENT)
Dept: PHYSICAL THERAPY | Facility: CLINIC | Age: 74
End: 2024-07-09
Payer: MEDICARE

## 2024-07-11 ENCOUNTER — APPOINTMENT (OUTPATIENT)
Dept: PHYSICAL THERAPY | Facility: CLINIC | Age: 74
End: 2024-07-11
Payer: MEDICARE

## 2024-07-15 ENCOUNTER — APPOINTMENT (OUTPATIENT)
Dept: PHYSICAL THERAPY | Facility: CLINIC | Age: 74
End: 2024-07-15
Payer: MEDICARE

## 2024-07-15 ENCOUNTER — APPOINTMENT (OUTPATIENT)
Age: 74
End: 2024-07-15
Payer: MEDICARE

## 2024-07-15 DIAGNOSIS — D45 POLYCYTHEMIA VERA (HCC): ICD-10-CM

## 2024-07-15 LAB
ALBUMIN SERPL BCG-MCNC: 4 G/DL (ref 3.5–5)
ALP SERPL-CCNC: 83 U/L (ref 34–104)
ALT SERPL W P-5'-P-CCNC: 14 U/L (ref 7–52)
ANION GAP SERPL CALCULATED.3IONS-SCNC: 9 MMOL/L (ref 4–13)
AST SERPL W P-5'-P-CCNC: 15 U/L (ref 13–39)
BASOPHILS # BLD AUTO: 0.02 THOUSANDS/ÂΜL (ref 0–0.1)
BASOPHILS NFR BLD AUTO: 1 % (ref 0–1)
BILIRUB SERPL-MCNC: 0.81 MG/DL (ref 0.2–1)
BUN SERPL-MCNC: 13 MG/DL (ref 5–25)
CALCIUM SERPL-MCNC: 9.6 MG/DL (ref 8.4–10.2)
CHLORIDE SERPL-SCNC: 103 MMOL/L (ref 96–108)
CO2 SERPL-SCNC: 27 MMOL/L (ref 21–32)
CREAT SERPL-MCNC: 0.93 MG/DL (ref 0.6–1.3)
EOSINOPHIL # BLD AUTO: 0.01 THOUSAND/ÂΜL (ref 0–0.61)
EOSINOPHIL NFR BLD AUTO: 0 % (ref 0–6)
ERYTHROCYTE [DISTWIDTH] IN BLOOD BY AUTOMATED COUNT: 12.3 % (ref 11.6–15.1)
GFR SERPL CREATININE-BSD FRML MDRD: 81 ML/MIN/1.73SQ M
GLUCOSE P FAST SERPL-MCNC: 113 MG/DL (ref 65–99)
HCT VFR BLD AUTO: 37.2 % (ref 36.5–49.3)
HGB BLD-MCNC: 13.3 G/DL (ref 12–17)
IMM GRANULOCYTES # BLD AUTO: 0.02 THOUSAND/UL (ref 0–0.2)
IMM GRANULOCYTES NFR BLD AUTO: 1 % (ref 0–2)
LYMPHOCYTES # BLD AUTO: 1.87 THOUSANDS/ÂΜL (ref 0.6–4.47)
LYMPHOCYTES NFR BLD AUTO: 52 % (ref 14–44)
MCH RBC QN AUTO: 43.9 PG (ref 26.8–34.3)
MCHC RBC AUTO-ENTMCNC: 35.8 G/DL (ref 31.4–37.4)
MCV RBC AUTO: 123 FL (ref 82–98)
MONOCYTES # BLD AUTO: 0.25 THOUSAND/ÂΜL (ref 0.17–1.22)
MONOCYTES NFR BLD AUTO: 7 % (ref 4–12)
NEUTROPHILS # BLD AUTO: 1.42 THOUSANDS/ÂΜL (ref 1.85–7.62)
NEUTS SEG NFR BLD AUTO: 39 % (ref 43–75)
NRBC BLD AUTO-RTO: 0 /100 WBCS
PLATELET # BLD AUTO: 147 THOUSANDS/UL (ref 149–390)
PMV BLD AUTO: 10.8 FL (ref 8.9–12.7)
POTASSIUM SERPL-SCNC: 3.8 MMOL/L (ref 3.5–5.3)
PROT SERPL-MCNC: 6.7 G/DL (ref 6.4–8.4)
RBC # BLD AUTO: 3.03 MILLION/UL (ref 3.88–5.62)
SODIUM SERPL-SCNC: 139 MMOL/L (ref 135–147)
WBC # BLD AUTO: 3.59 THOUSAND/UL (ref 4.31–10.16)

## 2024-07-15 PROCEDURE — 85025 COMPLETE CBC W/AUTO DIFF WBC: CPT

## 2024-07-15 PROCEDURE — 80053 COMPREHEN METABOLIC PANEL: CPT

## 2024-07-15 PROCEDURE — 36415 COLL VENOUS BLD VENIPUNCTURE: CPT

## 2024-07-16 ENCOUNTER — APPOINTMENT (OUTPATIENT)
Dept: PHYSICAL THERAPY | Facility: CLINIC | Age: 74
End: 2024-07-16
Payer: MEDICARE

## 2024-07-23 ENCOUNTER — APPOINTMENT (OUTPATIENT)
Dept: PHYSICAL THERAPY | Facility: CLINIC | Age: 74
End: 2024-07-23
Payer: MEDICARE

## 2024-07-25 ENCOUNTER — APPOINTMENT (OUTPATIENT)
Dept: PHYSICAL THERAPY | Facility: CLINIC | Age: 74
End: 2024-07-25
Payer: MEDICARE

## 2024-07-29 ENCOUNTER — APPOINTMENT (OUTPATIENT)
Dept: SPEECH THERAPY | Facility: CLINIC | Age: 74
End: 2024-07-29
Payer: MEDICARE

## 2024-07-29 NOTE — PROGRESS NOTES
Speech Language Pathology Evaluation  Today's date: 2024  Patient name: Kingsley Grider Jr.    : 1950        Referring provider: Evan Díaz DO  Dx: No diagnosis found.               Subjective Comments: Patient is a 71 y/o male with PMHx significant for Parkinson's Disease. Patient also has upcoming appointment schedule for Physical Therapy services at this location as well. Last seen by SLP services for cognitive-linguistic therapy at this location 10/27/22- 22.  Safety Measures: fall risk    Reason for Referral:Change in cognitive status  Prior Functional Status:Communication appropriate and efficient in most situations. Minimal difficulty with self-monitoring, self-correction needed  Medical History significant for:   Past Medical History:   Diagnosis Date    Anxiety     Benign hypertension     Cancer (HCC)     prostate    Coronary artery disease     ABRAM to LAD in     History of cardiovascular stress test 2016    Normal EF, normal study.    History of echocardiogram 2015    EF normal.      Ischemic cardiomyopathy     resolved    Mixed hyperlipidemia     Myocardial infarction (HCC)     Parkinson disease     Prostate cancer ([facility])     surgically removed    STEMI (ST elevation myocardial infarction) ([facility])      Clinically Complex Situations:{CLINICALLY COMPLEX SITUATIONS:1477951}    Hearing:Other bilateral hearing aids  Vision:{VISION:6917540}  Medication List:   Current Outpatient Medications   Medication Sig Dispense Refill    aspirin (ECOTRIN LOW STRENGTH) 81 mg EC tablet Take 81 mg by mouth daily      atorvastatin (LIPITOR) 80 mg tablet TAKE ONE TABLET DAILY... 90 tablet 0    carbidopa-levodopa (SINEMET CR)  mg TBCR per ER tablet Take 1 tablet by mouth 3 (three) times a day 270 tablet 1    hydroxyurea (HYDREA) 500 mg capsule Take 1 capsule (500 mg total) by mouth daily 30 capsule 11    PARoxetine (PAXIL) 20 mg tablet TAKE ONE TABLET  DAILY... 90 tablet 3    vitamin B-12 (VITAMIN B-12) 1,000 mcg tablet Take 1 tablet (1,000 mcg total) by mouth daily       No current facility-administered medications for this visit.     Allergies:   Allergies   Allergen Reactions    Carbidopa-Levodopa-Entacapone Confusion     Patient denies allergy to this medication     Primary Language: English  Preferred Language: English     Home Environment/ Lifestyle:***  previously lived alone   Vocational status: Retired; boat rental business owner  Highest Level of Education: Graduated high school, college for [employer]  Current / Prior Services being received: Physical Therapy and Speech Therapy Outpatient rehab       Mental Status: Alert  Behavior Status:{BEHAVIOR STATUS:3008242}  Communication Modalities: Verbal  Recent Speech/ Language therapy:{RECENT SPEECH/LANG THERAPY:5680748}  Rehabilitation Prognosis:Good rehab potential to reach and maintain prior level of function    Assessments:Cognitive Assessment    Concussion Checklist (patient identified the following areas as areas of difficulty)  Memory {MEMORY:6418252}, Attention {ATTENTION:3244611}, Processing {PROCESSIN}, Executive Functions {EXECUTIVE FUNCTIONS:2621549}, Communication {COMMUNICATION:0077985}, Visual {VISUAL:1771666}, and Emotional {EMOTIONAL:2611651}    Increased Sensitivities to {INCREASED SENSITIVITIES To:2602718}  Standardized testing   The Repeatable Battery for the Assessment of Neuropsychological Status (RBANS) is a brief, individually-administered assessment which measures attention, language, visuospatial/constructional abilities, and immediate & delayed memory. The RBANS is intended for use with adolescents to adults, ages 12 to 89 years. The following results were obtained during the administration of the assessment.    Form: ***    Cognitive Domain/Subtest: Index Score: Percentile Rank: Classification: IE Index Score: Status:   IMMEDIATE MEMORY *** ***%ile {RBANS  Classifications:96007} *** {Improvement:35109}        1. List Learning (***/40)          2. Story Memory (***/24)           VISUOSPATIAL/  CONSTRUCTIONAL *** ***%ile {RBANS Classifications:91773} *** {Improvement:40567}        3. Figure Copy (***/20)          4. Line Orientation (***/20)           LANGUAGE *** ***%ile {RBANS Classifications:85995} *** {Improvement:46925}        5. Picture Naming (***/10)          6. Semantic Fluency (***/40)           ATTENTION *** ***%ile {RBANS Classifications:85478} *** {Improvement:33550}        7. Digit Span (***/16)          8. Coding (***/89)           DELAYED MEMORY *** ***%ile {RBANS Classifications:50880} *** {Improvement:53055}        9. List Recall (***/10)          10. List Recognition (***/20)          11. Story Recall (***/12)          12. Figure Recall (***/20)           Sum of Index Scores:  ***  *** {Improvement:40130}   Total Score:  ***      Percentile: ***%ile      Classification: {RBANS Classifications:80982}          “IE” indicates the scores from the initial evaluation (***). Form: ***        Goals  Short Term Goals:***  Long Term Goals:***        Impressions/ Recommendations  Impressions:***    Recommendations:   Patients would benefit from: {BENEFIT FROM:8271221}   Frequency:{FREQUENCY:8958676}   Duration:{DURATION:2100142}    Intervention certification from:***  Intervention certification to:***  Intervention Comments:***

## 2024-07-30 ENCOUNTER — APPOINTMENT (OUTPATIENT)
Dept: PHYSICAL THERAPY | Facility: CLINIC | Age: 74
End: 2024-07-30
Payer: MEDICARE

## 2024-08-12 ENCOUNTER — APPOINTMENT (OUTPATIENT)
Age: 74
End: 2024-08-12
Payer: MEDICARE

## 2024-08-12 DIAGNOSIS — D45 POLYCYTHEMIA VERA (HCC): ICD-10-CM

## 2024-08-12 LAB
ALBUMIN SERPL BCG-MCNC: 4.1 G/DL (ref 3.5–5)
ALP SERPL-CCNC: 86 U/L (ref 34–104)
ALT SERPL W P-5'-P-CCNC: 11 U/L (ref 7–52)
ANION GAP SERPL CALCULATED.3IONS-SCNC: 7 MMOL/L (ref 4–13)
AST SERPL W P-5'-P-CCNC: 14 U/L (ref 13–39)
BASOPHILS # BLD AUTO: 0.02 THOUSANDS/ÂΜL (ref 0–0.1)
BASOPHILS NFR BLD AUTO: 1 % (ref 0–1)
BILIRUB SERPL-MCNC: 0.96 MG/DL (ref 0.2–1)
BUN SERPL-MCNC: 16 MG/DL (ref 5–25)
CALCIUM SERPL-MCNC: 9.6 MG/DL (ref 8.4–10.2)
CHLORIDE SERPL-SCNC: 103 MMOL/L (ref 96–108)
CO2 SERPL-SCNC: 28 MMOL/L (ref 21–32)
CREAT SERPL-MCNC: 0.99 MG/DL (ref 0.6–1.3)
EOSINOPHIL # BLD AUTO: 0.01 THOUSAND/ÂΜL (ref 0–0.61)
EOSINOPHIL NFR BLD AUTO: 0 % (ref 0–6)
ERYTHROCYTE [DISTWIDTH] IN BLOOD BY AUTOMATED COUNT: 13 % (ref 11.6–15.1)
GFR SERPL CREATININE-BSD FRML MDRD: 75 ML/MIN/1.73SQ M
GLUCOSE P FAST SERPL-MCNC: 91 MG/DL (ref 65–99)
HCT VFR BLD AUTO: 37.9 % (ref 36.5–49.3)
HGB BLD-MCNC: 13 G/DL (ref 12–17)
IMM GRANULOCYTES # BLD AUTO: 0.01 THOUSAND/UL (ref 0–0.2)
IMM GRANULOCYTES NFR BLD AUTO: 0 % (ref 0–2)
LYMPHOCYTES # BLD AUTO: 1.8 THOUSANDS/ÂΜL (ref 0.6–4.47)
LYMPHOCYTES NFR BLD AUTO: 50 % (ref 14–44)
MCH RBC QN AUTO: 42.9 PG (ref 26.8–34.3)
MCHC RBC AUTO-ENTMCNC: 34.3 G/DL (ref 31.4–37.4)
MCV RBC AUTO: 125 FL (ref 82–98)
MONOCYTES # BLD AUTO: 0.32 THOUSAND/ÂΜL (ref 0.17–1.22)
MONOCYTES NFR BLD AUTO: 9 % (ref 4–12)
NEUTROPHILS # BLD AUTO: 1.42 THOUSANDS/ÂΜL (ref 1.85–7.62)
NEUTS SEG NFR BLD AUTO: 40 % (ref 43–75)
NRBC BLD AUTO-RTO: 0 /100 WBCS
PLATELET # BLD AUTO: 152 THOUSANDS/UL (ref 149–390)
PMV BLD AUTO: 10.8 FL (ref 8.9–12.7)
POTASSIUM SERPL-SCNC: 3.8 MMOL/L (ref 3.5–5.3)
PROT SERPL-MCNC: 7.2 G/DL (ref 6.4–8.4)
RBC # BLD AUTO: 3.03 MILLION/UL (ref 3.88–5.62)
SODIUM SERPL-SCNC: 138 MMOL/L (ref 135–147)
WBC # BLD AUTO: 3.58 THOUSAND/UL (ref 4.31–10.16)

## 2024-08-12 PROCEDURE — 36415 COLL VENOUS BLD VENIPUNCTURE: CPT

## 2024-08-12 PROCEDURE — 80053 COMPREHEN METABOLIC PANEL: CPT

## 2024-08-12 PROCEDURE — 85025 COMPLETE CBC W/AUTO DIFF WBC: CPT

## 2024-09-16 ENCOUNTER — APPOINTMENT (OUTPATIENT)
Age: 74
End: 2024-09-16
Payer: MEDICARE

## 2024-09-16 DIAGNOSIS — D45 POLYCYTHEMIA VERA (HCC): ICD-10-CM

## 2024-09-16 DIAGNOSIS — I25.10 CORONARY ARTERY DISEASE INVOLVING NATIVE CORONARY ARTERY OF NATIVE HEART WITHOUT ANGINA PECTORIS: ICD-10-CM

## 2024-09-16 LAB
ALBUMIN SERPL BCG-MCNC: 4.1 G/DL (ref 3.5–5)
ALP SERPL-CCNC: 93 U/L (ref 34–104)
ALT SERPL W P-5'-P-CCNC: 8 U/L (ref 7–52)
ANION GAP SERPL CALCULATED.3IONS-SCNC: 11 MMOL/L (ref 4–13)
AST SERPL W P-5'-P-CCNC: 14 U/L (ref 13–39)
BASOPHILS # BLD AUTO: 0.02 THOUSANDS/ΜL (ref 0–0.1)
BASOPHILS NFR BLD AUTO: 1 % (ref 0–1)
BILIRUB SERPL-MCNC: 0.68 MG/DL (ref 0.2–1)
BUN SERPL-MCNC: 12 MG/DL (ref 5–25)
CALCIUM SERPL-MCNC: 9.4 MG/DL (ref 8.4–10.2)
CHLORIDE SERPL-SCNC: 101 MMOL/L (ref 96–108)
CO2 SERPL-SCNC: 27 MMOL/L (ref 21–32)
CREAT SERPL-MCNC: 0.95 MG/DL (ref 0.6–1.3)
EOSINOPHIL # BLD AUTO: 0.01 THOUSAND/ΜL (ref 0–0.61)
EOSINOPHIL NFR BLD AUTO: 0 % (ref 0–6)
ERYTHROCYTE [DISTWIDTH] IN BLOOD BY AUTOMATED COUNT: 13 % (ref 11.6–15.1)
GFR SERPL CREATININE-BSD FRML MDRD: 79 ML/MIN/1.73SQ M
GLUCOSE P FAST SERPL-MCNC: 84 MG/DL (ref 65–99)
HCT VFR BLD AUTO: 37.2 % (ref 36.5–49.3)
HGB BLD-MCNC: 13.3 G/DL (ref 12–17)
IMM GRANULOCYTES # BLD AUTO: 0.01 THOUSAND/UL (ref 0–0.2)
IMM GRANULOCYTES NFR BLD AUTO: 0 % (ref 0–2)
LYMPHOCYTES # BLD AUTO: 1.97 THOUSANDS/ΜL (ref 0.6–4.47)
LYMPHOCYTES NFR BLD AUTO: 57 % (ref 14–44)
MCH RBC QN AUTO: 45.1 PG (ref 26.8–34.3)
MCHC RBC AUTO-ENTMCNC: 35.8 G/DL (ref 31.4–37.4)
MCV RBC AUTO: 126 FL (ref 82–98)
MONOCYTES # BLD AUTO: 0.28 THOUSAND/ΜL (ref 0.17–1.22)
MONOCYTES NFR BLD AUTO: 8 % (ref 4–12)
NEUTROPHILS # BLD AUTO: 1.18 THOUSANDS/ΜL (ref 1.85–7.62)
NEUTS SEG NFR BLD AUTO: 34 % (ref 43–75)
NRBC BLD AUTO-RTO: 0 /100 WBCS
PLATELET # BLD AUTO: 145 THOUSANDS/UL (ref 149–390)
PMV BLD AUTO: 11 FL (ref 8.9–12.7)
POTASSIUM SERPL-SCNC: 3.6 MMOL/L (ref 3.5–5.3)
PROT SERPL-MCNC: 7.1 G/DL (ref 6.4–8.4)
RBC # BLD AUTO: 2.95 MILLION/UL (ref 3.88–5.62)
SODIUM SERPL-SCNC: 139 MMOL/L (ref 135–147)
WBC # BLD AUTO: 3.47 THOUSAND/UL (ref 4.31–10.16)

## 2024-09-16 PROCEDURE — 80053 COMPREHEN METABOLIC PANEL: CPT

## 2024-09-16 PROCEDURE — 36415 COLL VENOUS BLD VENIPUNCTURE: CPT

## 2024-09-16 PROCEDURE — 85025 COMPLETE CBC W/AUTO DIFF WBC: CPT

## 2024-09-17 RX ORDER — ATORVASTATIN CALCIUM 80 MG/1
80 TABLET, FILM COATED ORAL DAILY
Qty: 90 TABLET | Refills: 1 | Status: SHIPPED | OUTPATIENT
Start: 2024-09-17

## 2024-09-25 ENCOUNTER — TELEPHONE (OUTPATIENT)
Age: 74
End: 2024-09-25

## 2024-09-25 NOTE — TELEPHONE ENCOUNTER
"Hello,    The following message was sent via e-mail to the leadership team:     Please advise if you can help facilitate the following overbook request:    Patient Name: Kingsley Grider    Patient MRN: [id number]    Call back #: [phone]    Insurance: Medicare A&B    Department:Cardiology    Speciality: General Cardiology    Reason for overbook request: CARDIAC CLEARANCE PRIOR TO PROCEDURE (14-30 DAYS PRIOR TO PROCEDURE)    Comments (Write \"N/a\" if no comments): Patient is undergoing a neurological shunt surgery on 10/17/24 and will need cardiac clearance prior to surgery. Patient's daughter called to make a pre op clearance appointment for the patient.     Requested doctor and location: Dr. Hodges/ [facility]    Date of current appointment: 10/31/24      Thank you,  Margarita Li    "

## 2024-09-26 ENCOUNTER — APPOINTMENT (OUTPATIENT)
Age: 74
End: 2024-09-26
Payer: MEDICARE

## 2024-09-26 DIAGNOSIS — Z79.82 ENCOUNTER FOR LONG-TERM (CURRENT) USE OF ASPIRIN: ICD-10-CM

## 2024-09-26 DIAGNOSIS — G91.2 NPH (NORMAL PRESSURE HYDROCEPHALUS) (HCC): ICD-10-CM

## 2024-09-26 DIAGNOSIS — Z01.818 OTHER SPECIFIED PRE-OPERATIVE EXAMINATION: ICD-10-CM

## 2024-09-26 LAB
ANION GAP SERPL CALCULATED.3IONS-SCNC: 9 MMOL/L (ref 4–13)
APTT PPP: 34 SECONDS (ref 23–34)
BASOPHILS # BLD AUTO: 0.03 THOUSANDS/ΜL (ref 0–0.1)
BASOPHILS NFR BLD AUTO: 1 % (ref 0–1)
BUN SERPL-MCNC: 16 MG/DL (ref 5–25)
CALCIUM SERPL-MCNC: 9.4 MG/DL (ref 8.4–10.2)
CHLORIDE SERPL-SCNC: 103 MMOL/L (ref 96–108)
CO2 SERPL-SCNC: 27 MMOL/L (ref 21–32)
CREAT SERPL-MCNC: 0.99 MG/DL (ref 0.6–1.3)
EOSINOPHIL # BLD AUTO: 0.01 THOUSAND/ΜL (ref 0–0.61)
EOSINOPHIL NFR BLD AUTO: 0 % (ref 0–6)
ERYTHROCYTE [DISTWIDTH] IN BLOOD BY AUTOMATED COUNT: 13.1 % (ref 11.6–15.1)
GFR SERPL CREATININE-BSD FRML MDRD: 75 ML/MIN/1.73SQ M
GLUCOSE P FAST SERPL-MCNC: 91 MG/DL (ref 65–99)
HCT VFR BLD AUTO: 36.6 % (ref 36.5–49.3)
HGB BLD-MCNC: 13 G/DL (ref 12–17)
IMM GRANULOCYTES # BLD AUTO: 0.03 THOUSAND/UL (ref 0–0.2)
IMM GRANULOCYTES NFR BLD AUTO: 1 % (ref 0–2)
INR PPP: 1 (ref 0.85–1.19)
LYMPHOCYTES # BLD AUTO: 1.54 THOUSANDS/ΜL (ref 0.6–4.47)
LYMPHOCYTES NFR BLD AUTO: 40 % (ref 14–44)
MCH RBC QN AUTO: 44.5 PG (ref 26.8–34.3)
MCHC RBC AUTO-ENTMCNC: 35.5 G/DL (ref 31.4–37.4)
MCV RBC AUTO: 125 FL (ref 82–98)
MONOCYTES # BLD AUTO: 0.27 THOUSAND/ΜL (ref 0.17–1.22)
MONOCYTES NFR BLD AUTO: 7 % (ref 4–12)
NEUTROPHILS # BLD AUTO: 1.98 THOUSANDS/ΜL (ref 1.85–7.62)
NEUTS SEG NFR BLD AUTO: 51 % (ref 43–75)
NRBC BLD AUTO-RTO: 0 /100 WBCS
PLATELET # BLD AUTO: 133 THOUSANDS/UL (ref 149–390)
PMV BLD AUTO: 11 FL (ref 8.9–12.7)
POTASSIUM SERPL-SCNC: 3.8 MMOL/L (ref 3.5–5.3)
PROTHROMBIN TIME: 13.5 SECONDS (ref 12.3–15)
RBC # BLD AUTO: 2.92 MILLION/UL (ref 3.88–5.62)
SODIUM SERPL-SCNC: 139 MMOL/L (ref 135–147)
WBC # BLD AUTO: 3.86 THOUSAND/UL (ref 4.31–10.16)

## 2024-09-26 PROCEDURE — 36415 COLL VENOUS BLD VENIPUNCTURE: CPT

## 2024-09-26 PROCEDURE — 85730 THROMBOPLASTIN TIME PARTIAL: CPT

## 2024-09-26 PROCEDURE — 85610 PROTHROMBIN TIME: CPT

## 2024-09-26 PROCEDURE — 80048 BASIC METABOLIC PNL TOTAL CA: CPT

## 2024-09-26 PROCEDURE — 85025 COMPLETE CBC W/AUTO DIFF WBC: CPT

## 2024-10-01 ENCOUNTER — OFFICE VISIT (OUTPATIENT)
Dept: CARDIOLOGY CLINIC | Facility: CLINIC | Age: 74
End: 2024-10-01
Payer: MEDICARE

## 2024-10-01 VITALS
HEART RATE: 88 BPM | BODY MASS INDEX: 26.83 KG/M2 | OXYGEN SATURATION: 93 % | HEIGHT: 68 IN | SYSTOLIC BLOOD PRESSURE: 128 MMHG | RESPIRATION RATE: 16 BRPM | DIASTOLIC BLOOD PRESSURE: 82 MMHG | WEIGHT: 177 LBS

## 2024-10-01 DIAGNOSIS — E78.2 MIXED HYPERLIPIDEMIA: ICD-10-CM

## 2024-10-01 DIAGNOSIS — I25.119 CORONARY ARTERY DISEASE INVOLVING NATIVE CORONARY ARTERY OF NATIVE HEART WITH ANGINA PECTORIS (HCC): Primary | ICD-10-CM

## 2024-10-01 DIAGNOSIS — I25.10 CORONARY ARTERY DISEASE INVOLVING NATIVE CORONARY ARTERY OF NATIVE HEART WITHOUT ANGINA PECTORIS: ICD-10-CM

## 2024-10-01 DIAGNOSIS — Z01.810 PRE-OPERATIVE CARDIOVASCULAR EXAMINATION: ICD-10-CM

## 2024-10-01 PROCEDURE — 93000 ELECTROCARDIOGRAM COMPLETE: CPT | Performed by: INTERNAL MEDICINE

## 2024-10-01 PROCEDURE — 99214 OFFICE O/P EST MOD 30 MIN: CPT | Performed by: INTERNAL MEDICINE

## 2024-10-01 NOTE — PROGRESS NOTES
Patient ID: Kingsley Grider Jr. is a 74 y.o. male.        Plan:      Assessment & Plan  Coronary artery disease involving native coronary artery of native heart with angina pectoris (HCC)    Coronary artery disease involving native coronary artery of native heart without angina pectoris  No current symptoms.  Mixed hyperlipidemia  Tolerating high-intensity statin therapy and will continue current regimen.    Pre-operative cardiovascular examination  OK to proceed to shunt surgery at reasonable risk from cardiac perspective.      Follow up Plan/Other summary comments:  1 year follow-up.    HPI: Patient seen in follow-up today and for preop evaluation.  Apparently he has hydrocephalus and shunt surgery is planned at [facility].  There has been no chest pain or chest pressure since his last visit with me.  Ambulation is somewhat difficult but no change in exertional capacity.  No syncope or near syncope.    To reiterate, in December of 2014 he underwent heart catheterization which revealed a 50% left main stem stenosis as well as 95% lad stenosis. Drug-eluting stent of both lesions was performed. He has had no chest pain or chest pressure since then.   Results for orders placed or performed in visit on 10/01/24   POCT ECG    Impression    NSR. T wave inversions anteriorly similar to 12/26/2023.         Most recent or relevant cardiac/vascular testing:    Echo 4/2018 - EF >55%. Mild MR   Stress echo 1/2016 - normal EF, no evidence of ischemia   Cardiac cath 12/2014- 50% left main, 95% LAD, 40% CX. EF 40%  Myoview 12/29/2023: Nl LVEF. Can't exclude small prior inferobasal MI.      Past Surgical History:   Procedure Laterality Date    CORONARY ANGIOPLASTY WITH STENT PLACEMENT  12/26/2014    EF 40%, ABRAM to LAD.    FL LUMBAR PUNCTURE DIAGNOSTIC  7/31/2024    INCISIONAL HERNIA REPAIR      MS SURGICAL ARTHROSCOPY SHOULDER W/ROTATOR CUFF RPR Right 2/24/2021    Procedure: SHOULDER ARTHROSCOPY WITH ROTATOR CUFF REPAIR and  "acromioplasty;  Surgeon: Rene Valdez DO;  Location:  MAIN OR;  Service: Orthopedics    PROSTATECTOMY  2014    for cancer    TONSILLECTOMY         Lipid Profile: Reviewed      Review of Systems   10  point ROS  was otherwise non pertinent or negative except as per HPI or as below.   Gait: Wide-based.          Objective:     /82 (BP Location: Left arm, Patient Position: Sitting, Cuff Size: Standard)   Pulse 88   Resp 16   Ht 5' 7.72\" (1.72 m)   Wt 80.3 kg (177 lb)   SpO2 93%   BMI 27.14 kg/m²     PHYSICAL EXAM:    General:  Normal appearance in no distress.  Eyes:  Anicteric.  Oral mucosa:  Moist.  Neck:  No JVD. Carotid upstrokes are brisk without bruits.  No masses.  Chest:  Clear to auscultation.  Cardiac:  No palpable PMI.  Normal S1 and S2.  No murmur gallop or rub.  Abdomen:  Soft and nontender. No palpable organomegaly or aortic enlargement.  Extremities:  No peripheral edema.  Musculoskeletal:  Symmetric.   Vascular:  Femoral pulses are brisk without bruits.  Popliteal pulses are intact bilaterally.   Pedal pulses are intact.  Neuro:  Resting tremor  in the hands right worse than left.  Gait is wide-based.  Psych:  Alert and oriented x3.      Meds reviewed.    Past Medical History:   Diagnosis Date    Anxiety     Benign hypertension     Cancer (HCC)     prostate    Coronary artery disease     ABRAM to LAD in 2014    History of cardiovascular stress test 01/21/2016    Normal EF, normal study.    History of echocardiogram 05/05/2015    EF normal.      Ischemic cardiomyopathy     resolved    Mixed hyperlipidemia     Myocardial infarction (HCC)     Parkinson disease (HCC)     Prostate cancer (HCC)     surgically removed    STEMI (ST elevation myocardial infarction) ([facility]) 2014           Social History     Tobacco Use   Smoking Status Never   Smokeless Tobacco Never             "

## 2024-10-01 NOTE — LETTER
October 1, 2024     Rolf Ya MD  [address]  [address]  [facility] [address]    Patient: Kingsley Grider Jr.   YOB: 1950   Date of Visit: 10/1/2024       Dear Dr. Ya:    Thank you for referring Kingsley Grider to me for evaluation. Below are my notes for this consultation.    If you have questions, please do not hesitate to call me. I look forward to following your patient along with you.         Sincerely,        Saul Hodges MD        CC: No Recipients    Saul Hodges MD  10/1/2024  4:16 PM  Sign when Signing Visit   Patient ID: Kingsley Grider Jr. is a 74 y.o. male.        Plan:      Assessment & Plan  Coronary artery disease involving native coronary artery of native heart with angina pectoris (HCC)    Coronary artery disease involving native coronary artery of native heart without angina pectoris  No current symptoms.  Mixed hyperlipidemia  Tolerating high-intensity statin therapy and will continue current regimen.    Pre-operative cardiovascular examination  OK to proceed to shunt surgery at reasonable risk from cardiac perspective.      Follow up Plan/Other summary comments:  1 year follow-up.    HPI: Patient seen in follow-up today and for preop evaluation.  Apparently he has hydrocephalus and shunt surgery is planned at [facility].  There has been no chest pain or chest pressure since his last visit with me.  Ambulation is somewhat difficult but no change in exertional capacity.  No syncope or near syncope.    To reiterate, in December of 2014 he underwent heart catheterization which revealed a 50% left main stem stenosis as well as 95% lad stenosis. Drug-eluting stent of both lesions was performed. He has had no chest pain or chest pressure since then.   Results for orders placed or performed in visit on 10/01/24   POCT ECG    Impression    NSR. T wave inversions anteriorly similar to 12/26/2023.         Most recent or relevant cardiac/vascular testing:   "  Echo 4/2018 - EF >55%. Mild MR   Stress echo 1/2016 - normal EF, no evidence of ischemia   Cardiac cath 12/2014- 50% left main, 95% LAD, 40% CX. EF 40%  Myoview 12/29/2023: Nl LVEF. Can't exclude small prior inferobasal MI.      Past Surgical History:   Procedure Laterality Date   • CORONARY ANGIOPLASTY WITH STENT PLACEMENT  12/26/2014    EF 40%, ABRAM to LAD.   • FL LUMBAR PUNCTURE DIAGNOSTIC  7/31/2024   • INCISIONAL HERNIA REPAIR     • CO SURGICAL ARTHROSCOPY SHOULDER W/ROTATOR CUFF RPR Right 2/24/2021    Procedure: SHOULDER ARTHROSCOPY WITH ROTATOR CUFF REPAIR and acromioplasty;  Surgeon: Rene Valdez DO;  Location: [facility] OR;  Service: Orthopedics   • PROSTATECTOMY  2014    for cancer   • TONSILLECTOMY         Lipid Profile: Reviewed      Review of Systems   10  point ROS  was otherwise non pertinent or negative except as per HPI or as below.   Gait: Wide-based.          Objective:     /82 (BP Location: Left arm, Patient Position: Sitting, Cuff Size: Standard)   Pulse 88   Resp 16   Ht 5' 7.72\" (1.72 m)   Wt 80.3 kg (177 lb)   SpO2 93%   BMI 27.14 kg/m²     PHYSICAL EXAM:    General:  Normal appearance in no distress.  Eyes:  Anicteric.  Oral mucosa:  Moist.  Neck:  No JVD. Carotid upstrokes are brisk without bruits.  No masses.  Chest:  Clear to auscultation.  Cardiac:  No palpable PMI.  Normal S1 and S2.  No murmur gallop or rub.  Abdomen:  Soft and nontender. No palpable organomegaly or aortic enlargement.  Extremities:  No peripheral edema.  Musculoskeletal:  Symmetric.   Vascular:  Femoral pulses are brisk without bruits.  Popliteal pulses are intact bilaterally.   Pedal pulses are intact.  Neuro:  Resting tremor  in the hands right worse than left.  Gait is wide-based.  Psych:  Alert and oriented x3.      Meds reviewed.    Past Medical History:   Diagnosis Date   • Anxiety    • Benign hypertension    • Cancer (HCC)     prostate   • Coronary artery disease     ABRAM to LAD in 2014   • History of " cardiovascular stress test 01/21/2016    Normal EF, normal study.   • History of echocardiogram 05/05/2015    EF normal.     • Ischemic cardiomyopathy     resolved   • Mixed hyperlipidemia    • Myocardial infarction (HCC)    • Parkinson disease (HCC)    • Prostate cancer (HCC)     surgically removed   • STEMI (ST elevation myocardial infarction) (HCC) 2014           Social History     Tobacco Use   Smoking Status Never   Smokeless Tobacco Never

## 2024-10-03 ENCOUNTER — CONSULT (OUTPATIENT)
Dept: FAMILY MEDICINE CLINIC | Facility: CLINIC | Age: 74
End: 2024-10-03
Payer: MEDICARE

## 2024-10-03 VITALS
HEIGHT: 68 IN | BODY MASS INDEX: 26.37 KG/M2 | DIASTOLIC BLOOD PRESSURE: 70 MMHG | SYSTOLIC BLOOD PRESSURE: 118 MMHG | WEIGHT: 174 LBS | HEART RATE: 110 BPM | OXYGEN SATURATION: 97 %

## 2024-10-03 DIAGNOSIS — G20.A2 PARKINSON'S DISEASE WITHOUT DYSKINESIA, WITH FLUCTUATING MANIFESTATIONS (HCC): ICD-10-CM

## 2024-10-03 DIAGNOSIS — Z01.818 PRE-OPERATIVE EXAMINATION FOR INTERNAL MEDICINE: Primary | ICD-10-CM

## 2024-10-03 DIAGNOSIS — Z23 ENCOUNTER FOR IMMUNIZATION: ICD-10-CM

## 2024-10-03 DIAGNOSIS — D45 POLYCYTHEMIA VERA (HCC): ICD-10-CM

## 2024-10-03 DIAGNOSIS — G91.2 NORMAL PRESSURE HYDROCEPHALUS (HCC): ICD-10-CM

## 2024-10-03 DIAGNOSIS — C61 MALIGNANT NEOPLASM OF PROSTATE (HCC): ICD-10-CM

## 2024-10-03 DIAGNOSIS — I25.10 CORONARY ARTERY DISEASE INVOLVING NATIVE CORONARY ARTERY OF NATIVE HEART WITHOUT ANGINA PECTORIS: ICD-10-CM

## 2024-10-03 PROBLEM — I10 HYPERTENSION: Status: RESOLVED | Noted: 2019-05-29 | Resolved: 2024-10-03

## 2024-10-03 PROCEDURE — 99214 OFFICE O/P EST MOD 30 MIN: CPT | Performed by: INTERNAL MEDICINE

## 2024-10-03 PROCEDURE — 90662 IIV NO PRSV INCREASED AG IM: CPT | Performed by: INTERNAL MEDICINE

## 2024-10-03 PROCEDURE — G0008 ADMIN INFLUENZA VIRUS VAC: HCPCS | Performed by: INTERNAL MEDICINE

## 2024-10-03 NOTE — ASSESSMENT & PLAN NOTE
Has underlying Parkinson's sort he has gait dysfunction from this but has symptomatology as well as MRI evidence, followed by provocative testing suggestive of normal pressure hydrocephalus.  Proceeding now with  shunt in hopes to improve his quality of life with regards to his gait.

## 2024-10-03 NOTE — PROGRESS NOTES
ASSESSMENT/PLAN  Problem List Items Addressed This Visit       Coronary artery disease involving native coronary artery of native heart without angina pectoris     No current signs or symptoms of angina or anginal equivalents.  Seen by cardiology, cleared for surgery.  Most recent stress test 2 years ago.  Last intervention was 2014 when he had a ST elevation myocardial infarction, and subsequent stenting of the LAD.         Parkinson's disease (HCC)     Remains stable on current Sinemet, no recent changes in medicine and follows with neurology.         Malignant neoplasm of prostate (HCC)     Remains in remission, status post prostatectomy.         Polycythemia vera (HCC)     Remains on hydroxyurea and under the care of hematology/oncology.  Recent labs reviewed.         Normal pressure hydrocephalus (HCC)     Has underlying Parkinson's sort he has gait dysfunction from this but has symptomatology as well as MRI evidence, followed by provocative testing suggestive of normal pressure hydrocephalus.  Proceeding now with  shunt in hopes to improve his quality of life with regards to his gait.         Pre-operative examination for internal medicine - Primary     Reviewed preoperative testing, and cardiology evaluation.    Patient is considered medically cleared to proceed with nonvascular surgery with placement of a  shunt and attempts to treat normal  pressure hydrocephalus to be performed by Dr. Lyn on 10/17/2024.    Patient will stop all aspirin and vitamins 1 week prior to his procedure.  Recommend standard of care with regards to perioperative antibiotics as well as DVT prophylaxis.  Patient is aware of the risks and benefits of said procedure.              Other Visit Diagnoses       Encounter for immunization        Relevant Orders    influenza vaccine, high-dose, PF 0.5 mL (Fluzone High Dose) (Completed)            High Risk Surgery: no  CAD: yes  CHF: no  CVD: no  DM2 on insulin: no  Cr>2: no         Kingsley Grider Jr. is undergoing a Low Risk surgery. He is at Low Risk for major adverse cardiac event (MACE). He may proceed with surgery as planned without further workup.    SUBJECTIVE  CC: Pre-op Exam (Dos 10/17/24/ lvhn)  Patient with significant abnormality of gait.    HPI:  Kingsley Grider Jr. is a 74 y.o. male who is planning to undergo  shunt placement under general by Dr. Ya on October 17, 2024..  Patient has not had complications with anesthesia in the past.  Functional status: Limited due to gait and Parkinson's, but otherwise independent.  Currently lives with his son who lives above him and he lives in the apartment below.    Patient was evaluated by neurology, recent MRI suggestive of normal pressure hydrocephalus.  He did have provocative testing with removal of 22 cc of cerebrospinal fluid, which she states led to an significant improvement in his gait.  He has underlying Parkinson's as well which also impacts his gait but given his improvement after the provocative testing, he is moving forward with placement of the  shunt in hopes to gain improvement of balance and gait.    Review of Systems   Constitutional:  Negative for chills and fever.   HENT:  Negative for sore throat.    Eyes:  Negative for visual disturbance.   Respiratory:  Negative for cough and shortness of breath.    Cardiovascular:  Negative for chest pain and leg swelling.   Gastrointestinal:  Negative for abdominal pain, diarrhea, nausea and vomiting.   Genitourinary:  Negative for dysuria, flank pain, frequency and urgency.   Musculoskeletal:  Positive for arthralgias, back pain and gait problem. Negative for myalgias and neck pain.   Skin:  Negative for rash.   Neurological:  Positive for tremors. Negative for dizziness, weakness, light-headedness and headaches.   Hematological: Negative.    Psychiatric/Behavioral:  Negative for agitation, confusion and suicidal ideas. The patient is nervous/anxious.    All  other systems reviewed and are negative.        Historical Information   The patient history was reviewed as follows:    Past Medical History:   Diagnosis Date    Anxiety     Benign hypertension     Cancer (HCC)     prostate    Coronary artery disease     ABRAM to LAD in 2014    History of cardiovascular stress test 01/21/2016    Normal EF, normal study.    History of echocardiogram 05/05/2015    EF normal.      Ischemic cardiomyopathy     resolved    Mixed hyperlipidemia     Myocardial infarction (HCC)     Parkinson disease (HCC)     Prostate cancer (HCC)     surgically removed    STEMI (ST elevation myocardial infarction) (HCC) 2014     Past Surgical History:   Procedure Laterality Date    CORONARY ANGIOPLASTY WITH STENT PLACEMENT  12/26/2014    EF 40%, ABRAM to LAD.    FL LUMBAR PUNCTURE DIAGNOSTIC  7/31/2024    INCISIONAL HERNIA REPAIR      [facility] SURGICAL ARTHROSCOPY SHOULDER W/ROTATOR CUFF RPR Right 2/24/2021    Procedure: SHOULDER ARTHROSCOPY WITH ROTATOR CUFF REPAIR and acromioplasty;  Surgeon: Rene Valdez DO;  Location:  MAIN OR;  Service: Orthopedics    PROSTATECTOMY  2014    for cancer    TONSILLECTOMY       Family History   Problem Relation Age of Onset    Heart attack Father     Stroke Father       Social History       Medications:     Current Outpatient Medications:     aspirin (ECOTRIN LOW STRENGTH) 81 mg EC tablet, Take 81 mg by mouth daily, Disp: , Rfl:     atorvastatin (LIPITOR) 80 mg tablet, TAKE ONE TABLET DAILY..., Disp: 90 tablet, Rfl: 1    carbidopa-levodopa (SINEMET CR)  mg TBCR per ER tablet, Take 1 tablet by mouth 3 (three) times a day, Disp: 270 tablet, Rfl: 1    hydroxyurea (HYDREA) 500 mg capsule, Take 1 capsule (500 mg total) by mouth daily, Disp: 30 capsule, Rfl: 11    PARoxetine (PAXIL) 20 mg tablet, TAKE ONE TABLET DAILY..., Disp: 90 tablet, Rfl: 3    vitamin B-12 (VITAMIN B-12) 1,000 mcg tablet, Take 1 tablet (1,000 mcg total) by mouth daily, Disp: , Rfl:   Allergies   Allergen  "Reactions    Carbidopa-Levodopa-Entacapone Confusion     Patient denies allergy to this medication       OBJECTIVE    Vitals:   Vitals:    10/03/24 1033   BP: 118/70   BP Location: Left arm   Patient Position: Sitting   Cuff Size: Adult   Pulse: (!) 110   SpO2: 97%   Weight: 78.9 kg (174 lb)   Height: 5' 8\" (1.727 m)           Physical Exam  Constitutional:       Appearance: Normal appearance.   HENT:      Head: Normocephalic and atraumatic.      Nose: No congestion or rhinorrhea.   Eyes:      Extraocular Movements: Extraocular movements intact.      Pupils: Pupils are equal, round, and reactive to light.   Neck:      Vascular: No carotid bruit.   Cardiovascular:      Rate and Rhythm: Normal rate and regular rhythm.      Pulses: Normal pulses.      Heart sounds: Normal heart sounds. No murmur heard.  Pulmonary:      Effort: Pulmonary effort is normal. No respiratory distress.      Breath sounds: Normal breath sounds. No wheezing.   Chest:      Chest wall: No tenderness.   Abdominal:      General: There is no distension.      Tenderness: There is no abdominal tenderness.      Hernia: No hernia is present.   Musculoskeletal:         General: No swelling or tenderness.      Right lower leg: No edema.      Left lower leg: No edema.   Lymphadenopathy:      Cervical: No cervical adenopathy.   Skin:     Findings: No rash.   Neurological:      General: No focal deficit present.      Mental Status: He is alert and oriented to person, place, and time.      Sensory: No sensory deficit.      Gait: Gait abnormal.      Comments: Parkinson's-like gait with tremor, right hand greater than left.  Typical braiding of the Parkinson's individual.  Masklike facies.   Psychiatric:         Mood and Affect: Mood normal.         Behavior: Behavior normal.                Evan Díaz DO    10/3/2024  12:57 PM    "

## 2024-10-03 NOTE — ASSESSMENT & PLAN NOTE
Reviewed preoperative testing, and cardiology evaluation.    Patient is considered medically cleared to proceed with nonvascular surgery with placement of a  shunt and attempts to treat normal  pressure hydrocephalus to be performed by Dr. Lyn on 10/17/2024.    Patient will stop all aspirin and vitamins 1 week prior to his procedure.  Recommend standard of care with regards to perioperative antibiotics as well as DVT prophylaxis.  Patient is aware of the risks and benefits of said procedure.

## 2024-10-03 NOTE — ASSESSMENT & PLAN NOTE
No current signs or symptoms of angina or anginal equivalents.  Seen by cardiology, cleared for surgery.  Most recent stress test 2 years ago.  Last intervention was 2014 when he had a ST elevation myocardial infarction, and subsequent stenting of the LAD.

## 2024-10-07 DIAGNOSIS — F41.9 ANXIETY: ICD-10-CM

## 2024-10-08 RX ORDER — PAROXETINE 20 MG/1
20 TABLET, FILM COATED ORAL DAILY
Qty: 90 TABLET | Refills: 1 | Status: SHIPPED | OUTPATIENT
Start: 2024-10-08

## 2024-10-22 ENCOUNTER — TELEPHONE (OUTPATIENT)
Age: 74
End: 2024-10-22

## 2024-10-22 NOTE — TELEPHONE ENCOUNTER
Unfortunately would not be able to do a home visit that day to do this.  I will be leaving at noon that day.  I can try to squeeze him in between patients if he shows up at 8 or 815.  Otherwise she can just go to the urgent care and have them removed.  Please notify deena or Anaid.

## 2024-10-22 NOTE — TELEPHONE ENCOUNTER
Patient needs staples removed from his head on 10/25/2024 you have no open appt. Anaid was asking if you still do house call also because patient right behind [employer]. Let her know her # [phone]

## 2024-10-25 ENCOUNTER — OFFICE VISIT (OUTPATIENT)
Dept: FAMILY MEDICINE CLINIC | Facility: CLINIC | Age: 74
End: 2024-10-25
Payer: MEDICARE

## 2024-10-25 VITALS
OXYGEN SATURATION: 93 % | WEIGHT: 174.4 LBS | BODY MASS INDEX: 26.43 KG/M2 | SYSTOLIC BLOOD PRESSURE: 100 MMHG | HEART RATE: 80 BPM | TEMPERATURE: 96.9 F | DIASTOLIC BLOOD PRESSURE: 62 MMHG | HEIGHT: 68 IN

## 2024-10-25 DIAGNOSIS — G91.2 NORMAL PRESSURE HYDROCEPHALUS (HCC): ICD-10-CM

## 2024-10-25 DIAGNOSIS — Z48.02 ENCOUNTER FOR STAPLE REMOVAL: Primary | ICD-10-CM

## 2024-10-25 DIAGNOSIS — D45 POLYCYTHEMIA VERA (HCC): ICD-10-CM

## 2024-10-25 DIAGNOSIS — G20.A2 PARKINSON'S DISEASE WITH FLUCTUATING MANIFESTATIONS, UNSPECIFIED WHETHER DYSKINESIA PRESENT (HCC): ICD-10-CM

## 2024-10-25 DIAGNOSIS — Z98.2 S/P VP SHUNT: ICD-10-CM

## 2024-10-25 PROBLEM — Z01.810 PRE-OPERATIVE CARDIOVASCULAR EXAMINATION: Status: RESOLVED | Noted: 2024-10-01 | Resolved: 2024-10-25

## 2024-10-25 PROBLEM — Z01.818 PRE-OPERATIVE EXAMINATION FOR INTERNAL MEDICINE: Status: RESOLVED | Noted: 2024-10-03 | Resolved: 2024-10-25

## 2024-10-25 PROCEDURE — G2211 COMPLEX E/M VISIT ADD ON: HCPCS | Performed by: INTERNAL MEDICINE

## 2024-10-25 PROCEDURE — 99213 OFFICE O/P EST LOW 20 MIN: CPT | Performed by: INTERNAL MEDICINE

## 2024-10-25 NOTE — ASSESSMENT & PLAN NOTE
Has headache since  shunt, but not drinking of fluids.  Strongly encouraged to stay hydrated.  Possibly increasing salt.  Here with his daughter-in-law.

## 2024-10-25 NOTE — PROGRESS NOTES
"Ambulatory Visit  Name: Kingsley Grider Jr.      : 1950      MRN: [id number]  Encounter Provider: Evan Díaz DO  Encounter Date: 10/25/2024   Encounter department: [facility] PRIMARY CARE    Assessment & Plan  Encounter for staple removal  10 staples removed.       S/P  shunt   shunt in place for normal pressure hydrocephalus, gait has improved clinically on exam.       Normal pressure hydrocephalus (HCC)  Has headache since  shunt, but not drinking of fluids.  Strongly encouraged to stay hydrated.  Possibly increasing salt.  Here with his daughter-in-law.       Parkinson's disease with fluctuating manifestations, unspecified whether dyskinesia present (HCC)  Continue current medical regimen.  Follows with neurology.       Polycythemia vera (HCC)  Remains on hydroxyurea with good result.          History of Present Illness     Had recent  shunt placement.  Placed in [facility] for short time for rehab.  He is doing exceptionally well.  He is moving better, his Parkinson symptoms/tremor have improved but his gait is tremendously improved, less shuffling and much steadier.  Here for suture removal.          Review of Systems   Constitutional:  Negative for chills and fever.   Respiratory:  Negative for chest tightness and shortness of breath.    Cardiovascular:  Negative for chest pain.   Genitourinary:  Negative for difficulty urinating and frequency.   Musculoskeletal:  Positive for gait problem. Negative for arthralgias.   Skin:  Negative for rash.   Neurological:  Positive for tremors.   Psychiatric/Behavioral:  Negative for confusion and hallucinations.            Objective     /62 (BP Location: Left arm, Patient Position: Sitting, Cuff Size: Large)   Pulse 80   Temp (!) 96.9 °F (36.1 °C) (Tympanic)   Ht 5' 8\" (1.727 m)   Wt 79.1 kg (174 lb 6.4 oz)   SpO2 93%   BMI 26.52 kg/m²     Physical Exam  Constitutional:       Appearance: Normal " appearance.   HENT:      Head: Normocephalic.   Cardiovascular:      Rate and Rhythm: Normal rate and regular rhythm.      Pulses: Normal pulses.   Skin:     Comments: Surgical incision well-healed.   Neurological:      General: No focal deficit present.      Mental Status: He is alert and oriented to person, place, and time.

## 2024-10-29 DIAGNOSIS — G20.A2 PARKINSON'S DISEASE WITH FLUCTUATING MANIFESTATIONS, UNSPECIFIED WHETHER DYSKINESIA PRESENT (HCC): ICD-10-CM

## 2024-10-30 RX ORDER — CARBIDOPA AND LEVODOPA 25; 100 MG/1; MG/1
1 TABLET, EXTENDED RELEASE ORAL 3 TIMES DAILY
Qty: 270 TABLET | Refills: 0 | Status: SHIPPED | OUTPATIENT
Start: 2024-10-30

## 2024-11-14 ENCOUNTER — OFFICE VISIT (OUTPATIENT)
Dept: HEMATOLOGY ONCOLOGY | Facility: CLINIC | Age: 74
End: 2024-11-14
Payer: MEDICARE

## 2024-11-14 VITALS
HEART RATE: 76 BPM | DIASTOLIC BLOOD PRESSURE: 68 MMHG | TEMPERATURE: 97.7 F | SYSTOLIC BLOOD PRESSURE: 102 MMHG | BODY MASS INDEX: 24.77 KG/M2 | HEIGHT: 70 IN | WEIGHT: 173 LBS | OXYGEN SATURATION: 97 % | RESPIRATION RATE: 18 BRPM

## 2024-11-14 DIAGNOSIS — D45 POLYCYTHEMIA VERA (HCC): Primary | ICD-10-CM

## 2024-11-14 DIAGNOSIS — D75.839 THROMBOCYTOSIS: ICD-10-CM

## 2024-11-14 DIAGNOSIS — E53.8 B12 DEFICIENCY: ICD-10-CM

## 2024-11-14 PROCEDURE — 99214 OFFICE O/P EST MOD 30 MIN: CPT | Performed by: INTERNAL MEDICINE

## 2024-11-14 PROCEDURE — G2211 COMPLEX E/M VISIT ADD ON: HCPCS | Performed by: INTERNAL MEDICINE

## 2024-11-14 NOTE — PROGRESS NOTES
Hematology/Oncology Outpatient Follow-up  Kingsley Grider Jr. 74 y.o. male 1950 [id number]    Date:  11/14/2024        Assessment and Plan:  1. Polycythemia vera (HCC) (Primary)  His polycythemia is compatible with polycythemia vera with positive [id number] mutation.   I did not noticed that the patient is pancytopenic which could be due to the Hydrea he is taking on a daily basis.  I did ask him to decrease the frequency of the Hydrea from 500 mg daily to 500 mg on every other day basis.  We will continue to monitor his CBC on a monthly basis and make further adjustment accordingly.  He is to continue with baby aspirin without interruption.  - CBC and differential; Standing  - Comprehensive metabolic panel; Standing  - CBC and differential; Future  - Comprehensive metabolic panel; Future  - Magnesium; Future    2. B12 deficiency  Level will be checked.  - Vitamin B12; Future            HPI:  Patient is a pleasant 74-year-old male who presents today for further evaluation of his polycythemia/thrombocytosis.  He has past medical history of Parkinson's disease, hypertension, CAD status post MI, hyperlipidemia, chronic kidney disease, and prostate cancer status post robotic prostatectomy in 2014.  He does admit to consuming alcohol regularly on a daily basis about 4 beers per day.    He had extensive workup including a positive [id number] mutation.  He was then started on Hydrea 500 mg daily along with baby aspirin around November 2023.       Interval history:  Patient came today for a follow-up visit accompanied by his stepdaughter.  He continues to take Hydrea 500 mg daily.  Recent CBC showed a drop of his hemoglobin level down to 11.9 with white cell count of 2.6 and platelet count of 133.  Absolute neutrophil count of 1.1.  MCV is 128.  Creatinine 1.0.  ROS: Review of Systems   Constitutional:  Negative for chills and fever.   HENT:  Negative for ear pain and sore throat.    Eyes:  Negative for pain and  visual disturbance.   Respiratory:  Negative for cough and shortness of breath.    Cardiovascular:  Negative for chest pain and palpitations.   Gastrointestinal:  Positive for constipation. Negative for abdominal pain and vomiting.   Genitourinary:  Negative for dysuria and hematuria.   Musculoskeletal:  Negative for arthralgias and back pain.   Skin:  Negative for color change and rash.   Neurological:  Negative for seizures and syncope.   Psychiatric/Behavioral:  Positive for sleep disturbance.    All other systems reviewed and are negative.      Past Medical History:   Diagnosis Date    Anxiety     Benign hypertension     Cancer (HCC)     prostate    Coronary artery disease     ABRAM to LAD in 2014    History of cardiovascular stress test 01/21/2016    Normal EF, normal study.    History of echocardiogram 05/05/2015    EF normal.      Ischemic cardiomyopathy     resolved    Mixed hyperlipidemia     Myocardial infarction (HCC)     Parkinson disease (HCC)     Prostate cancer (HCC)     surgically removed    STEMI (ST elevation myocardial infarction) (HCC) 2014       Past Surgical History:   Procedure Laterality Date    CORONARY ANGIOPLASTY WITH STENT PLACEMENT  12/26/2014    EF 40%, ABRAM to LAD.    FL LUMBAR PUNCTURE DIAGNOSTIC  7/31/2024    INCISIONAL HERNIA REPAIR      [facility] SURGICAL ARTHROSCOPY SHOULDER W/ROTATOR CUFF RPR Right 2/24/2021    Procedure: SHOULDER ARTHROSCOPY WITH ROTATOR CUFF REPAIR and acromioplasty;  Surgeon: Rene Valdez DO;  Location:  MAIN OR;  Service: Orthopedics    PROSTATECTOMY  2014    for cancer    TONSILLECTOMY         Social History     Socioeconomic History    Marital status: Single     Spouse name: None    Number of children: None    Years of education: None    Highest education level: None   Occupational History    None   Tobacco Use    Smoking status: Never    Smokeless tobacco: Never   Vaping Use    Vaping status: Never Used   Substance and Sexual Activity    Alcohol use: Not Currently  "    Alcohol/week: 2.0 standard drinks of alcohol     Types: 2 Cans of beer per week     Comment: daily    Drug use: Never    Sexual activity: None   Other Topics Concern    None   Social History Narrative    Caffeine - 2 cups coffee/day     Social Drivers of Health     Financial Resource Strain: Not on file   Food Insecurity: Not on file   Transportation Needs: Not on file   Physical Activity: Not on file   Stress: Not on file   Social Connections: Not on file   Intimate Partner Violence: Not on file   Housing Stability: Not on file       Family History   Problem Relation Age of Onset    Heart attack Father     Stroke Father        Allergies   Allergen Reactions    Carbidopa-Levodopa-Entacapone Confusion     Patient denies allergy to this medication         Current Outpatient Medications:     aspirin (ECOTRIN LOW STRENGTH) 81 mg EC tablet, Take 81 mg by mouth daily, Disp: , Rfl:     atorvastatin (LIPITOR) 80 mg tablet, TAKE ONE TABLET DAILY..., Disp: 90 tablet, Rfl: 1    carbidopa-levodopa (SINEMET CR)  mg TBCR per ER tablet, Take 1 tablet by mouth 3 (three) times a day, Disp: 270 tablet, Rfl: 0    hydroxyurea (HYDREA) 500 mg capsule, Take 1 capsule (500 mg total) by mouth daily, Disp: 30 capsule, Rfl: 11    PARoxetine (PAXIL) 20 mg tablet, TAKE ONE TABLET DAILY..., Disp: 90 tablet, Rfl: 1    vitamin B-12 (VITAMIN B-12) 1,000 mcg tablet, Take 1 tablet (1,000 mcg total) by mouth daily, Disp: , Rfl:       Physical Exam:  /68 (BP Location: Left arm, Patient Position: Sitting, Cuff Size: Adult)   Pulse 76   Temp 97.7 °F (36.5 °C)   Resp 18   Ht 5' 10\" (1.778 m)   Wt 78.5 kg (173 lb)   SpO2 97%   BMI 24.82 kg/m²     Physical Exam  Constitutional:       Appearance: He is well-developed.   HENT:      Head: Normocephalic and atraumatic.      Nose: Nose normal.   Eyes:      General: No scleral icterus.        Right eye: No discharge.         Left eye: No discharge.      Conjunctiva/sclera: Conjunctivae " normal.      Pupils: Pupils are equal, round, and reactive to light.   Neck:      Thyroid: No thyromegaly.      Trachea: No tracheal deviation.   Cardiovascular:      Rate and Rhythm: Normal rate and regular rhythm.      Heart sounds: Normal heart sounds. No murmur heard.     No friction rub.   Pulmonary:      Effort: Pulmonary effort is normal. No respiratory distress.      Breath sounds: Normal breath sounds. No wheezing or rales.   Chest:      Chest wall: No tenderness.   Abdominal:      General: There is no distension.      Palpations: Abdomen is soft. There is no hepatomegaly or splenomegaly.      Tenderness: There is no abdominal tenderness. There is no guarding or rebound.   Musculoskeletal:         General: No tenderness or deformity. Normal range of motion.      Cervical back: Normal range of motion and neck supple.   Lymphadenopathy:      Cervical: No cervical adenopathy.   Skin:     General: Skin is warm and dry.      Coloration: Skin is not pale.      Findings: No erythema or rash.   Neurological:      Mental Status: He is alert and oriented to person, place, and time.      Cranial Nerves: No cranial nerve deficit.      Coordination: Coordination normal.      Deep Tendon Reflexes: Reflexes are normal and symmetric.   Psychiatric:         Behavior: Behavior normal.         Thought Content: Thought content normal.         Judgment: Judgment normal.           Labs:  Lab Results   Component Value Date    WBC 3.86 (L) 09/26/2024    HGB 13.0 09/26/2024    HCT 36.6 09/26/2024     (H) 09/26/2024     (L) 09/26/2024     Lab Results   Component Value Date     06/07/2018    K 3.5 10/21/2024     10/21/2024    CO2 30 10/21/2024    ANIONGAP 13.2 06/07/2018    BUN 15 10/21/2024    CREATININE 1.01 10/21/2024    GLUF 91 09/26/2024    CALCIUM 9.3 10/21/2024    AST 14 09/16/2024    ALT 8 09/16/2024    ALKPHOS 93 09/16/2024    PROT 6.8 06/07/2018    BILITOT 0.6 06/07/2018    EGFR 78 10/21/2024  "    No results found for: \"TSH\"    Patient voiced understanding and agreement in the above discussion. Aware to contact our office with questions/symptoms in the interim.   "

## 2024-11-22 ENCOUNTER — APPOINTMENT (OUTPATIENT)
Age: 74
End: 2024-11-22
Payer: MEDICARE

## 2024-11-22 ENCOUNTER — OFFICE VISIT (OUTPATIENT)
Dept: FAMILY MEDICINE CLINIC | Facility: CLINIC | Age: 74
End: 2024-11-22
Payer: MEDICARE

## 2024-11-22 VITALS
SYSTOLIC BLOOD PRESSURE: 108 MMHG | WEIGHT: 175.8 LBS | OXYGEN SATURATION: 98 % | BODY MASS INDEX: 25.17 KG/M2 | TEMPERATURE: 96.9 F | DIASTOLIC BLOOD PRESSURE: 62 MMHG | HEIGHT: 70 IN | HEART RATE: 72 BPM

## 2024-11-22 DIAGNOSIS — Z00.00 MEDICARE ANNUAL WELLNESS VISIT, INITIAL: Primary | ICD-10-CM

## 2024-11-22 DIAGNOSIS — I25.10 CORONARY ARTERY DISEASE INVOLVING NATIVE CORONARY ARTERY OF NATIVE HEART WITHOUT ANGINA PECTORIS: ICD-10-CM

## 2024-11-22 DIAGNOSIS — G20.A2 PARKINSON'S DISEASE WITH FLUCTUATING MANIFESTATIONS, UNSPECIFIED WHETHER DYSKINESIA PRESENT (HCC): ICD-10-CM

## 2024-11-22 DIAGNOSIS — D45 POLYCYTHEMIA VERA (HCC): ICD-10-CM

## 2024-11-22 DIAGNOSIS — D75.1 POLYCYTHEMIA: ICD-10-CM

## 2024-11-22 DIAGNOSIS — Z98.2 S/P VP SHUNT: ICD-10-CM

## 2024-11-22 DIAGNOSIS — E53.8 B12 DEFICIENCY: ICD-10-CM

## 2024-11-22 DIAGNOSIS — I10 PRIMARY HYPERTENSION: ICD-10-CM

## 2024-11-22 DIAGNOSIS — E78.2 MIXED HYPERLIPIDEMIA: ICD-10-CM

## 2024-11-22 DIAGNOSIS — Z11.59 NEED FOR HEPATITIS C SCREENING TEST: ICD-10-CM

## 2024-11-22 LAB
ALBUMIN SERPL BCG-MCNC: 4.4 G/DL (ref 3.5–5)
ALP SERPL-CCNC: 105 U/L (ref 34–104)
ALT SERPL W P-5'-P-CCNC: 5 U/L (ref 7–52)
ANION GAP SERPL CALCULATED.3IONS-SCNC: 8 MMOL/L (ref 4–13)
AST SERPL W P-5'-P-CCNC: 15 U/L (ref 13–39)
BASOPHILS # BLD AUTO: 0.02 THOUSANDS/ÂΜL (ref 0–0.1)
BASOPHILS NFR BLD AUTO: 1 % (ref 0–1)
BILIRUB SERPL-MCNC: 0.99 MG/DL (ref 0.2–1)
BUN SERPL-MCNC: 14 MG/DL (ref 5–25)
CALCIUM SERPL-MCNC: 9.5 MG/DL (ref 8.4–10.2)
CHLORIDE SERPL-SCNC: 99 MMOL/L (ref 96–108)
CHOLEST SERPL-MCNC: 130 MG/DL (ref ?–200)
CO2 SERPL-SCNC: 29 MMOL/L (ref 21–32)
CREAT SERPL-MCNC: 1 MG/DL (ref 0.6–1.3)
EOSINOPHIL # BLD AUTO: 0.06 THOUSAND/ÂΜL (ref 0–0.61)
EOSINOPHIL NFR BLD AUTO: 2 % (ref 0–6)
ERYTHROCYTE [DISTWIDTH] IN BLOOD BY AUTOMATED COUNT: 12.5 % (ref 11.6–15.1)
GFR SERPL CREATININE-BSD FRML MDRD: 74 ML/MIN/1.73SQ M
GLUCOSE P FAST SERPL-MCNC: 93 MG/DL (ref 65–99)
HCT VFR BLD AUTO: 37.6 % (ref 36.5–49.3)
HCV AB SER QL: NORMAL
HDLC SERPL-MCNC: 38 MG/DL
HGB BLD-MCNC: 13 G/DL (ref 12–17)
IMM GRANULOCYTES # BLD AUTO: 0.01 THOUSAND/UL (ref 0–0.2)
IMM GRANULOCYTES NFR BLD AUTO: 0 % (ref 0–2)
LDH SERPL-CCNC: 164 U/L (ref 140–271)
LDLC SERPL CALC-MCNC: 73 MG/DL (ref 0–100)
LYMPHOCYTES # BLD AUTO: 1.47 THOUSANDS/ÂΜL (ref 0.6–4.47)
LYMPHOCYTES NFR BLD AUTO: 45 % (ref 14–44)
MAGNESIUM SERPL-MCNC: 2.2 MG/DL (ref 1.9–2.7)
MCH RBC QN AUTO: 44.1 PG (ref 26.8–34.3)
MCHC RBC AUTO-ENTMCNC: 34.6 G/DL (ref 31.4–37.4)
MCV RBC AUTO: 128 FL (ref 82–98)
MONOCYTES # BLD AUTO: 0.28 THOUSAND/ÂΜL (ref 0.17–1.22)
MONOCYTES NFR BLD AUTO: 9 % (ref 4–12)
NEUTROPHILS # BLD AUTO: 1.36 THOUSANDS/ÂΜL (ref 1.85–7.62)
NEUTS SEG NFR BLD AUTO: 43 % (ref 43–75)
NRBC BLD AUTO-RTO: 0 /100 WBCS
PLATELET # BLD AUTO: 147 THOUSANDS/UL (ref 149–390)
PMV BLD AUTO: 11 FL (ref 8.9–12.7)
POTASSIUM SERPL-SCNC: 3.8 MMOL/L (ref 3.5–5.3)
PROT SERPL-MCNC: 7.3 G/DL (ref 6.4–8.4)
RBC # BLD AUTO: 2.95 MILLION/UL (ref 3.88–5.62)
SODIUM SERPL-SCNC: 136 MMOL/L (ref 135–147)
TRIGL SERPL-MCNC: 93 MG/DL (ref ?–150)
VIT B12 SERPL-MCNC: 692 PG/ML (ref 180–914)
WBC # BLD AUTO: 3.2 THOUSAND/UL (ref 4.31–10.16)

## 2024-11-22 PROCEDURE — 86803 HEPATITIS C AB TEST: CPT

## 2024-11-22 PROCEDURE — 80061 LIPID PANEL: CPT

## 2024-11-22 PROCEDURE — 36415 COLL VENOUS BLD VENIPUNCTURE: CPT

## 2024-11-22 PROCEDURE — 85025 COMPLETE CBC W/AUTO DIFF WBC: CPT

## 2024-11-22 PROCEDURE — G0439 PPPS, SUBSEQ VISIT: HCPCS | Performed by: INTERNAL MEDICINE

## 2024-11-22 PROCEDURE — 83615 LACTATE (LD) (LDH) ENZYME: CPT

## 2024-11-22 PROCEDURE — 82607 VITAMIN B-12: CPT

## 2024-11-22 PROCEDURE — 80053 COMPREHEN METABOLIC PANEL: CPT

## 2024-11-22 PROCEDURE — 83735 ASSAY OF MAGNESIUM: CPT

## 2024-11-22 RX ORDER — HYDROXYUREA 500 MG/1
500 CAPSULE ORAL EVERY OTHER DAY
Start: 2024-11-22

## 2024-11-22 NOTE — ASSESSMENT & PLAN NOTE
Stable with recent decrease in hydroxyurea to 500 mg every other day.  Orders:    hydroxyurea (HYDREA) 500 mg capsule; Take 1 capsule (500 mg total) by mouth every other day

## 2024-11-22 NOTE — PROGRESS NOTES
Name: Kingsley Grider Jr.      : 1950      MRN: [id number]  Encounter Provider: Evan Díaz DO  Encounter Date: 2024   Encounter department: [facility] PRIMARY CARE    Assessment & Plan  Medicare annual wellness visit, initial  Reviewed the AWV questionnaire.  Went through standard need for appropriate screening tests based on risk factors..  Reviewed cognitive issues.  Reviewed living will and DURABLE POWER OF .  Discussed healthy lifestyle, healthy diet.  Reviewed vaccines.  Encourage exercise.  Discussed safety issues within the home and about.        Polycythemia  Stable with recent decrease in hydroxyurea to 500 mg every other day.  Orders:    hydroxyurea (HYDREA) 500 mg capsule; Take 1 capsule (500 mg total) by mouth every other day    S/P  shunt  No new issues, seems to function properly.  May be slight improvement of his gait.       Coronary artery disease involving native coronary artery of native heart without angina pectoris  Denies chest pain.  Remains on aspirin and high intensity statin.       Parkinson's disease with fluctuating manifestations, unspecified whether dyskinesia present (HCC)  Remains on carbidopa levodopa, notably not using a cane, gait remains are concerned with him.  Gets therapy twice weekly sometimes once weekly in his home.         Depression Screening and Follow-up Plan: Patient was screened for depression during today's encounter. They screened negative with a PHQ-2 score of 0.    Falls Plan of Care: balance, strength, and gait training instructions were provided. Medications that increase falls were reviewed. Home safety education provided. Has PT at home      Preventive health issues were discussed with patient, and age appropriate screening tests were ordered as noted in patient's After Visit Summary. Personalized health advice and appropriate referrals for health education or preventive services given if needed, as noted in  patient's After Visit Summary.    History of Present Illness     Patient presents for their annual medical wellness visit.  Reviewed medical intake questionnaire.  Discussed living will and DURABLE POWER OF .  Discussed importance of these 2 documents.  Reviewed the various screening modalities the patient was due for.  Reviewed home safety as well as depression and risk of falling.  Through shared medical decision making move forward with testing or blood work as ordered.  The patient presents with no other complaints.  Only hospitalization was for his  shunt.  May be some improvement in gait but otherwise she does not notice a lack of a lot of difference she says.  She has had no falls recently.  Up-to-date on vaccines.  Denies any chest pain or shortness of breath..        Patient Care Team:  Evan Díaz DO as PCP - General (Internal Medicine)  Faina Arnett MA as  (Oncology)  Jo Ann Whitaker MD as Medical Oncologist (Hematology and Oncology)    Review of Systems   Constitutional:  Negative for chills and fever.   HENT:  Negative for congestion and sore throat.    Eyes:  Negative for pain and visual disturbance.   Respiratory:  Negative for cough and shortness of breath.    Cardiovascular:  Negative for chest pain and palpitations.   Gastrointestinal:  Negative for abdominal pain and vomiting.   Genitourinary:  Negative for dysuria and hematuria.   Musculoskeletal:  Positive for arthralgias, back pain and gait problem.   Skin:  Negative for color change and rash.   Neurological:  Positive for tremors and weakness. Negative for seizures and syncope.   All other systems reviewed and are negative.    Medical History Reviewed by provider this encounter:  Tobacco  Allergies  Meds  Problems  Med Hx  Surg Hx  Fam Hx       Annual Wellness Visit Questionnaire   Kingsley is here for his Subsequent Wellness visit. Last Medicare Wellness visit information reviewed, patient interviewed,  no change since last AWV.     Health Risk Assessment:   Patient rates overall health as fair. Patient feels that their physical health rating is slightly better. Patient is satisfied with their life. Eyesight was rated as same. Hearing was rated as same. Patient feels that their emotional and mental health rating is same. Patients states they are never, rarely angry. Patient states they are often unusually tired/fatigued. Pain experienced in the last 7 days has been none. Patient states that he has experienced no weight loss or gain in last 6 months.     Depression Screening:   PHQ-2 Score: 0      Fall Risk Screening:   In the past year, patient has experienced: no history of falling in past year      Home Safety:  Patient does not have trouble with stairs inside or outside of their home. Patient has working smoke alarms and has working carbon monoxide detector. Home safety hazards include: none.     Nutrition:   Current diet is Regular.     Medications:   Patient is currently taking over-the-counter supplements. OTC medications include: see medication list. Patient is able to manage medications.     Activities of Daily Living (ADLs)/Instrumental Activities of Daily Living (IADLs):   Walk and transfer into and out of bed and chair?: Yes  Dress and groom yourself?: Yes    Bathe or shower yourself?: Yes    Feed yourself? Yes  Do your laundry/housekeeping?: Yes  Manage your money, pay your bills and track your expenses?: Yes  Make your own meals?: Yes    Do your own shopping?: Yes    Previous Hospitalizations:   Any hospitalizations or ED visits within the last 12 months?: Yes    How many hospitalizations have you had in the last year?: 1-2    Hospitalization Comments:  shunt    Advance Care Planning:   Living will: Yes    Durable POA for healthcare: Yes    Advanced directive: Yes    Advanced directive counseling given: Yes    ACP document given: Yes    Patient declined ACP directive: No    End of Life Decisions  "reviewed with patient: Yes    Provider agrees with end of life decisions: Yes      Cognitive Screening:   Provider or family/friend/caregiver concerned regarding cognition?: No    PREVENTIVE SCREENINGS      Cardiovascular Screening:    General: Screening Not Indicated and History Lipid Disorder      Diabetes Screening:     General: Screening Current      Colorectal Cancer Screening:     General: Screening Current      Prostate Cancer Screening:    General: History Prostate Cancer      Osteoporosis Screening:    General: Screening Not Indicated      Abdominal Aortic Aneurysm (AAA) Screening:    Risk factors include: age between 65-74 yo        General: Screening Not Indicated      Lung Cancer Screening:     General: Screening Not Indicated      Hepatitis C Screening:    General: Screening Current    Other Counseling Topics:   Car/seat belt/driving safety, sunscreen and regular weightbearing exercise.     Social Drivers of Health     Food Insecurity: No Food Insecurity (11/22/2024)    Hunger Vital Sign     Worried About Running Out of Food in the Last Year: Never true     Ran Out of Food in the Last Year: Never true   Transportation Needs: No Transportation Needs (11/22/2024)    PRAPARE - Transportation     Lack of Transportation (Medical): No     Lack of Transportation (Non-Medical): No   Housing Stability: Unknown (11/22/2024)    Housing Stability Vital Sign     Unable to Pay for Housing in the Last Year: No     Homeless in the Last Year: No   Utilities: Not At Risk (11/22/2024)    [facility] Utilities     Threatened with loss of utilities: No     No results found.    Objective   /62 (BP Location: Left arm, Patient Position: Sitting, Cuff Size: Large)   Pulse 72   Temp (!) 96.9 °F (36.1 °C) (Tympanic)   Ht 5' 10\" (1.778 m)   Wt 79.7 kg (175 lb 12.8 oz)   SpO2 98%   BMI 25.22 kg/m²     Physical Exam  Vitals and nursing note reviewed.   Constitutional:       General: He is not in acute distress.     Appearance: " Normal appearance. He is well-developed.   HENT:      Head: Normocephalic and atraumatic.   Eyes:      Conjunctiva/sclera: Conjunctivae normal.   Cardiovascular:      Rate and Rhythm: Normal rate and regular rhythm.      Pulses: Normal pulses.      Heart sounds: Normal heart sounds. No murmur heard.  Pulmonary:      Effort: Pulmonary effort is normal. No respiratory distress.      Breath sounds: Normal breath sounds.   Abdominal:      Palpations: Abdomen is soft.      Tenderness: There is no abdominal tenderness.   Musculoskeletal:         General: No swelling.      Cervical back: Neck supple.   Skin:     General: Skin is warm and dry.      Capillary Refill: Capillary refill takes less than 2 seconds.   Neurological:      General: No focal deficit present.      Mental Status: He is alert and oriented to person, place, and time.   Psychiatric:         Mood and Affect: Mood normal.

## 2024-11-22 NOTE — PATIENT INSTRUCTIONS
Medicare Preventive Visit Patient Instructions  Thank you for completing your Welcome to Medicare Visit or Medicare Annual Wellness Visit today. Your next wellness visit will be due in one year (11/23/2025).  The screening/preventive services that you may require over the next 5-10 years are detailed below. Some tests may not apply to you based off risk factors and/or age. Screening tests ordered at today's visit but not completed yet may show as past due. Also, please note that scanned in results may not display below.  Preventive Screenings:  Service Recommendations Previous Testing/Comments   Colorectal Cancer Screening  Colonoscopy    Fecal Occult Blood Test (FOBT)/Fecal Immunochemical Test (FIT)  Fecal DNA/Cologuard Test  Flexible Sigmoidoscopy Age: 45-75 years old   Colonoscopy: every 10 years (May be performed more frequently if at higher risk)  OR  FOBT/FIT: every 1 year  OR  Cologuard: every 3 years  OR  Sigmoidoscopy: every 5 years  Screening may be recommended earlier than age 45 if at higher risk for colorectal cancer. Also, an individualized decision between you and your healthcare provider will decide whether screening between the ages of 76-85 would be appropriate. Colonoscopy: 06/01/2011  FOBT/FIT: Not on file  Cologuard: 06/01/2022  Sigmoidoscopy: Not on file    Screening Current     Prostate Cancer Screening Individualized decision between patient and health care provider in men between ages of 55-69   Medicare will cover every 12 months beginning on the day after your 50th birthday PSA: 0.02 ng/mL     History Prostate Cancer     Hepatitis C Screening Once for adults born between 1945 and 1965  More frequently in patients at high risk for Hepatitis C Hep C Antibody: 11/22/2024    Screening Current   Diabetes Screening 1-2 times per year if you're at risk for diabetes or have pre-diabetes Fasting glucose: 91 mg/dL (9/26/2024)  A1C: No results in last 5 years (No results in last 5 years)  Screening  Current   Cholesterol Screening Once every 5 years if you don't have a lipid disorder. May order more often based on risk factors. Lipid panel: 11/22/2024  Screening Not Indicated  History Lipid Disorder      Other Preventive Screenings Covered by Medicare:  Abdominal Aortic Aneurysm (AAA) Screening: covered once if your at risk. You're considered to be at risk if you have a family history of AAA or a male between the age of 65-75 who smoking at least 100 cigarettes in your lifetime.  Lung Cancer Screening: covers low dose CT scan once per year if you meet all of the following conditions: (1) Age 55-77; (2) No signs or symptoms of lung cancer; (3) Current smoker or have quit smoking within the last 15 years; (4) You have a tobacco smoking history of at least 20 pack years (packs per day x number of years you smoked); (5) You get a written order from a healthcare provider.  Glaucoma Screening: covered annually if you're considered high risk: (1) You have diabetes OR (2) Family history of glaucoma OR (3)  aged 50 and older OR (4)  American aged 65 and older  Osteoporosis Screening: covered every 2 years if you meet one of the following conditions: (1) Have a vertebral abnormality; (2) On glucocorticoid therapy for more than 3 months; (3) Have primary hyperparathyroidism; (4) On osteoporosis medications and need to assess response to drug therapy.  HIV Screening: covered annually if you're between the age of 15-65. Also covered annually if you are younger than 15 and older than 65 with risk factors for HIV infection. For pregnant patients, it is covered up to 3 times per pregnancy.    Immunizations:  Immunization Recommendations   Influenza Vaccine Annual influenza vaccination during flu season is recommended for all persons aged >= 6 months who do not have contraindications   Pneumococcal Vaccine   * Pneumococcal conjugate vaccine = PCV13 (Prevnar 13), PCV15 (Vaxneuvance), PCV20 (Prevnar 20)  *  Pneumococcal polysaccharide vaccine = PPSV23 (Pneumovax) Adults 19-65 yo with certain risk factors or if 65+ yo  If never received any pneumonia vaccine: recommend Prevnar 20 (PCV20)  Give PCV20 if previously received 1 dose of PCV13 or PPSV23   Hepatitis B Vaccine 3 dose series if at intermediate or high risk (ex: diabetes, end stage renal disease, liver disease)   Respiratory syncytial virus (RSV) Vaccine - COVERED BY MEDICARE PART D  * RSVPreF3 (Arexvy) CDC recommends that adults 60 years of age and older may receive a single dose of RSV vaccine using shared clinical decision-making (SCDM)   Tetanus (Td) Vaccine - COST NOT COVERED BY MEDICARE PART B Following completion of primary series, a booster dose should be given every 10 years to maintain immunity against tetanus. Td may also be given as tetanus wound prophylaxis.   Tdap Vaccine - COST NOT COVERED BY MEDICARE PART B Recommended at least once for all adults. For pregnant patients, recommended with each pregnancy.   Shingles Vaccine (Shingrix) - COST NOT COVERED BY MEDICARE PART B  2 shot series recommended in those 19 years and older who have or will have weakened immune systems or those 50 years and older     Health Maintenance Due:      Topic Date Due   • Hepatitis C Screening  Never done   • Colorectal Cancer Screening  06/01/2025     Immunizations Due:  There are no preventive care reminders to display for this patient.  Advance Directives   What are advance directives?  Advance directives are legal documents that state your wishes and plans for medical care. These plans are made ahead of time in case you lose your ability to make decisions for yourself. Advance directives can apply to any medical decision, such as the treatments you want, and if you want to donate organs.   What are the types of advance directives?  There are many types of advance directives, and each state has rules about how to use them. You may choose a combination of any of the  following:  Living will:  This is a written record of the treatment you want. You can also choose which treatments you do not want, which to limit, and which to stop at a certain time. This includes surgery, medicine, IV fluid, and tube feedings.   Durable power of  for healthcare (DPAHC):  This is a written record that states who you want to make healthcare choices for you when you are unable to make them for yourself. This person, called a proxy, is usually a family member or a friend. You may choose more than 1 proxy.  Do not resuscitate (DNR) order:  A DNR order is used in case your heart stops beating or you stop breathing. It is a request not to have certain forms of treatment, such as CPR. A DNR order may be included in other types of advance directives.  Medical directive:  This covers the care that you want if you are in a coma, near death, or unable to make decisions for yourself. You can list the treatments you want for each condition. Treatment may include pain medicine, surgery, blood transfusions, dialysis, IV or tube feedings, and a ventilator (breathing machine).  Values history:  This document has questions about your views, beliefs, and how you feel and think about life. This information can help others choose the care that you would choose.  Why are advance directives important?  An advance directive helps you control your care. Although spoken wishes may be used, it is better to have your wishes written down. Spoken wishes can be misunderstood, or not followed. Treatments may be given even if you do not want them. An advance directive may make it easier for your family to make difficult choices about your care.   Weight Management   Why it is important to manage your weight:  Being overweight increases your risk of health conditions such as heart disease, high blood pressure, type 2 diabetes, and certain types of cancer. It can also increase your risk for osteoarthritis, sleep apnea, and  other respiratory problems. Aim for a slow, steady weight loss. Even a small amount of weight loss can lower your risk of health problems.  How to lose weight safely:  A safe and healthy way to lose weight is to eat fewer calories and get regular exercise. You can lose up about 1 pound a week by decreasing the number of calories you eat by 500 calories each day.   Healthy meal plan for weight management:  A healthy meal plan includes a variety of foods, contains fewer calories, and helps you stay healthy. A healthy meal plan includes the following:  Eat whole-grain foods more often.  A healthy meal plan should contain fiber. Fiber is the part of grains, fruits, and vegetables that is not broken down by your body. Whole-grain foods are healthy and provide extra fiber in your diet. Some examples of whole-grain foods are whole-wheat breads and pastas, oatmeal, brown rice, and bulgur.  Eat a variety of vegetables every day.  Include dark, leafy greens such as spinach, kale, filippo greens, and mustard greens. Eat yellow and orange vegetables such as carrots, sweet potatoes, and winter squash.   Eat a variety of fruits every day.  Choose fresh or canned fruit (canned in its own juice or light syrup) instead of juice. Fruit juice has very little or no fiber.  Eat low-fat dairy foods.  Drink fat-free (skim) milk or 1% milk. Eat fat-free yogurt and low-fat cottage cheese. Try low-fat cheeses such as mozzarella and other reduced-fat cheeses.  Choose meat and other protein foods that are low in fat.  Choose beans or other legumes such as split peas or lentils. Choose fish, skinless poultry (chicken or turkey), or lean cuts of red meat (beef or pork). Before you cook meat or poultry, cut off any visible fat.   Use less fat and oil.  Try baking foods instead of frying them. Add less fat, such as margarine, sour cream, regular salad dressing and mayonnaise to foods. Eat fewer high-fat foods. Some examples of high-fat foods  include french fries, doughnuts, ice cream, and cakes.  Eat fewer sweets.  Limit foods and drinks that are high in sugar. This includes candy, cookies, regular soda, and sweetened drinks.  Exercise:  Exercise at least 30 minutes per day on most days of the week. Some examples of exercise include walking, biking, dancing, and swimming. You can also fit in more physical activity by taking the stairs instead of the elevator or parking farther away from stores. Ask your healthcare provider about the best exercise plan for you.      © Copyright [employer] 2018 Information is for End User's use only and may not be sold, redistributed or otherwise used for commercial purposes. All illustrations and images included in CareNotes® are the copyrighted property of [employer] Inc[employer] or [employer]

## 2024-11-22 NOTE — ASSESSMENT & PLAN NOTE
Remains on carbidopa levodopa, notably not using a cane, gait remains are concerned with him.  Gets therapy twice weekly sometimes once weekly in his home.

## 2024-11-25 ENCOUNTER — RESULTS FOLLOW-UP (OUTPATIENT)
Dept: FAMILY MEDICINE CLINIC | Facility: CLINIC | Age: 74
End: 2024-11-25

## 2024-11-25 NOTE — TELEPHONE ENCOUNTER
Patient's daughter-in-law, Anaid, returned call.    Relayed provider's comments and recommendations.  Caller expressed understanding.

## 2024-12-12 DIAGNOSIS — D75.1 POLYCYTHEMIA: ICD-10-CM

## 2024-12-12 RX ORDER — HYDROXYUREA 500 MG/1
500 CAPSULE ORAL DAILY
Qty: 30 CAPSULE | Refills: 5 | Status: SHIPPED | OUTPATIENT
Start: 2024-12-12

## 2024-12-20 ENCOUNTER — APPOINTMENT (OUTPATIENT)
Age: 74
End: 2024-12-20
Payer: MEDICARE

## 2024-12-20 DIAGNOSIS — D45 POLYCYTHEMIA VERA (HCC): ICD-10-CM

## 2024-12-20 LAB
ALBUMIN SERPL BCG-MCNC: 4.3 G/DL (ref 3.5–5)
ALP SERPL-CCNC: 95 U/L (ref 34–104)
ALT SERPL W P-5'-P-CCNC: 14 U/L (ref 7–52)
ANION GAP SERPL CALCULATED.3IONS-SCNC: 10 MMOL/L (ref 4–13)
AST SERPL W P-5'-P-CCNC: 16 U/L (ref 13–39)
BASOPHILS # BLD AUTO: 0.02 THOUSANDS/ΜL (ref 0–0.1)
BASOPHILS NFR BLD AUTO: 1 % (ref 0–1)
BILIRUB SERPL-MCNC: 0.77 MG/DL (ref 0.2–1)
BUN SERPL-MCNC: 14 MG/DL (ref 5–25)
CALCIUM SERPL-MCNC: 9.6 MG/DL (ref 8.4–10.2)
CHLORIDE SERPL-SCNC: 102 MMOL/L (ref 96–108)
CO2 SERPL-SCNC: 26 MMOL/L (ref 21–32)
CREAT SERPL-MCNC: 0.96 MG/DL (ref 0.6–1.3)
EOSINOPHIL # BLD AUTO: 0.02 THOUSAND/ΜL (ref 0–0.61)
EOSINOPHIL NFR BLD AUTO: 1 % (ref 0–6)
ERYTHROCYTE [DISTWIDTH] IN BLOOD BY AUTOMATED COUNT: 11 % (ref 11.6–15.1)
GFR SERPL CREATININE-BSD FRML MDRD: 78 ML/MIN/1.73SQ M
GLUCOSE P FAST SERPL-MCNC: 79 MG/DL (ref 65–99)
HCT VFR BLD AUTO: 39.2 % (ref 36.5–49.3)
HGB BLD-MCNC: 14 G/DL (ref 12–17)
IMM GRANULOCYTES # BLD AUTO: 0.01 THOUSAND/UL (ref 0–0.2)
IMM GRANULOCYTES NFR BLD AUTO: 0 % (ref 0–2)
LYMPHOCYTES # BLD AUTO: 1.94 THOUSANDS/ΜL (ref 0.6–4.47)
LYMPHOCYTES NFR BLD AUTO: 51 % (ref 14–44)
MCH RBC QN AUTO: 42.9 PG (ref 26.8–34.3)
MCHC RBC AUTO-ENTMCNC: 35.7 G/DL (ref 31.4–37.4)
MCV RBC AUTO: 120 FL (ref 82–98)
MONOCYTES # BLD AUTO: 0.28 THOUSAND/ΜL (ref 0.17–1.22)
MONOCYTES NFR BLD AUTO: 8 % (ref 4–12)
NEUTROPHILS # BLD AUTO: 1.45 THOUSANDS/ΜL (ref 1.85–7.62)
NEUTS SEG NFR BLD AUTO: 39 % (ref 43–75)
NRBC BLD AUTO-RTO: 0 /100 WBCS
PLATELET # BLD AUTO: 155 THOUSANDS/UL (ref 149–390)
PMV BLD AUTO: 11.2 FL (ref 8.9–12.7)
POTASSIUM SERPL-SCNC: 3.7 MMOL/L (ref 3.5–5.3)
PROT SERPL-MCNC: 7.2 G/DL (ref 6.4–8.4)
RBC # BLD AUTO: 3.26 MILLION/UL (ref 3.88–5.62)
SODIUM SERPL-SCNC: 138 MMOL/L (ref 135–147)
WBC # BLD AUTO: 3.72 THOUSAND/UL (ref 4.31–10.16)

## 2024-12-20 PROCEDURE — 80053 COMPREHEN METABOLIC PANEL: CPT

## 2024-12-20 PROCEDURE — 85025 COMPLETE CBC W/AUTO DIFF WBC: CPT

## 2024-12-20 PROCEDURE — 36415 COLL VENOUS BLD VENIPUNCTURE: CPT

## 2024-12-22 PROBLEM — Z00.00 MEDICARE ANNUAL WELLNESS VISIT, INITIAL: Status: RESOLVED | Noted: 2024-11-22 | Resolved: 2024-12-22

## 2024-12-24 ENCOUNTER — TELEPHONE (OUTPATIENT)
Age: 74
End: 2024-12-24

## 2024-12-24 NOTE — TELEPHONE ENCOUNTER
I do not know if that will help him but I do not think it will affect his blood thinner or his other medical problems.

## 2024-12-24 NOTE — TELEPHONE ENCOUNTER
Patients daughter Anaid camara , wants to know if it okay to start giving him turkey tail supplement.    Wants to consult the doctor first,especially because he's on blood thinners

## 2025-01-21 ENCOUNTER — APPOINTMENT (OUTPATIENT)
Age: 75
End: 2025-01-21
Payer: MEDICARE

## 2025-01-21 DIAGNOSIS — E53.8 B12 DEFICIENCY: ICD-10-CM

## 2025-01-21 DIAGNOSIS — D75.839 THROMBOCYTOSIS: ICD-10-CM

## 2025-01-21 DIAGNOSIS — D45 POLYCYTHEMIA VERA (HCC): ICD-10-CM

## 2025-01-21 LAB
ALBUMIN SERPL BCG-MCNC: 3.3 G/DL (ref 3.5–5)
ALP SERPL-CCNC: 100 U/L (ref 34–104)
ALT SERPL W P-5'-P-CCNC: 10 U/L (ref 7–52)
ANION GAP SERPL CALCULATED.3IONS-SCNC: 9 MMOL/L (ref 4–13)
AST SERPL W P-5'-P-CCNC: 11 U/L (ref 13–39)
BASOPHILS # BLD AUTO: 0.01 THOUSANDS/ΜL (ref 0–0.1)
BASOPHILS NFR BLD AUTO: 0 % (ref 0–1)
BILIRUB SERPL-MCNC: 0.89 MG/DL (ref 0.2–1)
BUN SERPL-MCNC: 14 MG/DL (ref 5–25)
CALCIUM ALBUM COR SERPL-MCNC: 10.2 MG/DL (ref 8.3–10.1)
CALCIUM SERPL-MCNC: 9.6 MG/DL (ref 8.4–10.2)
CHLORIDE SERPL-SCNC: 99 MMOL/L (ref 96–108)
CO2 SERPL-SCNC: 28 MMOL/L (ref 21–32)
CREAT SERPL-MCNC: 1.1 MG/DL (ref 0.6–1.3)
EOSINOPHIL # BLD AUTO: 0.01 THOUSAND/ΜL (ref 0–0.61)
EOSINOPHIL NFR BLD AUTO: 0 % (ref 0–6)
ERYTHROCYTE [DISTWIDTH] IN BLOOD BY AUTOMATED COUNT: 11.4 % (ref 11.6–15.1)
GFR SERPL CREATININE-BSD FRML MDRD: 66 ML/MIN/1.73SQ M
GLUCOSE P FAST SERPL-MCNC: 91 MG/DL (ref 65–99)
HCT VFR BLD AUTO: 41.2 % (ref 36.5–49.3)
HGB BLD-MCNC: 14.2 G/DL (ref 12–17)
IMM GRANULOCYTES # BLD AUTO: 0.01 THOUSAND/UL (ref 0–0.2)
IMM GRANULOCYTES NFR BLD AUTO: 0 % (ref 0–2)
LYMPHOCYTES # BLD AUTO: 1.74 THOUSANDS/ΜL (ref 0.6–4.47)
LYMPHOCYTES NFR BLD AUTO: 54 % (ref 14–44)
MAGNESIUM SERPL-MCNC: 2.3 MG/DL (ref 1.9–2.7)
MCH RBC QN AUTO: 40.9 PG (ref 26.8–34.3)
MCHC RBC AUTO-ENTMCNC: 34.5 G/DL (ref 31.4–37.4)
MCV RBC AUTO: 119 FL (ref 82–98)
MONOCYTES # BLD AUTO: 0.28 THOUSAND/ΜL (ref 0.17–1.22)
MONOCYTES NFR BLD AUTO: 9 % (ref 4–12)
NEUTROPHILS # BLD AUTO: 1.18 THOUSANDS/ΜL (ref 1.85–7.62)
NEUTS SEG NFR BLD AUTO: 37 % (ref 43–75)
NRBC BLD AUTO-RTO: 0 /100 WBCS
PLATELET # BLD AUTO: 153 THOUSANDS/UL (ref 149–390)
PMV BLD AUTO: 11.4 FL (ref 8.9–12.7)
POTASSIUM SERPL-SCNC: 3.8 MMOL/L (ref 3.5–5.3)
PROT SERPL-MCNC: 6.8 G/DL (ref 6.4–8.4)
RBC # BLD AUTO: 3.47 MILLION/UL (ref 3.88–5.62)
SODIUM SERPL-SCNC: 136 MMOL/L (ref 135–147)
VIT B12 SERPL-MCNC: 509 PG/ML (ref 180–914)
WBC # BLD AUTO: 3.23 THOUSAND/UL (ref 4.31–10.16)

## 2025-01-21 PROCEDURE — 80053 COMPREHEN METABOLIC PANEL: CPT

## 2025-01-21 PROCEDURE — 36415 COLL VENOUS BLD VENIPUNCTURE: CPT

## 2025-01-21 PROCEDURE — 82607 VITAMIN B-12: CPT

## 2025-01-21 PROCEDURE — 83735 ASSAY OF MAGNESIUM: CPT

## 2025-01-21 PROCEDURE — 85025 COMPLETE CBC W/AUTO DIFF WBC: CPT

## 2025-02-14 ENCOUNTER — APPOINTMENT (OUTPATIENT)
Age: 75
End: 2025-02-14
Payer: MEDICARE

## 2025-02-14 DIAGNOSIS — D45 POLYCYTHEMIA VERA (HCC): ICD-10-CM

## 2025-02-14 LAB
ALBUMIN SERPL BCG-MCNC: 4.2 G/DL (ref 3.5–5)
ALP SERPL-CCNC: 101 U/L (ref 34–104)
ALT SERPL W P-5'-P-CCNC: 17 U/L (ref 7–52)
ANION GAP SERPL CALCULATED.3IONS-SCNC: 10 MMOL/L (ref 4–13)
AST SERPL W P-5'-P-CCNC: 18 U/L (ref 13–39)
BASOPHILS # BLD AUTO: 0.02 THOUSANDS/ÂΜL (ref 0–0.1)
BASOPHILS NFR BLD AUTO: 1 % (ref 0–1)
BILIRUB SERPL-MCNC: 0.67 MG/DL (ref 0.2–1)
BUN SERPL-MCNC: 14 MG/DL (ref 5–25)
CALCIUM SERPL-MCNC: 9.7 MG/DL (ref 8.4–10.2)
CHLORIDE SERPL-SCNC: 103 MMOL/L (ref 96–108)
CO2 SERPL-SCNC: 28 MMOL/L (ref 21–32)
CREAT SERPL-MCNC: 0.97 MG/DL (ref 0.6–1.3)
EOSINOPHIL # BLD AUTO: 0.01 THOUSAND/ÂΜL (ref 0–0.61)
EOSINOPHIL NFR BLD AUTO: 0 % (ref 0–6)
ERYTHROCYTE [DISTWIDTH] IN BLOOD BY AUTOMATED COUNT: 11.6 % (ref 11.6–15.1)
GFR SERPL CREATININE-BSD FRML MDRD: 77 ML/MIN/1.73SQ M
GLUCOSE P FAST SERPL-MCNC: 96 MG/DL (ref 65–99)
HCT VFR BLD AUTO: 42.1 % (ref 36.5–49.3)
HGB BLD-MCNC: 13.9 G/DL (ref 12–17)
IMM GRANULOCYTES # BLD AUTO: 0.01 THOUSAND/UL (ref 0–0.2)
IMM GRANULOCYTES NFR BLD AUTO: 0 % (ref 0–2)
LYMPHOCYTES # BLD AUTO: 2.1 THOUSANDS/ÂΜL (ref 0.6–4.47)
LYMPHOCYTES NFR BLD AUTO: 56 % (ref 14–44)
MCH RBC QN AUTO: 38.7 PG (ref 26.8–34.3)
MCHC RBC AUTO-ENTMCNC: 33 G/DL (ref 31.4–37.4)
MCV RBC AUTO: 117 FL (ref 82–98)
MONOCYTES # BLD AUTO: 0.34 THOUSAND/ÂΜL (ref 0.17–1.22)
MONOCYTES NFR BLD AUTO: 9 % (ref 4–12)
NEUTROPHILS # BLD AUTO: 1.29 THOUSANDS/ÂΜL (ref 1.85–7.62)
NEUTS SEG NFR BLD AUTO: 34 % (ref 43–75)
NRBC BLD AUTO-RTO: 0 /100 WBCS
PLATELET # BLD AUTO: 191 THOUSANDS/UL (ref 149–390)
PMV BLD AUTO: 11.3 FL (ref 8.9–12.7)
POTASSIUM SERPL-SCNC: 3.9 MMOL/L (ref 3.5–5.3)
PROT SERPL-MCNC: 7 G/DL (ref 6.4–8.4)
RBC # BLD AUTO: 3.59 MILLION/UL (ref 3.88–5.62)
SODIUM SERPL-SCNC: 141 MMOL/L (ref 135–147)
WBC # BLD AUTO: 3.77 THOUSAND/UL (ref 4.31–10.16)

## 2025-02-14 PROCEDURE — 80053 COMPREHEN METABOLIC PANEL: CPT

## 2025-02-14 PROCEDURE — 36415 COLL VENOUS BLD VENIPUNCTURE: CPT

## 2025-02-14 PROCEDURE — 85025 COMPLETE CBC W/AUTO DIFF WBC: CPT

## 2025-02-19 ENCOUNTER — OFFICE VISIT (OUTPATIENT)
Dept: HEMATOLOGY ONCOLOGY | Facility: CLINIC | Age: 75
End: 2025-02-19
Payer: MEDICARE

## 2025-02-19 VITALS
DIASTOLIC BLOOD PRESSURE: 80 MMHG | HEIGHT: 70 IN | WEIGHT: 175 LBS | TEMPERATURE: 97.6 F | BODY MASS INDEX: 25.05 KG/M2 | OXYGEN SATURATION: 98 % | SYSTOLIC BLOOD PRESSURE: 122 MMHG | RESPIRATION RATE: 18 BRPM | HEART RATE: 72 BPM

## 2025-02-19 DIAGNOSIS — D45 POLYCYTHEMIA VERA (HCC): Primary | ICD-10-CM

## 2025-02-19 PROCEDURE — G2211 COMPLEX E/M VISIT ADD ON: HCPCS | Performed by: INTERNAL MEDICINE

## 2025-02-19 PROCEDURE — 99214 OFFICE O/P EST MOD 30 MIN: CPT | Performed by: INTERNAL MEDICINE

## 2025-02-19 NOTE — PROGRESS NOTES
Name: Kingsley Grider Jr.      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  The patient continues to take the Hydrea 500 mg on every other day basis which is keeping his hematocrit less than 45%.  I did ask him to continue with the current dose without any changes along with the baby aspirin.  We will continue to monitor him closely.  Orders:    CBC and differential; Future    Comprehensive metabolic panel; Future    Magnesium; Future        Return in about 15 weeks (around 2025) for Office Visit 20 min, Labs.    History of Present Illness   Chief Complaint   Patient presents with    Follow-up   HPI:  Patient is a pleasant 74-year-old male who presents today for further evaluation of his polycythemia/thrombocytosis.  He has past medical history of Parkinson's disease, hypertension, CAD status post MI, hyperlipidemia, chronic kidney disease, and prostate cancer status post robotic prostatectomy in .  He does admit to consuming alcohol regularly on a daily basis about 4 beers per day.    He had extensive workup including a positive [id number] mutation.  He was then started on Hydrea 500 mg daily along with baby aspirin around 2023.         Interval history:  The patient came today for a follow-up visit accompanied by his stepdaughter.  He apparently had a  shunt which improved to his gait.  He continues to have tremors.  He stated that he is taking the Hydrea 500 mg on every other day basis without interruption.  Most recent available blood work from 2025 showed white cell count of 3.7 with hemoglobin of 13.9.  Platelet count was 191.  .  ANC 1.2.  CMP was entirely normal.    Review of Systems   Constitutional:  Positive for fatigue. Negative for chills  and fever.   HENT:  Positive for hearing loss. Negative for ear pain and sore throat.    Eyes:  Negative for pain and visual disturbance.   Respiratory:  Negative for cough and shortness of breath.    Cardiovascular:  Negative for chest pain and palpitations.   Gastrointestinal:  Positive for constipation. Negative for abdominal pain and vomiting.   Genitourinary:  Negative for dysuria and hematuria.   Musculoskeletal:  Negative for arthralgias and back pain.   Skin:  Negative for color change and rash.   Neurological:  Positive for dizziness and tremors. Negative for seizures and syncope.   Psychiatric/Behavioral:  Positive for sleep disturbance.    All other systems reviewed and are negative.    Medical History Reviewed by provider this encounter:  Tobacco  Allergies  Meds  Problems  Med Hx  Surg Hx  Fam Hx     .  Current Outpatient Medications on File Prior to Visit   Medication Sig Dispense Refill    aspirin (ECOTRIN LOW STRENGTH) 81 mg EC tablet Take 81 mg by mouth daily      atorvastatin (LIPITOR) 80 mg tablet TAKE ONE TABLET DAILY... 90 tablet 1    carbidopa-levodopa (SINEMET CR)  mg TBCR per ER tablet Take 1 tablet by mouth 3 (three) times a day 270 tablet 0    hydroxyurea (HYDREA) 500 mg capsule TAKE 1 CAPSULE DAILY... 30 capsule 5    PARoxetine (PAXIL) 20 mg tablet TAKE ONE TABLET DAILY... 90 tablet 1    vitamin B-12 (VITAMIN B-12) 1,000 mcg tablet Take 1 tablet (1,000 mcg total) by mouth daily       No current facility-administered medications on file prior to visit.      Social History     Tobacco Use    Smoking status: Never    Smokeless tobacco: Never   Vaping Use    Vaping status: Never Used   Substance and Sexual Activity    Alcohol use: Not Currently     Alcohol/week: 2.0 standard drinks of alcohol     Types: 2 Cans of beer per week     Comment: daily    Drug use: Never    Sexual activity: Not on file         Objective   /80 (BP Location: Left arm, Patient Position: Sitting, Cuff  "Size: Adult)   Pulse 72   Temp 97.6 °F (36.4 °C)   Resp 18   Ht 5' 10\" (1.778 m)   Wt 79.4 kg (175 lb)   SpO2 98%   BMI 25.11 kg/m²     Pain Screening:  Pain Score:   2  ECOG   1  Physical Exam  Constitutional:       Appearance: He is well-developed.   HENT:      Head: Normocephalic and atraumatic.      Nose: Nose normal.   Eyes:      General: No scleral icterus.        Right eye: No discharge.         Left eye: No discharge.      Conjunctiva/sclera: Conjunctivae normal.      Pupils: Pupils are equal, round, and reactive to light.   Neck:      Thyroid: No thyromegaly.      Trachea: No tracheal deviation.   Cardiovascular:      Rate and Rhythm: Normal rate and regular rhythm.      Heart sounds: Normal heart sounds. No murmur heard.     No friction rub.   Pulmonary:      Effort: Pulmonary effort is normal. No respiratory distress.      Breath sounds: Normal breath sounds. No wheezing or rales.   Chest:      Chest wall: No tenderness.   Abdominal:      General: There is no distension.      Palpations: Abdomen is soft. There is no hepatomegaly or splenomegaly.      Tenderness: There is no abdominal tenderness. There is no guarding or rebound.   Musculoskeletal:         General: No tenderness or deformity. Normal range of motion.      Cervical back: Normal range of motion and neck supple.   Lymphadenopathy:      Cervical: No cervical adenopathy.   Skin:     General: Skin is warm and dry.      Coloration: Skin is not pale.      Findings: No erythema or rash.   Neurological:      Mental Status: He is alert and oriented to person, place, and time.      Cranial Nerves: No cranial nerve deficit.      Coordination: Coordination normal.      Deep Tendon Reflexes: Reflexes are normal and symmetric.   Psychiatric:         Behavior: Behavior normal.         Thought Content: Thought content normal.         Judgment: Judgment normal.         Labs: I have reviewed the following labs:  Lab Results   Component Value Date/Time    " WBC 3.77 (L) 02/14/2025 07:44 AM    RBC 3.59 (L) 02/14/2025 07:44 AM    Hemoglobin 13.9 02/14/2025 07:44 AM    Hematocrit 42.1 02/14/2025 07:44 AM     (H) 02/14/2025 07:44 AM    MCH 38.7 (H) 02/14/2025 07:44 AM    RDW 11.6 02/14/2025 07:44 AM    Platelets 191 02/14/2025 07:44 AM    Segmented % 34 (L) 02/14/2025 07:44 AM    Lymphocytes % 56 (H) 02/14/2025 07:44 AM    Monocytes % 9 02/14/2025 07:44 AM    Eosinophils Relative 0 02/14/2025 07:44 AM    Basophils Relative 1 02/14/2025 07:44 AM    Immature Grans % 0 02/14/2025 07:44 AM    Absolute Neutrophils 1.29 (L) 02/14/2025 07:44 AM     Lab Results   Component Value Date/Time    Potassium 3.9 02/14/2025 07:44 AM    Potassium 3.5 10/21/2024 06:00 AM    Chloride 103 02/14/2025 07:44 AM    Chloride 101 10/21/2024 06:00 AM    Carbon Dioxide 30 10/21/2024 06:00 AM    CO2 28 02/14/2025 07:44 AM    BUN 14 02/14/2025 07:44 AM    BUN 15 10/21/2024 06:00 AM    Creatinine 0.97 02/14/2025 07:44 AM    Creatinine 1.01 10/21/2024 06:00 AM    Glucose, Fasting 96 02/14/2025 07:44 AM    Calcium 9.7 02/14/2025 07:44 AM    Calcium 9.3 10/21/2024 06:00 AM    Corrected Calcium 10.2 (H) 01/21/2025 09:43 AM    AST 18 02/14/2025 07:44 AM    ALT 17 02/14/2025 07:44 AM    Alkaline Phosphatase 101 02/14/2025 07:44 AM    Total Protein 7.0 02/14/2025 07:44 AM    Albumin 4.2 02/14/2025 07:44 AM    Total Bilirubin 0.67 02/14/2025 07:44 AM    eGFRcr 78 10/21/2024 06:00 AM    eGFR 77 02/14/2025 07:44 AM     Lab Results   Component Value Date/Time    WBC 3.77 (L) 02/14/2025 07:44 AM    RBC 3.59 (L) 02/14/2025 07:44 AM    Hemoglobin 13.9 02/14/2025 07:44 AM    Hematocrit 42.1 02/14/2025 07:44 AM     (H) 02/14/2025 07:44 AM    MCH 38.7 (H) 02/14/2025 07:44 AM    RDW 11.6 02/14/2025 07:44 AM    Platelets 191 02/14/2025 07:44 AM    Segmented % 34 (L) 02/14/2025 07:44 AM    Lymphocytes % 56 (H) 02/14/2025 07:44 AM    Monocytes % 9 02/14/2025 07:44 AM    Eosinophils Relative 0 02/14/2025 07:44  AM    Basophils Relative 1 02/14/2025 07:44 AM    Immature Grans % 0 02/14/2025 07:44 AM    Absolute Neutrophils 1.29 (L) 02/14/2025 07:44 AM      Lab Results   Component Value Date/Time    Sodium 141 02/14/2025 07:44 AM    Sodium 139 10/21/2024 06:00 AM    Potassium 3.9 02/14/2025 07:44 AM    Potassium 3.5 10/21/2024 06:00 AM    Chloride 103 02/14/2025 07:44 AM    Chloride 101 10/21/2024 06:00 AM    Carbon Dioxide 30 10/21/2024 06:00 AM    CO2 28 02/14/2025 07:44 AM    ANION GAP 10 02/14/2025 07:44 AM    ANION GAP 8 10/21/2024 06:00 AM    BUN 14 02/14/2025 07:44 AM    BUN 15 10/21/2024 06:00 AM    Creatinine 0.97 02/14/2025 07:44 AM    Creatinine 1.01 10/21/2024 06:00 AM    Glucose 86 10/21/2024 06:00 AM    Glucose, Fasting 96 02/14/2025 07:44 AM    Calcium 9.7 02/14/2025 07:44 AM    Calcium 9.3 10/21/2024 06:00 AM    Corrected Calcium 10.2 (H) 01/21/2025 09:43 AM    AST 18 02/14/2025 07:44 AM    ALT 17 02/14/2025 07:44 AM    Alkaline Phosphatase 101 02/14/2025 07:44 AM    Total Protein 7.0 02/14/2025 07:44 AM    Albumin 4.2 02/14/2025 07:44 AM    Total Bilirubin 0.67 02/14/2025 07:44 AM    eGFRcr 78 10/21/2024 06:00 AM    eGFR 77 02/14/2025 07:44 AM      Lab Results   Component Value Date/Time     11/22/2024 10:04 AM        Radiology Results Review : No pertinent imaging studies reviewed.

## 2025-02-19 NOTE — ASSESSMENT & PLAN NOTE
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  The patient continues to take the Hydrea 500 mg on every other day basis which is keeping his hematocrit less than 45%.  I did ask him to continue with the current dose without any changes along with the baby aspirin.  We will continue to monitor him closely.  Orders:    CBC and differential; Future    Comprehensive metabolic panel; Future    Magnesium; Future

## 2025-04-07 DIAGNOSIS — I25.10 CORONARY ARTERY DISEASE INVOLVING NATIVE CORONARY ARTERY OF NATIVE HEART WITHOUT ANGINA PECTORIS: ICD-10-CM

## 2025-04-08 RX ORDER — ATORVASTATIN CALCIUM 80 MG/1
80 TABLET, FILM COATED ORAL DAILY
Qty: 90 TABLET | Refills: 1 | Status: SHIPPED | OUTPATIENT
Start: 2025-04-08

## 2025-05-19 ENCOUNTER — APPOINTMENT (OUTPATIENT)
Age: 75
End: 2025-05-19
Payer: MEDICARE

## 2025-05-19 DIAGNOSIS — D45 POLYCYTHEMIA VERA (HCC): ICD-10-CM

## 2025-05-19 DIAGNOSIS — G20.A2 PARKINSON'S DISEASE WITH FLUCTUATING MANIFESTATIONS, UNSPECIFIED WHETHER DYSKINESIA PRESENT (HCC): ICD-10-CM

## 2025-05-19 LAB
ALBUMIN SERPL BCG-MCNC: 4.2 G/DL (ref 3.5–5)
ALP SERPL-CCNC: 118 U/L (ref 34–104)
ALT SERPL W P-5'-P-CCNC: 13 U/L (ref 7–52)
ANION GAP SERPL CALCULATED.3IONS-SCNC: 10 MMOL/L (ref 4–13)
AST SERPL W P-5'-P-CCNC: 17 U/L (ref 13–39)
BASOPHILS # BLD AUTO: 0.03 THOUSANDS/ÂΜL (ref 0–0.1)
BASOPHILS NFR BLD AUTO: 1 % (ref 0–1)
BILIRUB SERPL-MCNC: 1.01 MG/DL (ref 0.2–1)
BUN SERPL-MCNC: 17 MG/DL (ref 5–25)
CALCIUM SERPL-MCNC: 9.5 MG/DL (ref 8.4–10.2)
CHLORIDE SERPL-SCNC: 102 MMOL/L (ref 96–108)
CO2 SERPL-SCNC: 27 MMOL/L (ref 21–32)
CREAT SERPL-MCNC: 1.09 MG/DL (ref 0.6–1.3)
EOSINOPHIL # BLD AUTO: 0.01 THOUSAND/ÂΜL (ref 0–0.61)
EOSINOPHIL NFR BLD AUTO: 0 % (ref 0–6)
ERYTHROCYTE [DISTWIDTH] IN BLOOD BY AUTOMATED COUNT: 12.7 % (ref 11.6–15.1)
GFR SERPL CREATININE-BSD FRML MDRD: 66 ML/MIN/1.73SQ M
GLUCOSE P FAST SERPL-MCNC: 90 MG/DL (ref 65–99)
HCT VFR BLD AUTO: 44.5 % (ref 36.5–49.3)
HGB BLD-MCNC: 14.8 G/DL (ref 12–17)
IMM GRANULOCYTES # BLD AUTO: 0.01 THOUSAND/UL (ref 0–0.2)
IMM GRANULOCYTES NFR BLD AUTO: 0 % (ref 0–2)
LYMPHOCYTES # BLD AUTO: 1.86 THOUSANDS/ÂΜL (ref 0.6–4.47)
LYMPHOCYTES NFR BLD AUTO: 42 % (ref 14–44)
MAGNESIUM SERPL-MCNC: 2.2 MG/DL (ref 1.9–2.7)
MCH RBC QN AUTO: 35.9 PG (ref 26.8–34.3)
MCHC RBC AUTO-ENTMCNC: 33.3 G/DL (ref 31.4–37.4)
MCV RBC AUTO: 108 FL (ref 82–98)
MONOCYTES # BLD AUTO: 0.45 THOUSAND/ÂΜL (ref 0.17–1.22)
MONOCYTES NFR BLD AUTO: 10 % (ref 4–12)
NEUTROPHILS # BLD AUTO: 2.08 THOUSANDS/ÂΜL (ref 1.85–7.62)
NEUTS SEG NFR BLD AUTO: 47 % (ref 43–75)
NRBC BLD AUTO-RTO: 0 /100 WBCS
PLATELET # BLD AUTO: 219 THOUSANDS/UL (ref 149–390)
PMV BLD AUTO: 11.4 FL (ref 8.9–12.7)
POTASSIUM SERPL-SCNC: 3.9 MMOL/L (ref 3.5–5.3)
PROT SERPL-MCNC: 7.4 G/DL (ref 6.4–8.4)
RBC # BLD AUTO: 4.12 MILLION/UL (ref 3.88–5.62)
SODIUM SERPL-SCNC: 139 MMOL/L (ref 135–147)
WBC # BLD AUTO: 4.44 THOUSAND/UL (ref 4.31–10.16)

## 2025-05-19 PROCEDURE — 83735 ASSAY OF MAGNESIUM: CPT

## 2025-05-19 PROCEDURE — 80053 COMPREHEN METABOLIC PANEL: CPT

## 2025-05-19 PROCEDURE — 85025 COMPLETE CBC W/AUTO DIFF WBC: CPT

## 2025-05-19 PROCEDURE — 36415 COLL VENOUS BLD VENIPUNCTURE: CPT

## 2025-05-19 RX ORDER — CARBIDOPA AND LEVODOPA 25; 100 MG/1; MG/1
1 TABLET, EXTENDED RELEASE ORAL 3 TIMES DAILY
Qty: 270 TABLET | Refills: 1 | Status: SHIPPED | OUTPATIENT
Start: 2025-05-19

## 2025-05-22 ENCOUNTER — OFFICE VISIT (OUTPATIENT)
Dept: FAMILY MEDICINE CLINIC | Facility: CLINIC | Age: 75
End: 2025-05-22
Payer: MEDICARE

## 2025-05-22 VITALS
SYSTOLIC BLOOD PRESSURE: 134 MMHG | TEMPERATURE: 97.8 F | BODY MASS INDEX: 25.37 KG/M2 | HEIGHT: 70 IN | HEART RATE: 83 BPM | OXYGEN SATURATION: 97 % | DIASTOLIC BLOOD PRESSURE: 72 MMHG | WEIGHT: 177.2 LBS

## 2025-05-22 DIAGNOSIS — G20.A2 PARKINSON'S DISEASE WITH FLUCTUATING MANIFESTATIONS, UNSPECIFIED WHETHER DYSKINESIA PRESENT (HCC): ICD-10-CM

## 2025-05-22 DIAGNOSIS — D45 POLYCYTHEMIA VERA (HCC): ICD-10-CM

## 2025-05-22 DIAGNOSIS — C61 MALIGNANT NEOPLASM OF PROSTATE (HCC): ICD-10-CM

## 2025-05-22 DIAGNOSIS — E78.2 MIXED HYPERLIPIDEMIA: ICD-10-CM

## 2025-05-22 DIAGNOSIS — Z98.2 S/P VP SHUNT: ICD-10-CM

## 2025-05-22 DIAGNOSIS — Z12.11 SCREEN FOR COLON CANCER: ICD-10-CM

## 2025-05-22 DIAGNOSIS — G91.2 NORMAL PRESSURE HYDROCEPHALUS (HCC): ICD-10-CM

## 2025-05-22 DIAGNOSIS — I25.10 CORONARY ARTERY DISEASE INVOLVING NATIVE CORONARY ARTERY OF NATIVE HEART WITHOUT ANGINA PECTORIS: Primary | ICD-10-CM

## 2025-05-22 PROCEDURE — G2211 COMPLEX E/M VISIT ADD ON: HCPCS | Performed by: INTERNAL MEDICINE

## 2025-05-22 PROCEDURE — 99214 OFFICE O/P EST MOD 30 MIN: CPT | Performed by: INTERNAL MEDICINE

## 2025-05-22 NOTE — ASSESSMENT & PLAN NOTE
Patient remains free of coronary symptomatology.  Continue current medical regimen.  Orders:  •  Comprehensive metabolic panel; Future

## 2025-05-22 NOTE — ASSESSMENT & PLAN NOTE
Follows with hematology/oncology.  Remains on hydroxyurea with excellent result.  Orders:  •  CBC and differential; Future  •  Comprehensive metabolic panel; Future

## 2025-05-22 NOTE — PROGRESS NOTES
Name: Kingsley Grider Jr.      : 1950      MRN: [id number]  Encounter Provider: Evan Díaz DO  Encounter Date: 2025   Encounter department: [facility] PRIMARY CARE  :  Assessment & Plan  Coronary artery disease involving native coronary artery of native heart without angina pectoris  Patient remains free of coronary symptomatology.  Continue current medical regimen.  Orders:  •  Comprehensive metabolic panel; Future    Mixed hyperlipidemia  Remains on high intensity statin.  Orders:  •  Lipid Panel with Direct LDL reflex; Future    Normal pressure hydrocephalus (HCC)  Ambulation has improved as has cognition.  Status post  shunt.       S/P  shunt  And  shunt remains functional.       Parkinson's disease with fluctuating manifestations, unspecified whether dyskinesia present (HCC)  Remains on carbidopa levodopa/follow-up with neurology.  Has been through physical therapy for ambulation.       Malignant neoplasm of prostate (HCC)  Status post prostatectomy.  Remains in remission over 10 years.       Polycythemia vera (HCC)  Follows with hematology/oncology.  Remains on hydroxyurea with excellent result.  Orders:  •  CBC and differential; Future  •  Comprehensive metabolic panel; Future    Screen for colon cancer  Agreeable to Erin.  Orders:  •  Cologuard          Depression Screening and Follow-up Plan: Patient was screened for depression during today's encounter. They screened negative with a PHQ-2 score of 0.      Falls Plan of Care: balance, strength, and gait training instructions were provided. Medications that increase falls were reviewed. Vitamin D supplementation was recommended. Home safety education provided.       History of Present Illness   Patient doing well.  Fell once, only because it was raining and he had his head down for so he fell forward.  Tremor has been good.  Apparently neurology is discussing the addition of a new medicine for his parkinsonian  "disease.  No chest pain, no shortness of breath.  Bowels and bladder been doing okay.  No recent change in weight.  Remains on Paxil.  When asked if he is depressed he states he is overly happy!      Review of Systems   Constitutional:  Negative for chills.   HENT:  Negative for congestion, rhinorrhea and sore throat.    Eyes:  Negative for visual disturbance.   Respiratory:  Negative for cough and shortness of breath.    Cardiovascular:  Negative for chest pain and leg swelling.   Gastrointestinal:  Negative for abdominal pain, diarrhea, nausea and vomiting.   Genitourinary:  Negative for dysuria, flank pain, frequency and urgency.   Musculoskeletal:  Positive for gait problem. Negative for back pain and neck pain.   Skin:  Negative for rash.   Neurological:  Positive for tremors, weakness and light-headedness. Negative for dizziness.   Hematological: Negative.    Psychiatric/Behavioral:  Negative for agitation, confusion and suicidal ideas.    All other systems reviewed and are negative.      Objective   /72 (BP Location: Left arm, Patient Position: Sitting, Cuff Size: Adult)   Pulse 83   Temp 97.8 °F (36.6 °C) (Tympanic)   Ht 5' 10\" (1.778 m)   Wt 80.4 kg (177 lb 3.2 oz)   SpO2 97%   BMI 25.43 kg/m²      Physical Exam  Vitals and nursing note reviewed.   Constitutional:       General: He is not in acute distress.     Appearance: Normal appearance. He is well-developed.   HENT:      Head: Normocephalic and atraumatic.     Eyes:      Conjunctiva/sclera: Conjunctivae normal.       Cardiovascular:      Rate and Rhythm: Normal rate and regular rhythm.      Pulses: Normal pulses.      Heart sounds: Normal heart sounds. No murmur heard.  Pulmonary:      Effort: Pulmonary effort is normal. No respiratory distress.      Breath sounds: Normal breath sounds.   Abdominal:      Palpations: Abdomen is soft.      Tenderness: There is no abdominal tenderness.     Musculoskeletal:         General: No swelling.      " Cervical back: Neck supple.     Skin:     General: Skin is warm and dry.      Capillary Refill: Capillary refill takes less than 2 seconds.     Neurological:      General: No focal deficit present.      Mental Status: He is alert and oriented to person, place, and time.     Psychiatric:         Mood and Affect: Mood normal.

## 2025-05-22 NOTE — ASSESSMENT & PLAN NOTE
Remains on carbidopa levodopa/follow-up with neurology.  Has been through physical therapy for ambulation.

## 2025-06-04 ENCOUNTER — OFFICE VISIT (OUTPATIENT)
Dept: HEMATOLOGY ONCOLOGY | Facility: CLINIC | Age: 75
End: 2025-06-04
Payer: MEDICARE

## 2025-06-04 VITALS
OXYGEN SATURATION: 99 % | HEIGHT: 70 IN | RESPIRATION RATE: 16 BRPM | WEIGHT: 176 LBS | BODY MASS INDEX: 25.2 KG/M2 | HEART RATE: 75 BPM | DIASTOLIC BLOOD PRESSURE: 76 MMHG | TEMPERATURE: 98.4 F | SYSTOLIC BLOOD PRESSURE: 126 MMHG

## 2025-06-04 DIAGNOSIS — D45 POLYCYTHEMIA VERA (HCC): Primary | ICD-10-CM

## 2025-06-04 PROCEDURE — 99214 OFFICE O/P EST MOD 30 MIN: CPT | Performed by: INTERNAL MEDICINE

## 2025-06-04 PROCEDURE — G2211 COMPLEX E/M VISIT ADD ON: HCPCS | Performed by: INTERNAL MEDICINE

## 2025-06-04 NOTE — PROGRESS NOTES
Name: Kingsley Grider Jr.      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  The patient continues to take the Hydrea 500 mg on every other day basis which is keeping his hematocrit less than 45%.  I did ask him to continue with the current dose without any changes along with the daily baby aspirin.  We will continue to monitor him closely.  Orders:    CBC and differential; Future    Comprehensive metabolic panel; Future    LD,Blood; Future    Magnesium; Future        Return in about 4 months (around 10/16/2025) for Labs, Office Visit 20 min.    History of Present Illness   Chief Complaint   Patient presents with    Follow-up   HPI:  Patient is a pleasant 74-year-old male who presents today for further evaluation of his polycythemia/thrombocytosis.  He has past medical history of Parkinson's disease, hypertension, CAD status post MI, hyperlipidemia, chronic kidney disease, and prostate cancer status post robotic prostatectomy in .  He does admit to consuming alcohol regularly on a daily basis about 4 beers per day.    He had extensive workup including a positive [id number] mutation.  He was then started on Hydrea 500 mg daily along with baby aspirin around 2023.         Interval history:  The patient came today for a follow-up visit accompanied by a family member.  He stated that he continues to take the Hydrea at the current dose and frequency of 500 mg every other day along with baby aspirin.  Recent available blood work from 2025 showed hemoglobin of 14.8 with hematocrit of 44.5%.  MCV is 108.  White cell count was 4.4 with normal platelets of 219.  White cell differential was normal.  CMP was normal.  Magnesium was normal.     Review of Systems   Constitutional:   "Positive for fatigue. Negative for chills and fever.   HENT:  Negative for ear pain and sore throat.    Eyes:  Negative for pain and visual disturbance.   Respiratory:  Negative for cough and shortness of breath.    Cardiovascular:  Negative for chest pain and palpitations.   Gastrointestinal:  Positive for constipation. Negative for abdominal pain and vomiting.   Genitourinary:  Negative for dysuria and hematuria.   Musculoskeletal:  Negative for arthralgias and back pain.   Skin:  Negative for color change and rash.   Neurological:  Positive for tremors. Negative for seizures and syncope.   Psychiatric/Behavioral:  Positive for sleep disturbance.    All other systems reviewed and are negative.    Medical History Reviewed by provider this encounter:  Tobacco  Allergies  Meds  Problems  Med Hx  Surg Hx  Fam Hx     .  Medications Ordered Prior to Encounter[1]   Social History[2]      Objective   /76 (BP Location: Right arm, Patient Position: Sitting, Cuff Size: Adult)   Pulse 75   Temp 98.4 °F (36.9 °C)   Resp 16   Ht 5' 10\" (1.778 m)   Wt 79.8 kg (176 lb)   SpO2 99%   BMI 25.25 kg/m²     Pain Screening:  Pain Score: 0-No pain  ECOG   1  Physical Exam  Constitutional:       Appearance: He is well-developed.   HENT:      Head: Normocephalic and atraumatic.      Nose: Nose normal.     Eyes:      General: No scleral icterus.        Right eye: No discharge.         Left eye: No discharge.      Conjunctiva/sclera: Conjunctivae normal.      Pupils: Pupils are equal, round, and reactive to light.     Neck:      Thyroid: No thyromegaly.      Trachea: No tracheal deviation.     Cardiovascular:      Rate and Rhythm: Normal rate and regular rhythm.      Heart sounds: Normal heart sounds. No murmur heard.     No friction rub.   Pulmonary:      Effort: Pulmonary effort is normal. No respiratory distress.      Breath sounds: Normal breath sounds. No wheezing or rales.   Chest:      Chest wall: No tenderness. "   Abdominal:      General: Bowel sounds are normal. There is no distension.      Palpations: Abdomen is soft. There is no hepatomegaly, splenomegaly or mass.      Tenderness: There is no abdominal tenderness. There is no guarding or rebound.     Musculoskeletal:         General: No tenderness or deformity. Normal range of motion.      Cervical back: Normal range of motion and neck supple.   Lymphadenopathy:      Cervical: No cervical adenopathy.     Skin:     General: Skin is warm and dry.      Coloration: Skin is not pale.      Findings: No erythema or rash.     Neurological:      Mental Status: He is alert and oriented to person, place, and time.      Cranial Nerves: No cranial nerve deficit.      Coordination: Coordination normal.      Deep Tendon Reflexes: Reflexes are normal and symmetric.     Psychiatric:         Behavior: Behavior normal.         Thought Content: Thought content normal.         Judgment: Judgment normal.         Labs: I have reviewed the following labs:  Lab Results   Component Value Date/Time    WBC 4.44 05/19/2025 09:26 AM    RBC 4.12 05/19/2025 09:26 AM    Hemoglobin 14.8 05/19/2025 09:26 AM    Hematocrit 44.5 05/19/2025 09:26 AM     (H) 05/19/2025 09:26 AM    MCH 35.9 (H) 05/19/2025 09:26 AM    RDW 12.7 05/19/2025 09:26 AM    Platelets 219 05/19/2025 09:26 AM    Segmented % 47 05/19/2025 09:26 AM    Lymphocytes % 42 05/19/2025 09:26 AM    Monocytes % 10 05/19/2025 09:26 AM    Eosinophils Relative 0 05/19/2025 09:26 AM    Basophils Relative 1 05/19/2025 09:26 AM    Immature Grans % 0 05/19/2025 09:26 AM    Absolute Neutrophils 2.08 05/19/2025 09:26 AM     Lab Results   Component Value Date/Time    Potassium 3.9 05/19/2025 09:26 AM    Potassium 3.5 10/21/2024 06:00 AM    Chloride 102 05/19/2025 09:26 AM    Chloride 101 10/21/2024 06:00 AM    Carbon Dioxide 30 10/21/2024 06:00 AM    CO2 27 05/19/2025 09:26 AM    BUN 17 05/19/2025 09:26 AM    BUN 15 10/21/2024 06:00 AM    Creatinine  1.09 05/19/2025 09:26 AM    Creatinine 1.01 10/21/2024 06:00 AM    Glucose, Fasting 90 05/19/2025 09:26 AM    Calcium 9.5 05/19/2025 09:26 AM    Calcium 9.3 10/21/2024 06:00 AM    Corrected Calcium 10.2 (H) 01/21/2025 09:43 AM    AST 17 05/19/2025 09:26 AM    ALT 13 05/19/2025 09:26 AM    Alkaline Phosphatase 118 (H) 05/19/2025 09:26 AM    Total Protein 7.4 05/19/2025 09:26 AM    Albumin 4.2 05/19/2025 09:26 AM    Total Bilirubin 1.01 (H) 05/19/2025 09:26 AM    eGFRcr 78 10/21/2024 06:00 AM    eGFR 66 05/19/2025 09:26 AM     Lab Results   Component Value Date/Time    WBC 4.44 05/19/2025 09:26 AM    RBC 4.12 05/19/2025 09:26 AM    Hemoglobin 14.8 05/19/2025 09:26 AM    Hematocrit 44.5 05/19/2025 09:26 AM     (H) 05/19/2025 09:26 AM    MCH 35.9 (H) 05/19/2025 09:26 AM    RDW 12.7 05/19/2025 09:26 AM    Platelets 219 05/19/2025 09:26 AM    Segmented % 47 05/19/2025 09:26 AM    Lymphocytes % 42 05/19/2025 09:26 AM    Monocytes % 10 05/19/2025 09:26 AM    Eosinophils Relative 0 05/19/2025 09:26 AM    Basophils Relative 1 05/19/2025 09:26 AM    Immature Grans % 0 05/19/2025 09:26 AM    Absolute Neutrophils 2.08 05/19/2025 09:26 AM      Lab Results   Component Value Date/Time    Sodium 139 05/19/2025 09:26 AM    Sodium 139 10/21/2024 06:00 AM    Potassium 3.9 05/19/2025 09:26 AM    Potassium 3.5 10/21/2024 06:00 AM    Chloride 102 05/19/2025 09:26 AM    Chloride 101 10/21/2024 06:00 AM    Carbon Dioxide 30 10/21/2024 06:00 AM    CO2 27 05/19/2025 09:26 AM    ANION GAP 10 05/19/2025 09:26 AM    ANION GAP 8 10/21/2024 06:00 AM    BUN 17 05/19/2025 09:26 AM    BUN 15 10/21/2024 06:00 AM    Creatinine 1.09 05/19/2025 09:26 AM    Creatinine 1.01 10/21/2024 06:00 AM    Glucose 86 10/21/2024 06:00 AM    Glucose, Fasting 90 05/19/2025 09:26 AM    Calcium 9.5 05/19/2025 09:26 AM    Calcium 9.3 10/21/2024 06:00 AM    Corrected Calcium 10.2 (H) 01/21/2025 09:43 AM    AST 17 05/19/2025 09:26 AM    ALT 13 05/19/2025 09:26 AM     Alkaline Phosphatase 118 (H) 05/19/2025 09:26 AM    Total Protein 7.4 05/19/2025 09:26 AM    Albumin 4.2 05/19/2025 09:26 AM    Total Bilirubin 1.01 (H) 05/19/2025 09:26 AM    eGFRcr 78 10/21/2024 06:00 AM    eGFR 66 05/19/2025 09:26 AM      Lab Results   Component Value Date/Time    Vitamin B-12 509 01/21/2025 09:43 AM      Results for orders placed or performed in visit on 05/19/25   CBC and differential   Result Value Ref Range    WBC 4.44 4.31 - 10.16 Thousand/uL    RBC 4.12 3.88 - 5.62 Million/uL    Hemoglobin 14.8 12.0 - 17.0 g/dL    Hematocrit 44.5 36.5 - 49.3 %     (H) 82 - 98 fL    MCH 35.9 (H) 26.8 - 34.3 pg    MCHC 33.3 31.4 - 37.4 g/dL    RDW 12.7 11.6 - 15.1 %    MPV 11.4 8.9 - 12.7 fL    Platelets 219 149 - 390 Thousands/uL    nRBC 0 /100 WBCs    Segmented % 47 43 - 75 %    Immature Grans % 0 0 - 2 %    Lymphocytes % 42 14 - 44 %    Monocytes % 10 4 - 12 %    Eosinophils Relative 0 0 - 6 %    Basophils Relative 1 0 - 1 %    Absolute Neutrophils 2.08 1.85 - 7.62 Thousands/µL    Absolute Immature Grans 0.01 0.00 - 0.20 Thousand/uL    Absolute Lymphocytes 1.86 0.60 - 4.47 Thousands/µL    Absolute Monocytes 0.45 0.17 - 1.22 Thousand/µL    Eosinophils Absolute 0.01 0.00 - 0.61 Thousand/µL    Basophils Absolute 0.03 0.00 - 0.10 Thousands/µL   Comprehensive metabolic panel   Result Value Ref Range    Sodium 139 135 - 147 mmol/L    Potassium 3.9 3.5 - 5.3 mmol/L    Chloride 102 96 - 108 mmol/L    CO2 27 21 - 32 mmol/L    ANION GAP 10 4 - 13 mmol/L    BUN 17 5 - 25 mg/dL    Creatinine 1.09 0.60 - 1.30 mg/dL    Glucose, Fasting 90 65 - 99 mg/dL    Calcium 9.5 8.4 - 10.2 mg/dL    AST 17 13 - 39 U/L    ALT 13 7 - 52 U/L    Alkaline Phosphatase 118 (H) 34 - 104 U/L    Total Protein 7.4 6.4 - 8.4 g/dL    Albumin 4.2 3.5 - 5.0 g/dL    Total Bilirubin 1.01 (H) 0.20 - 1.00 mg/dL    eGFR 66 ml/min/1.73sq m   Result Value Ref Range    Magnesium 2.2 1.9 - 2.7 mg/dL                   [1]   Current Outpatient  Medications on File Prior to Visit   Medication Sig Dispense Refill    aspirin (ECOTRIN LOW STRENGTH) 81 mg EC tablet Take 81 mg by mouth in the morning.      atorvastatin (LIPITOR) 80 mg tablet TAKE ONE TABLET DAILY... 90 tablet 1    carbidopa-levodopa (SINEMET CR)  mg TBCR per ER tablet Take 1 tablet by mouth 3 (three) times a day 270 tablet 1    hydroxyurea (HYDREA) 500 mg capsule TAKE 1 CAPSULE DAILY... 30 capsule 5    PARoxetine (PAXIL) 20 mg tablet TAKE ONE TABLET DAILY... 90 tablet 1    vitamin B-12 (VITAMIN B-12) 1,000 mcg tablet Take 1 tablet (1,000 mcg total) by mouth daily       No current facility-administered medications on file prior to visit.   [2]   Social History  Tobacco Use    Smoking status: Never     Passive exposure: Never    Smokeless tobacco: Never   Vaping Use    Vaping status: Never Used   Substance and Sexual Activity    Alcohol use: Not Currently     Alcohol/week: 2.0 standard drinks of alcohol     Types: 2 Cans of beer per week     Comment: daily    Drug use: Never

## 2025-06-04 NOTE — ASSESSMENT & PLAN NOTE
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  The patient continues to take the Hydrea 500 mg on every other day basis which is keeping his hematocrit less than 45%.  I did ask him to continue with the current dose without any changes along with the daily baby aspirin.  We will continue to monitor him closely.  Orders:    CBC and differential; Future    Comprehensive metabolic panel; Future    LD,Blood; Future    Magnesium; Future

## 2025-06-14 LAB — COLOGUARD RESULT REPORTABLE: NEGATIVE

## 2025-06-15 ENCOUNTER — RESULTS FOLLOW-UP (OUTPATIENT)
Dept: EMERGENCY DEPT | Facility: HOSPITAL | Age: 75
End: 2025-06-15

## 2025-07-22 DIAGNOSIS — F41.9 ANXIETY: ICD-10-CM

## 2025-07-22 RX ORDER — PAROXETINE 20 MG/1
20 TABLET, FILM COATED ORAL DAILY
Qty: 90 TABLET | Refills: 1 | Status: SHIPPED | OUTPATIENT
Start: 2025-07-22

## 2025-08-01 ENCOUNTER — APPOINTMENT (OUTPATIENT)
Age: 75
End: 2025-08-01
Payer: MEDICARE

## 2025-08-01 DIAGNOSIS — G20.C PARKINSONISM, UNSPECIFIED PARKINSONISM TYPE (HCC): ICD-10-CM

## 2025-08-01 DIAGNOSIS — R41.3 MEMORY IMPAIRMENT: ICD-10-CM

## 2025-08-01 DIAGNOSIS — Z13.29 SCREENING FOR HYPOTHYROIDISM: ICD-10-CM

## 2025-08-01 DIAGNOSIS — Z98.2 S/P VENTRICULAR SHUNT PLACEMENT: ICD-10-CM

## 2025-08-01 DIAGNOSIS — E55.9 VITAMIN D DEFICIENCY: ICD-10-CM

## 2025-08-01 LAB
25(OH)D3 SERPL-MCNC: 25.8 NG/ML (ref 30–100)
TSH SERPL DL<=0.05 MIU/L-ACNC: 2.21 UIU/ML (ref 0.45–4.5)

## 2025-08-01 PROCEDURE — 84443 ASSAY THYROID STIM HORMONE: CPT

## 2025-08-01 PROCEDURE — 82306 VITAMIN D 25 HYDROXY: CPT

## 2025-08-01 PROCEDURE — 36415 COLL VENOUS BLD VENIPUNCTURE: CPT

## (undated) DEVICE — SUT ETHILON 3-0 INFS-1 30 IN 669H

## (undated) DEVICE — NEEDLE SUT SCORPION MULTIFIRE

## (undated) DEVICE — INTENDED FOR TISSUE SEPARATION, AND OTHER PROCEDURES THAT REQUIRE A SHARP SURGICAL BLADE TO PUNCTURE OR CUT.: Brand: BARD-PARKER ® CARBON RIB-BACK BLADES

## (undated) DEVICE — ABDOMINAL PAD: Brand: DERMACEA

## (undated) DEVICE — STOCKINETTE,IMPERVIOUS,12X48,STERILE: Brand: MEDLINE

## (undated) DEVICE — POSITIONER HEAD DISP STRL

## (undated) DEVICE — DISPOSABLE CANNULA WITH OBTURATOR, SMOOTH, 6.0 MM X 75 MM: Brand: CONMED

## (undated) DEVICE — TUBING ARTHROSCOPIC WAVE  MAIN PUMP

## (undated) DEVICE — AMBIENT MEGAVAC 90: Brand: COBLATION

## (undated) DEVICE — NEEDLE 21 G X 1 1/2 SAFETY

## (undated) DEVICE — GLOVE INDICATOR PI UNDERGLOVE SZ 8 BLUE

## (undated) DEVICE — OCCLUSIVE GAUZE STRIP,3% BISMUTH TRIBROMOPHENATE IN PETROLATUM BLEND: Brand: XEROFORM

## (undated) DEVICE — 3M™ STERI-DRAPE™ U-DRAPE 1015: Brand: STERI-DRAPE™

## (undated) DEVICE — GLOVE SRG BIOGEL 7.5

## (undated) DEVICE — 4-PORT MANIFOLD: Brand: NEPTUNE 2

## (undated) DEVICE — MAT FLOOR STEP DRI 24 X 36 IN N-STRL

## (undated) DEVICE — NEEDLE SPINAL 18G X 3IN DISP

## (undated) DEVICE — PREP SURGICAL PURPREP 26ML

## (undated) DEVICE — NEEDLE 18 G X 1 1/2 SAFETY

## (undated) DEVICE — FIBERTAPE 2MM X 7IN AR-7237-7

## (undated) DEVICE — SYRINGE 10ML LL

## (undated) DEVICE — BURR  OVAL 4MM 13CM 8 FLUTE COOLCUT

## (undated) DEVICE — BLADE SHAVER EXCALIBUR 4MM 13CM COOLCUT

## (undated) DEVICE — GLOVE INDICATOR PI UNDERGLOVE SZ 7.5 BLUE

## (undated) DEVICE — GLOVE SRG BIOGEL 8

## (undated) DEVICE — 3M™ DURAPORE™ SURGICAL TAPE 1538-3, 3 INCH X 10 YARD (7,5CM X 9,1M), 4 ROLLS/BOX: Brand: 3M™ DURAPORE™

## (undated) DEVICE — TUBING SUCTION 5MM X 12 FT

## (undated) DEVICE — GAUZE SPONGES,16 PLY: Brand: CURITY

## (undated) DEVICE — SYRINGE 50ML LL

## (undated) DEVICE — SPECIMEN TISSUE TRAP 12MM RIGID

## (undated) DEVICE — AMBIENT COVAC 50 IFS: Brand: COBLATION

## (undated) DEVICE — PACK PBDS SHOULDER ARTHROSCOPY RF

## (undated) DEVICE — READY WET SKIN SCRUB TRAY-LF: Brand: MEDLINE INDUSTRIES, INC.